# Patient Record
Sex: FEMALE | Race: BLACK OR AFRICAN AMERICAN | Employment: OTHER | URBAN - METROPOLITAN AREA
[De-identification: names, ages, dates, MRNs, and addresses within clinical notes are randomized per-mention and may not be internally consistent; named-entity substitution may affect disease eponyms.]

---

## 2017-01-28 ENCOUNTER — HOSPITAL ENCOUNTER (EMERGENCY)
Age: 67
Discharge: HOME | End: 2017-01-28
Attending: EMERGENCY MEDICINE
Payer: SELF-PAY

## 2017-01-28 VITALS
OXYGEN SATURATION: 100 % | BODY MASS INDEX: 27.09 KG/M2 | WEIGHT: 138 LBS | HEART RATE: 82 BPM | TEMPERATURE: 98.9 F | RESPIRATION RATE: 16 BRPM | DIASTOLIC BLOOD PRESSURE: 83 MMHG | HEIGHT: 60 IN | SYSTOLIC BLOOD PRESSURE: 127 MMHG

## 2017-01-28 DIAGNOSIS — M25.512 PAIN IN JOINT OF LEFT SHOULDER: Primary | ICD-10-CM

## 2017-01-28 DIAGNOSIS — M75.42 SHOULDER IMPINGEMENT, LEFT: ICD-10-CM

## 2017-01-28 PROCEDURE — 99283 EMERGENCY DEPT VISIT LOW MDM: CPT

## 2017-01-28 PROCEDURE — 74011250637 HC RX REV CODE- 250/637: Performed by: PHYSICIAN ASSISTANT

## 2017-01-28 RX ORDER — HYDROCODONE BITARTRATE AND ACETAMINOPHEN 5; 325 MG/1; MG/1
1 TABLET ORAL
Status: COMPLETED | OUTPATIENT
Start: 2017-01-28 | End: 2017-01-28

## 2017-01-28 RX ORDER — HYDROCODONE BITARTRATE AND ACETAMINOPHEN 5; 325 MG/1; MG/1
1 TABLET ORAL
Qty: 12 TAB | Refills: 0 | Status: SHIPPED | OUTPATIENT
Start: 2017-01-28 | End: 2017-02-12

## 2017-01-28 RX ADMIN — HYDROCODONE BITARTRATE AND ACETAMINOPHEN 1 TABLET: 5; 325 TABLET ORAL at 15:00

## 2017-01-28 NOTE — DISCHARGE INSTRUCTIONS
MD On-Line Activation    Thank you for requesting access to [facility]. Please follow the instructions below to securely access and download your online medical record. [facility] allows you to send messages to your doctor, view your test results, renew your prescriptions, schedule appointments, and more. How Do I Sign Up? 1. In your internet browser, go to [email or web address]  2. Click on the First Time User? Click Here link in the Sign In box. You will be redirect to the New Member Sign Up page. 3. Enter your [facility] Access Code exactly as it appears below. You will not need to use this code after youve completed the sign-up process. If you do not sign up before the expiration date, you must request a new code. [facility] Access Code: [email or web address]  Expires: 2017  2:22 PM (This is the date your [facility] access code will )    4. Enter the last four digits of your Social Security Number (xxxx) and Date of Birth (mm/dd/yyyy) as indicated and click Submit. You will be taken to the next sign-up page. 5. Create a [facility] ID. This will be your [facility] login ID and cannot be changed, so think of one that is secure and easy to remember. 6. Create a [facility] password. You can change your password at any time. 7. Enter your Password Reset Question and Answer. This can be used at a later time if you forget your password. 8. Enter your e-mail address. You will receive e-mail notification when new information is available in [address]. 9. Click Sign Up. You can now view and download portions of your medical record. 10. Click the Download Summary menu link to download a portable copy of your medical information. Additional Information    If you have questions, please visit the Frequently Asked Questions section of the [facility] On[facility]Line website at [email or web address] Remember, [facility] On[facility] is NOT to be used for urgent needs. For medical emergencies, dial 911.

## 2017-01-28 NOTE — ED PROVIDER NOTES
HPI Comments: 2:12 PM Yao Ty is a 73 y.o. female who presents to the ED c/o left shoulder pain that started a week ago, but worsened several days ago after carrying a 10lb bag of potatoes for 5 blocks. The pain is worsened with movement. Today, the pt's sister measured her temperature and said that she had a fever, so she brought her to the ED. She took Tylenol when the fever was measured. The pt denies any redness of the left shoulder, any abscesses, chest pain, SOB, HA, back pain, trauma, cough, runny nose, sore throat, abdominal pain, N/V/D, urinary symptoms, and any further complaints. The history is provided by the patient. Past Medical History:   Diagnosis Date    Anxiety     Dental caries        No past surgical history on file. No family history on file. Social History     Social History    Marital status: SINGLE     Spouse name: N/A    Number of children: N/A    Years of education: N/A     Occupational History    Not on file. Social History Main Topics    Smoking status: Former Smoker     Years: 15.00    Smokeless tobacco: Not on file    Alcohol use No    Drug use: No      Comment: none in 2 years    Sexual activity: Not on file     Other Topics Concern    Not on file     Social History Narrative         ALLERGIES: Aspirin    Review of Systems   Constitutional: Positive for fever. Negative for chills, diaphoresis and fatigue. HENT: Negative for congestion, ear pain, rhinorrhea and sore throat. Eyes: Negative for pain and redness. Respiratory: Negative for cough, shortness of breath, wheezing and stridor. Cardiovascular: Negative for chest pain, palpitations and leg swelling. Gastrointestinal: Negative for abdominal pain, constipation, diarrhea, nausea and vomiting. Genitourinary: Negative for dysuria, flank pain, frequency, hematuria and urgency. Musculoskeletal: Positive for arthralgias.  Negative for back pain, myalgias, neck pain and neck stiffness. Positive for left shoulder pain   Skin: Negative for color change, rash and wound. Neurological: Negative for dizziness, seizures, syncope, light-headedness, numbness and headaches. Psychiatric/Behavioral: Negative for confusion and hallucinations. All other systems reviewed and are negative. There were no vitals filed for this visit. Physical Exam   Constitutional: She is oriented to person, place, and time. She appears well-developed and well-nourished. She appears distressed. Mildly distressed   HENT:   Head: Normocephalic. Neck: Normal range of motion. Neck supple. Cardiovascular: Normal rate, regular rhythm and normal heart sounds. Exam reveals no gallop and no friction rub. No murmur heard. Pulmonary/Chest: Effort normal and breath sounds normal. No stridor. No respiratory distress. She has no wheezes. She has no rales. Musculoskeletal: She exhibits tenderness. She exhibits no edema or deformity. L shoulder: TTP over the deltoid region and anterior shoulder, no deformity noted, limited ROM upon flexion due to pain, no edema/warmth/ertythema noted, positive Hawkin's and Neer's   Neurological: She is alert and oriented to person, place, and time. Gait is steady. Able to ambulate without difficulty. Skin: Skin is warm and dry. No rash noted. She is not diaphoretic. No erythema. Psychiatric: She has a normal mood and affect. Her behavior is normal. Thought content normal.   Nursing note and vitals reviewed. MDM  Number of Diagnoses or Management Options  Pain in joint of left shoulder:   Diagnosis management comments: Impression:  Shoulder pain, shoulder impingement     norco given in the ED    Patient is stable for discharge at this time. Rx for short course of norco given. Rest and follow-up with PCP/ortho this week. Return to the ED immediately for any new or worsening sx.   Jazmin Beavers PA-C 2:20 PM     Risk of Complications, Morbidity, and/or Mortality  Presenting problems: low  Diagnostic procedures: low  Management options: low    Patient Progress  Patient progress: stable    ED Course       Procedures  Vitals:  Patient Vitals for the past 12 hrs:   Temp Pulse Resp BP SpO2   01/28/17 1412 98.9 °F (37.2 °C) 82 16 127/83 100 %     Pulse ox reviewed and WNL    Medications ordered:   Medications - No data to display      Progress notes, Consult notes or additional Procedure notes:   2:17 PM Pt reevaluated at this time and is resting comfortably in NAD. Discussed results and findings, as well as, diagnosis and plan for discharge. Pt verbalizes understanding and agreement with plan. All questions addressed at this time. Disposition:  Diagnosis: No diagnosis found. Disposition: Discharge    Follow-up Information     None           Patient's Medications   Start Taking    No medications on file   Continue Taking    HYDROXYZINE (VISTARIL) 25 MG CAPSULE    Take 1 Cap by mouth three (3) times daily as needed for Itching. Indications: ALLERGIC DERMATITIS    PERMETHRIN (ELIMITE) 5 % TOPICAL CREAM    Apply from neck to toes where there is itch and rash and leave on skin for 12 hours and repeat in 3 days    PREDNISONE (DELTASONE) 20 MG TABLET    Take 2 Tabs by mouth daily. Take two tabs each morning with breakfast and start tomorrow   These Medications have changed    No medications on file   Stop Taking    No medications on file         SCRIBE ATTESTATION STATEMENT  Documented by: Kiko Marino for, and in the presence of, Jazmin Rose PA-C 2:17 PM     Signed by: Nir Akbar, 01/28/17 2:17 PM    PROVIDER ATTESTATION STATEMENT  I personally performed the services described in the documentation, reviewed the documentation, as recorded by the scribe in my presence, and it accurately and completely records my words and actions.   Jazmin Rose PA-C

## 2017-02-12 ENCOUNTER — HOSPITAL ENCOUNTER (EMERGENCY)
Age: 67
Discharge: HOME | End: 2017-02-12
Attending: EMERGENCY MEDICINE
Payer: COMMERCIAL

## 2017-02-12 VITALS
TEMPERATURE: 98.4 F | BODY MASS INDEX: 27.82 KG/M2 | RESPIRATION RATE: 16 BRPM | WEIGHT: 138 LBS | SYSTOLIC BLOOD PRESSURE: 98 MMHG | DIASTOLIC BLOOD PRESSURE: 63 MMHG | HEART RATE: 92 BPM | HEIGHT: 59 IN | OXYGEN SATURATION: 97 %

## 2017-02-12 DIAGNOSIS — G89.29 CHRONIC LEFT SHOULDER PAIN: Primary | ICD-10-CM

## 2017-02-12 DIAGNOSIS — M25.512 CHRONIC LEFT SHOULDER PAIN: Primary | ICD-10-CM

## 2017-02-12 PROCEDURE — 74011250637 HC RX REV CODE- 250/637: Performed by: PHYSICIAN ASSISTANT

## 2017-02-12 PROCEDURE — 99283 EMERGENCY DEPT VISIT LOW MDM: CPT

## 2017-02-12 RX ORDER — TRAMADOL HYDROCHLORIDE 50 MG/1
50 TABLET ORAL
Qty: 12 TAB | Refills: 0 | Status: SHIPPED | OUTPATIENT
Start: 2017-02-12 | End: 2017-06-11

## 2017-02-12 RX ORDER — TRAMADOL HYDROCHLORIDE 50 MG/1
50 TABLET ORAL
Status: COMPLETED | OUTPATIENT
Start: 2017-02-12 | End: 2017-02-12

## 2017-02-12 RX ADMIN — TRAMADOL HYDROCHLORIDE 50 MG: 50 TABLET, FILM COATED ORAL at 11:56

## 2017-02-12 NOTE — ED PROVIDER NOTES
HPI Comments: 11:27 AM Joselin Patel is a 73 y.o. female who presents to the ED c/o left shoulder pain that started 2 weeks ago after carrying a 10 lb bag of potatoes for several blocks. She was seen in the ED two weeks ago and was prescribed Norco, that did not help her pain. She tried taking a friend's pain pill which helped relieve her pain, but she cannot remember the name of the medications. The pain is worsened with movement. She was referred to Dr. Kailash Viramontes, but she has been unable to see him as she does not have any insurance. The pt denies fever, new trauma, neck pain, back pain, numbness or tingling in the left arm, chest pain, SOB, and any further complaints. The history is provided by the patient. Past Medical History:   Diagnosis Date    Anxiety     Dental caries        No past surgical history on file. No family history on file. Social History     Social History    Marital status: SINGLE     Spouse name: N/A    Number of children: N/A    Years of education: N/A     Occupational History    Not on file. Social History Main Topics    Smoking status: Former Smoker     Years: 15.00    Smokeless tobacco: Not on file    Alcohol use No    Drug use: No      Comment: none in 2 years    Sexual activity: Not on file     Other Topics Concern    Not on file     Social History Narrative         ALLERGIES: Aspirin    Review of Systems   Constitutional: Negative for chills and fever. HENT: Negative for congestion and rhinorrhea. Respiratory: Negative for cough and shortness of breath. Cardiovascular: Negative for chest pain and leg swelling. Gastrointestinal: Negative for abdominal pain and nausea. Genitourinary: Negative for dysuria and hematuria. Musculoskeletal: Positive for arthralgias (left shoulder ). Negative for myalgias. Skin: Negative for rash and wound. Neurological: Negative for light-headedness and headaches.    Psychiatric/Behavioral: Negative for confusion and hallucinations. Vitals:    02/12/17 1123   BP: 98/63   Pulse: 92   Resp: 16   Temp: 98.4 °F (36.9 °C)   SpO2: 97%   Weight: 62.6 kg (138 lb)   Height: 4' 11\" (1.499 m)            Physical Exam   Constitutional: She is oriented to person, place, and time. She appears well-developed and well-nourished. No distress. HENT:   Head: Normocephalic and atraumatic. Eyes: Conjunctivae are normal. Right eye exhibits no discharge. Left eye exhibits no discharge. No scleral icterus. Neck: Normal range of motion. Neck supple.   + Spurling's to left   Cardiovascular: Normal rate, regular rhythm and normal heart sounds. Pulmonary/Chest: Effort normal and breath sounds normal. No respiratory distress. She has no wheezes. She has no rales. Musculoskeletal: Normal range of motion. TTP left anterior shoulder and overlying superior deltoid, + NEER sign. NVI distally with 5/5  strength. Neurological: She is alert and oriented to person, place, and time. She exhibits normal muscle tone. Skin: Skin is warm and dry. No rash noted. She is not diaphoretic. No erythema. No pallor. Psychiatric: She has a normal mood and affect. Her behavior is normal. Judgment and thought content normal.   Nursing note and vitals reviewed. MDM  Number of Diagnoses or Management Options  Chronic left shoulder pain:   Diagnosis management comments: DDX: nerve impingement, ligamentous tear, fracture, muscle strain, versus other etiologies. Pt appears to have impingement in her shoulder and possibly her neck as well. Will not write for norco, as cannot refill narcotics in the ED. Will give her a few ultram and instructions to f/u with ortho.  referral placed.      ED Course       Procedures      Vitals:  Patient Vitals for the past 12 hrs:   Temp Pulse Resp BP SpO2   02/12/17 1123 98.4 °F (36.9 °C) 92 16 98/63 97 %     Pulse ox reviewed and WNL    Medications ordered:   Medications   traMADol Shearon Se) tablet 50 mg (50 mg Oral Given 2/12/17 1156)           Progress notes, Consult notes or additional Procedure notes:     11:34 AM Pt reevaluated at this time and is resting comfortably in NAD. Discussed results and findings, as well as, diagnosis and plan for discharge. Pt verbalizes understanding and agreement with plan. All questions addressed at this time. Disposition:  Diagnosis:   1. Chronic left shoulder pain        Disposition: Discharge    Follow-up Information     Follow up With Details Comments Contact Info    Stacy Chapman MD In 3 days  [address] Replaced by [facility] [address] [facility] Call in 3 days  [address]    [facility]  If symptoms worsen [address] [facility]  [phone]           Patient's Medications   Start Taking    TRAMADOL (ULTRAM) 50 MG TABLET    Take 1 Tab by mouth every six (6) hours as needed for Pain. Max Daily Amount: 200 mg. Continue Taking    HYDROXYZINE (VISTARIL) 25 MG CAPSULE    Take 1 Cap by mouth three (3) times daily as needed for Itching. Indications: ALLERGIC DERMATITIS    PERMETHRIN (ELIMITE) 5 % TOPICAL CREAM    Apply from neck to toes where there is itch and rash and leave on skin for 12 hours and repeat in 3 days    PREDNISONE (DELTASONE) 20 MG TABLET    Take 2 Tabs by mouth daily. Take two tabs each morning with breakfast and start tomorrow   These Medications have changed    No medications on file   Stop Taking    HYDROCODONE-ACETAMINOPHEN (NORCO) 5-325 MG PER TABLET    Take 1 Tab by mouth every four (4) hours as needed for Pain. Max Daily Amount: 6 Tabs.          SCRIBE ATTESTATION STATEMENT  Documented by: Malia Hernandez scribing for, and in the presence of, Roxanne Cain PA-C 11:33 AM     Signed by: Nir Zurita, 02/12/17 11:33 AM      PROVIDER ATTESTATION STATEMENT  I personally performed the services described in the documentation, reviewed the documentation, as recorded by the scribe in my presence, and it accurately and completely records my words and actions.   Chauncey Quintero PA-C

## 2017-02-12 NOTE — ED NOTES
I have reviewed discharge instructions with the patient. The patient verbalized understanding. Patient armband removed and shredded  Pt seen and treated by provider.

## 2017-02-12 NOTE — DISCHARGE INSTRUCTIONS
Shoulder Pain: Care Instructions  Your Care Instructions    You can hurt your shoulder by using it too much during an activity, such as fishing or baseball. It can also happen as part of the everyday wear and tear of getting older. Shoulder injuries can be slow to heal, but your shoulder should get better with time. Your doctor may recommend a sling to rest your shoulder. If you have injured your shoulder, you may need testing and treatment. Follow-up care is a key part of your treatment and safety. Be sure to make and go to all appointments, and call your doctor if you are having problems. It's also a good idea to know your test results and keep a list of the medicines you take. How can you care for yourself at home? · Take pain medicines exactly as directed. ¨ If the doctor gave you a prescription medicine for pain, take it as prescribed. ¨ If you are not taking a prescription pain medicine, ask your doctor if you can take an over-the-counter medicine. ¨ Do not take two or more pain medicines at the same time unless the doctor told you to. Many pain medicines contain acetaminophen, which is Tylenol. Too much acetaminophen (Tylenol) can be harmful. · If your doctor recommends that you wear a sling, use it as directed. Do not take it off before your doctor tells you to. · Put ice or a cold pack on the sore area for 10 to 20 minutes at a time. Put a thin cloth between the ice and your skin. · If there is no swelling, you can put moist heat, a heating pad, or a warm cloth on your shoulder. Some doctors suggest alternating between hot and cold. · Rest your shoulder for a few days. If your doctor recommends it, you can then begin gentle exercise of the shoulder, but do not lift anything heavy. When should you call for help? Call 911 anytime you think you may need emergency care. For example, call if:  · You have chest pain or pressure. This may occur with:  ¨ Sweating. ¨ Shortness of breath.   ¨ Nausea or vomiting. ¨ Pain that spreads from the chest to the neck, jaw, or one or both shoulders or arms. ¨ Dizziness or lightheadedness. ¨ A fast or uneven pulse. After calling 911, chew 1 adult-strength aspirin. Wait for an ambulance. Do not try to drive yourself. · Your arm or hand is cool or pale or changes color. Call your doctor now or seek immediate medical care if:  · You have signs of infection, such as:  ¨ Increased pain, swelling, warmth, or redness in your shoulder. ¨ Red streaks leading from a place on your shoulder. ¨ Pus draining from an area of your shoulder. ¨ Swollen lymph nodes in your neck, armpits, or groin. ¨ A fever. Watch closely for changes in your health, and be sure to contact your doctor if:  · You cannot use your shoulder. · Your shoulder does not get better as expected. Where can you learn more? Go to http://hussein-nickolas.info/. Enter D549 in the search box to learn more about \"Shoulder Pain: Care Instructions. \"  Current as of: May 23, 2016  Content Version: 11.1  © 7532-7904 [employer]. Care instructions adapted under license by [employer] (which disclaims liability or warranty for this information). If you have questions about a medical condition or this instruction, always ask your healthcare professional. [place] [address] any warranty or liability for your use of this information.

## 2017-02-12 NOTE — ED TRIAGE NOTES
Pt, c/o pain left shoulder for 4 weeks,  Was seen  Here  For same problem  Given  Rx  States   Its  Did not help

## 2017-02-12 NOTE — LETTER
NOTIFICATION OF RETURN TO WORK / SCHOOL 
 
2/12/2017 Ms. Nayeli Mitchell [address] 
[address] [facility] To Whom It May Concern: Nayeli Mitchell was under the care of [facility] from 2/12/17 to 2/14/17. She will return to work on 2/15/17 with no restrictions. If there are questions or concerns please have the patient contact our office.  
 
 
 
Sincerely, 
 
 
Roseline Sotelo RN

## 2017-02-14 NOTE — ED NOTES
Patient will need to follow up at [facility] ED because she currently does not have any insurance. She will have to get qualified for the [employer] in order to pay for services at Orthopedic office in [place]. Patient will be given information to follow up with the [facility] or the [address] [place] [address] in order to get an referral to go to Orthopedic and [address] [place] [address]. They require to pay a $200 co-pay or make payment arrangement with their office in order to see the doctor. The patient will be mailed out information to contact [facility] in order to see if she qualifies for the [facility] in order to be seen at Ortho. Patient will be followed up in 1 week to ensure that she has contacted the foundation or been seen on the [place] [id number], and [facility].

## 2017-02-24 ENCOUNTER — HOSPITAL ENCOUNTER (EMERGENCY)
Age: 67
Discharge: HOME | End: 2017-02-24
Attending: EMERGENCY MEDICINE
Payer: SELF-PAY

## 2017-02-24 ENCOUNTER — APPOINTMENT (OUTPATIENT)
Dept: GENERAL RADIOLOGY | Age: 67
End: 2017-02-24
Attending: PHYSICIAN ASSISTANT
Payer: SELF-PAY

## 2017-02-24 VITALS
DIASTOLIC BLOOD PRESSURE: 83 MMHG | HEART RATE: 68 BPM | OXYGEN SATURATION: 99 % | WEIGHT: 138 LBS | RESPIRATION RATE: 16 BRPM | SYSTOLIC BLOOD PRESSURE: 134 MMHG | TEMPERATURE: 98.6 F | HEIGHT: 59 IN | BODY MASS INDEX: 27.82 KG/M2

## 2017-02-24 DIAGNOSIS — A59.03 TRICHOMONAL CYSTITIS: ICD-10-CM

## 2017-02-24 DIAGNOSIS — B86 SCABIES: ICD-10-CM

## 2017-02-24 DIAGNOSIS — R07.9 CHEST PAIN, UNSPECIFIED TYPE: Primary | ICD-10-CM

## 2017-02-24 LAB
ALBUMIN SERPL BCP-MCNC: 3.8 G/DL (ref 3.4–5)
ALBUMIN/GLOB SERPL: 1.1 {RATIO} (ref 0.8–1.7)
ALP SERPL-CCNC: 53 U/L (ref 45–117)
ALT SERPL-CCNC: 26 U/L (ref 13–56)
ANION GAP BLD CALC-SCNC: 4 MMOL/L (ref 3–18)
APPEARANCE UR: ABNORMAL
AST SERPL W P-5'-P-CCNC: 18 U/L (ref 15–37)
ATRIAL RATE: 76 BPM
BACTERIA URNS QL MICRO: ABNORMAL /HPF
BASOPHILS # BLD AUTO: 0 K/UL (ref 0–0.06)
BASOPHILS # BLD: 1 % (ref 0–2)
BILIRUB SERPL-MCNC: 0.5 MG/DL (ref 0.2–1)
BILIRUB UR QL: NEGATIVE
BUN SERPL-MCNC: 12 MG/DL (ref 7–18)
BUN/CREAT SERPL: 14 (ref 12–20)
CALCIUM SERPL-MCNC: 9.3 MG/DL (ref 8.5–10.1)
CALCULATED P AXIS, ECG09: 46 DEGREES
CALCULATED R AXIS, ECG10: 24 DEGREES
CALCULATED T AXIS, ECG11: 37 DEGREES
CHLORIDE SERPL-SCNC: 104 MMOL/L (ref 100–108)
CK MB CFR SERPL CALC: 1.6 % (ref 0–4)
CK MB SERPL-MCNC: 3.1 NG/ML (ref 5–25)
CK SERPL-CCNC: 195 U/L (ref 26–192)
CO2 SERPL-SCNC: 31 MMOL/L (ref 21–32)
COLOR UR: YELLOW
CREAT SERPL-MCNC: 0.84 MG/DL (ref 0.6–1.3)
DIAGNOSIS, 93000: NORMAL
DIFFERENTIAL METHOD BLD: NORMAL
EOSINOPHIL # BLD: 0.1 K/UL (ref 0–0.4)
EOSINOPHIL NFR BLD: 2 % (ref 0–5)
EPITH CASTS URNS QL MICRO: ABNORMAL /LPF (ref 0–5)
ERYTHROCYTE [DISTWIDTH] IN BLOOD BY AUTOMATED COUNT: 14 % (ref 11.6–14.5)
GLOBULIN SER CALC-MCNC: 3.6 G/DL (ref 2–4)
GLUCOSE SERPL-MCNC: 125 MG/DL (ref 74–99)
GLUCOSE UR STRIP.AUTO-MCNC: NEGATIVE MG/DL
HCT VFR BLD AUTO: 38.9 % (ref 35–45)
HGB BLD-MCNC: 13.8 G/DL (ref 12–16)
HGB UR QL STRIP: ABNORMAL
KETONES UR QL STRIP.AUTO: NEGATIVE MG/DL
LEUKOCYTE ESTERASE UR QL STRIP.AUTO: ABNORMAL
LYMPHOCYTES # BLD AUTO: 48 % (ref 21–52)
LYMPHOCYTES # BLD: 3.2 K/UL (ref 0.9–3.6)
MAGNESIUM SERPL-MCNC: 2.3 MG/DL (ref 1.8–2.4)
MCH RBC QN AUTO: 28.9 PG (ref 24–34)
MCHC RBC AUTO-ENTMCNC: 35.5 G/DL (ref 31–37)
MCV RBC AUTO: 81.6 FL (ref 74–97)
MONOCYTES # BLD: 0.5 K/UL (ref 0.05–1.2)
MONOCYTES NFR BLD AUTO: 8 % (ref 3–10)
NEUTS SEG # BLD: 2.7 K/UL (ref 1.8–8)
NEUTS SEG NFR BLD AUTO: 41 % (ref 40–73)
NITRITE UR QL STRIP.AUTO: NEGATIVE
P-R INTERVAL, ECG05: 154 MS
PH UR STRIP: 7 [PH] (ref 5–8)
PLATELET # BLD AUTO: 264 K/UL (ref 135–420)
PMV BLD AUTO: 9.7 FL (ref 9.2–11.8)
POTASSIUM SERPL-SCNC: 4.1 MMOL/L (ref 3.5–5.5)
PROT SERPL-MCNC: 7.4 G/DL (ref 6.4–8.2)
PROT UR STRIP-MCNC: NEGATIVE MG/DL
Q-T INTERVAL, ECG07: 408 MS
QRS DURATION, ECG06: 72 MS
QTC CALCULATION (BEZET), ECG08: 459 MS
RBC # BLD AUTO: 4.77 M/UL (ref 4.2–5.3)
RBC #/AREA URNS HPF: ABNORMAL /HPF (ref 0–5)
SODIUM SERPL-SCNC: 139 MMOL/L (ref 136–145)
SP GR UR REFRACTOMETRY: 1.02 (ref 1–1.03)
TRICHOMONAS UR QL MICRO: ABNORMAL
TROPONIN I SERPL-MCNC: <0.02 NG/ML (ref 0–0.04)
UROBILINOGEN UR QL STRIP.AUTO: 1 EU/DL (ref 0.2–1)
VENTRICULAR RATE, ECG03: 76 BPM
WBC # BLD AUTO: 6.6 K/UL (ref 4.6–13.2)
WBC URNS QL MICRO: ABNORMAL /HPF (ref 0–4)

## 2017-02-24 PROCEDURE — 93005 ELECTROCARDIOGRAM TRACING: CPT

## 2017-02-24 PROCEDURE — 85025 COMPLETE CBC W/AUTO DIFF WBC: CPT | Performed by: PHYSICIAN ASSISTANT

## 2017-02-24 PROCEDURE — 71020 XR CHEST PA LAT: CPT

## 2017-02-24 PROCEDURE — 82550 ASSAY OF CK (CPK): CPT | Performed by: PHYSICIAN ASSISTANT

## 2017-02-24 PROCEDURE — 80053 COMPREHEN METABOLIC PANEL: CPT | Performed by: PHYSICIAN ASSISTANT

## 2017-02-24 PROCEDURE — 83735 ASSAY OF MAGNESIUM: CPT | Performed by: PHYSICIAN ASSISTANT

## 2017-02-24 PROCEDURE — 74011250637 HC RX REV CODE- 250/637: Performed by: PHYSICIAN ASSISTANT

## 2017-02-24 PROCEDURE — 99284 EMERGENCY DEPT VISIT MOD MDM: CPT

## 2017-02-24 PROCEDURE — 81001 URINALYSIS AUTO W/SCOPE: CPT | Performed by: PHYSICIAN ASSISTANT

## 2017-02-24 RX ORDER — PERMETHRIN 50 MG/G
CREAM TOPICAL
Qty: 60 G | Refills: 0 | Status: SHIPPED | OUTPATIENT
Start: 2017-02-24 | End: 2017-03-22

## 2017-02-24 RX ORDER — HYDROXYZINE PAMOATE 25 MG/1
50 CAPSULE ORAL
Status: COMPLETED | OUTPATIENT
Start: 2017-02-24 | End: 2017-02-24

## 2017-02-24 RX ORDER — METRONIDAZOLE 500 MG/1
500 TABLET ORAL 2 TIMES DAILY
Qty: 14 TAB | Refills: 0 | Status: SHIPPED | OUTPATIENT
Start: 2017-02-24 | End: 2017-03-03

## 2017-02-24 RX ORDER — HYDROXYZINE PAMOATE 25 MG/1
25 CAPSULE ORAL
Qty: 30 CAP | Refills: 0 | Status: SHIPPED | OUTPATIENT
Start: 2017-02-24 | End: 2017-06-11

## 2017-02-24 RX ADMIN — HYDROXYZINE PAMOATE 50 MG: 25 CAPSULE ORAL at 14:50

## 2017-02-24 NOTE — ED TRIAGE NOTES
Pt,   Of itching  On arms & legs  For about a week , had been scratching it hard,  Also added  That she had chest pain  Started last night,

## 2017-02-24 NOTE — ED NOTES
PT ambulated to room F2 without difficulty, reports itching to bilateral arms and reports scabies, also reports incident of CP last night but was out of prescribed nitro, Ax4, safety intact, will continue to monitor

## 2017-02-24 NOTE — ED PROVIDER NOTES
HPI Comments: 70yoF with hx of anxiety, scabies, bradycardia to ED c/o bilateral arm itching x last night. Pt states when she was itching her arms this morning, she started to experience left sided chest pain (8/9am). The pain lasted for about 5-10 min and has not returned. Reports mild SOB at that time. Denies N/V/D, abd pain, dizziness, HA or any other symptoms. She usually takes nitro for the CP but has been out. Pt reports she believes the itching is from scabies as it is similar to the itching she had last year when she was dx with scabies. Patient is a 73 y.o. female presenting with skin problem and other event. Skin Problem   Associated symptoms include chest pain and shortness of breath. Other   Associated symptoms include chest pain and shortness of breath. Past Medical History:   Diagnosis Date    Anxiety     Dental caries        No past surgical history on file. No family history on file. Social History     Social History    Marital status: SINGLE     Spouse name: N/A    Number of children: N/A    Years of education: N/A     Occupational History    Not on file. Social History Main Topics    Smoking status: Former Smoker     Years: 15.00    Smokeless tobacco: Not on file    Alcohol use No    Drug use: No      Comment: none in 2 years    Sexual activity: Not on file     Other Topics Concern    Not on file     Social History Narrative         ALLERGIES: Aspirin    Review of Systems   Respiratory: Positive for shortness of breath. Cardiovascular: Positive for chest pain. Skin: Positive for rash. All other systems reviewed and are negative. Vitals:    02/24/17 1106   BP: 134/83   Pulse: 68   Resp: 16   Temp: 98.6 °F (37 °C)   SpO2: 99%   Weight: 62.6 kg (138 lb)   Height: 4' 11\" (1.499 m)            Physical Exam   Constitutional: She is oriented to person, place, and time. She appears well-developed and well-nourished. No distress.    Resting comfortably, sunglasses on   HENT:   Head: Normocephalic and atraumatic. Right Ear: External ear normal.   Left Ear: External ear normal.   Mouth/Throat: Oropharynx is clear and moist. No oropharyngeal exudate. Eyes: Conjunctivae and EOM are normal. Pupils are equal, round, and reactive to light. Neck: Normal range of motion. Neck supple. Cardiovascular: Normal rate and regular rhythm. Pulmonary/Chest: Effort normal. She has no wheezes. Abdominal: Soft. Bowel sounds are normal. There is no tenderness. Musculoskeletal: Normal range of motion. She exhibits no edema. Neurological: She is alert and oriented to person, place, and time. Skin: Skin is warm and dry. She is not diaphoretic. Psychiatric: She has a normal mood and affect. MDM  Number of Diagnoses or Management Options  Chest pain, unspecified type:   Scabies:   Trichomonal cystitis:   Diagnosis management comments: Pt presents c/o CP and SOB onset this morning while she was itching her arms due to scabies. Pt has been CP free since this morning. She is walking around the department w/o distress or complaints. CP likely anxiety related. Labs pending. SKYE White 2:36 PM  2:45 PM Labs are reassuring with exception of +trichomonas in urine, will place on flagyl. Pt states she is calling her ride because she is ready to leave.  SKYE White         Amount and/or Complexity of Data Reviewed  Clinical lab tests: ordered and reviewed  Tests in the radiology section of CPT®: ordered and reviewed    Risk of Complications, Morbidity, and/or Mortality  Presenting problems: moderate  Diagnostic procedures: moderate  Management options: low    Patient Progress  Patient progress: stable    ED Course       Procedures

## 2017-02-24 NOTE — DISCHARGE INSTRUCTIONS
Chest Pain: Care Instructions  Your Care Instructions  There are many things that can cause chest pain. Some are not serious and will get better on their own in a few days. But some kinds of chest pain need more testing and treatment. Your doctor may have recommended a follow-up visit in the next 8 to 12 hours. If you are not getting better, you may need more tests or treatment. Even though your doctor has released you, you still need to watch for any problems. The doctor carefully checked you, but sometimes problems can develop later. If you have new symptoms or if your symptoms do not get better, get medical care right away. If you have worse or different chest pain or pressure that lasts more than 5 minutes or you passed out (lost consciousness), call 911 or seek other emergency help right away. A medical visit is only one step in your treatment. Even if you feel better, you still need to do what your doctor recommends, such as going to all suggested follow-up appointments and taking medicines exactly as directed. This will help you recover and help prevent future problems. How can you care for yourself at home? · Rest until you feel better. · Take your medicine exactly as prescribed. Call your doctor if you think you are having a problem with your medicine. · Do not drive after taking a prescription pain medicine. When should you call for help? Call 911 if:  · You passed out (lost consciousness). · You have severe difficulty breathing. · You have symptoms of a heart attack. These may include:  ¨ Chest pain or pressure, or a strange feeling in your chest.  ¨ Sweating. ¨ Shortness of breath. ¨ Nausea or vomiting. ¨ Pain, pressure, or a strange feeling in your back, neck, jaw, or upper belly or in one or both shoulders or arms. ¨ Lightheadedness or sudden weakness. ¨ A fast or irregular heartbeat.   After you call 911, the  may tell you to chew 1 adult-strength or 2 to 4 low-dose aspirin. Wait for an ambulance. Do not try to drive yourself. Call your doctor today if:  · You have any trouble breathing. · Your chest pain gets worse. · You are dizzy or lightheaded, or you feel like you may faint. · You are not getting better as expected. · You are having new or different chest pain. Where can you learn more? Go to http://hussein-nickolas.info/. Enter A120 in the search box to learn more about \"Chest Pain: Care Instructions. \"  Current as of: May 27, 2016  Content Version: 11.1  © 2651-2163 [employer]. Care instructions adapted under license by [employer] (which disclaims liability or warranty for this information). If you have questions about a medical condition or this instruction, always ask your healthcare professional. [address] any warranty or liability for your use of this information. Scabies: Care Instructions  Your Care Instructions  Scabies is a skin problem that can cause intense itching. It is caused by very tiny bugs called mites that dig just under the skin and lay eggs. An allergic reaction to the mites causes the itching. Scabies is usually spread by person-to-person contact. It is also possible, but not common, for scabies to spread through towels, clothes, and bedding. Everyone in your household should be treated. Scabies is treated with medicine. Itching may last for several weeks after treatment. Follow-up care is a key part of your treatment and safety. Be sure to make and go to all appointments, and call your doctor if you are having problems. It's also a good idea to know your test results and keep a list of the medicines you take. How can you care for yourself at home? · Use the lotion or cream your doctor recommends or prescribes. One treatment usually cures scabies. Do not use the cream again unless your doctor tells you to.   · Wash all clothes, bedding, and towels that you used in the 3 days before you started treatment. Use hot water, and use the hot cycle in the dryer. Another option is to dry-clean these items. Or seal them in a plastic bag for 3 to 7 days. · Take an oral antihistamine, such as loratadine (Claritin) or diphenhydramine (Benadryl), to help stop itching. You also can use a nonprescription anti-itch cream. Read and follow all instructions on the label. · Do not have physical contact with other people or let anyone use your personal items until you have finished treatment. Do not use other people's personal items until your treatment is done. Tell people with whom you have close contact that they will need treatment if they have symptoms. · Take an oatmeal bath to help relieve itching. Add a handful of oatmeal (ground to a powder) to your bath. Or you can try an oatmeal bath product, such as Aveeno. When should you call for help? Call your doctor now or seek immediate medical care if:  · You have signs of infection, such as:  ¨ Increased pain, swelling, warmth, or redness. ¨ Red streaks leading from the mite bites. ¨ Pus draining from a bite area. ¨ A fever. Watch closely for changes in your health, and be sure to contact your doctor if:  · Anyone else in your family has itching. · You do not get better within 2 weeks. Where can you learn more? Go to http://hussein-nickolas.info/. Enter R344 in the search box to learn more about \"Scabies: Care Instructions. \"  Current as of: February 5, 2016  Content Version: 11.1  © 1109-1760 [employer]. Care instructions adapted under license by [employer] (which disclaims liability or warranty for this information). If you have questions about a medical condition or this instruction, always ask your healthcare professional. [address] any warranty or liability for your use of this information.          Trichomoniasis: Care Instructions  Your Care Instructions  Trichomoniasis is a sexually transmitted infection (STI) that is spread by having sex with an infected partner. Trichomoniasis is commonly called trich (say \"trick\"). In women, trich may cause vaginal itching and a smelly discharge. But in many cases, especially in men, there are no symptoms. Ajay Rising is treated so that you do not spread it to others. Both you and your sex partner or partners should be treated at the same time so you do not infect each other again. Trich may cause problems with pregnancy. Your doctor will talk with you about treatment for Trich if you are pregnant. Follow-up care is a key part of your treatment and safety. Be sure to make and go to all appointments, and call your doctor if you are having problems. Its also a good idea to know your test results and keep a list of the medicines you take. How can you care for yourself at home? · Take your antibiotics as directed. Do not stop taking them just because you feel better. You need to take the full course of antibiotics. · Do not have sex while you are being treated. If your doctor gave you a single dose of antibiotics, do not have sex for one week after being treated and until your partner also has been treated. · Tell your sex partner (or partners) that he or she will also need to be tested and treated. · Use a cold water compress or cool baths to relieve itching. To prevent trichomoniasis in the future  · Use latex condoms every time you have sex. Use them from the beginning to the end of sexual contact. · Talk to your partner before having sex. Find out if he or she has or is at risk for trich or any other STI. Keep in mind that a person may be able to spread an STI even if he or she does not have symptoms. · Do not have sex if you are being treated for trich or any other STI. · Do not have sex with anyone who has symptoms of an STI, such as sores on the genitals or mouth.   · Having one sex partner (who does not have STIs and does not have sex with anyone else) is a good way to avoid STIs. When should you call for help? Call your doctor now or seek immediate medical care if:  · You have unusual vaginal bleeding. · You have a fever. · You have new discharge from the vagina or penis. · You have pelvic pain. Watch closely for changes in your health, and be sure to contact your doctor if:  · You do not get better as expected. · You have any new symptoms or your symptoms get worse. Where can you learn more? Go to http://hussein-nickolas.info/. Enter L561 in the search box to learn more about \"Trichomoniasis: Care Instructions. \"  Current as of: May 27, 2016  Content Version: 11.1  © 2353-5905 [employer]. Care instructions adapted under license by [employer] (which disclaims liability or warranty for this information). If you have questions about a medical condition or this instruction, always ask your healthcare professional. [address] any warranty or liability for your use of this information.

## 2017-03-22 ENCOUNTER — HOSPITAL ENCOUNTER (EMERGENCY)
Age: 67
Discharge: STILL ADMITTED OR ADMINISTRATIVE | End: 2017-03-22
Attending: EMERGENCY MEDICINE
Payer: SELF-PAY

## 2017-03-22 PROCEDURE — 75810000275 HC EMERGENCY DEPT VISIT NO LEVEL OF CARE

## 2017-03-22 RX ORDER — PERMETHRIN 50 MG/G
CREAM TOPICAL
Qty: 60 G | Refills: 0 | Status: SHIPPED | OUTPATIENT
Start: 2017-03-22 | End: 2017-06-11

## 2017-05-17 ENCOUNTER — HOSPITAL ENCOUNTER (EMERGENCY)
Age: 67
Discharge: HOME | End: 2017-05-17
Attending: EMERGENCY MEDICINE
Payer: SELF-PAY

## 2017-05-17 VITALS
SYSTOLIC BLOOD PRESSURE: 106 MMHG | HEIGHT: 60 IN | DIASTOLIC BLOOD PRESSURE: 77 MMHG | HEART RATE: 89 BPM | TEMPERATURE: 98.4 F | RESPIRATION RATE: 16 BRPM | OXYGEN SATURATION: 97 % | BODY MASS INDEX: 27.48 KG/M2 | WEIGHT: 140 LBS

## 2017-05-17 DIAGNOSIS — M72.2 PLANTAR FASCIITIS: ICD-10-CM

## 2017-05-17 DIAGNOSIS — K05.10 GINGIVITIS: Primary | ICD-10-CM

## 2017-05-17 DIAGNOSIS — M79.672 LEFT FOOT PAIN: ICD-10-CM

## 2017-05-17 PROCEDURE — 99282 EMERGENCY DEPT VISIT SF MDM: CPT

## 2017-05-17 RX ORDER — ACETAMINOPHEN AND CODEINE PHOSPHATE 300; 30 MG/1; MG/1
1 TABLET ORAL
Qty: 12 TAB | Refills: 0 | Status: SHIPPED | OUTPATIENT
Start: 2017-05-17 | End: 2017-06-11

## 2017-05-17 NOTE — ED PROVIDER NOTES
HPI Comments: 3:34 PM Ilia Guido is a 73 y.o. female with a history of dental caries who presents to ED to be evaluated for gum pain onset 2 days ago. Pt also c/o L foot pain she describes as \"burning\" in the ball of her foot. States she has been experiencing the pain for 8- 9 months. No other concerns at this time. The history is provided by the patient. Past Medical History:   Diagnosis Date    Angina at rest [facility])     Anxiety     Dental caries        No past surgical history on file. No family history on file. Social History     Social History    Marital status: SINGLE     Spouse name: N/A    Number of children: N/A    Years of education: N/A     Occupational History    Not on file. Social History Main Topics    Smoking status: Former Smoker     Years: 15.00    Smokeless tobacco: Not on file    Alcohol use No    Drug use: No      Comment: none in 2 years    Sexual activity: Not on file     Other Topics Concern    Not on file     Social History Narrative         ALLERGIES: Aspirin    Review of Systems   Constitutional: Negative for fever. HENT: Positive for dental problem (Gum pain). Negative for congestion, ear pain and sore throat. Eyes: Negative for pain. Respiratory: Negative for cough and shortness of breath. Cardiovascular: Negative for chest pain. Gastrointestinal: Negative for abdominal pain and vomiting. Genitourinary: Negative for difficulty urinating, dysuria and hematuria. Musculoskeletal: Negative for back pain and neck pain. L foot pain   Skin: Negative for rash. Neurological: Negative for light-headedness. All other systems reviewed and are negative. Vitals:    05/17/17 1534   BP: 106/77   Pulse: 89   Resp: 16   Temp: 98.4 °F (36.9 °C)   SpO2: 97%   Weight: 63.5 kg (140 lb)   Height: 5' (1.524 m)            Physical Exam   Constitutional: She appears well-developed and well-nourished. Non-toxic appearance.  She does not have a sickly appearance. She does not appear ill. No distress. HENT:   Head: Normocephalic and atraumatic. Mouth/Throat: Uvula is midline, oropharynx is clear and moist and mucous membranes are normal. She does not have dentures. Abnormal dentition. No dental abscesses, uvula swelling or dental caries. No oropharyngeal exudate, posterior oropharyngeal edema, posterior oropharyngeal erythema or tonsillar abscesses. Edentulous   No gingivitis noted   Eyes: Conjunctivae and EOM are normal. Pupils are equal, round, and reactive to light. No scleral icterus. Neck: Trachea normal and normal range of motion. Neck supple. No hepatojugular reflux and no JVD present. No tracheal deviation present. No thyromegaly present. Cardiovascular: Normal rate, regular rhythm, S1 normal, S2 normal, normal heart sounds, intact distal pulses and normal pulses. Exam reveals no gallop, no S3 and no S4. No murmur heard. Pulses:       Radial pulses are 2+ on the right side, and 2+ on the left side. Dorsalis pedis pulses are 2+ on the right side, and 2+ on the left side. Pulmonary/Chest: Effort normal and breath sounds normal. No respiratory distress. She has no decreased breath sounds. She has no wheezes. She has no rhonchi. She has no rales. Abdominal: Soft. Normal appearance and bowel sounds are normal. She exhibits no distension and no mass. There is no hepatosplenomegaly. There is no tenderness. There is no rigidity, no rebound, no guarding, no CVA tenderness, no tenderness at McBurney's point and negative Eagle's sign. Musculoskeletal: Normal range of motion. Feet:    Lymphadenopathy:        Head (right side): No submental, no submandibular, no preauricular and no occipital adenopathy present. Head (left side): No submental, no submandibular, no preauricular and no occipital adenopathy present. She has no cervical adenopathy. Right: No supraclavicular adenopathy present.         Left: No supraclavicular adenopathy present. Neurological: She is alert. She has normal strength and normal reflexes. She is not disoriented. No cranial nerve deficit or sensory deficit. Coordination and gait normal. GCS eye subscore is 4. GCS verbal subscore is 5. GCS motor subscore is 6. Grossly intact    Skin: Skin is warm, dry and intact. No rash noted. She is not diaphoretic. Psychiatric: She has a normal mood and affect. Her speech is normal and behavior is normal. Judgment and thought content normal. Cognition and memory are normal.   Nursing note and vitals reviewed. MDM  Number of Diagnoses or Management Options  Gingivitis:   Left foot pain:   Plantar fasciitis:     ED Course       Procedures       I have assessed the patient. Patient is feeling better. Patient will be prescribed Tylenol-Codeine #3. Patient was discharged in stable condition. Patient is to return to emergency department if any new or worsening condition. 3:47 PM      Disposition: Discharged    Diagnosis:   1. Gingivitis    2. Left foot pain    3. Plantar fasciitis          Follow-up Information     Follow up With Details Comments Contact Info    Kiesha Gandhi DPM Schedule an appointment as soon as possible for a visit  [address] [place] [address]  Jairon Go to  [address] [place] [address] kalyn Vera scribing for, and in the presence of [facility] found 3:38 PM, 05/17/17. Physician Attestation  I personally performed the services described in the documentation, reviewed the documentation, as recorded by the scribe in my presence, and it accurately and completely records my words and actions.     [facility],   Signed by: Nir Levi, May 17, 2017 at 3:38 PM

## 2017-05-17 NOTE — ED NOTES
Patient armband removed and shredded  I have reviewed discharge instructions with the patient. The patient verbalized understanding. Patient discharged on one prescription.

## 2017-06-11 ENCOUNTER — HOSPITAL ENCOUNTER (EMERGENCY)
Age: 67
Discharge: HOME | End: 2017-06-11
Attending: EMERGENCY MEDICINE
Payer: SELF-PAY

## 2017-06-11 VITALS
BODY MASS INDEX: 27.48 KG/M2 | WEIGHT: 140 LBS | RESPIRATION RATE: 16 BRPM | DIASTOLIC BLOOD PRESSURE: 73 MMHG | TEMPERATURE: 98.3 F | HEART RATE: 88 BPM | SYSTOLIC BLOOD PRESSURE: 113 MMHG | HEIGHT: 60 IN | OXYGEN SATURATION: 98 %

## 2017-06-11 DIAGNOSIS — K05.10 GINGIVITIS: Primary | ICD-10-CM

## 2017-06-11 DIAGNOSIS — Z59.00 HOMELESSNESS: ICD-10-CM

## 2017-06-11 PROCEDURE — 99283 EMERGENCY DEPT VISIT LOW MDM: CPT

## 2017-06-11 PROCEDURE — 74011250637 HC RX REV CODE- 250/637: Performed by: NURSE PRACTITIONER

## 2017-06-11 RX ORDER — CHLORHEXIDINE GLUCONATE 1.2 MG/ML
15 RINSE ORAL
Status: COMPLETED | OUTPATIENT
Start: 2017-06-11 | End: 2017-06-11

## 2017-06-11 RX ORDER — CHLORHEXIDINE GLUCONATE 1.2 MG/ML
15 RINSE ORAL 2 TIMES DAILY
Qty: 420 ML | Refills: 0 | Status: SHIPPED | OUTPATIENT
Start: 2017-06-11 | End: 2017-06-25

## 2017-06-11 RX ADMIN — CHLORHEXIDINE GLUCONATE 15 ML: 1.2 RINSE ORAL at 19:32

## 2017-06-11 SDOH — ECONOMIC STABILITY - HOUSING INSECURITY: HOMELESSNESS UNSPECIFIED: Z59.00

## 2017-06-11 NOTE — ED PROVIDER NOTES
HPI Comments: Patient is a 73 y.o.  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 States she is homeless, has not seen a dentist as an adult, lost the majority of her teeth years ago and has not returned to the [facility]"in years\" for her general care. She denies swelling to the face, inability to control oral secretions, swallow, sensation of swelling to tongue or throat, discharge of blood or purulence, inability to open or close mouth, external trauma or abuse, fever/chills, n/v, CP, SOB or swallowing/inhaling the broken tooth/hot food/beverage or caustic substance. This did start after a lot of soda yesterday. Patient is a 73 y.o. female presenting with dental problem. The history is provided by the patient. Dental Pain    This is a chronic problem. The current episode started yesterday. The problem occurs daily. The problem has not changed since onset. The pain is located in the right lower and right upper mouth. The pain is at a severity of 6/10. The pain is moderate. Associated symptoms include swelling. There was no vomiting, no nausea, no fever, no chest pain, no shortness of breath, no headaches, no gum redness and no drainage. She has tried nothing for the symptoms. The treatment provided no relief. The patient has cardiac history. Past Medical History:   Diagnosis Date    Angina at rest [facility])     Anxiety     CVA, old, facial weakness 2012    left ptosis    Dental caries        History reviewed. No pertinent surgical history. History reviewed. No pertinent family history.     Social History     Social History    Marital status: SINGLE     Spouse name: N/A    Number of children: N/A    Years of education: N/A     Occupational History    Not on file. Social History Main Topics    Smoking status: Former Smoker     Years: 15.00    Smokeless tobacco: Not on file    Alcohol use No    Drug use: No      Comment: none in 2 years    Sexual activity: No     Other Topics Concern    Not on file     Social History Narrative         ALLERGIES: Aspirin    Review of Systems   Constitutional: Negative for activity change, appetite change, chills and fever. HENT: Positive for dental problem. Negative for congestion, drooling, ear discharge, trouble swallowing and voice change. Eyes: Negative for discharge, redness and visual disturbance. Respiratory: Negative for cough, chest tightness and shortness of breath. Cardiovascular: Negative for chest pain and leg swelling. Gastrointestinal: Negative for abdominal pain, constipation, diarrhea, nausea and vomiting. Genitourinary: Negative for difficulty urinating, dysuria, flank pain, frequency and urgency. Musculoskeletal: Negative for back pain, joint swelling, neck pain and neck stiffness. Skin: Negative for color change, rash and wound. Neurological: Negative for dizziness, speech difficulty and headaches. Psychiatric/Behavioral: Negative for agitation and behavioral problems. The patient is not nervous/anxious. Vitals:    06/11/17 1910   BP: 113/73   Pulse: 88   Resp: 16   Temp: 98.3 °F (36.8 °C)   SpO2: 98%   Weight: 63.5 kg (140 lb)   Height: 5' (1.524 m)            Physical Exam   Constitutional: She is oriented to person, place, and time. She appears well-developed and well-nourished. No distress. HENT:   Head: Normocephalic. Mouth/Throat: Uvula is midline and oropharynx is clear and moist. No oropharyngeal exudate. Eyes: Conjunctivae and EOM are normal. No scleral icterus.    Cardiovascular: Normal rate, regular rhythm and normal heart sounds. No murmur heard. Pulmonary/Chest: Effort normal and breath sounds normal. No stridor. No respiratory distress. Neurological: She is alert and oriented to person, place, and time. Skin: Skin is warm and dry. Psychiatric: She has a normal mood and affect. Her behavior is normal. Judgment and thought content normal.        MDM  Number of Diagnoses or Management Options  Gingivitis:   Homelessness:   Diagnosis management comments: Will work on providing oral antibiotics and encourage follow up with  since she qualifies for insurance at 73 y.o. Nursing will work on this today. The primary encounter diagnosis was Gingivitis. A diagnosis of Homelessness was also pertinent to this visit.       ED Course       Procedures

## 2017-06-11 NOTE — DISCHARGE INSTRUCTIONS
Periodontal Conditions: Care Instructions  Your Care Instructions    Periodontal conditions affect the gums, bone, and tissue that surround and support the teeth. The most common problems are caused by plaque. Plaque is a thin film of bacteria that sticks to teeth above and below the gum line. It can build up and harden into tartar. The bacteria in plaque and tartar can cause gum disease. Gingivitis is a disease that affects the gums (gingiva). The gums are the soft tissue that surrounds the teeth. Gingivitis causes red, swollen, tender gums that bleed easily when brushed, persistent bad breath, and sensitive teeth. Because it is not painful, many people do not get treatment when they should. Gingivitis can be reversed with good dental care. Periodontitis is a more advanced disease that affects more than the gums. The gums pull away from the teeth. This leaves deep pockets where bacteria can grow. The disease can damage the bones that support the teeth. The teeth may get loose and fall out. A periodontal condition should be treated as soon as it is found. Finding gum problems early, treating them right away, and having regular checkups bring the best results. You can treat mild periodontal conditions by brushing and flossing your teeth every day. Your dentist may prescribe a mouthwash to kill the bacteria that can damage teeth and gums. Your dentist may have you take antibiotics to treat infection from moderate periodontal disease. If your gums have pulled away from your teeth, you may need cleaning between the teeth and gums right down to the teeth roots. This is called root planing and scaling. If you have severe periodontal disease, you may need surgery to remove diseased gum tissue or repair bone damage. Follow-up care is a key part of your treatment and safety. Be sure to make and go to all appointments, and call your dentist if you are having problems.  It's also a good idea to know your test results and keep a list of the medicines you take. How can you care for yourself at home? · If your dentist prescribed antibiotics, take them as directed. Do not stop taking them just because you feel better. You need to take the full course of antibiotics. · Brush your teeth twice a day, in the morning and at night. ¨ Use a toothbrush with soft, rounded-end bristles and a head that is small enough to reach all parts of your teeth and mouth. Replace your toothbrush every 3 to 4 months. ¨ Use a fluoride toothpaste. ¨ Place the brush at a 45-degree angle where the teeth meet the gums. Press firmly, and gently rock the brush back and forth using small circular movements. ¨ Brush chewing surfaces vigorously with short back-and-forth strokes. ¨ Brush your tongue from back to front. · Floss at least once a day. Choose the type and flavor that you like best.  · Have your teeth cleaned by a professional at least twice a year. · Ask your dentist about using an antibacterial mouthwash to help reduce bacteria. · Rinse your mouth with water or chew sugar-free gum after meals if you can't brush your teeth. · Do not smoke or use smokeless tobacco. Tobacco use can cause periodontal disease. When should you call for help? Call your dentist now or seek immediate medical care if:  · You have symptoms of infection, such as:  ¨ Increased pain, swelling, warmth, or redness. ¨ Red streaks leading from the area. ¨ Pus draining from the area. ¨ A fever. Watch closely for changes in your health, and be sure to contact your dentist if:  · Your gums bleed easily when brushed. · You have other problems with your gums or mouth. · You do not get better as expected. Where can you learn more? Go to http://hussein-nickolas.info/. Enter V351 in the search box to learn more about \"Periodontal Conditions: Care Instructions. \"  Current as of: August 9, 2016  Content Version: 11.2  © 0010-4707 [employer]. Care instructions adapted under license by [employer] (which disclaims liability or warranty for this information). If you have questions about a medical condition or this instruction, always ask your healthcare professional. [address] any warranty or liability for your use of this information.

## 2017-06-11 NOTE — ROUTINE PROCESS
I have reviewed discharge instructions with the patient. The patient verbalized understanding. Patient armband removed and given to patient to take home. Patient was informed of the privacy risks if armband lost or stolen. Pt ambulated without any distress.

## 2017-06-11 NOTE — ED TRIAGE NOTES
Pt,  C/o gum  Pain, right upper & lower  Jaw for 2 days, also wants to get check  For scabies , was treated for , just want to make  She don't have it,  Denies rash or itching

## 2017-08-31 ENCOUNTER — HOSPITAL ENCOUNTER (EMERGENCY)
Age: 67
Discharge: HOME | End: 2017-08-31
Attending: EMERGENCY MEDICINE
Payer: SELF-PAY

## 2017-08-31 VITALS
SYSTOLIC BLOOD PRESSURE: 124 MMHG | HEART RATE: 91 BPM | OXYGEN SATURATION: 99 % | BODY MASS INDEX: 27.48 KG/M2 | HEIGHT: 60 IN | WEIGHT: 140 LBS | RESPIRATION RATE: 20 BRPM | DIASTOLIC BLOOD PRESSURE: 78 MMHG | TEMPERATURE: 98.6 F

## 2017-08-31 DIAGNOSIS — M54.6 ACUTE BILATERAL THORACIC BACK PAIN: ICD-10-CM

## 2017-08-31 DIAGNOSIS — H10.33 ACUTE CONJUNCTIVITIS OF BOTH EYES, UNSPECIFIED ACUTE CONJUNCTIVITIS TYPE: ICD-10-CM

## 2017-08-31 DIAGNOSIS — R21 RASH AND OTHER NONSPECIFIC SKIN ERUPTION: Primary | ICD-10-CM

## 2017-08-31 PROCEDURE — 74011250637 HC RX REV CODE- 250/637: Performed by: PHYSICIAN ASSISTANT

## 2017-08-31 PROCEDURE — 99283 EMERGENCY DEPT VISIT LOW MDM: CPT

## 2017-08-31 PROCEDURE — 74011000250 HC RX REV CODE- 250: Performed by: PHYSICIAN ASSISTANT

## 2017-08-31 RX ORDER — NAPROXEN 500 MG/1
500 TABLET ORAL 2 TIMES DAILY WITH MEALS
Qty: 12 TAB | Refills: 0 | Status: SHIPPED | OUTPATIENT
Start: 2017-08-31 | End: 2021-10-27

## 2017-08-31 RX ORDER — PERMETHRIN 50 MG/G
CREAM TOPICAL
Qty: 60 G | Refills: 1 | Status: SHIPPED | OUTPATIENT
Start: 2017-08-31 | End: 2017-09-01

## 2017-08-31 RX ORDER — PERMETHRIN 50 MG/G
CREAM TOPICAL
Status: COMPLETED | OUTPATIENT
Start: 2017-08-31 | End: 2017-08-31

## 2017-08-31 RX ORDER — PROPARACAINE HYDROCHLORIDE 5 MG/ML
2 SOLUTION/ DROPS OPHTHALMIC
Status: COMPLETED | OUTPATIENT
Start: 2017-08-31 | End: 2017-08-31

## 2017-08-31 RX ORDER — ERYTHROMYCIN 5 MG/G
OINTMENT OPHTHALMIC
Qty: 3.5 G | Refills: 0 | Status: SHIPPED | OUTPATIENT
Start: 2017-08-31 | End: 2019-02-07

## 2017-08-31 RX ORDER — NAPROXEN 250 MG/1
500 TABLET ORAL ONCE
Status: COMPLETED | OUTPATIENT
Start: 2017-08-31 | End: 2017-08-31

## 2017-08-31 RX ADMIN — PERMETHRIN: 50 CREAM TOPICAL at 18:08

## 2017-08-31 RX ADMIN — FLUORESCEIN SODIUM 1 STRIP: 1 STRIP OPHTHALMIC at 18:14

## 2017-08-31 RX ADMIN — NAPROXEN 500 MG: 250 TABLET ORAL at 19:30

## 2017-08-31 RX ADMIN — PROPARACAINE HYDROCHLORIDE 2 DROP: 5 SOLUTION/ DROPS OPHTHALMIC at 18:14

## 2017-08-31 NOTE — ED PROVIDER NOTES
HPI Comments: Pari Mccarty is a 78 y.o. female with a pertinent history of dental caries, anxiety, CVA who presents to the emergency department for evaluation of diffuse rash 2/2 to scabies, bilateral upper back pain, and bilateral watery eyes. She reports the left eye was initially watery and then it spread to the right eye. She relates thick eye discharge this morning, but states it feels better now. No visual changes, pain, redness, or photophobia. Back pain is bilateral, non-radiating. She denies any heavy lifting, trauma, injury, or falls. No focal numbness/tingling/weakness. Rash is worse on BUE. Pt relates she ran out of her prescription cream.  She was only able to use it once. She thinks she needs another dose because the rash has gotten worse in the past few days, as has the itching. Pt denies any recent fevers or chills, headache, dizziness or light headedness, ENT issues, CP or discomfort, SOB, cough, n/v/d/c, abd pain, dysuria, hematuria, frequency, bladder/bowel dysfunction. Patient has no other complaints at this time. PCP:  None        Patient is a 78 y.o. female presenting with rash, back pain, and watery eyes. Rash      Back Pain    Pertinent negatives include no chest pain, no fever, no headaches, no abdominal pain and no dysuria. Watery Eyes   Pertinent negatives include no chest pain, no abdominal pain, no headaches and no shortness of breath. Past Medical History:   Diagnosis Date    Angina at rest 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 No family history on file. Social History     Social History    Marital status: SINGLE     Spouse name: N/A    Number of children: N/A    Years of education: N/A     Occupational History    Not on file.      Social History Main Topics    Smoking status: Former Smoker     Years: 15.00    Smokeless tobacco: Not on file    Alcohol use No    Drug use: No      Comment: none in 2 years    Sexual activity: No     Other Topics Concern    Not on file     Social History Narrative         ALLERGIES: Aspirin    Review of Systems   Constitutional: Negative for chills and fever. HENT: Negative for congestion, rhinorrhea and sore throat. Eyes: Positive for discharge. Negative for photophobia, pain, redness, itching and visual disturbance. Respiratory: Negative for cough and shortness of breath. Cardiovascular: Negative for chest pain. Gastrointestinal: Negative for abdominal pain, constipation, diarrhea, nausea and vomiting. Genitourinary: Negative for dysuria, frequency and hematuria. Musculoskeletal: Positive for back pain. Negative for myalgias. Skin: Positive for rash. Negative for wound. Neurological: Negative for dizziness and headaches. Vitals:    08/31/17 1630   BP: 124/78   Pulse: 91   Resp: 20   Temp: 98.6 °F (37 °C)   SpO2: 99%   Weight: 63.5 kg (140 lb)   Height: 5' (1.524 m)            Physical Exam   Constitutional: She is oriented to person, place, and time. She appears well-developed and well-nourished. No distress. HENT:   Head: Normocephalic and atraumatic. Eyes: Conjunctivae and EOM are normal. Pupils are equal, round, and reactive to light. Right eye exhibits no discharge. Left eye exhibits no discharge. No scleral icterus. Unremarkable exam of bilateral eyes without conjunctival injection, tearing, drainage, swelling, chemosis, or other abnormality. EYE EXAM:    Bilateral eyes were anesthetized with 3 drops of proparacaine. No gross foreign body seen with eversion of eyelids. Fluorescein stain reveals no uptake. No hyphema, hypopyon, streaming, anterior chamber bulging, or periorbital swelling, redness, dendritic lesion, or rash. No pain with EOMs. Pt tolerated exam well. Neck: Normal range of motion. Neck supple. No thyromegaly present.    Cardiovascular: Normal rate, regular rhythm and normal heart sounds. Pulmonary/Chest: Effort normal and breath sounds normal. No respiratory distress. She exhibits no tenderness. Musculoskeletal: She exhibits no edema or deformity. Minimal TTP bilateral thoracic back. No obvious deformity, step-off deformity, midline spinal tenderness, edema, ecchymosis, erythema, or overlying skin changes noted on exam.  Pt is ambulatory with even, steady gait, moving BUE and BLE with 5/5 strength and FROM against resistance in flexion and extension. Pt is neurovascularly intact distally with cap refill < 3 seconds and intact sensation. Lymphadenopathy:     She has no cervical adenopathy. Neurological: She is alert and oriented to person, place, and time. She has normal reflexes. Skin: Skin is warm and dry. She is not diaphoretic. Psychiatric: She has a normal mood and affect. Nursing note and vitals reviewed. MDM  Number of Diagnoses or Management Options  Acute bilateral thoracic back pain: new and does not require workup  Acute conjunctivitis of both eyes, unspecified acute conjunctivitis type: new and requires workup  Rash and other nonspecific skin eruption: new and does not require workup  Diagnosis management comments: Differential Diagnosis (eye discharge): Viral/bacterial/allergic conjunctivitis, infectious keratitis, blepharitis, hordeolum, chalazion, periorbital cellulitis, orbital cellulitis, corneal ulcer/lesion, corneal abrasion, ultraviolet keratitis, eye FB, corneal FB, corneal laceration, globe injury, chemical eye burn    Differential Diagnosis (back pain): Musculoskeletal pain, myofascial strain/sprain, muscle spasm, electrolyte abnormality, dehydration    Differential Diagnosis (rash):  Scabies, tinea, eczema, psoriasis, dry skin, allergic reaction, varicella, acne, bug bites, impetigo, folliculitis, pityriasis, poison ivy/oak/sumac, lupus, lichen planus, dermatitis herpetiformis, chiggers    Plan:  Pt presents in NAD, vitals wnl. Rash c/w scabies. Unremarkable eye exam.  Back minimally tender on exam.  Treated here with naprosyn. Will DC home with ilotycin, permethrin, and naprosyn. At this time, patient is stable and appropriate for discharge home. Patient demonstrates understanding of current diagnoses and is in agreement with the treatment plan. They are advised that while the likelihood of serious underlying condition is low at this point given the evaluation performed today, we cannot fully rule it out. They are advised to immediately return with any new symptoms or worsening of current condition. All questions have been answered. Patient is given educational material regarding their diagnoses, including danger symptoms and when to return to the ED. Follow-up with PCP and . Amount and/or Complexity of Data Reviewed  Review and summarize past medical records: yes    Risk of Complications, Morbidity, and/or Mortality  Presenting problems: moderate  Diagnostic procedures: moderate  Management options: moderate    Patient Progress  Patient progress: improved    ED Course       Procedures      -------------------------------------------------------------------------------------------------------------------  Orders:  Orders Placed This Encounter    proparacaine (OPTHAINE) 0.5 % ophthalmic solution 2 Drop    fluorescein (FUL-IRIS) 1 mg ophthalmic strip 1 Strip    permethrin (ACTICIN) 5 % cream    naproxen (NAPROSYN) tablet 500 mg    naproxen (NAPROSYN) 500 mg tablet     Sig: Take 1 Tab by mouth two (2) times daily (with meals). Dispense:  12 Tab     Refill:  0    erythromycin (ILOTYCIN) ophthalmic ointment     Sig: Apply one 1/2 inch cream ribbon inside the lower lid of both eyes four to six times per day. Continue for seven days. Dispense:  3.5 g     Refill:  0    permethrin (ACTICIN) 5 % topical cream     Sig: Apply to entire body, avoiding eyes/ears/nose/mouth/genitals.   Leave on for 8-14 hours and then wash off. Repeat in 14 days if still experiencing symptoms. Dispense:  60 g     Refill:  1      Progress Notes:  6:15 PM:  Megan Rose PA-C was at the pt's bedside, assessed the pt and answered the pt's questions regarding treatment.    -------------------------------------------------------------------------------------------------------------------    Disposition:  Diagnosis:   1. Rash and other nonspecific skin eruption    2. Acute conjunctivitis of both eyes, unspecified acute conjunctivitis type    3. Acute bilateral thoracic back pain        Disposition: NH home    Follow-up Information     Follow up With Details Comments Dipesh Pitts Call in 2 days For assistance with affording medications [facility] Ch[address]    [facility] EMERGENCY DEPT Go to As needed, If symptoms worsen [address] [facility] [address]  [phone]          Patient's Medications   Start Taking    ERYTHROMYCIN (ILOTYCIN) OPHTHALMIC OINTMENT    Apply one 1/2 inch cream ribbon inside the lower lid of both eyes four to six times per day. Continue for seven days. NAPROXEN (NAPROSYN) 500 MG TABLET    Take 1 Tab by mouth two (2) times daily (with meals). PERMETHRIN (ACTICIN) 5 % TOPICAL CREAM    Apply to entire body, avoiding eyes/ears/nose/mouth/genitals. Leave on for 8-14 hours and then wash off. Repeat in 14 days if still experiencing symptoms.    Continue Taking    No medications on file   These Medications have changed    No medications on file   Stop Taking    No medications on file

## 2017-08-31 NOTE — DISCHARGE INSTRUCTIONS
Back Pain, Emergency or Urgent Symptoms: Care Instructions  Your Care Instructions  Many people have back pain at one time or another. In most cases, pain gets better with self-care that includes over-the-counter pain medicine, ice, heat, and exercises. Unless you have symptoms of a severe injury or heart attack, you may be able to give yourself a few days before you call a doctor. But some back problems are very serious. Do not ignore symptoms that need to be checked right away. Follow-up care is a key part of your treatment and safety. Be sure to make and go to all appointments, and call your doctor if you are having problems. It's also a good idea to know your test results and keep a list of the medicines you take. How can you care for yourself at home? · Sit or lie in positions that are most comfortable and that reduce your pain. Try one of these positions when you lie down:  ¨ Lie on your back with your knees bent and supported by large pillows. ¨ Lie on the floor with your legs on the seat of a sofa or chair. Karn Miser on your side with your knees and hips bent and a pillow between your legs. ¨ Lie on your stomach if it does not make pain worse. · Do not sit up in bed, and avoid soft couches and twisted positions. Bed rest can help relieve pain at first, but it delays healing. Avoid bed rest after the first day. · Change positions every 30 minutes. If you must sit for long periods of time, take breaks from sitting. Get up and walk around, or lie flat. · Try using a heating pad on a low or medium setting, for 15 to 20 minutes every 2 or 3 hours. Try a warm shower in place of one session with the heating pad. You can also buy single-use heat wraps that last up to 8 hours. You can also try ice or cold packs on your back for 10 to 20 minutes at a time, several times a day. (Put a thin cloth between the ice pack and your skin.) This reduces pain and makes it easier to be active and exercise.   · Take pain medicines exactly as directed. ¨ If the doctor gave you a prescription medicine for pain, take it as prescribed. ¨ If you are not taking a prescription pain medicine, ask your doctor if you can take an over-the-counter medicine. When should you call for help? Call 911 anytime you think you may need emergency care. For example, call if:  · You are unable to move a leg at all. · You have back pain with severe belly pain. · You have symptoms of a heart attack. These may include:  ¨ Chest pain or pressure, or a strange feeling in the chest.  ¨ Sweating. ¨ Shortness of breath. ¨ Nausea or vomiting. ¨ Pain, pressure, or a strange feeling in the back, neck, jaw, or upper belly or in one or both shoulders or arms. ¨ Lightheadedness or sudden weakness. ¨ A fast or irregular heartbeat. After you call 911, the  may tell you to chew 1 adult-strength or 2 to 4 low-dose aspirin. Wait for an ambulance. Do not try to drive yourself. Call your doctor now or seek immediate medical care if:  · You have new or worse symptoms in your arms, legs, chest, belly, or buttocks. Symptoms may include:  ¨ Numbness or tingling. ¨ Weakness. ¨ Pain. · You lose bladder or bowel control. · You have back pain and:  ¨ You have injured your back while lifting or doing some other activity. Call if the pain is severe, has not gone away after 1 or 2 days, and you cannot do your normal daily activities. ¨ You have had a back injury before that needed treatment. ¨ Your pain has lasted longer than 4 weeks. ¨ You have had weight loss you cannot explain. ¨ You are age 48 or older. ¨ You have cancer now or have had it before. Watch closely for changes in your health, and be sure to contact your doctor if you are not getting better as expected. Where can you learn more? Go to http://hussein-nickolas.info/.   Enter D451 in the search box to learn more about \"Back Pain, Emergency or Urgent Symptoms: Care Instructions. \"  Current as of: March 20, 2017  Content Version: 11.3  © 1108-8014 [employer]. Care instructions adapted under license by [employer] (which disclaims liability or warranty for this information). If you have questions about a medical condition or this instruction, always ask your healthcare professional. [address] any warranty or liability for your use of this information. Pinkeye: Care Instructions  Your Care Instructions    Pinkeye is redness and swelling of the eye surface and the conjunctiva (the lining of the eyelid and the covering of the white part of the eye). Pinkeye is also called conjunctivitis. Pinkeye is often caused by infection with bacteria or a virus. Dry air, allergies, smoke, and chemicals are other common causes. Pinkeye often clears on its own in 7 to 10 days. Antibiotics only help if the pinkeye is caused by bacteria. Pinkeye caused by infection spreads easily. If an allergy or chemical is causing pinkeye, it will not go away unless you can avoid whatever is causing it. Follow-up care is a key part of your treatment and safety. Be sure to make and go to all appointments, and call your doctor if you are having problems. It's also a good idea to know your test results and keep a list of the medicines you take. How can you care for yourself at home? · Wash your hands often. Always wash them before and after you treat pinkeye or touch your eyes or face. · Use moist cotton or a clean, wet cloth to remove crust. Wipe from the inside corner of the eye to the outside. Use a clean part of the cloth for each wipe. · Put cold or warm wet cloths on your eye a few times a day if the eye hurts. · Do not wear contact lenses or eye makeup until the pinkeye is gone. Throw away any eye makeup you were using when you got pinkeye. Clean your contacts and storage case.  If you wear disposable contacts, use a new pair when your eye has cleared and it is safe to wear contacts again. · If the doctor gave you antibiotic ointment or eyedrops, use them as directed. Use the medicine for as long as instructed, even if your eye starts looking better soon. Keep the bottle tip clean, and do not let it touch the eye area. · To put in eyedrops or ointment:  ¨ Tilt your head back, and pull your lower eyelid down with one finger. ¨ Drop or squirt the medicine inside the lower lid. ¨ Close your eye for 30 to 60 seconds to let the drops or ointment move around. ¨ Do not touch the ointment or dropper tip to your eyelashes or any other surface. · Do not share towels, pillows, or washcloths while you have pinkeye. When should you call for help? Call your doctor now or seek immediate medical care if:  · You have pain in your eye, not just irritation on the surface. · You have a change in vision or loss of vision. · You have an increase in discharge from the eye. · Your eye has not started to improve or begins to get worse within 48 hours after you start using antibiotics. · Pinkeye lasts longer than 7 days. Watch closely for changes in your health, and be sure to contact your doctor if you have any problems. Where can you learn more? Go to http://hussein-nickolas.info/. Enter Y392 in the search box to learn more about \"Pinkeye: Care Instructions. \"  Current as of: March 20, 2017  Content Version: 11.3  © 9210-5597 [employer]. Care instructions adapted under license by [employer] (which disclaims liability or warranty for this information). If you have questions about a medical condition or this instruction, always ask your healthcare professional. [place] [address] any warranty or liability for your use of this information.

## 2018-01-19 ENCOUNTER — APPOINTMENT (OUTPATIENT)
Dept: GENERAL RADIOLOGY | Age: 68
End: 2018-01-19
Attending: EMERGENCY MEDICINE
Payer: SELF-PAY

## 2018-01-19 ENCOUNTER — HOSPITAL ENCOUNTER (EMERGENCY)
Age: 68
Discharge: HOME | End: 2018-01-19
Attending: EMERGENCY MEDICINE
Payer: SELF-PAY

## 2018-01-19 VITALS
WEIGHT: 130 LBS | DIASTOLIC BLOOD PRESSURE: 87 MMHG | OXYGEN SATURATION: 97 % | HEIGHT: 60 IN | HEART RATE: 71 BPM | RESPIRATION RATE: 18 BRPM | SYSTOLIC BLOOD PRESSURE: 128 MMHG | BODY MASS INDEX: 25.52 KG/M2 | TEMPERATURE: 98.2 F

## 2018-01-19 DIAGNOSIS — R07.9 CHEST PAIN, UNSPECIFIED TYPE: Primary | ICD-10-CM
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
## 2018-01-19 LAB
ANION GAP SERPL CALC-SCNC: 4 MMOL/L (ref 3–18)
BASOPHILS # BLD: 0.1 K/UL (ref 0–0.1)
BASOPHILS NFR BLD: 1 % (ref 0–2)
BUN SERPL-MCNC: 16 MG/DL (ref 7–18)
BUN/CREAT SERPL: 19 (ref 12–20)
CALCIUM SERPL-MCNC: 9.3 MG/DL (ref 8.5–10.1)
CHLORIDE SERPL-SCNC: 107 MMOL/L (ref 100–108)
CK MB CFR SERPL CALC: 1.1 % (ref 0–4)
CK MB SERPL-MCNC: 3.6 NG/ML (ref 5–25)
CK SERPL-CCNC: 334 U/L (ref 26–192)
CO2 SERPL-SCNC: 31 MMOL/L (ref 21–32)
CREAT SERPL-MCNC: 0.86 MG/DL (ref 0.6–1.3)
DIFFERENTIAL METHOD BLD: ABNORMAL
EOSINOPHIL # BLD: 0.1 K/UL (ref 0–0.4)
EOSINOPHIL NFR BLD: 2 % (ref 0–5)
ERYTHROCYTE [DISTWIDTH] IN BLOOD BY AUTOMATED COUNT: 14 % (ref 11.6–14.5)
GLUCOSE SERPL-MCNC: 81 MG/DL (ref 74–99)
HCT VFR BLD AUTO: 39 % (ref 35–45)
HGB BLD-MCNC: 13.9 G/DL (ref 12–16)
LYMPHOCYTES # BLD: 1.4 K/UL (ref 0.9–3.6)
LYMPHOCYTES NFR BLD: 34 % (ref 21–52)
MAGNESIUM SERPL-MCNC: 2.2 MG/DL (ref 1.6–2.6)
MCH RBC QN AUTO: 28.8 PG (ref 24–34)
MCHC RBC AUTO-ENTMCNC: 35.6 G/DL (ref 31–37)
MCV RBC AUTO: 80.9 FL (ref 74–97)
MONOCYTES # BLD: 0.6 K/UL (ref 0.05–1.2)
MONOCYTES NFR BLD: 15 % (ref 3–10)
NEUTS SEG # BLD: 2 K/UL (ref 1.8–8)
NEUTS SEG NFR BLD: 48 % (ref 40–73)
PLATELET # BLD AUTO: 296 K/UL (ref 135–420)
PMV BLD AUTO: 10.3 FL (ref 9.2–11.8)
POTASSIUM SERPL-SCNC: 4 MMOL/L (ref 3.5–5.5)
RBC # BLD AUTO: 4.82 M/UL (ref 4.2–5.3)
SODIUM SERPL-SCNC: 142 MMOL/L (ref 136–145)
TROPONIN I SERPL-MCNC: <0.02 NG/ML (ref 0–0.04)
WBC # BLD AUTO: 4.2 K/UL (ref 4.6–13.2)

## 2018-01-19 PROCEDURE — 74011250637 HC RX REV CODE- 250/637: Performed by: EMERGENCY MEDICINE

## 2018-01-19 PROCEDURE — 93005 ELECTROCARDIOGRAM TRACING: CPT

## 2018-01-19 PROCEDURE — 85025 COMPLETE CBC W/AUTO DIFF WBC: CPT | Performed by: EMERGENCY MEDICINE

## 2018-01-19 PROCEDURE — 82553 CREATINE MB FRACTION: CPT | Performed by: EMERGENCY MEDICINE

## 2018-01-19 PROCEDURE — 83735 ASSAY OF MAGNESIUM: CPT | Performed by: EMERGENCY MEDICINE

## 2018-01-19 PROCEDURE — 80048 BASIC METABOLIC PNL TOTAL CA: CPT | Performed by: EMERGENCY MEDICINE

## 2018-01-19 PROCEDURE — 99283 EMERGENCY DEPT VISIT LOW MDM: CPT

## 2018-01-19 RX ORDER — NITROGLYCERIN 0.4 MG/1
0.4 TABLET SUBLINGUAL AS NEEDED
Status: DISCONTINUED | OUTPATIENT
Start: 2018-01-19 | End: 2018-01-20 | Stop reason: HOSPADM

## 2018-01-19 RX ORDER — NITROGLYCERIN 0.4 MG/1
0.4 TABLET SUBLINGUAL
Qty: 1 BOTTLE | Refills: 0 | Status: SHIPPED | OUTPATIENT
Start: 2018-01-19 | End: 2021-10-27

## 2018-01-19 RX ORDER — GUAIFENESIN 100 MG/5ML
324 LIQUID (ML) ORAL
Status: DISPENSED | OUTPATIENT
Start: 2018-01-19 | End: 2018-01-19

## 2018-01-19 RX ADMIN — NITROGLYCERIN 0.4 MG: 0.4 TABLET, ORALLY DISINTEGRATING SUBLINGUAL at 11:23

## 2018-01-19 NOTE — DISCHARGE INSTRUCTIONS
Chest Pain: Care Instructions  Your Care Instructions    There are many things that can cause chest pain. Some are not serious and will get better on their own in a few days. But some kinds of chest pain need more testing and treatment. Your doctor may have recommended a follow-up visit in the next 8 to 12 hours. If you are not getting better, you may need more tests or treatment. Even though your doctor has released you, you still need to watch for any problems. The doctor carefully checked you, but sometimes problems can develop later. If you have new symptoms or if your symptoms do not get better, get medical care right away. If you have worse or different chest pain or pressure that lasts more than 5 minutes or you passed out (lost consciousness), call 911 or seek other emergency help right away. A medical visit is only one step in your treatment. Even if you feel better, you still need to do what your doctor recommends, such as going to all suggested follow-up appointments and taking medicines exactly as directed. This will help you recover and help prevent future problems. How can you care for yourself at home? · Rest until you feel better. · Take your medicine exactly as prescribed. Call your doctor if you think you are having a problem with your medicine. · Do not drive after taking a prescription pain medicine. When should you call for help? Call 911 if:  ? · You passed out (lost consciousness). ? · You have severe difficulty breathing. ? · You have symptoms of a heart attack. These may include:  ¨ Chest pain or pressure, or a strange feeling in your chest.  ¨ Sweating. ¨ Shortness of breath. ¨ Nausea or vomiting. ¨ Pain, pressure, or a strange feeling in your back, neck, jaw, or upper belly or in one or both shoulders or arms. ¨ Lightheadedness or sudden weakness. ¨ A fast or irregular heartbeat.   After you call 911, the  may tell you to chew 1 adult-strength or 2 to 4 low-dose aspirin. Wait for an ambulance. Do not try to drive yourself. ?Call your doctor today if:  ? · You have any trouble breathing. ? · Your chest pain gets worse. ? · You are dizzy or lightheaded, or you feel like you may faint. ? · You are not getting better as expected. ? · You are having new or different chest pain. Where can you learn more? Go to http://hussein-nickolas.info/. Enter A120 in the search box to learn more about \"Chest Pain: Care Instructions. \"  Current as of: 2017  Content Version: 11.4  © 0900-5620 [employer]. Care instructions adapted under license by [employer] (which disclaims liability or warranty for this information). If you have questions about a medical condition or this instruction, always ask your healthcare professional. [address] any warranty or liability for your use of this information. [employer] Activation    Thank you for requesting access to [employer]. Please follow the instructions below to securely access and download your online medical record. [employer] allows you to send messages to your doctor, view your test results, renew your prescriptions, schedule appointments, and more. How Do I Sign Up? 1. In your internet browser, go to [email or web address]. 2. Click on the First Time User? Click Here link in the Sign In box. You will see the New Member Sign Up page. 3. Enter your [employer] Access Code exactly as it appears below. You will not need to use this code after youve completed the sign-up process. If you do not sign up before the expiration date, you must request a new code. [employer] Access Code: [email or web address]  Expires: 2018 12:38 PM (This is the date your [employer] access code will )    4. Enter the last four digits of your Social Security Number (xxxx) and Date of Birth (mm/dd/yyyy) as indicated and click Submit.  You will be taken to the next sign-up page. 5. Create a EachNett ID. This will be your [employer] login ID and cannot be changed, so think of one that is secure and easy to remember. 6. Create a [employer] password. You can change your password at any time. 7. Enter your Password Reset Question and Answer. This can be used at a later time if you forget your password. 8. Enter your e-mail address. You will receive e-mail notification when new information is available in [address]. 9. Click Sign Up. You can now view and download portions of your medical record. 10. Click the Download Summary menu link to download a portable copy of your medical information. Additional Information    If you have questions, please visit the Frequently Asked Questions section of the [employer] website at [email or web address] Remember, [employer] is NOT to be used for urgent needs. For medical emergencies, dial 911.
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
## 2018-01-19 NOTE — ED TRIAGE NOTES
Pt stated she started having CP yesterday evening. Pt stated she is out of her nitro pills she usually takes for CP. Pt stated pain is in the center of her chest and non radiating. Denies SOB.

## 2018-01-20 LAB
ATRIAL RATE: 72 BPM
CALCULATED P AXIS, ECG09: 37 DEGREES
CALCULATED R AXIS, ECG10: 5 DEGREES
CALCULATED T AXIS, ECG11: 14 DEGREES
DIAGNOSIS, 93000: NORMAL
P-R INTERVAL, ECG05: 160 MS
Q-T INTERVAL, ECG07: 422 MS
QRS DURATION, ECG06: 70 MS
QTC CALCULATION (BEZET), ECG08: 462 MS
VENTRICULAR RATE, ECG03: 72 BPM

## 2018-08-04 ENCOUNTER — HOSPITAL ENCOUNTER (EMERGENCY)
Age: 68
Discharge: HOME | End: 2018-08-04
Attending: EMERGENCY MEDICINE
Payer: SELF-PAY

## 2018-08-04 ENCOUNTER — APPOINTMENT (OUTPATIENT)
Dept: GENERAL RADIOLOGY | Age: 68
End: 2018-08-04
Attending: EMERGENCY MEDICINE
Payer: SELF-PAY

## 2018-08-04 VITALS
SYSTOLIC BLOOD PRESSURE: 130 MMHG | TEMPERATURE: 98.3 F | DIASTOLIC BLOOD PRESSURE: 81 MMHG | RESPIRATION RATE: 12 BRPM | HEART RATE: 67 BPM | OXYGEN SATURATION: 99 %

## 2018-08-04 DIAGNOSIS — R07.9 ACUTE CHEST PAIN: Primary | ICD-10-CM

## 2018-08-04 LAB
ALBUMIN SERPL-MCNC: 4 G/DL (ref 3.4–5)
ALBUMIN/GLOB SERPL: 1.1 {RATIO} (ref 0.8–1.7)
ALP SERPL-CCNC: 48 U/L (ref 45–117)
ALT SERPL-CCNC: 24 U/L (ref 13–56)
ANION GAP SERPL CALC-SCNC: 8 MMOL/L (ref 3–18)
APTT PPP: 27.4 SEC (ref 23–36.4)
AST SERPL-CCNC: 15 U/L (ref 15–37)
BASOPHILS # BLD: 0.1 K/UL (ref 0–0.1)
BASOPHILS NFR BLD: 1 % (ref 0–2)
BILIRUB SERPL-MCNC: 0.6 MG/DL (ref 0.2–1)
BUN SERPL-MCNC: 13 MG/DL (ref 7–18)
BUN/CREAT SERPL: 16 (ref 12–20)
CALCIUM SERPL-MCNC: 8.6 MG/DL (ref 8.5–10.1)
CHLORIDE SERPL-SCNC: 104 MMOL/L (ref 100–108)
CK MB CFR SERPL CALC: 1.7 % (ref 0–4)
CK MB SERPL-MCNC: 4.2 NG/ML (ref 5–25)
CK SERPL-CCNC: 243 U/L (ref 26–192)
CO2 SERPL-SCNC: 26 MMOL/L (ref 21–32)
CREAT SERPL-MCNC: 0.81 MG/DL (ref 0.6–1.3)
DIFFERENTIAL METHOD BLD: ABNORMAL
EOSINOPHIL # BLD: 0.1 K/UL (ref 0–0.4)
EOSINOPHIL NFR BLD: 2 % (ref 0–5)
ERYTHROCYTE [DISTWIDTH] IN BLOOD BY AUTOMATED COUNT: 13.7 % (ref 11.6–14.5)
GLOBULIN SER CALC-MCNC: 3.8 G/DL (ref 2–4)
GLUCOSE SERPL-MCNC: 95 MG/DL (ref 74–99)
HCT VFR BLD AUTO: 37.7 % (ref 35–45)
HGB BLD-MCNC: 13.7 G/DL (ref 12–16)
INR PPP: 0.9 (ref 0.8–1.2)
LYMPHOCYTES # BLD: 1.7 K/UL (ref 0.9–3.6)
LYMPHOCYTES NFR BLD: 30 % (ref 21–52)
MCH RBC QN AUTO: 29.1 PG (ref 24–34)
MCHC RBC AUTO-ENTMCNC: 36.3 G/DL (ref 31–37)
MCV RBC AUTO: 80.2 FL (ref 74–97)
MONOCYTES # BLD: 0.6 K/UL (ref 0.05–1.2)
MONOCYTES NFR BLD: 11 % (ref 3–10)
NEUTS SEG # BLD: 3.1 K/UL (ref 1.8–8)
NEUTS SEG NFR BLD: 56 % (ref 40–73)
PLATELET # BLD AUTO: 267 K/UL (ref 135–420)
PMV BLD AUTO: 9.6 FL (ref 9.2–11.8)
POTASSIUM SERPL-SCNC: 3.7 MMOL/L (ref 3.5–5.5)
PROT SERPL-MCNC: 7.8 G/DL (ref 6.4–8.2)
PROTHROMBIN TIME: 11.8 SEC (ref 11.5–15.2)
RBC # BLD AUTO: 4.7 M/UL (ref 4.2–5.3)
SODIUM SERPL-SCNC: 138 MMOL/L (ref 136–145)
TROPONIN I SERPL-MCNC: <0.02 NG/ML (ref 0–0.04)
WBC # BLD AUTO: 5.6 K/UL (ref 4.6–13.2)

## 2018-08-04 PROCEDURE — 93005 ELECTROCARDIOGRAM TRACING: CPT

## 2018-08-04 PROCEDURE — 71046 X-RAY EXAM CHEST 2 VIEWS: CPT

## 2018-08-04 PROCEDURE — 85730 THROMBOPLASTIN TIME PARTIAL: CPT | Performed by: EMERGENCY MEDICINE

## 2018-08-04 PROCEDURE — 85025 COMPLETE CBC W/AUTO DIFF WBC: CPT | Performed by: EMERGENCY MEDICINE

## 2018-08-04 PROCEDURE — 99285 EMERGENCY DEPT VISIT HI MDM: CPT

## 2018-08-04 PROCEDURE — 80053 COMPREHEN METABOLIC PANEL: CPT | Performed by: EMERGENCY MEDICINE

## 2018-08-04 PROCEDURE — 85610 PROTHROMBIN TIME: CPT | Performed by: EMERGENCY MEDICINE

## 2018-08-04 PROCEDURE — 82550 ASSAY OF CK (CPK): CPT | Performed by: EMERGENCY MEDICINE

## 2018-08-04 PROCEDURE — 71045 X-RAY EXAM CHEST 1 VIEW: CPT

## 2018-08-04 NOTE — DISCHARGE INSTRUCTIONS
Chest Pain: Care Instructions  Your Care Instructions    There are many things that can cause chest pain. Some are not serious and will get better on their own in a few days. But some kinds of chest pain need more testing and treatment. Your doctor may have recommended a follow-up visit in the next 8 to 12 hours. If you are not getting better, you may need more tests or treatment. Even though your doctor has released you, you still need to watch for any problems. The doctor carefully checked you, but sometimes problems can develop later. If you have new symptoms or if your symptoms do not get better, get medical care right away. If you have worse or different chest pain or pressure that lasts more than 5 minutes or you passed out (lost consciousness), call 911 or seek other emergency help right away. A medical visit is only one step in your treatment. Even if you feel better, you still need to do what your doctor recommends, such as going to all suggested follow-up appointments and taking medicines exactly as directed. This will help you recover and help prevent future problems. How can you care for yourself at home? · Rest until you feel better. · Take your medicine exactly as prescribed. Call your doctor if you think you are having a problem with your medicine. · Do not drive after taking a prescription pain medicine. When should you call for help? Call 911 if:    · You passed out (lost consciousness).     · You have severe difficulty breathing.     · You have symptoms of a heart attack. These may include:  ¨ Chest pain or pressure, or a strange feeling in your chest.  ¨ Sweating. ¨ Shortness of breath. ¨ Nausea or vomiting. ¨ Pain, pressure, or a strange feeling in your back, neck, jaw, or upper belly or in one or both shoulders or arms. ¨ Lightheadedness or sudden weakness. ¨ A fast or irregular heartbeat.   After you call 911, the  may tell you to chew 1 adult-strength or 2 to 4 low-dose aspirin. Wait for an ambulance. Do not try to drive yourself.    Call your doctor today if:    · You have any trouble breathing.     · Your chest pain gets worse.     · You are dizzy or lightheaded, or you feel like you may faint.     · You are not getting better as expected.     · You are having new or different chest pain. Where can you learn more? Go to http://hussein-nickolas.info/. Enter A120 in the search box to learn more about \"Chest Pain: Care Instructions. \"  Current as of: November 20, 2017  Content Version: 11.7  © 5100-9920 [employer]. Care instructions adapted under license by [employer] (which disclaims liability or warranty for this information). If you have questions about a medical condition or this instruction, always ask your healthcare professional. [address] any warranty or liability for your use of this information.

## 2018-08-04 NOTE — ED PROVIDER NOTES
EMERGENCY DEPARTMENT HISTORY AND PHYSICAL EXAM 
 
5:09 AM 
 
 
Date: 8/4/2018 Patient Name: Severiano Rist History of Presenting Illness No chief complaint on file. History Provided By: Patient Chief Complaint: chest pain Duration: 1 Hours Timing:  Acute Location: center chest 
Quality: Aching Severity: Moderate Modifying Factors: none Associated Symptoms: denies any other associated signs or symptoms Additional History (Context): Severiano Rist is a 80 y.o. female with stroke and anxiety, angina who presents with 1 hour of acute onset moderate aching center chest pain. Pt reports that she woke up with chest pain. Pt denies shortness of breath, nausea, vomiting, diarrhea. No other concerns or symptoms at this time. PCP: None Current Outpatient Prescriptions Medication Sig Dispense Refill  nitroglycerin (NITROSTAT) 0.4 mg SL tablet Take 1 Tab by mouth every five (5) minutes as needed for Chest Pain. Sit/Lay down then put one tab under the tongue every 5 minutes as needed for chest pain for 3 doses 1 Bottle 0  
 naproxen (NAPROSYN) 500 mg tablet Take 1 Tab by mouth two (2) times daily (with meals). 12 Tab 0  
 erythromycin (ILOTYCIN) ophthalmic ointment Apply one 1/2 inch cream ribbon inside the lower lid of both eyes four to six times per day. Continue for seven days. 3.5 g 0 Past History Past Medical History: 
Past Medical History:  
Diagnosis Date  Angina at rest [facility])  Anxiety  CVA, old, facial weakness 2012  
 left ptosis  Dental caries Past Surgical History: No past surgical history on file. Family History: No family history on file. Social History: 
Social History Substance Use Topics  Smoking status: Former Smoker Years: 15.00  Smokeless tobacco: Not on file  Alcohol use No  
 
 
Allergies: Allergies Allergen Reactions  Aspirin Nausea Only Review of Systems Review of Systems Respiratory: Negative for shortness of breath. Cardiovascular: Positive for chest pain. Gastrointestinal: Negative for diarrhea, nausea and vomiting. All other systems reviewed and are negative. Physical Exam  
 
Visit Vitals  /82 (BP 1 Location: Right arm, BP Patient Position: At rest)  Pulse 67  Temp 98.3 °F (36.8 °C)  Resp 18  SpO2 98% Physical Exam  
Constitutional:  
Physical exam: 
General:  Well-developed, well-nourished, sleepy but arousable. Head:  Normocephalic atraumatic. Eyes:  Pupils pinpoint  extraocular movements intact. No pallor or conjunctival injection. Nose:  No rhinorrhea, inspection grossly normal.   
Ears:  Grossly normal to inspection, no discharge. Mouth:  Mucous membranes moist, no appreciable intraoral lesion. Neck/Back:  Trachea midline, no asymmetry. Chest:  Grossly normal inspection, symmetric chest rise. Pulmonary:  Clear to auscultation bilaterally no wheezes rhonchi or rales. Cardiovascular:  S1-S2 no murmurs rubs or gallops. Abdomen: Soft, nontender, nondistended no guarding rebound or peritoneal signs. Extremities:  Grossly normal to inspection, peripheral pulses intact Neurologic:  Alert and oriented no appreciable focal neurologic deficit Skin:  Warm and dry Psychiatric:  Grossly normal mood and affect Nursing note reviewed, vital signs reviewed. Diagnostic Study Results Labs - Recent Results (from the past 12 hour(s)) METABOLIC PANEL, COMPREHENSIVE Collection Time: 08/04/18  3:30 AM  
Result Value Ref Range Sodium 138 136 - 145 mmol/L Potassium 3.7 3.5 - 5.5 mmol/L Chloride 104 100 - 108 mmol/L  
 CO2 26 21 - 32 mmol/L Anion gap 8 3.0 - 18 mmol/L Glucose 95 74 - 99 mg/dL BUN 13 7.0 - 18 MG/DL Creatinine 0.81 0.6 - 1.3 MG/DL  
 BUN/Creatinine ratio 16 12 - 20 GFR est AA >60 >60 ml/min/1.73m2 GFR est non-AA >60 >60 ml/min/1.73m2  Calcium 8.6 8.5 - 10.1 MG/DL  
 Bilirubin, total 0.6 0.2 - 1.0 MG/DL  
 ALT (SGPT) 24 13 - 56 U/L  
 AST (SGOT) 15 15 - 37 U/L Alk. phosphatase 48 45 - 117 U/L Protein, total 7.8 6.4 - 8.2 g/dL Albumin 4.0 3.4 - 5.0 g/dL Globulin 3.8 2.0 - 4.0 g/dL A-G Ratio 1.1 0.8 - 1.7 CARDIAC PANEL,(CK, CKMB & TROPONIN) Collection Time: 08/04/18  3:30 AM  
Result Value Ref Range  (H) 26 - 192 U/L  
 CK - MB 4.2 (H) <3.6 ng/ml CK-MB Index 1.7 0.0 - 4.0 % Troponin-I, Qt. <0.02 0.0 - 0.045 NG/ML  
PROTHROMBIN TIME + INR Collection Time: 08/04/18  3:30 AM  
Result Value Ref Range Prothrombin time 11.8 11.5 - 15.2 sec INR 0.9 0.8 - 1.2 PTT Collection Time: 08/04/18  3:30 AM  
Result Value Ref Range aPTT 27.4 23.0 - 36.4 SEC  
CBC WITH AUTOMATED DIFF Collection Time: 08/04/18  5:19 AM  
Result Value Ref Range WBC 5.6 4.6 - 13.2 K/uL  
 RBC 4.70 4.20 - 5.30 M/uL  
 HGB 13.7 12.0 - 16.0 g/dL HCT 37.7 35.0 - 45.0 % MCV 80.2 74.0 - 97.0 FL  
 MCH 29.1 24.0 - 34.0 PG  
 MCHC 36.3 31.0 - 37.0 g/dL  
 RDW 13.7 11.6 - 14.5 % PLATELET 185 265 - 259 K/uL MPV 9.6 9.2 - 11.8 FL  
 NEUTROPHILS 56 40 - 73 % LYMPHOCYTES 30 21 - 52 % MONOCYTES 11 (H) 3 - 10 % EOSINOPHILS 2 0 - 5 % BASOPHILS 1 0 - 2 %  
 ABS. NEUTROPHILS 3.1 1.8 - 8.0 K/UL  
 ABS. LYMPHOCYTES 1.7 0.9 - 3.6 K/UL  
 ABS. MONOCYTES 0.6 0.05 - 1.2 K/UL  
 ABS. EOSINOPHILS 0.1 0.0 - 0.4 K/UL  
 ABS. BASOPHILS 0.1 0.0 - 0.1 K/UL  
 DF AUTOMATED Radiologic Studies -  
XR CHEST PORT    (Results Pending) Medical Decision Making I am the first provider for this patient. I reviewed the vital signs, available nursing notes, past medical history, past surgical history, family history and social history. Vital Signs-Reviewed the patient's vital signs. Records Reviewed: Nursing Notes (Time of Review: 5:09 AM) ED Course: Progress Notes, Reevaluation, and Consults: 
ED course: 
Patient presents with chest pain, atypical, frequent visits for chest pain, she is afebrile and not tachycardic saturation normal on room air will rule out ACS, no concern for PE or pneumothorax, less likely infectious etiology she does not have any fever or cough abdomen is completely benign Monitor no sinus rhythm EKG done at 0339 hrs. normal sinus rhythm heart rate is 70 intervals within normal limits no ST changes or ectopy Labs unremarkable troponin is negative, she does not require a repeat troponin because of the onset of her symptoms Patient's history, physical exam and laboratory evaluations were reviewed. Had discussion with the patient about the possible etiologies of chest pain. Heart score: 3 Patient was felt to be low risk for major adverse cardiac event, was  informed about the risks and benefits and alternatives of inpatient versus outpatient workup. At this time patient is stable for outpatient management including follow-up with primary care physician/cardiology for reevaluation within 72 hours. Patient is aware that no evaluation in the emergency department can ensure 0% risk, patient will return to the emergency department with any concerns. Disposition: 
 
Discharged home Portions of this chart were created with Dragon medical speech to text program.   Unrecognized errors may be present. Diagnosis Clinical Impression: 1. Acute chest pain Follow-up Information Follow up With Details Comments Contact Info Sriram Patel MD Go in 2 days For follow up [place] [address] [address] [facility] [address] [facility] [facility] [address] 
[phone] [facility] EMERGENCY DEPT Go to As needed, If symptoms worsen [address] [place] [address] [address] Patient's Medications Start Taking No medications on file Continue Taking  ERYTHROMYCIN (ILOTYCIN) OPHTHALMIC OINTMENT    Apply one 1/2 inch cream ribbon inside the lower lid of both eyes four to six times per day. Continue for seven days. NAPROXEN (NAPROSYN) 500 MG TABLET    Take 1 Tab by mouth two (2) times daily (with meals). NITROGLYCERIN (NITROSTAT) 0.4 MG SL TABLET    Take 1 Tab by mouth every five (5) minutes as needed for Chest Pain. Sit/Lay down then put one tab under the tongue every 5 minutes as needed for chest pain for 3 doses These Medications have changed No medications on file Stop Taking No medications on file  
 
_______________________________ Attestations: 
Scribe Attestation Pricillamorenita Don acting as a scribe for and in the presence of Sherryle Bachelor, MD     
August 04, 2018 at 5:09 AM 
    
Provider Attestation:     
I personally performed the services described in the documentation, reviewed the documentation, as recorded by the scribe in my presence, and it accurately and completely records my words and actions. August 04, 2018 at 5:09 AM - Sherryle Bachelor, MD   
_______________________________

## 2018-08-04 NOTE — ED NOTES
Patient resting quietly on stretcher. Patient currently denies needs. Although patient appears in no distress and is sleeping at intervals, patient states that her pain is 9/10.

## 2018-08-05 LAB
ATRIAL RATE: 70 BPM
CALCULATED P AXIS, ECG09: 40 DEGREES
CALCULATED R AXIS, ECG10: 10 DEGREES
CALCULATED T AXIS, ECG11: 26 DEGREES
DIAGNOSIS, 93000: NORMAL
P-R INTERVAL, ECG05: 170 MS
Q-T INTERVAL, ECG07: 426 MS
QRS DURATION, ECG06: 76 MS
QTC CALCULATION (BEZET), ECG08: 460 MS
VENTRICULAR RATE, ECG03: 70 BPM

## 2019-02-04 ENCOUNTER — HOSPITAL ENCOUNTER (EMERGENCY)
Age: 69
Discharge: HOME | End: 2019-02-04
Attending: EMERGENCY MEDICINE
Payer: MEDICAID

## 2019-02-04 ENCOUNTER — APPOINTMENT (OUTPATIENT)
Dept: GENERAL RADIOLOGY | Age: 69
End: 2019-02-04
Attending: EMERGENCY MEDICINE
Payer: MEDICAID

## 2019-02-04 VITALS
SYSTOLIC BLOOD PRESSURE: 130 MMHG | HEART RATE: 73 BPM | BODY MASS INDEX: 27.48 KG/M2 | HEIGHT: 60 IN | RESPIRATION RATE: 18 BRPM | OXYGEN SATURATION: 99 % | WEIGHT: 140 LBS | TEMPERATURE: 97.8 F | DIASTOLIC BLOOD PRESSURE: 86 MMHG

## 2019-02-04 DIAGNOSIS — J06.9 ACUTE UPPER RESPIRATORY INFECTION: Primary | ICD-10-CM

## 2019-02-04 PROCEDURE — 71046 X-RAY EXAM CHEST 2 VIEWS: CPT

## 2019-02-04 PROCEDURE — 99281 EMR DPT VST MAYX REQ PHY/QHP: CPT

## 2019-02-04 RX ORDER — FLUTICASONE PROPIONATE 50 MCG
2 SPRAY, SUSPENSION (ML) NASAL DAILY
Qty: 1 BOTTLE | Refills: 0 | Status: SHIPPED | OUTPATIENT
Start: 2019-02-04 | End: 2021-10-27

## 2019-02-04 NOTE — DISCHARGE INSTRUCTIONS
Patient Education        Upper Respiratory Infection (Cold): Care Instructions  Your Care Instructions    An upper respiratory infection, or URI, is an infection of the nose, sinuses, or throat. URIs are spread by coughs, sneezes, and direct contact. The common cold is the most frequent kind of URI. The flu and sinus infections are other kinds of URIs. Almost all URIs are caused by viruses. Antibiotics won't cure them. But you can treat most infections with home care. This may include drinking lots of fluids and taking over-the-counter pain medicine. You will probably feel better in 4 to 10 days. The doctor has checked you carefully, but problems can develop later. If you notice any problems or new symptoms, get medical treatment right away. Follow-up care is a key part of your treatment and safety. Be sure to make and go to all appointments, and call your doctor if you are having problems. It's also a good idea to know your test results and keep a list of the medicines you take. How can you care for yourself at home? · To prevent dehydration, drink plenty of fluids, enough so that your urine is light yellow or clear like water. Choose water and other caffeine-free clear liquids until you feel better. If you have kidney, heart, or liver disease and have to limit fluids, talk with your doctor before you increase the amount of fluids you drink. · Take an over-the-counter pain medicine, such as acetaminophen (Tylenol), ibuprofen (Advil, Motrin), or naproxen (Aleve). Read and follow all instructions on the label. · Before you use cough and cold medicines, check the label. These medicines may not be safe for young children or for people with certain health problems. · Be careful when taking over-the-counter cold or flu medicines and Tylenol at the same time. Many of these medicines have acetaminophen, which is Tylenol. Read the labels to make sure that you are not taking more than the recommended dose.  Too much acetaminophen (Tylenol) can be harmful. · Get plenty of rest.  · Do not smoke or allow others to smoke around you. If you need help quitting, talk to your doctor about stop-smoking programs and medicines. These can increase your chances of quitting for good. When should you call for help? Call 911 anytime you think you may need emergency care. For example, call if:    · You have severe trouble breathing.    Call your doctor now or seek immediate medical care if:    · You seem to be getting much sicker.     · You have new or worse trouble breathing.     · You have a new or higher fever.     · You have a new rash.    Watch closely for changes in your health, and be sure to contact your doctor if:    · You have a new symptom, such as a sore throat, an earache, or sinus pain.     · You cough more deeply or more often, especially if you notice more mucus or a change in the color of your mucus.     · You do not get better as expected. Where can you learn more? Go to http://hussein-nickolas.info/. Enter D333 in the search box to learn more about \"Upper Respiratory Infection (Cold): Care Instructions. \"  Current as of: September 5, 2018  Content Version: 11.9  © 4651-5789 [employer]. Care instructions adapted under license by [employer] (which disclaims liability or warranty for this information). If you have questions about a medical condition or this instruction, always ask your healthcare professional. [place] [address] any warranty or liability for your use of this information.

## 2019-02-04 NOTE — ED PROVIDER NOTES
EMERGENCY DEPARTMENT HISTORY AND PHYSICAL EXAM    2:40 PM      Date: 2/4/2019  Patient Name: Jeferson Serrano    History of Presenting Illness     Chief Complaint   Patient presents with    Flu Like Symptoms         History Provided By: Patient    Chief Complaint: Congestion   Duration: last night   Timing:  Constant  Location: chest   Quality: Aching   Severity: not reported   Modifying Factors: No modifying or aggravating factors were reported. Associated Symptoms: myalgias, cough, fever, vomiting, congestion, and back pain with deep inspiration       Additional History (Context): Jeferson Serrano is a 80 y.o. female with PMHx of anxiety, angina (at rest), and CVA who presents with c/o constant aching chest congestion that started last night with associate Sx of myalgias, cough, fever, vomiting, congestion, and back pain with deep inspiration that started 5 days ago. Pt states she didn't try any meds for her Sx today. Pt denies sore throat and diarrhea. Denies any further complaints or symptoms at the moment. PCP: None    Current Outpatient Medications   Medication Sig Dispense Refill    Camphor-Eucalyptus Oil-Menthol (VICKS VAPORUB) 4.8-1.2-2.6 % oint 1 Actuation(s) by Apply Externally route three (3) times daily as needed. 50 g 0    dextromethorphan-guaiFENesin (ROBITUSSIN-DM)  mg/5 mL syrup Take 10 mL by mouth every six (6) hours as needed for Cough. 240 mL 0    fluticasone (FLONASE) 50 mcg/actuation nasal spray 2 Sprays by Both Nostrils route daily. 1 Bottle 0    dextromethorphan-guaiFENesin (CORICIDIN HBP)  mg cap Take 1 Cap by mouth two (2) times daily as needed. 20 Cap 0    nitroglycerin (NITROSTAT) 0.4 mg SL tablet Take 1 Tab by mouth every five (5) minutes as needed for Chest Pain. Sit/Lay down then put one tab under the tongue every 5 minutes as needed for chest pain for 3 doses 1 Bottle 0    naproxen (NAPROSYN) 500 mg tablet Take 1 Tab by mouth two (2) times daily (with meals). 12 Tab 0    erythromycin (ILOTYCIN) ophthalmic ointment Apply one 1/2 inch cream ribbon inside the lower lid of both eyes four to six times per day. Continue for seven days. 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 Family History:  No family history on file. Social History:  Social History     Tobacco Use    Smoking status: Former Smoker     Years: 15.00   Substance Use Topics    Alcohol use: No    Drug use: No     Comment: none in 2 years       Allergies: Allergies   Allergen Reactions    Aspirin Nausea Only         Review of Systems       Review of Systems   Constitutional: Positive for fever. Negative for activity change and fatigue. HENT: Positive for congestion (chest). Negative for rhinorrhea and sore throat. Eyes: Negative for visual disturbance. Respiratory: Positive for cough. Negative for shortness of breath. Cardiovascular: Negative for chest pain and palpitations. Gastrointestinal: Positive for vomiting. Negative for abdominal pain, diarrhea and nausea. Genitourinary: Negative for dysuria and hematuria. Musculoskeletal: Positive for back pain (with deep inspiration) and myalgias. Skin: Negative for rash. Neurological: Negative for dizziness, weakness and light-headedness. All other systems reviewed and are negative. Physical Exam     Visit Vitals  /86 (BP 1 Location: Left arm, BP Patient Position: Sitting)   Pulse 73   Temp 97.8 °F (36.6 °C)   Resp 18   Ht 5' (1.524 m)   Wt 63.5 kg (140 lb)   SpO2 99%   BMI 27.34 kg/m²     Physical Exam   Constitutional: She is oriented to person, place, and time. She appears well-developed. HENT:   Head: Normocephalic and atraumatic. Eyes: Pupils are equal, round, and reactive to light.    Neck: No JVD present. No tracheal deviation present. No thyromegaly present. Cardiovascular: Normal rate, regular rhythm and normal heart sounds. Exam reveals no gallop and no friction rub. No murmur heard. Pulmonary/Chest: Effort normal and breath sounds normal. No stridor. No respiratory distress. She has no wheezes. She has no rales. She exhibits no tenderness. Abdominal: Soft. She exhibits no distension and no mass. There is no tenderness. There is no rebound and no guarding. Musculoskeletal: She exhibits no edema or tenderness. Lymphadenopathy:     She has no cervical adenopathy. Neurological: She is alert and oriented to person, place, and time. Skin: Skin is warm and dry. No rash noted. No erythema. No pallor. Psychiatric: She has a normal mood and affect. Her behavior is normal. Thought content normal.   Nursing note and vitals reviewed. Diagnostic Study Results     Labs -  No results found for this or any previous visit (from the past 12 hour(s)). Radiologic Studies -   XR CHEST PA LAT    (Results Pending)         Medical Decision Making     It should be noted that Lord Yuriy MARTINEZ PA-C will be the provider of record for this patient. I reviewed the vital signs, available nursing notes, past medical history, past surgical history, family history and social history. Vital Signs-Reviewed the patient's vital signs. Pulse Oximetry Analysis -  99% on room air (Interpretation) normal    Records Reviewed: Nursing Notes and Old Medical Records (Time of Review: 2:40 PM)    ED Course: Progress Notes, Reevaluation, and Consults:      Provider Notes (Medical Decision Making): nothing acute on CXR; treat URI. Diagnosis     Clinical Impression:   1.  Acute upper respiratory infection        Disposition: home    Follow-up Information     Follow up With Specialties Details Why Adiel Romeo Callahan  Schedule an appointment as soon as possible for a visit in 1 day  [phone] [facility] [address] [phone]    [facility] EMERGENCY DEPT Emergency Medicine  If symptoms worsen return immediately [address] [facility] [address]  [phone]              Medication List      START taking these medications    Camphor-Eucalyptus Oil-Menthol 4.8-1.2-2.6 % Oint  Commonly known as:  VICKS VAPORUB  1 Actuation(s) by Apply Externally route three (3) times daily as needed. * dextromethorphan-guaiFENesin  mg/5 mL syrup  Commonly known as:  ROBITUSSIN-DM  Take 10 mL by mouth every six (6) hours as needed for Cough. * dextromethorphan-guaiFENesin  mg Cap  Commonly known as:  CORICIDIN HBP  Take 1 Cap by mouth two (2) times daily as needed. fluticasone 50 mcg/actuation nasal spray  Commonly known as:  FLONASE  2 Sprays by Both Nostrils route daily. * This list has 2 medication(s) that are the same as other medications prescribed for you. Read the directions carefully, and ask your doctor or other care provider to review them with you. ASK your doctor about these medications    erythromycin ophthalmic ointment  Commonly known as:  ILOTYCIN  Apply one 1/2 inch cream ribbon inside the lower lid of both eyes four to six times per day. Continue for seven days. naproxen 500 mg tablet  Commonly known as:  NAPROSYN  Take 1 Tab by mouth two (2) times daily (with meals). nitroglycerin 0.4 mg SL tablet  Commonly known as:  NITROSTAT  Take 1 Tab by mouth every five (5) minutes as needed for Chest Pain.  Sit/Lay down then put one tab under the tongue every 5 minutes as needed for chest pain for 3 doses           Where to Get Your Medications      Information about where to get these medications is not yet available    Ask your nurse or doctor about these medications  · Camphor-Eucalyptus Oil-Menthol 4.8-1.2-2.6 % Oint  · dextromethorphan-guaiFENesin  mg Cap  · dextromethorphan-guaiFENesin  mg/5 mL syrup  · fluticasone 50 mcg/actuation nasal spray       _______________________________       Scribe Attestation     Hyun Goel acting as a scribe for and in the presence of Camron Lara      February 04, 2019 at 2:40 PM       Provider Attestation:      I personally performed the services described in the documentation, reviewed the documentation, as recorded by the scribe in my presence, and it accurately and completely records my words and actions.  February 04, 2019 at 2:40 PM - Miki Henderson PA-C        _______________________________

## 2019-02-04 NOTE — ED TRIAGE NOTES
Pt. States \"I was having chest pain last night, but I've body aches and cold in my throat and I've been throwing up\".

## 2019-02-07 ENCOUNTER — APPOINTMENT (OUTPATIENT)
Dept: GENERAL RADIOLOGY | Age: 69
End: 2019-02-07
Attending: EMERGENCY MEDICINE
Payer: MEDICAID

## 2019-02-07 ENCOUNTER — HOSPITAL ENCOUNTER (EMERGENCY)
Age: 69
Discharge: HOME | End: 2019-02-07
Attending: EMERGENCY MEDICINE
Payer: MEDICAID

## 2019-02-07 VITALS
OXYGEN SATURATION: 100 % | DIASTOLIC BLOOD PRESSURE: 84 MMHG | HEART RATE: 87 BPM | TEMPERATURE: 98.3 F | WEIGHT: 140 LBS | RESPIRATION RATE: 24 BRPM | BODY MASS INDEX: 27.34 KG/M2 | SYSTOLIC BLOOD PRESSURE: 131 MMHG

## 2019-02-07 DIAGNOSIS — F17.210 CIGARETTE NICOTINE DEPENDENCE WITHOUT COMPLICATION: ICD-10-CM

## 2019-02-07 DIAGNOSIS — I10 ESSENTIAL HYPERTENSION: Primary | ICD-10-CM

## 2019-02-07 LAB
ALBUMIN SERPL-MCNC: 4 G/DL (ref 3.4–5)
ALBUMIN/GLOB SERPL: 1.1 {RATIO} (ref 0.8–1.7)
ALP SERPL-CCNC: 52 U/L (ref 45–117)
ALT SERPL-CCNC: 27 U/L (ref 13–56)
ANION GAP SERPL CALC-SCNC: 6 MMOL/L (ref 3–18)
AST SERPL-CCNC: 25 U/L (ref 15–37)
ATRIAL RATE: 69 BPM
BASOPHILS # BLD: 0 K/UL (ref 0–0.1)
BASOPHILS NFR BLD: 0 % (ref 0–2)
BILIRUB SERPL-MCNC: 0.7 MG/DL (ref 0.2–1)
BUN SERPL-MCNC: 9 MG/DL (ref 7–18)
BUN/CREAT SERPL: 13 (ref 12–20)
CALCIUM SERPL-MCNC: 8.9 MG/DL (ref 8.5–10.1)
CALCULATED P AXIS, ECG09: 41 DEGREES
CALCULATED R AXIS, ECG10: 14 DEGREES
CALCULATED T AXIS, ECG11: 28 DEGREES
CHLORIDE SERPL-SCNC: 105 MMOL/L (ref 100–108)
CK MB CFR SERPL CALC: 2 % (ref 0–4)
CK MB SERPL-MCNC: 6.4 NG/ML (ref 5–25)
CK SERPL-CCNC: 313 U/L (ref 26–192)
CO2 SERPL-SCNC: 26 MMOL/L (ref 21–32)
CREAT SERPL-MCNC: 0.7 MG/DL (ref 0.6–1.3)
DIAGNOSIS, 93000: NORMAL
DIFFERENTIAL METHOD BLD: ABNORMAL
EOSINOPHIL # BLD: 0.1 K/UL (ref 0–0.4)
EOSINOPHIL NFR BLD: 2 % (ref 0–5)
ERYTHROCYTE [DISTWIDTH] IN BLOOD BY AUTOMATED COUNT: 14 % (ref 11.6–14.5)
FLUAV AG NPH QL IA: NEGATIVE
FLUBV AG NOSE QL IA: NEGATIVE
GLOBULIN SER CALC-MCNC: 3.6 G/DL (ref 2–4)
GLUCOSE SERPL-MCNC: 98 MG/DL (ref 74–99)
HCT VFR BLD AUTO: 38.4 % (ref 35–45)
HGB BLD-MCNC: 14 G/DL (ref 12–16)
LYMPHOCYTES # BLD: 2.3 K/UL (ref 0.9–3.6)
LYMPHOCYTES NFR BLD: 34 % (ref 21–52)
MAGNESIUM SERPL-MCNC: 2.3 MG/DL (ref 1.6–2.6)
MCH RBC QN AUTO: 28.8 PG (ref 24–34)
MCHC RBC AUTO-ENTMCNC: 36.5 G/DL (ref 31–37)
MCV RBC AUTO: 79 FL (ref 74–97)
MONOCYTES # BLD: 0.7 K/UL (ref 0.05–1.2)
MONOCYTES NFR BLD: 11 % (ref 3–10)
NEUTS SEG # BLD: 3.6 K/UL (ref 1.8–8)
NEUTS SEG NFR BLD: 53 % (ref 40–73)
P-R INTERVAL, ECG05: 172 MS
PLATELET # BLD AUTO: 264 K/UL (ref 135–420)
PMV BLD AUTO: 9.7 FL (ref 9.2–11.8)
POTASSIUM SERPL-SCNC: 3.9 MMOL/L (ref 3.5–5.5)
PROT SERPL-MCNC: 7.6 G/DL (ref 6.4–8.2)
Q-T INTERVAL, ECG07: 424 MS
QRS DURATION, ECG06: 76 MS
QTC CALCULATION (BEZET), ECG08: 454 MS
RBC # BLD AUTO: 4.86 M/UL (ref 4.2–5.3)
SODIUM SERPL-SCNC: 137 MMOL/L (ref 136–145)
TROPONIN I SERPL-MCNC: <0.02 NG/ML (ref 0–0.04)
VENTRICULAR RATE, ECG03: 69 BPM
WBC # BLD AUTO: 6.8 K/UL (ref 4.6–13.2)

## 2019-02-07 PROCEDURE — 82550 ASSAY OF CK (CPK): CPT

## 2019-02-07 PROCEDURE — 71046 X-RAY EXAM CHEST 2 VIEWS: CPT

## 2019-02-07 PROCEDURE — 80053 COMPREHEN METABOLIC PANEL: CPT

## 2019-02-07 PROCEDURE — 83735 ASSAY OF MAGNESIUM: CPT

## 2019-02-07 PROCEDURE — 74011250637 HC RX REV CODE- 250/637: Performed by: EMERGENCY MEDICINE

## 2019-02-07 PROCEDURE — 93005 ELECTROCARDIOGRAM TRACING: CPT

## 2019-02-07 PROCEDURE — 99284 EMERGENCY DEPT VISIT MOD MDM: CPT

## 2019-02-07 PROCEDURE — 87804 INFLUENZA ASSAY W/OPTIC: CPT

## 2019-02-07 PROCEDURE — 85025 COMPLETE CBC W/AUTO DIFF WBC: CPT

## 2019-02-07 RX ORDER — ONDANSETRON 2 MG/ML
4 INJECTION INTRAMUSCULAR; INTRAVENOUS
Status: DISCONTINUED | OUTPATIENT
Start: 2019-02-07 | End: 2019-02-07 | Stop reason: HOSPADM

## 2019-02-07 RX ORDER — CLONIDINE HYDROCHLORIDE 0.1 MG/1
0.2 TABLET ORAL
Status: DISCONTINUED | OUTPATIENT
Start: 2019-02-07 | End: 2019-02-07

## 2019-02-07 RX ORDER — CLONIDINE HYDROCHLORIDE 0.1 MG/1
0.1 TABLET ORAL
Status: DISCONTINUED | OUTPATIENT
Start: 2019-02-07 | End: 2019-02-07 | Stop reason: HOSPADM

## 2019-02-07 RX ORDER — ACETAMINOPHEN 325 MG/1
650 TABLET ORAL
Status: COMPLETED | OUTPATIENT
Start: 2019-02-07 | End: 2019-02-07

## 2019-02-07 RX ORDER — HYDROCHLOROTHIAZIDE 25 MG/1
25 TABLET ORAL DAILY
Qty: 10 TAB | Refills: 0 | Status: SHIPPED | OUTPATIENT
Start: 2019-02-07 | End: 2019-02-17

## 2019-02-07 RX ADMIN — ACETAMINOPHEN 650 MG: 325 TABLET ORAL at 04:41

## 2019-02-07 NOTE — ED PROVIDER NOTES
EMERGENCY DEPARTMENT HISTORY AND PHYSICAL EXAM 
 
3:20 AM 
 
 
Date: 2/7/2019 Patient Name: Cedric Alejo History of Presenting Illness Chief Complaint Patient presents with  Generalized Body Aches  Headache History Provided By: Patient Chief Complaint: Headaches Duration:  2 days Timing:  Worsening Location: neuro Quality: Aching Severity: Moderate Modifying Factors: None reported. Associated Symptoms: fever (subjective), cough, SOB (intermittent), diarrhea, nausea and diaphoresis Additional History (Context): Cedric Alejo is a 80 y.o. female with a PMHx of a CVA, anxiety and angina (at rest) who presents with c/o moderate, worsening headaches onset 2 days. Pt reports she was seen in the ED 2 days ago for similar sx and discharged home. States that since her visit she has had worsening headaches and developed associated diaphoresis excessively. She says that she has been having a cough that is productive of brownish sputum. Pt has a drug allergy to Aspirin. Former smoker, no etoh or drug use. Associated sx include fever (subjective), cough, SOB (intermittent), diarrhea, nausea and diaphoresis. Denies any chills, CP, abdominal pain, vomiting and any urinary sx. No further concerns or complaints at this time. PCP: None Current Outpatient Medications Medication Sig Dispense Refill  Camphor-Eucalyptus Oil-Menthol (VICKS VAPORUB) 4.8-1.2-2.6 % oint 1 Actuation(s) by Apply Externally route three (3) times daily as needed. 50 g 0  
 fluticasone (FLONASE) 50 mcg/actuation nasal spray 2 Sprays by Both Nostrils route daily. 1 Bottle 0  
 nitroglycerin (NITROSTAT) 0.4 mg SL tablet Take 1 Tab by mouth every five (5) minutes as needed for Chest Pain. Sit/Lay down then put one tab under the tongue every 5 minutes as needed for chest pain for 3 doses 1 Bottle 0  
 naproxen (NAPROSYN) 500 mg tablet Take 1 Tab by mouth two (2) times daily (with meals).  12 Tab 0  
 
 
 Past History Past Medical History: 
Past Medical History:  
Diagnosis Date  Angina at rest [facility])  Anxiety  CVA, old, facial weakness 2012  
 left ptosis  Dental caries Past Surgical History: 
History reviewed. No pertinent surgical history. Family History: 
History reviewed. No pertinent family history. Social History: 
Social History Tobacco Use  Smoking status: Former Smoker Years: 15.00 Substance Use Topics  Alcohol use: No  
 Drug use: No  
  Comment: none in 2 years Allergies: Allergies Allergen Reactions  Aspirin Nausea Only Review of Systems Review of Systems Constitutional: Positive for diaphoresis and fever (subjective). Negative for activity change and fatigue. HENT: Negative for congestion and rhinorrhea. Eyes: Negative for visual disturbance. Respiratory: Positive for cough and shortness of breath. Cardiovascular: Negative for chest pain and palpitations. Gastrointestinal: Positive for diarrhea and nausea. Negative for abdominal pain and vomiting. Genitourinary: Negative for dysuria and hematuria. Musculoskeletal: Negative for back pain. Skin: Negative for rash. Neurological: Positive for headaches. Negative for dizziness, weakness and light-headedness. All other systems reviewed and are negative. Physical Exam  
 
Visit Vitals BP (!) 184/102 (BP 1 Location: Left arm) Pulse 89 Temp 98.3 °F (36.8 °C) Resp 16 Wt 63.5 kg (140 lb) SpO2 97% BMI 27.34 kg/m² Physical Exam 
GENERAL: Elderly  female, appears older than age, AOx4, awake EYES: PERRLA, EOMI, no conjunctival pallor, no hyphema HEAD: NC/AT, no lacerations NECK: No midline tenderness, no step offs,  trachea midline ENT: MMM, no pharyngeal edema, hearing intact CV: RRR, +S1/S2, no JVD RESP: Lungs CTA B/L,  no w/r/r, breaths sounds equal and nonlabored, no accessory muscle use CHEST: No crepitus or obvious deformity, no ecchymosis or abrasions ABD:  normoactive BS x4 quadrants, non-TTP, soft and non distended EXTREMITIES: no LE edema B/L, no obvious deformity INTEGUMENTARY: warm, dry, no pallor MSK: normal ROM, normal gross strength NEURO: Alert and appropriate, normal speech,  CNs II-XII intact, moving all 4 extremities freely and symmetrically Diagnostic Study Results Labs - Recent Results (from the past 12 hour(s)) EKG, 12 LEAD, INITIAL Collection Time: 02/07/19  3:37 AM  
Result Value Ref Range Ventricular Rate 69 BPM  
 Atrial Rate 69 BPM  
 P-R Interval 172 ms QRS Duration 76 ms  
 Q-T Interval 424 ms QTC Calculation (Bezet) 454 ms Calculated P Axis 41 degrees Calculated R Axis 14 degrees Calculated T Axis 28 degrees Diagnosis Normal sinus rhythm Possible Left atrial enlargement Borderline ECG When compared with ECG of 04-AUG-2018 03:39, No significant change was found CBC WITH AUTOMATED DIFF Collection Time: 02/07/19  3:48 AM  
Result Value Ref Range WBC 6.8 4.6 - 13.2 K/uL  
 RBC 4.86 4.20 - 5.30 M/uL  
 HGB 14.0 12.0 - 16.0 g/dL HCT 38.4 35.0 - 45.0 % MCV 79.0 74.0 - 97.0 FL  
 MCH 28.8 24.0 - 34.0 PG  
 MCHC 36.5 31.0 - 37.0 g/dL  
 RDW 14.0 11.6 - 14.5 % PLATELET 636 085 - 751 K/uL MPV 9.7 9.2 - 11.8 FL  
 NEUTROPHILS 53 40 - 73 % LYMPHOCYTES 34 21 - 52 % MONOCYTES 11 (H) 3 - 10 % EOSINOPHILS 2 0 - 5 % BASOPHILS 0 0 - 2 %  
 ABS. NEUTROPHILS 3.6 1.8 - 8.0 K/UL  
 ABS. LYMPHOCYTES 2.3 0.9 - 3.6 K/UL  
 ABS. MONOCYTES 0.7 0.05 - 1.2 K/UL  
 ABS. EOSINOPHILS 0.1 0.0 - 0.4 K/UL  
 ABS. BASOPHILS 0.0 0.0 - 0.1 K/UL  
 DF AUTOMATED METABOLIC PANEL, COMPREHENSIVE Collection Time: 02/07/19  3:48 AM  
Result Value Ref Range Sodium 137 136 - 145 mmol/L Potassium 3.9 3.5 - 5.5 mmol/L Chloride 105 100 - 108 mmol/L  
 CO2 26 21 - 32 mmol/L  Anion gap 6 3.0 - 18 mmol/L  
 Glucose 98 74 - 99 mg/dL BUN 9 7.0 - 18 MG/DL Creatinine 0.70 0.6 - 1.3 MG/DL  
 BUN/Creatinine ratio 13 12 - 20 GFR est AA >60 >60 ml/min/1.73m2 GFR est non-AA >60 >60 ml/min/1.73m2 Calcium 8.9 8.5 - 10.1 MG/DL Bilirubin, total 0.7 0.2 - 1.0 MG/DL  
 ALT (SGPT) 27 13 - 56 U/L  
 AST (SGOT) 25 15 - 37 U/L Alk. phosphatase 52 45 - 117 U/L Protein, total 7.6 6.4 - 8.2 g/dL Albumin 4.0 3.4 - 5.0 g/dL Globulin 3.6 2.0 - 4.0 g/dL A-G Ratio 1.1 0.8 - 1.7 CARDIAC PANEL,(CK, CKMB & TROPONIN) Collection Time: 02/07/19  3:48 AM  
Result Value Ref Range  (H) 26 - 192 U/L  
 CK - MB 6.4 (H) <3.6 ng/ml CK-MB Index 2.0 0.0 - 4.0 % Troponin-I, QT <0.02 0.0 - 0.045 NG/ML  
MAGNESIUM Collection Time: 02/07/19  3:48 AM  
Result Value Ref Range Magnesium 2.3 1.6 - 2.6 mg/dL INFLUENZA A & B AG (RAPID TEST) Collection Time: 02/07/19  3:48 AM  
Result Value Ref Range Influenza A Antigen NEGATIVE  NEG Influenza B Antigen NEGATIVE  NEG Radiologic Studies -  
XR CHEST PA LAT    (Results Pending) NAP read by Dr. Geovani Weber, pending official radiologist read Medical Decision Making It should be noted that INathan Cather* will be the provider of record for this patient. I reviewed the vital signs, available nursing notes, past medical history, past surgical history, family history and social history. Vital Signs-Reviewed the patient's vital signs. Pulse Oximetry Analysis -  97% on room air (Normal) Cardiac Monitor: 
Rate: 89 Rhythm:  Normal Sinus Rhythm EKG: Interpreted by the EP. Time Interpreted: 3:40 AM 
 Rate: 69 bpm. 
 Rhythm: Normal Sinus Rhythm Interpretation: QTc 454 ms, No STEMI. Records Reviewed: Nursing Notes (Time of Review: 3:20 AM) ED Course: Progress Notes, Reevaluation, and Consults: 
 
Provider Notes (Medical Decision Making): 79 YO F presenting w/ generalized body aches, cough, SOB. Was seen 2 days prior w/ similar compaints. On exam hypertensive but neurologically intact. Afebrile, non toxic. Will obtain screening labwork, cardiac enzymes, EKG, CXR and swab for flu. No indication for advanced imaging for stella hypertension as no focal neurological deficits. Will give dose of Clonidine but if labs WNL will DC w [facility] FU for PCP, start on HCTZ as had documented hx of prior HBP. Labwork w/ no bandemia or leukocytosis. CMP reassuring. Cardiac enzymes negative. Will DC with rx for HCTZ and PCP FU. Diagnosis DISCHARGE NOTE: 
Rasheed Mcguire  results have been reviewed with her. She has been counseled regarding her diagnosis, treatment, and plan. She verbally conveys understanding and agreement of the signs, symptoms, diagnosis, treatment and prognosis and additionally agrees to follow up as discussed. She also agrees with the care-plan and conveys that all of her questions have been answered. I have also provided discharge instructions for her that include: educational information regarding their diagnosis and treatment, and list of reasons why they would want to return to the ED prior to their follow-up appointment, should her condition change. She has been provided with education for proper emergency department utilization. CLINICAL IMPRESSION: 
 
1. Essential hypertension 2. Cigarette nicotine dependence without complication PLAN: 
1. D/C Home 2. Current Discharge Medication List  
  
START taking these medications Details  
hydroCHLOROthiazide (HYDRODIURIL) 25 mg tablet Take 1 Tab by mouth daily for 10 days. Qty: 10 Tab, Refills: 0  
  
  
 
3. Follow-up Information Follow up With Specialties Details Why Contact Info [address]  Schedule an appointment as soon as possible for a visit in 2 days  [address] [facility] [address] 
[phone] Scribe Attestation Angelika Ha acting as a scribe for and in the presence of Prema Evans* February 07, 2019 at 3:20 AM 
    
Provider Attestation:     
I personally performed the services described in the documentation, reviewed the documentation, as recorded by the scribe in my presence, and it accurately and completely records my words and actions. February 07, 2019 at 3:20 AM - Prema Evans*   
 
 
_______________________________

## 2019-02-07 NOTE — DISCHARGE INSTRUCTIONS
Patient Education        Home Blood Pressure Test: About This Test  What is it? A home blood pressure test allows you to keep track of your blood pressure at home. Blood pressure is a measure of the force of blood against the walls of your arteries. Blood pressure readings include two numbers, such as 130/80 (say \"130 over 80\"). The first number is the systolic pressure. The second number is the diastolic pressure. Why is this test done? You may do this test at home to:  · Find out if you have high blood pressure. · Track your blood pressure if you have high blood pressure. · Track how well medicine is working to reduce high blood pressure. · Check how lifestyle changes, such as weight loss and exercise, are affecting blood pressure. How can you prepare for the test?  · Do not use caffeine, tobacco, or medicines known to raise blood pressure (such as nasal decongestant sprays) for at least 30 minutes before taking your blood pressure. · Do not exercise for at least 30 minutes before taking your blood pressure. What happens before the test?  Take your blood pressure while you feel comfortable and relaxed. Sit quietly with both feet on the floor for at least 5 minutes before the test.  What happens during the test?  · Sit with your arm slightly bent and resting on a table so that your upper arm is at the same level as your heart. · Roll up your sleeve or take off your shirt to expose your upper arm. · Wrap the blood pressure cuff around your upper arm so that the lower edge of the cuff is about 1 inch above the bend of your elbow. Proceed with the following steps depending on if you are using an automatic or manual pressure monitor. Automatic blood pressure monitors  · Press the on/off button on the automatic monitor and wait until the ready-to-measure \"heart\" symbol appears next to zero in the display window. · Press the start button. The cuff will inflate and deflate by itself.   · Your blood pressure numbers will appear on the screen. · Write your numbers in your log book, along with the date and time. Manual blood pressure monitors  · Place the earpieces of a stethoscope in your ears, and place the bell of the stethoscope over the artery, just below the cuff. · Close the valve on the rubber inflating bulb. · Squeeze the bulb rapidly with your opposite hand to inflate the cuff until the dial or column of mercury reads about 30 mm Hg higher than your usual systolic pressure. If you do not know your usual pressure, inflate the cuff to 210 mm Hg or until the pulse at your wrist disappears. · Open the pressure valve just slightly by twisting or pressing the valve on the bulb. · As you watch the pressure slowly fall, note the level on the dial at which you first start to hear a pulsing or tapping sound through the stethoscope. This is your systolic blood pressure. · Continue letting the air out slowly. The sounds will become muffled and will finally disappear. Note the pressure when the sounds completely disappear. This is your diastolic blood pressure. Let out all the remaining air. · Write your numbers in your log book, along with the date and time. What else should you know about the test?  It is more accurate to take the average of several readings made throughout the day than to rely on a single reading. It's normal for blood pressure to go up and down throughout the day. Follow-up care is a key part of your treatment and safety. Be sure to make and go to all appointments, and call your doctor if you are having problems. It's also a good idea to keep a list of the medicines you take. Where can you learn more? Go to http://hussein-nickolas.info/. Enter C427 in the search box to learn more about \"Home Blood Pressure Test: About This Test.\"  Current as of: July 22, 2018  Content Version: 11.9  © 2929-1796 [employer].  Care instructions adapted under license by [facility] (which disclaims liability or warranty for this information). If you have questions about a medical condition or this instruction, always ask your healthcare professional. [address] any warranty or liability for your use of this information. Patient Education        Learning About High Blood Pressure  What is high blood pressure? Blood pressure is a measure of how hard the blood pushes against the walls of your arteries. It's normal for blood pressure to go up and down throughout the day, but if it stays up, you have high blood pressure. Another name for high blood pressure is hypertension. Two numbers tell you your blood pressure. The first number is the systolic pressure. It shows how hard the blood pushes when your heart is pumping. The second number is the diastolic pressure. It shows how hard the blood pushes between heartbeats, when your heart is relaxed and filling with blood. Your doctor will give you a goal for your blood pressure. Your goal will be based on your health and your age. High blood pressure (hypertension) means that the top number stays high, or the bottom number stays high, or both. High blood pressure increases the risk of stroke, heart attack, and other problems. You and your doctor will talk about your risks of these problems based on your blood pressure. What happens when you have high blood pressure? · Blood flows through your arteries with too much force. Over time, this damages the walls of your arteries. But you can't feel it. High blood pressure usually doesn't cause symptoms. · Fat and calcium start to build up in your arteries. This buildup is called plaque. Plaque makes your arteries narrower and stiffer. Blood can't flow through them as easily. · This lack of good blood flow starts to damage some of the organs in your body.  This can lead to problems such as coronary artery disease and heart attack, heart failure, stroke, kidney failure, and eye damage. How can you prevent high blood pressure? · Stay at a healthy weight. · Try to limit how much sodium you eat to less than 2,300 milligrams (mg) a day. If you limit your sodium to 1,500 mg a day, you can lower your blood pressure even more. ? Buy foods that are labeled \"unsalted,\" \"sodium-free,\" or \"low-sodium. \" Foods labeled \"reduced-sodium\" and \"light sodium\" may still have too much sodium. ? Flavor your food with garlic, lemon juice, onion, vinegar, herbs, and spices instead of salt. Do not use soy sauce, steak sauce, onion salt, garlic salt, mustard, or ketchup on your food. ? Use less salt (or none) when recipes call for it. You can often use half the salt a recipe calls for without losing flavor. · Be physically active. Get at least 30 minutes of exercise on most days of the week. Walking is a good choice. You also may want to do other activities, such as running, swimming, cycling, or playing tennis or team sports. · Limit alcohol to 2 drinks a day for men and 1 drink a day for women. · Eat plenty of fruits, vegetables, and low-fat dairy products. Eat less saturated and total fats. How is high blood pressure treated? · Your doctor will suggest making lifestyle changes. For example, your doctor may ask you to eat healthy foods, quit smoking, lose extra weight, and be more active. · If lifestyle changes don't help enough, your doctor may recommend that you take medicine. · When blood pressure is very high, medicines are needed to lower it. Follow-up care is a key part of your treatment and safety. Be sure to make and go to all appointments, and call your doctor if you are having problems. It's also a good idea to know your test results and keep a list of the medicines you take. Where can you learn more? Go to http://hussein-nickolas.info/. Enter P501 in the search box to learn more about \"Learning About High Blood Pressure. \"  Current as of: July 22, 2018  Content Version: 11.9  © 6772-6599 [employer]. Care instructions adapted under license by [employer] (which disclaims liability or warranty for this information). If you have questions about a medical condition or this instruction, always ask your healthcare professional. [address] any warranty or liability for your use of this information.

## 2019-02-25 ENCOUNTER — APPOINTMENT (OUTPATIENT)
Dept: GENERAL RADIOLOGY | Age: 69
End: 2019-02-25
Attending: PHYSICIAN ASSISTANT
Payer: MEDICAID

## 2019-02-25 ENCOUNTER — HOSPITAL ENCOUNTER (EMERGENCY)
Age: 69
Discharge: HOME | End: 2019-02-26
Attending: EMERGENCY MEDICINE
Payer: MEDICAID

## 2019-02-25 DIAGNOSIS — R07.9 CHEST PAIN, UNSPECIFIED TYPE: Primary | ICD-10-CM

## 2019-02-25 LAB
ALBUMIN SERPL-MCNC: 4 G/DL (ref 3.4–5)
ALBUMIN/GLOB SERPL: 1 {RATIO} (ref 0.8–1.7)
ALP SERPL-CCNC: 60 U/L (ref 45–117)
ALT SERPL-CCNC: 28 U/L (ref 13–56)
ANION GAP SERPL CALC-SCNC: 4 MMOL/L (ref 3–18)
AST SERPL-CCNC: 22 U/L (ref 15–37)
BASOPHILS # BLD: 0.1 K/UL (ref 0–0.1)
BASOPHILS NFR BLD: 1 % (ref 0–2)
BILIRUB SERPL-MCNC: 0.6 MG/DL (ref 0.2–1)
BUN SERPL-MCNC: 23 MG/DL (ref 7–18)
BUN/CREAT SERPL: 18 (ref 12–20)
CALCIUM SERPL-MCNC: 8.9 MG/DL (ref 8.5–10.1)
CHLORIDE SERPL-SCNC: 104 MMOL/L (ref 100–108)
CK MB CFR SERPL CALC: 1 % (ref 0–4)
CK MB SERPL-MCNC: 4.2 NG/ML (ref 5–25)
CK SERPL-CCNC: 403 U/L (ref 26–192)
CO2 SERPL-SCNC: 29 MMOL/L (ref 21–32)
CREAT SERPL-MCNC: 1.29 MG/DL (ref 0.6–1.3)
DIFFERENTIAL METHOD BLD: NORMAL
EOSINOPHIL # BLD: 0.2 K/UL (ref 0–0.4)
EOSINOPHIL NFR BLD: 3 % (ref 0–5)
ERYTHROCYTE [DISTWIDTH] IN BLOOD BY AUTOMATED COUNT: 14 % (ref 11.6–14.5)
GLOBULIN SER CALC-MCNC: 4 G/DL (ref 2–4)
GLUCOSE SERPL-MCNC: 159 MG/DL (ref 74–99)
HCT VFR BLD AUTO: 40.3 % (ref 35–45)
HGB BLD-MCNC: 14.5 G/DL (ref 12–16)
LYMPHOCYTES # BLD: 3.5 K/UL (ref 0.9–3.6)
LYMPHOCYTES NFR BLD: 45 % (ref 21–52)
MCH RBC QN AUTO: 29.1 PG (ref 24–34)
MCHC RBC AUTO-ENTMCNC: 36 G/DL (ref 31–37)
MCV RBC AUTO: 80.8 FL (ref 74–97)
MONOCYTES # BLD: 0.7 K/UL (ref 0.05–1.2)
MONOCYTES NFR BLD: 9 % (ref 3–10)
NEUTS SEG # BLD: 3.1 K/UL (ref 1.8–8)
NEUTS SEG NFR BLD: 42 % (ref 40–73)
PLATELET # BLD AUTO: 301 K/UL (ref 135–420)
PMV BLD AUTO: 9.7 FL (ref 9.2–11.8)
POTASSIUM SERPL-SCNC: 3.9 MMOL/L (ref 3.5–5.5)
PROT SERPL-MCNC: 8 G/DL (ref 6.4–8.2)
RBC # BLD AUTO: 4.99 M/UL (ref 4.2–5.3)
SODIUM SERPL-SCNC: 137 MMOL/L (ref 136–145)
TROPONIN I SERPL-MCNC: <0.02 NG/ML (ref 0–0.04)
WBC # BLD AUTO: 7.5 K/UL (ref 4.6–13.2)

## 2019-02-25 PROCEDURE — 93005 ELECTROCARDIOGRAM TRACING: CPT

## 2019-02-25 PROCEDURE — 71046 X-RAY EXAM CHEST 2 VIEWS: CPT

## 2019-02-25 PROCEDURE — 82550 ASSAY OF CK (CPK): CPT

## 2019-02-25 PROCEDURE — 85025 COMPLETE CBC W/AUTO DIFF WBC: CPT

## 2019-02-25 PROCEDURE — 80053 COMPREHEN METABOLIC PANEL: CPT

## 2019-02-25 PROCEDURE — 99284 EMERGENCY DEPT VISIT MOD MDM: CPT

## 2019-02-26 VITALS
RESPIRATION RATE: 16 BRPM | TEMPERATURE: 97.2 F | OXYGEN SATURATION: 98 % | SYSTOLIC BLOOD PRESSURE: 118 MMHG | DIASTOLIC BLOOD PRESSURE: 82 MMHG | HEART RATE: 97 BPM

## 2019-02-26 LAB
ATRIAL RATE: 88 BPM
CALCULATED P AXIS, ECG09: 32 DEGREES
CALCULATED R AXIS, ECG10: 12 DEGREES
CALCULATED T AXIS, ECG11: 26 DEGREES
DIAGNOSIS, 93000: NORMAL
P-R INTERVAL, ECG05: 158 MS
Q-T INTERVAL, ECG07: 370 MS
QRS DURATION, ECG06: 64 MS
QTC CALCULATION (BEZET), ECG08: 447 MS
TROPONIN I SERPL-MCNC: <0.02 NG/ML (ref 0–0.04)
VENTRICULAR RATE, ECG03: 88 BPM

## 2019-02-26 PROCEDURE — 74011250637 HC RX REV CODE- 250/637: Performed by: EMERGENCY MEDICINE

## 2019-02-26 PROCEDURE — 84484 ASSAY OF TROPONIN QUANT: CPT

## 2019-02-26 RX ORDER — ACETAMINOPHEN 325 MG/1
650 TABLET ORAL
Status: COMPLETED | OUTPATIENT
Start: 2019-02-26 | End: 2019-02-26

## 2019-02-26 RX ORDER — ACETAMINOPHEN 325 MG/1
TABLET ORAL
Status: DISCONTINUED
Start: 2019-02-26 | End: 2019-02-26 | Stop reason: HOSPADM

## 2019-02-26 RX ADMIN — ACETAMINOPHEN 650 MG: 325 TABLET ORAL at 01:30

## 2019-05-01 ENCOUNTER — HOSPITAL ENCOUNTER (EMERGENCY)
Age: 69
Discharge: HOME | End: 2019-05-01
Attending: EMERGENCY MEDICINE
Payer: MEDICAID

## 2019-05-01 VITALS
TEMPERATURE: 97.2 F | RESPIRATION RATE: 18 BRPM | OXYGEN SATURATION: 98 % | DIASTOLIC BLOOD PRESSURE: 95 MMHG | SYSTOLIC BLOOD PRESSURE: 151 MMHG | HEART RATE: 97 BPM

## 2019-05-01 DIAGNOSIS — B86 SCABIES: Primary | ICD-10-CM

## 2019-05-01 PROCEDURE — 99282 EMERGENCY DEPT VISIT SF MDM: CPT

## 2019-05-01 RX ORDER — PERMETHRIN 50 MG/G
CREAM TOPICAL
Qty: 60 G | Refills: 0 | Status: SHIPPED | OUTPATIENT
Start: 2019-05-01 | End: 2019-05-31

## 2019-05-01 NOTE — ED PROVIDER NOTES
EMERGENCY DEPARTMENT HISTORY AND PHYSICAL EXAM 
 
Date: (Not on file) Patient Name: Wendy Arciniega History of Presenting Illness Chief Complaint Patient presents with  Insect Bite HPI: Wendy Arciniega, 80 y.o. female presents to the ED with pruritic rash to arms, legs, fingers, and back. She states she has had same rash in the past and it was scabies. She states she has been treated with the cream for scabies but it has been over one year. Patient reports she recently went back to the house where she got scabies last year and rash began. Rash is nonpainful, no drainage. She denies fevers, chills, arthralgias, joint swelling, shortness of breath, wheezing, throat swelling There are no other complaints, changes, or physical findings at this time. Past History Past Medical History: 
Past Medical History:  
Diagnosis Date  Angina at rest [facility])  Anxiety  CVA, old, facial weakness 2012  
 left ptosis  Dental caries Past Surgical History: No past surgical history on file. Family History: No family history on file. Social History: 
Social History Tobacco Use  Smoking status: Former Smoker Years: 15.00  Smokeless tobacco: Never Used Substance Use Topics  Alcohol use: No  
 Drug use: No  
  Comment: none in 2 years Allergies: Allergies Allergen Reactions  Aspirin Nausea Only Review of Systems Constitutional: Negative for chills and fever. HENT: Negative. Musculoskeletal: Negative for arthralgias, joint swelling and myalgias. Skin: Positive for rash. itching Allergic/Immunologic: Negative for environmental allergies and food allergies. Physical Exam  
Constitutional: She is oriented to person, place, and time. She appears well-developed and well-nourished. She is cooperative. Non-toxic appearance. No distress. HENT:  
Head: Normocephalic and atraumatic.   
Right Ear: External ear normal.  
 Left Ear: External ear normal.  
Nose: Nose normal.  
Mouth/Throat: Mucous membranes are normal.  
Eyes: Conjunctivae, EOM and lids are normal. No scleral icterus. Neck: Normal range of motion. Neck supple. Pulmonary/Chest: Effort normal. No respiratory distress. Abdominal: There is no tenderness. There is no rebound. Musculoskeletal: Normal range of motion. She exhibits no edema. Neurological: She is alert and oriented to person, place, and time. She exhibits normal muscle tone. Skin: Skin is warm and intact. Rash noted. Small erythematous papules to dorsum of bilat hands, arms, lower legs, and upper back. No vesicles or pustules. No drainage Psychiatric: She has a normal mood and affect. Her speech is normal and behavior is normal. Judgment and thought content normal.  
Nursing note and vitals reviewed. Diagnostic Study Results Labs - No results found for this or any previous visit (from the past 12 hour(s)). Radiologic Studies - No orders to display CT Results  (Last 48 hours) None CXR Results  (Last 48 hours) None Vital Signs-Reviewed the patient's vital signs. Patient Vitals for the past 12 hrs: 
 Temp Pulse Resp BP SpO2  
05/01/19 1307 97.2 °F (36.2 °C) 97 18 (!) 151/95 98 % I reviewed the vital signs, available nursing notes, past medical history, past surgical history, family history and social history. Provider Notes (Medical Decision Making):  
Pruritic bites diffusely, states she had same in past when she was staying at same house and it was scabies. Scabies v. Bed bugs, will give Permethrin Diagnosis Clinical Impression: 1. Scabies Disposition: 
Home PLAN: 
1. Current Discharge Medication List  
  
START taking these medications Details  
permethrin (ACTICIN) 5 % topical cream Apply head to toe avoiding face, wash off after 12 hours, use entire tube Qty: 60 g, Refills: 0  
  
  
 
2. Follow-up Information Follow up With Specialties Details Why Contact Info [facility] EMERGENCY DEPT Emergency Medicine  If symptoms worsen, As needed [address] [address] 
[phone] [address]  Call in 2 days for re-evaluation, If symptoms worsen, As needed [address] [address] 
[phone] 3. Return to ED if worse Petra Flowers PA-C

## 2019-05-01 NOTE — DISCHARGE INSTRUCTIONS
Patient Education   Wash all bedding and clothes in hot water. Wash hands frequently. Return as needed or if symptoms worsen. If you continue to get new bites after using the cream then it is possible you have bedbugs, which requires an      Scabies: Care Instructions  Your Care Instructions  Scabies is a skin problem that can cause intense itching. It is caused by very tiny bugs called mites that dig just under the skin and lay eggs. An allergic reaction to the mites causes the itching. Scabies is usually spread by person-to-person contact. It is also possible, but not common, for scabies to spread through towels, clothes, and bedding. Everyone in your household should be treated. Scabies is treated with medicine. Itching may last for several weeks after treatment. Follow-up care is a key part of your treatment and safety. Be sure to make and go to all appointments, and call your doctor if you are having problems. It's also a good idea to know your test results and keep a list of the medicines you take. How can you care for yourself at home? · Use the lotion or cream your doctor recommends or prescribes. One treatment usually cures scabies. Do not use the cream again unless your doctor tells you to. · Wash all clothes, bedding, and towels that you used in the 3 days before you started treatment. Use hot water, and use the hot cycle in the dryer. Another option is to dry-clean these items. Or seal them in a plastic bag for 3 to 7 days. · Take an oral antihistamine, such as loratadine (Claritin) or diphenhydramine (Benadryl), to help stop itching. You also can use a nonprescription anti-itch cream. Read and follow all instructions on the label. · Do not have physical contact with other people or let anyone use your personal items until you have finished treatment. Do not use other people's personal items until your treatment is done.  Tell people with whom you have close contact that they will need treatment if they have symptoms. · Take an oatmeal bath to help relieve itching. Add a handful of oatmeal (ground to a powder) to your bath. Or you can try an oatmeal bath product, such as Aveeno. When should you call for help? Call your doctor now or seek immediate medical care if:    · You have signs of infection, such as:  ? Increased pain, swelling, warmth, or redness. ? Red streaks leading from the mite bites. ? Pus draining from a bite area. ? A fever.    Watch closely for changes in your health, and be sure to contact your doctor if:    · Anyone else in your family has itching.     · You do not get better within 2 weeks. Where can you learn more? Go to http://hussein-nickolas.info/. Enter F559 in the search box to learn more about \"Scabies: Care Instructions. \"  Current as of: April 17, 2018  Content Version: 11.9  © 4093-5753 [employer]. Care instructions adapted under license by [employer] (which disclaims liability or warranty for this information). If you have questions about a medical condition or this instruction, always ask your healthcare professional. [place] [address] any warranty or liability for your use of this information.

## 2019-05-01 NOTE — ED NOTES
Pt. walked into [facility] office after discharge requesting medication assistance. Pt. assessed while in [facility] office. Pt. currently uninsured and unemployed. Pt. and I discussed the importance of establishing care with a primary care doctor for medical needs. Pt. states she has applied for Medicaid and application is pending. Pt. states she completed a atif care application over 6 months ago. Pt. given another application to complete and return with W-6 form, ID, SNAP benefit letter and proof of [place] residency. Pt. handed [facility] application to complete and return. Pt. handed information for [facility] [employer] [facility] on 05- and 05/06/2019. Pt. received INDIGENT VOUCHER for one medication. Pt. Ilana [facility] use [facility]. Voucher faxed over. Pt. given [facility] contact information for any further assistance that's needed.

## 2019-06-17 ENCOUNTER — HOSPITAL ENCOUNTER (EMERGENCY)
Age: 69
Discharge: HOME | End: 2019-06-17
Attending: EMERGENCY MEDICINE
Payer: MEDICAID

## 2019-06-17 ENCOUNTER — APPOINTMENT (OUTPATIENT)
Dept: GENERAL RADIOLOGY | Age: 69
End: 2019-06-17
Attending: EMERGENCY MEDICINE
Payer: MEDICAID

## 2019-06-17 VITALS
OXYGEN SATURATION: 100 % | TEMPERATURE: 98.1 F | HEART RATE: 78 BPM | SYSTOLIC BLOOD PRESSURE: 142 MMHG | RESPIRATION RATE: 16 BRPM | DIASTOLIC BLOOD PRESSURE: 88 MMHG

## 2019-06-17 DIAGNOSIS — B86 SCABIES: Primary | ICD-10-CM

## 2019-06-17 LAB
APPEARANCE UR: CLEAR
BACTERIA URNS QL MICRO: ABNORMAL /HPF
BILIRUB UR QL: NEGATIVE
COLOR UR: YELLOW
EPITH CASTS URNS QL MICRO: ABNORMAL /LPF (ref 0–5)
GLUCOSE UR STRIP.AUTO-MCNC: NEGATIVE MG/DL
HGB UR QL STRIP: ABNORMAL
KETONES UR QL STRIP.AUTO: NEGATIVE MG/DL
LEUKOCYTE ESTERASE UR QL STRIP.AUTO: ABNORMAL
NITRITE UR QL STRIP.AUTO: NEGATIVE
PH UR STRIP: 5 [PH] (ref 5–8)
PROT UR STRIP-MCNC: NEGATIVE MG/DL
RBC #/AREA URNS HPF: ABNORMAL /HPF (ref 0–5)
SP GR UR REFRACTOMETRY: 1.02 (ref 1–1.03)
UROBILINOGEN UR QL STRIP.AUTO: 0.2 EU/DL (ref 0.2–1)
WBC URNS QL MICRO: ABNORMAL /HPF (ref 0–4)

## 2019-06-17 PROCEDURE — 74018 RADEX ABDOMEN 1 VIEW: CPT

## 2019-06-17 PROCEDURE — 99283 EMERGENCY DEPT VISIT LOW MDM: CPT

## 2019-06-17 PROCEDURE — 81001 URINALYSIS AUTO W/SCOPE: CPT

## 2019-06-17 RX ORDER — PREDNISONE 5 MG/1
TABLET ORAL
Qty: 21 TAB | Refills: 0 | Status: SHIPPED | OUTPATIENT
Start: 2019-06-17 | End: 2021-10-27

## 2019-06-17 RX ORDER — HYDROXYZINE 25 MG/1
TABLET, FILM COATED ORAL
Qty: 30 TAB | Refills: 0 | Status: SHIPPED | OUTPATIENT
Start: 2019-06-17 | End: 2019-07-14

## 2019-06-17 NOTE — ED TRIAGE NOTES
\"I was here for scabies. It's not any better. I'm still itching. I'm also having sharp pains to the upper part of my stomach. I also have an abscess to the inside of my mouth and it busted in my sleep last night. \"  She is also complaining of nausea, vomiting, and diarrhea.

## 2019-06-18 NOTE — ED PROVIDER NOTES
EMERGENCY DEPARTMENT HISTORY AND PHYSICAL EXAM    8:26 PM      Date: 6/17/2019  Patient Name: Ne Patel    History of Presenting Illness     Chief Complaint   Patient presents with    Itching         History Provided By: Patient    Additional History (Context): Ne Patel is a 80 y.o. female with stroke who presents with status post scabies treatment. Patient states he continues to have some itchiness. He admits to not really following directions for the medication. She additionally complains of some mild stomach pain which she says it might be gas. She denies any urinary symptoms. She denies any chest pain, nausea, vomiting or diarrhea. She denies any smoking alcohol or recreational drug use. Minesh Santizo PCP: None      Current Outpatient Medications   Medication Sig Dispense Refill    permethrin (NIX) 1 % lotion Apply  to affected area two (2) times a day. 1 Bottle 0    hydrOXYzine HCl (ATARAX) 25 mg tablet Take 1-2 tablets by mouth every 6 hours as needed. 30 Tab 0    predniSONE (STERAPRED) 5 mg dose pack See administration instruction per 5mg dose pack 21 Tab 0    Camphor-Eucalyptus Oil-Menthol (VICKS VAPORUB) 4.8-1.2-2.6 % oint 1 Actuation(s) by Apply Externally route three (3) times daily as needed. 50 g 0    fluticasone (FLONASE) 50 mcg/actuation nasal spray 2 Sprays by Both Nostrils route daily. 1 Bottle 0    nitroglycerin (NITROSTAT) 0.4 mg SL tablet Take 1 Tab by mouth every five (5) minutes as needed for Chest Pain. Sit/Lay down then put one tab under the tongue every 5 minutes as needed for chest pain for 3 doses 1 Bottle 0    naproxen (NAPROSYN) 500 mg tablet Take 1 Tab by mouth two (2) times daily (with meals). 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  No pertinent surgical history. Family History:  History reviewed. No pertinent family history. Social History:  Social History     Tobacco Use    Smoking status: Former Smoker     Years: 15.00    Smokeless tobacco: Never Used   Substance Use Topics    Alcohol use: No    Drug use: No     Comment: none in 2 years       Allergies: Allergies   Allergen Reactions    Aspirin Nausea Only         Review of Systems       Review of Systems   Constitutional: Negative. Negative for chills, diaphoresis and fever. HENT: Negative. Negative for congestion, rhinorrhea and sore throat. Eyes: Negative. Negative for pain, discharge and redness. Respiratory: Negative. Negative for cough, chest tightness, shortness of breath and wheezing. Cardiovascular: Negative. Negative for chest pain. Gastrointestinal: Negative. Negative for abdominal pain, constipation, diarrhea, nausea and vomiting. Genitourinary: Negative. Negative for dysuria, flank pain, frequency, hematuria and urgency. Musculoskeletal: Negative. Negative for back pain and neck pain. Skin: Negative. Negative for rash. Neurological: Negative. Negative for syncope, weakness, numbness and headaches. Psychiatric/Behavioral: Negative. All other systems reviewed and are negative. Physical Exam   There were no vitals taken for this visit. Physical Exam   Constitutional: She appears well-developed and well-nourished. Non-toxic appearance. She does not have a sickly appearance. She does not appear ill. No distress. HENT:   Head: Normocephalic and atraumatic. Mouth/Throat: Oropharynx is clear and moist. No oropharyngeal exudate. Eyes: Pupils are equal, round, and reactive to light. Conjunctivae and EOM are normal. No scleral icterus. Neck: Normal range of motion. Neck supple. No hepatojugular reflux and no JVD present. No tracheal deviation present. No thyromegaly present.    Cardiovascular: Normal rate, regular rhythm, S1 normal, S2 normal, normal heart sounds, intact distal pulses and normal pulses. Exam reveals no gallop, no S3 and no S4. No murmur heard. Pulses:       Radial pulses are 2+ on the right side, and 2+ on the left side. Dorsalis pedis pulses are 2+ on the right side, and 2+ on the left side. Pulmonary/Chest: Effort normal and breath sounds normal. No respiratory distress. She has no decreased breath sounds. She has no wheezes. She has no rhonchi. She has no rales. Abdominal: Soft. Normal appearance and bowel sounds are normal. She exhibits no distension and no mass. There is no hepatosplenomegaly. There is no tenderness. There is no rigidity, no rebound, no guarding, no CVA tenderness, no tenderness at McBurney's point and negative Eagle's sign. Musculoskeletal: Normal range of motion. Lymphadenopathy:        Head (right side): No submental, no submandibular, no preauricular and no occipital adenopathy present. Head (left side): No submental, no submandibular, no preauricular and no occipital adenopathy present. She has no cervical adenopathy. Right: No supraclavicular adenopathy present. Left: No supraclavicular adenopathy present. Neurological: She is alert. She has normal strength and normal reflexes. She is not disoriented. No cranial nerve deficit or sensory deficit. Coordination and gait normal. GCS eye subscore is 4. GCS verbal subscore is 5. GCS motor subscore is 6. Skin: Skin is warm, dry and intact. No rash noted. She is not diaphoretic. Psychiatric: She has a normal mood and affect. Her speech is normal and behavior is normal. Judgment and thought content normal. Cognition and memory are normal.   Nursing note and vitals reviewed.         Diagnostic Study Results     Labs -  Recent Results (from the past 12 hour(s))   URINALYSIS W/ RFLX MICROSCOPIC    Collection Time: 06/17/19  7:10 PM   Result Value Ref Range    Color YELLOW      Appearance CLEAR      Specific gravity 1.021 1.005 - 1.030      pH (UA) 5.0 5.0 - 8.0      Protein NEGATIVE  NEG mg/dL    Glucose NEGATIVE  NEG mg/dL    Ketone NEGATIVE  NEG mg/dL    Bilirubin NEGATIVE  NEG      Blood SMALL (A) NEG      Urobilinogen 0.2 0.2 - 1.0 EU/dL    Nitrites NEGATIVE  NEG      Leukocyte Esterase MODERATE (A) NEG     URINE MICROSCOPIC ONLY    Collection Time: 06/17/19  7:10 PM   Result Value Ref Range    WBC 3 to 6 0 - 4 /hpf    RBC 0 to 3 0 - 5 /hpf    Epithelial cells 4+ 0 - 5 /lpf    Bacteria FEW (A) NEG /hpf       Radiologic Studies -   XR ABD (KUB)    (Results Pending)         Medical Decision Making   Provider Notes (Medical Decision Making):  MDM  Number of Diagnoses or Management Options  Diagnosis management comments: Scabies  Abdominal pain        I am the first provider for this patient. I reviewed the vital signs, available nursing notes, past medical history, past surgical history, family history and social history. Vital Signs-Reviewed the patient's vital signs. Records Reviewed: Nursing Notes (Time of Review: 8:26 PM)    ED Course: Progress Notes, Reevaluation, and Consults:    UA showed moderate amount of leukocytes. I will not treat since the patient does not have any symptoms. KUB showed diffuse gas patterns. No acute process. 8:56 PM 6/17/2019        Diagnosis       I have reassessed the patient. Patient is feeling well. Patient will be prescribed a second prescription of Eliminate, Atarax and Sterapred. Patient was discharged in stable condition. Patient is to return to emergency department if any new or worsening condition. Clinical Impression:   1.  Scabies        Disposition: Discharged home    Follow-up Information     Follow up With Specialties Details Why [address]  In 2 days  [address] [place] [address]             Attestation        Provider Attestation:     I personally performed the services described in the documentation, reviewed the documentation and it accurately and completely records my words and actions utilizing the [address] [place] June 17, 2019 at 8:57 PM - Heena, Memory Darting, DO    Disclaimer. It is dictated using utilizing voice recognition software. Unfortunately this leads to occasional typographical errors. I apologize in advance if the situation occurs. If questions arise please do not hesitate to contact me or call our department.

## 2019-07-14 ENCOUNTER — HOSPITAL ENCOUNTER (EMERGENCY)
Age: 69
Discharge: HOME | End: 2019-07-14
Attending: EMERGENCY MEDICINE
Payer: MEDICAID

## 2019-07-14 VITALS
DIASTOLIC BLOOD PRESSURE: 70 MMHG | RESPIRATION RATE: 16 BRPM | HEART RATE: 75 BPM | TEMPERATURE: 98.2 F | SYSTOLIC BLOOD PRESSURE: 103 MMHG | OXYGEN SATURATION: 98 %

## 2019-07-14 DIAGNOSIS — Z59.00 HOMELESSNESS: ICD-10-CM

## 2019-07-14 DIAGNOSIS — K05.10 GINGIVITIS: ICD-10-CM

## 2019-07-14 DIAGNOSIS — B86 SCABIES: Primary | ICD-10-CM

## 2019-07-14 PROCEDURE — 99282 EMERGENCY DEPT VISIT SF MDM: CPT

## 2019-07-14 RX ORDER — AMOXICILLIN AND CLAVULANATE POTASSIUM 875; 125 MG/1; MG/1
1 TABLET, FILM COATED ORAL 2 TIMES DAILY
Qty: 20 TAB | Refills: 0 | Status: SHIPPED | OUTPATIENT
Start: 2019-07-14 | End: 2019-07-24

## 2019-07-14 RX ORDER — HYDROXYZINE 25 MG/1
TABLET, FILM COATED ORAL
Qty: 30 TAB | Refills: 0 | Status: SHIPPED | OUTPATIENT
Start: 2019-07-14 | End: 2019-09-11

## 2019-07-14 SDOH — ECONOMIC STABILITY - HOUSING INSECURITY: HOMELESSNESS UNSPECIFIED: Z59.00

## 2019-07-14 NOTE — DISCHARGE INSTRUCTIONS
Patient Education        Scabies: Care Instructions  Your Care Instructions  Scabies is a skin problem that can cause intense itching. It is caused by very tiny bugs called mites that dig just under the skin and lay eggs. An allergic reaction to the mites causes the itching. Scabies is usually spread by person-to-person contact. It is also possible, but not common, for scabies to spread through towels, clothes, and bedding. Everyone in your household should be treated. Scabies is treated with medicine. Itching may last for several weeks after treatment. Follow-up care is a key part of your treatment and safety. Be sure to make and go to all appointments, and call your doctor if you are having problems. It's also a good idea to know your test results and keep a list of the medicines you take. How can you care for yourself at home? · Use the lotion or cream your doctor recommends or prescribes. One treatment usually cures scabies. Do not use the cream again unless your doctor tells you to. · Wash all clothes, bedding, and towels that you used in the 3 days before you started treatment. Use hot water, and use the hot cycle in the dryer. Another option is to dry-clean these items. Or seal them in a plastic bag for 3 to 7 days. · Take an oral antihistamine, such as loratadine (Claritin) or diphenhydramine (Benadryl), to help stop itching. You also can use a nonprescription anti-itch cream. Read and follow all instructions on the label. · Do not have physical contact with other people or let anyone use your personal items until you have finished treatment. Do not use other people's personal items until your treatment is done. Tell people with whom you have close contact that they will need treatment if they have symptoms. · Take an oatmeal bath to help relieve itching. Add a handful of oatmeal (ground to a powder) to your bath. Or you can try an oatmeal bath product, such as Aveeno.   When should you call for help?  Call your doctor now or seek immediate medical care if:    · You have signs of infection, such as:  ? Increased pain, swelling, warmth, or redness. ? Red streaks leading from the mite bites. ? Pus draining from a bite area. ? A fever.    Watch closely for changes in your health, and be sure to contact your doctor if:    · Anyone else in your family has itching.     · You do not get better within 2 weeks. Where can you learn more? Go to http://hussein-nickolas.info/. Enter Z920 in the search box to learn more about \"Scabies: Care Instructions. \"  Current as of: April 17, 2018  Content Version: 11.9  © 2006-2018 [employer]. Care instructions adapted under license by [employer] (which disclaims liability or warranty for this information). If you have questions about a medical condition or this instruction, always ask your healthcare professional. [place] [address] any warranty or liability for your use of this information. Patient Education        Periodontal Conditions: Care Instructions  Your Care Instructions    Periodontal conditions affect the gums, bone, and tissue that surround and support the teeth. The most common problems are caused by plaque. Plaque is a thin film of bacteria that sticks to teeth above and below the gum line. It can build up and harden into tartar. The bacteria in plaque and tartar can cause gum disease. Gingivitis is a disease that affects the gums (gingiva). The gums are the soft tissue that surrounds the teeth. Gingivitis causes red, swollen, tender gums that bleed easily when brushed, persistent bad breath, and sensitive teeth. Because it is not painful, many people do not get treatment when they should. Gingivitis can be reversed with good dental care. Periodontitis is a more advanced disease that affects more than the gums. The gums pull away from the teeth.  This leaves deep pockets where bacteria can grow. The disease can damage the bones that support the teeth. The teeth may get loose and fall out. A periodontal condition should be treated as soon as it is found. Finding gum problems early, treating them right away, and having regular checkups bring the best results. You can treat mild periodontal conditions by brushing and flossing your teeth every day. Your dentist may prescribe a mouthwash to kill the bacteria that can damage teeth and gums. Your dentist may have you take antibiotics to treat infection from moderate periodontal disease. If your gums have pulled away from your teeth, you may need cleaning between the teeth and gums right down to the teeth roots. This is called root planing and scaling. If you have severe periodontal disease, you may need surgery to remove diseased gum tissue or repair bone damage. Follow-up care is a key part of your treatment and safety. Be sure to make and go to all appointments, and call your dentist if you are having problems. It's also a good idea to know your test results and keep a list of the medicines you take. How can you care for yourself at home? · If your dentist prescribed antibiotics, take them as directed. Do not stop taking them just because you feel better. You need to take the full course of antibiotics. · Brush your teeth twice a day, in the morning and at night. ? Use a toothbrush with soft, rounded-end bristles and a head that is small enough to reach all parts of your teeth and mouth. Replace your toothbrush every 3 to 4 months. ? Use a fluoride toothpaste. ? Place the brush at a 45-degree angle where the teeth meet the gums. Press firmly, and gently rock the brush back and forth using small circular movements. ? Brush chewing surfaces vigorously with short back-and-forth strokes. ? Brush your tongue from back to front. · Floss at least once a day.  Choose the type and flavor that you like best.  · Have your teeth cleaned by a professional at least twice a year. · Ask your dentist about using an antibacterial mouthwash to help reduce bacteria. · Rinse your mouth with water or chew sugar-free gum after meals if you can't brush your teeth. · Do not smoke or use smokeless tobacco. Tobacco use can cause periodontal disease. When should you call for help? Call your dentist now or seek immediate medical care if:    · You have symptoms of infection, such as:  ? Increased pain, swelling, warmth, or redness. ? Red streaks leading from the area. ? Pus draining from the area. ? A fever.    Watch closely for changes in your health, and be sure to contact your dentist if:    · You have new or worse tooth pain.     · You do not get better as expected. Where can you learn more? Go to http://hussein-nickolas.info/. Enter B903 in the search box to learn more about \"Periodontal Conditions: Care Instructions. \"  Current as of: March 27, 2018  Content Version: 11.9  © 9936-6220 [employer]. Care instructions adapted under license by [employer] (which disclaims liability or warranty for this information). If you have questions about a medical condition or this instruction, always ask your healthcare professional. [place] [address] any warranty or liability for your use of this information.

## 2019-07-14 NOTE — ED TRIAGE NOTES
Pt reports she wants her hydralazine refilled for itching to her arms and abscess to her upper right gums.

## 2019-07-14 NOTE — ED PROVIDER NOTES
EMERGENCY DEPARTMENT HISTORY AND PHYSICAL EXAM    Date: 7/14/2019  Patient Name: John Barbosa    History of Presenting Illness     Chief Complaint   Patient presents with    Skin Problem    Medication Refill    Dental Pain         History Provided By: Patient        Additional History (Context): John Barbosa is a 77 y.o. female with Gingivitis, homelessness and scabies who presents with request for additional scabies medications and treatment of her upper right gingivitis. Going to [facility] dental.  Denies fever drooling trismus. PCP: None    Current Outpatient Medications   Medication Sig Dispense Refill    permethrin (NIX) 1 % lotion Apply  to affected area once for 1 dose. Leave on for 8 hours before rinsing. 1 Bottle 2    amoxicillin-clavulanate (AUGMENTIN) 875-125 mg per tablet Take 1 Tab by mouth two (2) times a day for 10 days. 20 Tab 0    hydrOXYzine HCl (ATARAX) 25 mg tablet Take 1-2 tablets by mouth every 6 hours as needed. 30 Tab 0    predniSONE (STERAPRED) 5 mg dose pack See administration instruction per 5mg dose pack 21 Tab 0    Camphor-Eucalyptus Oil-Menthol (VICKS VAPORUB) 4.8-1.2-2.6 % oint 1 Actuation(s) by Apply Externally route three (3) times daily as needed. 50 g 0    fluticasone (FLONASE) 50 mcg/actuation nasal spray 2 Sprays by Both Nostrils route daily. 1 Bottle 0    nitroglycerin (NITROSTAT) 0.4 mg SL tablet Take 1 Tab by mouth every five (5) minutes as needed for Chest Pain. Sit/Lay down then put one tab under the tongue every 5 minutes as needed for chest pain for 3 doses 1 Bottle 0    naproxen (NAPROSYN) 500 mg tablet Take 1 Tab by mouth two (2) times daily (with meals). 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     Family History:  No family history on file. Social History:  Social History     Tobacco Use    Smoking status: Former Smoker     Years: 15.00    Smokeless tobacco: Never Used   Substance Use Topics    Alcohol use: No    Drug use: No     Comment: none in 2 years       Allergies: Allergies   Allergen Reactions    Aspirin Nausea Only         Review of Systems   Review of Systems   Constitutional: Negative for fever. HENT: Positive for dental problem. Negative for drooling. Skin: Positive for rash. All Other Systems Negative  Physical Exam     Vitals:    07/14/19 1212   BP: 103/70   Pulse: 75   Resp: 16   Temp: 98.2 °F (36.8 °C)   SpO2: 98%     Physical Exam   Constitutional: She is oriented to person, place, and time. She appears well-developed and well-nourished. HENT:   Head: Normocephalic and atraumatic. Right Ear: External ear normal.   Left Ear: External ear normal.   Nose: Nose normal.   Mouth/Throat: Oropharynx is clear and moist.   Edentulous. Upper right gingiva has mild inflammation and tenderness to it no focal abscess seen or palpated. No drooling or trismus. Eyes: Pupils are equal, round, and reactive to light. Conjunctivae and EOM are normal.   Neck: Normal range of motion. Neck supple. No JVD present. No tracheal deviation present. Cardiovascular: Normal rate, regular rhythm, normal heart sounds and intact distal pulses. Exam reveals no gallop and no friction rub. No murmur heard. Pulmonary/Chest: Effort normal and breath sounds normal. No respiratory distress. She has no wheezes. She has no rales. Abdominal: Soft. Bowel sounds are normal. She exhibits no distension and no mass. There is no tenderness. There is no rebound and no guarding. Musculoskeletal: Normal range of motion. She exhibits no edema or tenderness. Neurological: She is alert and oriented to person, place, and time. She has normal reflexes. No cranial nerve deficit. Skin: Skin is warm and dry. Rash noted.    Numerous body wide scabies consistent lesions of slits burrowing marks on her neck back bilateral upper arms and webspaces. Psychiatric: She has a normal mood and affect. Her behavior is normal.   Nursing note and vitals reviewed. Diagnostic Study Results     Labs -   No results found for this or any previous visit (from the past 12 hour(s)). Radiologic Studies -   No orders to display     CT Results  (Last 48 hours)    None        CXR Results  (Last 48 hours)    None            Medical Decision Making   I am the first provider for this patient. I reviewed the vital signs, available nursing notes, past medical history, past surgical history, family history and social history. Vital Signs-Reviewed the patient's vital signs. Records Reviewed: Nursing Notes    Procedures:  Procedures    Provider Notes (Medical Decision Making): Treat gingivitis as scabies. Follow-up with [facility] and [facility].    MED RECONCILIATION:  No current facility-administered medications for this encounter. Current Outpatient Medications   Medication Sig    permethrin (NIX) 1 % lotion Apply  to affected area once for 1 dose. Leave on for 8 hours before rinsing.  amoxicillin-clavulanate (AUGMENTIN) 875-125 mg per tablet Take 1 Tab by mouth two (2) times a day for 10 days.  hydrOXYzine HCl (ATARAX) 25 mg tablet Take 1-2 tablets by mouth every 6 hours as needed.  predniSONE (STERAPRED) 5 mg dose pack See administration instruction per 5mg dose pack    Camphor-Eucalyptus Oil-Menthol (VICKS VAPORUB) 4.8-1.2-2.6 % oint 1 Actuation(s) by Apply Externally route three (3) times daily as needed.  fluticasone (FLONASE) 50 mcg/actuation nasal spray 2 Sprays by Both Nostrils route daily.  nitroglycerin (NITROSTAT) 0.4 mg SL tablet Take 1 Tab by mouth every five (5) minutes as needed for Chest Pain.  Sit/Lay down then put one tab under the tongue every 5 minutes as needed for chest pain for 3 doses    naproxen (NAPROSYN) 500 mg tablet Take 1 Tab by mouth two (2) times daily (with meals). Disposition:  home    DISCHARGE NOTE:   2:05 PM    Pt has been reexamined. Patient has no new complaints, changes, or physical findings. Care plan outlined and precautions discussed. Results of exam were reviewed with the patient. All medications were reviewed with the patient; will d/c home with see below. All of pt's questions and concerns were addressed. Patient was instructed and agrees to follow up with PCP, dental, as well as to return to the ED upon further deterioration. Patient is ready to go home. Follow-up Information     Follow up With Specialties Details Why [address]  Call in 1 day  [place] [facility]    [address]  Schedule an appointment as soon as possible for a visit in 1 day  [address] [address]  [facility] [facility]  Schedule an appointment as soon as possible for a visit in 1 day  [address] [address]  [phone]    [facility]  Schedule an appointment as soon as possible for a visit in 1 day  [address]  [address]  [address]    [facility] EMERGENCY DEPT Emergency Medicine  If symptoms worsen return immediately [address] [facility] [address]  [phone]          Current Discharge Medication List      START taking these medications    Details   amoxicillin-clavulanate (AUGMENTIN) 875-125 mg per tablet Take 1 Tab by mouth two (2) times a day for 10 days. Qty: 20 Tab, Refills: 0         CONTINUE these medications which have CHANGED    Details   permethrin (NIX) 1 % lotion Apply  to affected area once for 1 dose. Leave on for 8 hours before rinsing. Qty: 1 Bottle, Refills: 2      hydrOXYzine HCl (ATARAX) 25 mg tablet Take 1-2 tablets by mouth every 6 hours as needed.   Qty: 30 Tab, Refills: 0           Diagnosis Clinical Impression:   1. Scabies    2. Gingivitis    3. Homelessness        I was personally available for consultation in the emergency department.  Donna Ruelas MD

## 2019-09-11 ENCOUNTER — HOSPITAL ENCOUNTER (EMERGENCY)
Age: 69
Discharge: HOME | End: 2019-09-11
Attending: EMERGENCY MEDICINE
Payer: MEDICAID

## 2019-09-11 VITALS
SYSTOLIC BLOOD PRESSURE: 138 MMHG | OXYGEN SATURATION: 98 % | TEMPERATURE: 99.2 F | HEART RATE: 84 BPM | RESPIRATION RATE: 16 BRPM | DIASTOLIC BLOOD PRESSURE: 89 MMHG

## 2019-09-11 DIAGNOSIS — B86 SCABIES: Primary | ICD-10-CM

## 2019-09-11 DIAGNOSIS — K04.7 DENTAL ABSCESS: ICD-10-CM

## 2019-09-11 PROCEDURE — 99281 EMR DPT VST MAYX REQ PHY/QHP: CPT

## 2019-09-11 RX ORDER — PENICILLIN V POTASSIUM 250 MG/1
250 TABLET, FILM COATED ORAL 4 TIMES DAILY
Qty: 28 TAB | Refills: 0 | Status: SHIPPED | OUTPATIENT
Start: 2019-09-11 | End: 2019-09-18

## 2019-09-11 RX ORDER — HYDROXYZINE 50 MG/1
50 TABLET, FILM COATED ORAL
Qty: 20 TAB | Refills: 0 | Status: SHIPPED | OUTPATIENT
Start: 2019-09-11 | End: 2019-09-21

## 2019-09-11 RX ORDER — PERMETHRIN 50 MG/G
CREAM TOPICAL
Qty: 60 G | Refills: 0 | Status: SHIPPED | OUTPATIENT
Start: 2019-09-11 | End: 2019-10-11

## 2019-09-11 NOTE — ED TRIAGE NOTES
\"My scabies got worse mainly on my back and right here. \" Refers to left upper arm. Dry rash observed. Pt. Also c/o dental pain to left, upper side.

## 2019-09-11 NOTE — DISCHARGE INSTRUCTIONS
Patient Education        Scabies: Care Instructions  Your Care Instructions  Scabies is a skin problem that can cause intense itching. It is caused by very tiny bugs called mites that dig just under the skin and lay eggs. An allergic reaction to the mites causes the itching. Scabies is usually spread by person-to-person contact. It is also possible, but not common, for scabies to spread through towels, clothes, and bedding. Everyone in your household should be treated. Scabies is treated with medicine. Itching may last for several weeks after treatment. Follow-up care is a key part of your treatment and safety. Be sure to make and go to all appointments, and call your doctor if you are having problems. It's also a good idea to know your test results and keep a list of the medicines you take. How can you care for yourself at home? · Use the lotion or cream your doctor recommends or prescribes. One treatment usually cures scabies. Do not use the cream again unless your doctor tells you to. · Wash all clothes, bedding, and towels that you used in the 3 days before you started treatment. Use hot water, and use the hot cycle in the dryer. Another option is to dry-clean these items. Or seal them in a plastic bag for 3 to 7 days. · Take an oral antihistamine, such as loratadine (Claritin) or diphenhydramine (Benadryl), to help stop itching. You also can use a nonprescription anti-itch cream. Read and follow all instructions on the label. · Do not have physical contact with other people or let anyone use your personal items until you have finished treatment. Do not use other people's personal items until your treatment is done. Tell people with whom you have close contact that they will need treatment if they have symptoms. · Take an oatmeal bath to help relieve itching. Add a handful of oatmeal (ground to a powder) to your bath. Or you can try an oatmeal bath product, such as Aveeno.   When should you call for help?  Call your doctor now or seek immediate medical care if:    · You have signs of infection, such as:  ? Increased pain, swelling, warmth, or redness. ? Red streaks leading from the mite bites. ? Pus draining from a bite area. ? A fever.    Watch closely for changes in your health, and be sure to contact your doctor if:    · Anyone else in your family has itching.     · You do not get better within 2 weeks. Where can you learn more? Go to http://hussein-nickolas.info/. Enter P203 in the search box to learn more about \"Scabies: Care Instructions. \"  Current as of: April 1, 2019  Content Version: 12.1  © 0826-0175 [employer] Care instructions adapted under license by [employer] (which disclaims liability or warranty for this information). If you have questions about a medical condition or this instruction, always ask your healthcare professional. [place] [address] any warranty or liability for your use of this information. Patient Education        Abscessed Tooth: Care Instructions  Your Care Instructions    An abscessed tooth is a tooth that has a pocket of pus in the tissues around it. Pus forms when the body tries to fight an infection caused by bacteria. If the pus cannot drain, it forms an abscess. An abscessed tooth can cause red, swollen gums and throbbing pain, especially when you chew. You may have a bad taste in your mouth and a fever, and your jaw may swell. Damage to the tooth, untreated tooth decay, or gum disease can cause an abscessed tooth. An abscessed tooth needs to be treated by a dental professional right away. If it is not treated, the infection could spread to other parts of your body. Your dentist will give you antibiotics to stop the infection. He or she may make a hole in the tooth or cut open (miladys) the abscess inside your mouth so that the infection can drain, which should relieve your pain.  You may need to have a root canal treatment, which tries to save your tooth by taking out the infected pulp and replacing it with a healing medicine and/or a filling. If these treatments do not work, your tooth may have to be removed. Follow-up care is a key part of your treatment and safety. Be sure to make and go to all appointments, and call your doctor if you are having problems. It's also a good idea to know your test results and keep a list of the medicines you take. How can you care for yourself at home? · Reduce pain and swelling in your face and jaw by putting ice or a cold pack on the outside of your cheek for 10 to 20 minutes at a time. Put a thin cloth between the ice and your skin. · Take pain medicines exactly as directed. ? If the doctor gave you a prescription medicine for pain, take it as prescribed. ? If you are not taking a prescription pain medicine, ask your doctor if you can take an over-the-counter medicine. · Take your antibiotics as directed. Do not stop taking them just because you feel better. You need to take the full course of antibiotics. To prevent tooth abscess  · Brush and floss every day, and have regular dental checkups. · Eat a healthy diet, and avoid sugary foods and drinks. · Do not smoke, use e-cigarettes with nicotine, or use spit tobacco. Tobacco and nicotine slow your ability to heal. Tobacco also increases your risk for gum disease and cancer of the mouth and throat. If you need help quitting, talk to your doctor about stop-smoking programs and medicines. These can increase your chances of quitting for good. When should you call for help? Call 911 anytime you think you may need emergency care. For example, call if:    · You have trouble breathing.    Call your doctor now or seek immediate medical care if:    · You have new or worse symptoms of infection, such as:  ? Increased pain, swelling, warmth, or redness. ? Red streaks leading from the area.   ? Pus draining from the area.  ? A fever.    Watch closely for changes in your health, and be sure to contact your doctor if:    · You do not get better as expected. Where can you learn more? Go to http://hussein-nickolas.info/. Enter V616 in the search box to learn more about \"Abscessed Tooth: Care Instructions. \"  Current as of: October 3, 2018  Content Version: 12.1  © 1629-7199 [employer]. Care instructions adapted under license by [employer] (which disclaims liability or warranty for this information). If you have questions about a medical condition or this instruction, always ask your healthcare professional. [address] any warranty or liability for your use of this information.

## 2019-09-11 NOTE — ED PROVIDER NOTES
EMERGENCY DEPARTMENT HISTORY AND PHYSICAL EXAM    Date: 9/11/2019  Patient Name: Beth Morales    History of Presenting Illness     Chief Complaint   Patient presents with    Rash    Dental Pain         History Provided By: Patient    2:52 PM  Beth Morales is a 77 y.o. female with PMHX of scabies and dental infections who presents to the emergency department C/O relies itching and left upper dental pain. Patient reports past diagnosis of scabies for which she did not correctly apply cream or take medication. She feels like it is gotten worse prompting her to come in. Also reports about 2 days of left upper dental pain and swelling consistent with abscess that she has had before. Does not have a dentist appointment for another month. . Pt denies fever, new soaps lotions detergents, lip or tongue swelling, and any other sxs or complaints. PCP: None    Current Outpatient Medications   Medication Sig Dispense Refill    permethrin (ACTICIN) 5 % topical cream Apply head to toe, leave on 8hrs then rinse off. May reapply in 1 week if needed. 60 g 0    hydrOXYzine HCl (ATARAX) 50 mg tablet Take 1 Tab by mouth every six (6) hours as needed for Itching for up to 10 days. 20 Tab 0    penicillin v potassium (VEETID) 250 mg tablet Take 1 Tab by mouth four (4) times daily for 7 days. 28 Tab 0    predniSONE (STERAPRED) 5 mg dose pack See administration instruction per 5mg dose pack 21 Tab 0    Camphor-Eucalyptus Oil-Menthol (VICKS VAPORUB) 4.8-1.2-2.6 % oint 1 Actuation(s) by Apply Externally route three (3) times daily as needed. 50 g 0    fluticasone (FLONASE) 50 mcg/actuation nasal spray 2 Sprays by Both Nostrils route daily. 1 Bottle 0    nitroglycerin (NITROSTAT) 0.4 mg SL tablet Take 1 Tab by mouth every five (5) minutes as needed for Chest Pain.  Sit/Lay down then put one tab under the tongue every 5 minutes as needed for chest pain for 3 doses 1 Bottle 0    naproxen (NAPROSYN) 500 mg tablet Take 1 Tab by mouth two (2) times daily (with meals). 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 Family History:  No family history on file. Social History:  Social History     Tobacco Use    Smoking status: Former Smoker     Years: 15.00    Smokeless tobacco: Never Used   Substance Use Topics    Alcohol use: No    Drug use: No     Comment: none in 2 years       Allergies: Allergies   Allergen Reactions    Aspirin Nausea Only         Review of Systems   Review of Systems   Constitutional: Negative for fever. HENT: Positive for dental problem. Skin: Positive for rash. All other systems reviewed and are negative. Physical Exam     Vitals:    09/11/19 1438   BP: 138/89   Pulse: 84   Resp: 16   Temp: 99.2 °F (37.3 °C)   SpO2: 98%     Physical Exam  Vital signs and nursing notes reviewed. CONSTITUTIONAL: Alert. Well-appearing; well-nourished; in no apparent distress. HEAD: Normocephalic; atraumatic. EYES: PERRL; Conjunctiva clear. ENT: TM's normal. External ear normal. Normal nose; no rhinorrhea. Normal pharynx. Overall very poor dentition with many missing or decayed teeth. Left upper tooth is decayed on the gumline with associated gum swelling, no fluctuance or drainage. No overlying facial swelling moist mucus membranes. NECK: Supple; FROM without difficulty, non-tender; no cervical lymphadenopathy. CV: Normal S1, S2; no murmurs, rubs, or gallops. No chest wall tenderness. RESPIRATORY: Normal chest excursion with respiration; breath sounds clear and equal bilaterally; no wheezes, rhonchi, or rales. EXT: Normal ROM in all four extremities; non-tender to palpation.   SKIN: Normal for age and race; warm; dry; good turgor; diffuse dry fine flesh-colored papules on bilateral arms upper back and hands. No other vesicular lesions, ulcerations, erythema or discharge. NEURO: A & O x3. PSYCH:  Mood and affect appropriate. Diagnostic Study Results     Labs -   No results found for this or any previous visit (from the past 12 hour(s)). Radiologic Studies - none  No orders to display     CT Results  (Last 48 hours)    None        CXR Results  (Last 48 hours)    None          Medications given in the ED-  Medications - No data to display      Medical Decision Making   I am the first provider for this patient. I reviewed the vital signs, available nursing notes, past medical history, past surgical history, family history and social history. Vital Signs-Reviewed the patient's vital signs. Records Reviewed: Nursing Notes      Procedures:  Procedures    ED Course:  2:52 PM   Initial assessment performed. The patients presenting problems have been discussed, and they are in agreement with the care plan formulated and outlined with them. I have encouraged them to ask questions as they arise throughout their visit. Provider Notes (Medical Decision Making): Destiny Hylton is a 77 y.o. female with recurrent scabies and early dental abscess. Will start on permethrin as well as hydroxyzine as needed for itching, penicillin for dental infection and referral to PCP/dental clinic. Diagnosis and Disposition       DISCHARGE NOTE:    Vignesh Hansenisaura  results have been reviewed with her. She has been counseled regarding her diagnosis, treatment, and plan. She verbally conveys understanding and agreement of the signs, symptoms, diagnosis, treatment and prognosis and additionally agrees to follow up as discussed. She also agrees with the care-plan and conveys that all of her questions have been answered.   I have also provided discharge instructions for her that include: educational information regarding their diagnosis and treatment, and list of reasons why they would want to return to the ED prior to their follow-up appointment, should her condition change. She has been provided with education for proper emergency department utilization. CLINICAL IMPRESSION:    1. Scabies    2. Dental abscess        PLAN:  1. D/C Home  2. Discharge Medication List as of 9/11/2019  2:51 PM      START taking these medications    Details   permethrin (ACTICIN) 5 % topical cream Apply head to toe, leave on 8hrs then rinse off. May reapply in 1 week if needed., Normal, Disp-60 g, R-0      penicillin v potassium (VEETID) 250 mg tablet Take 1 Tab by mouth four (4) times daily for 7 days. , Normal, Disp-28 Tab, R-0         CONTINUE these medications which have CHANGED    Details   hydrOXYzine HCl (ATARAX) 50 mg tablet Take 1 Tab by mouth every six (6) hours as needed for Itching for up to 10 days. , Normal, Disp-20 Tab, R-0         CONTINUE these medications which have NOT CHANGED    Details   predniSONE (STERAPRED) 5 mg dose pack See administration instruction per 5mg dose pack, Print, Disp-21 Tab, R-0      Camphor-Eucalyptus Oil-Menthol (VICKS VAPORUB) 4.8-1.2-2.6 % oint 1 Actuation(s) by Apply Externally route three (3) times daily as needed. , Print, Disp-50 g, R-0      fluticasone (FLONASE) 50 mcg/actuation nasal spray 2 Sprays by Both Nostrils route daily. , Print, Disp-1 Bottle, R-0      nitroglycerin (NITROSTAT) 0.4 mg SL tablet Take 1 Tab by mouth every five (5) minutes as needed for Chest Pain. Sit/Lay down then put one tab under the tongue every 5 minutes as needed for chest pain for 3 doses, Print, Disp-1 Bottle, R-0      naproxen (NAPROSYN) 500 mg tablet Take 1 Tab by mouth two (2) times daily (with meals). , Print, Disp-12 Tab, R-0           3.    Follow-up Information     Follow up With Specialties Details Why Contact Dora Gonzalez  Schedule an appointment as soon as possible for a visit  [address] [place] [address] [facility]) [phone]    [facility] EMERGENCY DEPT Emergency Medicine  As needed, If symptoms worsen [address]  [phone]        _______________________________      Please note that this dictation was completed with [employer], the computer voice recognition software. Quite often unanticipated grammatical, syntax, homophones, and other interpretive errors are inadvertently transcribed by the computer software. Please disregard these errors. Please excuse any errors that have escaped final proofreading.

## 2019-11-12 PROCEDURE — 99283 EMERGENCY DEPT VISIT LOW MDM: CPT

## 2019-11-13 ENCOUNTER — HOSPITAL ENCOUNTER (EMERGENCY)
Age: 69
Discharge: HOME | End: 2019-11-13
Attending: EMERGENCY MEDICINE
Payer: MEDICAID

## 2019-11-13 VITALS
SYSTOLIC BLOOD PRESSURE: 133 MMHG | WEIGHT: 148.7 LBS | OXYGEN SATURATION: 96 % | DIASTOLIC BLOOD PRESSURE: 86 MMHG | RESPIRATION RATE: 18 BRPM | HEART RATE: 81 BPM | TEMPERATURE: 98.5 F | BODY MASS INDEX: 29.04 KG/M2

## 2019-11-13 DIAGNOSIS — M54.50 ACUTE BILATERAL LOW BACK PAIN WITHOUT SCIATICA: ICD-10-CM

## 2019-11-13 DIAGNOSIS — B86 SCABIES: Primary | ICD-10-CM

## 2019-11-13 LAB
AMPHET UR QL SCN: NEGATIVE
APPEARANCE UR: CLEAR
BARBITURATES UR QL SCN: NEGATIVE
BENZODIAZ UR QL: NEGATIVE
BILIRUB UR QL: NEGATIVE
CANNABINOIDS UR QL SCN: NEGATIVE
COCAINE UR QL SCN: POSITIVE
COLOR UR: YELLOW
EPITH CASTS URNS QL MICRO: NORMAL /LPF (ref 0–5)
GLUCOSE UR STRIP.AUTO-MCNC: NEGATIVE MG/DL
HDSCOM,HDSCOM: ABNORMAL
HGB UR QL STRIP: ABNORMAL
KETONES UR QL STRIP.AUTO: NEGATIVE MG/DL
LEUKOCYTE ESTERASE UR QL STRIP.AUTO: ABNORMAL
METHADONE UR QL: NEGATIVE
NITRITE UR QL STRIP.AUTO: NEGATIVE
OPIATES UR QL: NEGATIVE
PCP UR QL: NEGATIVE
PH UR STRIP: 5.5 [PH] (ref 5–8)
PROT UR STRIP-MCNC: NEGATIVE MG/DL
RBC #/AREA URNS HPF: NORMAL /HPF (ref 0–5)
SP GR UR REFRACTOMETRY: 1.02 (ref 1–1.03)
UROBILINOGEN UR QL STRIP.AUTO: 1 EU/DL (ref 0.2–1)
WBC URNS QL MICRO: NORMAL /HPF (ref 0–4)

## 2019-11-13 PROCEDURE — 81001 URINALYSIS AUTO W/SCOPE: CPT

## 2019-11-13 PROCEDURE — 74011250637 HC RX REV CODE- 250/637: Performed by: NURSE PRACTITIONER

## 2019-11-13 PROCEDURE — 80307 DRUG TEST PRSMV CHEM ANLYZR: CPT

## 2019-11-13 RX ORDER — METHOCARBAMOL 500 MG/1
500 TABLET, FILM COATED ORAL 4 TIMES DAILY
Qty: 20 TAB | Refills: 0 | Status: SHIPPED | OUTPATIENT
Start: 2019-11-13 | End: 2019-12-12

## 2019-11-13 RX ORDER — METHOCARBAMOL 500 MG/1
500 TABLET, FILM COATED ORAL
Status: COMPLETED | OUTPATIENT
Start: 2019-11-13 | End: 2019-11-13

## 2019-11-13 RX ORDER — IBUPROFEN 800 MG/1
800 TABLET ORAL
Qty: 20 TAB | Refills: 0 | Status: SHIPPED | OUTPATIENT
Start: 2019-11-13 | End: 2019-11-20

## 2019-11-13 RX ORDER — IBUPROFEN 400 MG/1
800 TABLET ORAL
Status: COMPLETED | OUTPATIENT
Start: 2019-11-13 | End: 2019-11-13

## 2019-11-13 RX ADMIN — IBUPROFEN 800 MG: 400 TABLET ORAL at 03:33

## 2019-11-13 RX ADMIN — METHOCARBAMOL 500 MG: 500 TABLET, FILM COATED ORAL at 03:33

## 2019-11-13 NOTE — ED PROVIDER NOTES
[facility]  Emergency Department Treatment Report        3:25 AM Alondra Nation is a 77 y.o. female who presents to ED complaining of low back pain and scabies patient states she has had scabies in the past and its back again. And she is has some low back pain is just an ache in her lower lumbar buttock region no numbness or tingling is reported no saddle anesthesia no urinary incontinence or retention no dysuria. Patient denies any injury just states she has and this happens sometimes. No distinct injury has been reported and patient denies any IV drug use. Patient states she has had back pain in the past this is just a recurrence of an old back injury states it flares up every once in a while    No other complaints, associated symptoms or modifying factors at this time. PCP: None      The history is provided by the patient. No  was used. Back Pain    This is a recurrent problem. The problem has not changed since onset. The problem occurs constantly. The pain is associated with no known injury. The pain is present in the lower back. The quality of the pain is described as dull. The pain does not radiate. The pain is mild. Pertinent negatives include no chest pain, no fever, no numbness, no abdominal pain, no abdominal swelling, no bowel incontinence, no perianal numbness, no bladder incontinence, no dysuria, no pelvic pain, no leg pain, no paresthesias, no paresis, no tingling and no weakness. She has tried nothing for the symptoms. 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 No pertinent surgical history. History reviewed. No pertinent family history.     Social History     Socioeconomic History    Marital status: SINGLE     Spouse name: Not on file    Number of children: Not on file    Years of education: Not on file    Highest education level: Not on file   Occupational History    Not on file   Social Needs    Financial resource strain: Not on file    Food insecurity:     Worry: Not on file     Inability: Not on file    Transportation needs:     Medical: Not on file     Non-medical: Not on file   Tobacco Use    Smoking status: Former Smoker     Years: 15.00    Smokeless tobacco: Never Used   Substance and Sexual Activity    Alcohol use: No    Drug use: No     Comment: none in 2 years    Sexual activity: Never   Lifestyle    Physical activity:     Days per week: Not on file     Minutes per session: Not on file    Stress: Not on file   Relationships    Social connections:     Talks on phone: Not on file     Gets together: Not on file     Attends [employer] service: Not on file     Active member of club or organization: Not on file     Attends meetings of clubs or organizations: Not on file     Relationship status: Not on file    Intimate partner violence:     Fear of current or ex partner: Not on file     Emotionally abused: Not on file     Physically abused: Not on file     Forced sexual activity: Not on file   Other Topics Concern    Not on file   Social History Narrative    Not on file         ALLERGIES: Aspirin    Review of Systems   Constitutional: Negative for fever. Cardiovascular: Negative for chest pain. Gastrointestinal: Negative for abdominal pain and bowel incontinence. Genitourinary: Negative for bladder incontinence, dysuria, pelvic pain and urgency. Musculoskeletal: Positive for back pain. Negative for gait problem, joint swelling, neck pain and neck stiffness. Skin: Negative for color change. Neurological: Negative for dizziness, tingling, weakness, numbness and paresthesias. All other systems reviewed and are negative.       Vitals:    11/13/19 0009   BP: 133/86   Pulse: 81   Resp: 18   Temp: 98.5 °F (36.9 °C)   SpO2: 96%   Weight: 67.4 kg (148 lb 11.2 oz)            Physical Exam Constitutional: She is oriented to person, place, and time. She appears well-developed and well-nourished. HENT:   Head: Normocephalic and atraumatic. Eyes: Pupils are equal, round, and reactive to light. Conjunctivae and EOM are normal.   Neck: Normal range of motion. Neck supple. Cardiovascular: Normal rate and regular rhythm. Pulmonary/Chest: Effort normal and breath sounds normal.   Abdominal: Soft. Bowel sounds are normal.   Musculoskeletal: Normal range of motion. She exhibits tenderness. She exhibits no edema or deformity. Back:    Tenderness mainly to the soft tissue paraspinal area   Neurological: She is alert and oriented to person, place, and time. She has normal reflexes. Skin: Skin is warm and dry. Capillary refill takes less than 2 seconds. Rash noted. Created rash to upper arms bilaterally rash to dorsal area of hands and between fingers. No sign of cellulitis or infection   Psychiatric: She has a normal mood and affect. Her behavior is normal. Judgment and thought content normal.   Nursing note and vitals reviewed. MDM  Number of Diagnoses or Management Options  Acute bilateral low back pain without sciatica:   Scabies:   Diagnosis management comments: Suspect low back pain I do not suspect any spinal abscess or other acute illness. No trauma has been reported no fever. Patient also states that she has had scabies in the past that she thinks she is been exposed to it again she is got excoriated areas on her upper arms and patient states she was just putting the cream on her arms and was not using it on her entire body and then washing it off and may had not have been used correctly. I will prescribe patient Motrin and Robaxin for her pain and Elimite cream for her repeated infection with scabies I will instruct patient on how to use it appropriately. Patient to follow-up with her primary care doctor and return to the emergency room if worse.        Amount and/or Complexity of Data Reviewed  Clinical lab tests: ordered and reviewed  Review and summarize past medical records: yes  Independent visualization of images, tracings, or specimens: yes    Risk of Complications, Morbidity, and/or Mortality  Presenting problems: low  Diagnostic procedures: low  Management options: low    Patient Progress  Patient progress: stable         Procedures            Vitals:  Patient Vitals for the past 12 hrs:   Temp Pulse Resp BP SpO2   11/13/19 0009 98.5 °F (36.9 °C) 81 18 133/86 96 %       Medications ordered:   Medications   ibuprofen (MOTRIN) tablet 800 mg (has no administration in time range)   methocarbamol (ROBAXIN) tablet 500 mg (has no administration in time range)         Lab findings:  Recent Results (from the past 12 hour(s))   DRUG SCREEN, URINE    Collection Time: 11/13/19 12:12 AM   Result Value Ref Range    BENZODIAZEPINES NEGATIVE  NEG      BARBITURATES NEGATIVE  NEG      THC (TH-CANNABINOL) NEGATIVE  NEG      OPIATES NEGATIVE  NEG      PCP(PHENCYCLIDINE) NEGATIVE  NEG      COCAINE POSITIVE (A) NEG      AMPHETAMINES NEGATIVE  NEG      METHADONE NEGATIVE  NEG      HDSCOM (NOTE)    URINALYSIS W/ RFLX MICROSCOPIC    Collection Time: 11/13/19 12:12 AM   Result Value Ref Range    Color YELLOW      Appearance CLEAR      Specific gravity 1.019 1.005 - 1.030      pH (UA) 5.5 5.0 - 8.0      Protein NEGATIVE  NEG mg/dL    Glucose NEGATIVE  NEG mg/dL    Ketone NEGATIVE  NEG mg/dL    Bilirubin NEGATIVE  NEG      Blood SMALL (A) NEG      Urobilinogen 1.0 0.2 - 1.0 EU/dL    Nitrites NEGATIVE  NEG      Leukocyte Esterase SMALL (A) NEG     URINE MICROSCOPIC ONLY    Collection Time: 11/13/19 12:12 AM   Result Value Ref Range    WBC 0 to 5 0 - 4 /hpf    RBC 0 to 2 0 - 5 /hpf    Epithelial cells 1+ 0 - 5 /lpf            Disposition:    Diagnosis:   1. Scabies    2.  Acute bilateral low back pain without sciatica        Disposition: to Home      Follow-up Information     Follow up With Specialties Details Why Contact Info    [address]  In 2 days  [address] [facility]  [phone]           Patient's Medications   Start Taking    IBUPROFEN (MOTRIN) 800 MG TABLET    Take 1 Tab by mouth every six (6) hours as needed for Pain for up to 7 days. METHOCARBAMOL (ROBAXIN) 500 MG TABLET    Take 1 Tab by mouth four (4) times daily. PERMETHRIN (NIX) 1 % LOTION    Apply to entire body below the chin leave on for 10 hours and then wash off   Continue Taking    CAMPHOR-EUCALYPTUS OIL-MENTHOL (VICKS VAPORUB) 4.8-1.2-2.6 % OINT    1 Actuation(s) by Apply Externally route three (3) times daily as needed. FLUTICASONE (FLONASE) 50 MCG/ACTUATION NASAL SPRAY    2 Sprays by Both Nostrils route daily. NAPROXEN (NAPROSYN) 500 MG TABLET    Take 1 Tab by mouth two (2) times daily (with meals). NITROGLYCERIN (NITROSTAT) 0.4 MG SL TABLET    Take 1 Tab by mouth every five (5) minutes as needed for Chest Pain. Sit/Lay down then put one tab under the tongue every 5 minutes as needed for chest pain for 3 doses    PREDNISONE (STERAPRED) 5 MG DOSE PACK    See administration instruction per 5mg dose pack   These Medications have changed    No medications on file   Stop Taking    No medications on file       Return to the ER if you are unable to obtain referral as directed. [facility]  results have been reviewed with her. She has been counseled regarding her diagnosis, treatment, and plan. She verbally conveys understanding and agreement of the signs, symptoms, diagnosis, treatment and prognosis and additionally agrees to follow up as discussed. She also agrees with the care-plan and conveys that all of her questions have been answered.   I have also provided discharge instructions for her that include: educational information regarding their diagnosis and treatment, and list of reasons why they would want to return to the ED prior to their follow-up appointment, should her condition change. Ellen Maya ENP-C,FNP-C      Dragon voice recognition was used to generate this report, which may have resulted in some phonetic based errors in grammar and contents.  Even though attempts were made to correct all the mistakes, some may have been missed, and remained in the body of the document

## 2019-11-13 NOTE — DISCHARGE INSTRUCTIONS
Patient Education        Back Pain: Care Instructions  Your Care Instructions    Back pain has many possible causes. It is often related to problems with muscles and ligaments of the back. It may also be related to problems with the nerves, discs, or bones of the back. Moving, lifting, standing, sitting, or sleeping in an awkward way can strain the back. Sometimes you don't notice the injury until later. Arthritis is another common cause of back pain. Although it may hurt a lot, back pain usually improves on its own within several weeks. Most people recover in 12 weeks or less. Using good home treatment and being careful not to stress your back can help you feel better sooner. Follow-up care is a key part of your treatment and safety. Be sure to make and go to all appointments, and call your doctor if you are having problems. It's also a good idea to know your test results and keep a list of the medicines you take. How can you care for yourself at home? · Sit or lie in positions that are most comfortable and reduce your pain. Try one of these positions when you lie down:  ? Lie on your back with your knees bent and supported by large pillows. ? Lie on the floor with your legs on the seat of a sofa or chair. ? Lie on your side with your knees and hips bent and a pillow between your legs. ? Lie on your stomach if it does not make pain worse. · Do not sit up in bed, and avoid soft couches and twisted positions. Bed rest can help relieve pain at first, but it delays healing. Avoid bed rest after the first day of back pain. · Change positions every 30 minutes. If you must sit for long periods of time, take breaks from sitting. Get up and walk around, or lie in a comfortable position. · Try using a heating pad on a low or medium setting for 15 to 20 minutes every 2 or 3 hours. Try a warm shower in place of one session with the heating pad. · You can also try an ice pack for 10 to 15 minutes every 2 to 3 hours. Put a thin cloth between the ice pack and your skin. · Take pain medicines exactly as directed. ? If the doctor gave you a prescription medicine for pain, take it as prescribed. ? If you are not taking a prescription pain medicine, ask your doctor if you can take an over-the-counter medicine. · Take short walks several times a day. You can start with 5 to 10 minutes, 3 or 4 times a day, and work up to longer walks. Walk on level surfaces and avoid hills and stairs until your back is better. · Return to work and other activities as soon as you can. Continued rest without activity is usually not good for your back. · To prevent future back pain, do exercises to stretch and strengthen your back and stomach. Learn how to use good posture, safe lifting techniques, and proper body mechanics. When should you call for help? Call your doctor now or seek immediate medical care if:    · You have new or worsening numbness in your legs.     · You have new or worsening weakness in your legs. (This could make it hard to stand up.)     · You lose control of your bladder or bowels.    Watch closely for changes in your health, and be sure to contact your doctor if:    · You have a fever, lose weight, or don't feel well.     · You do not get better as expected. Where can you learn more? Go to http://hussein-nickolas.info/. Enter W635 in the search box to learn more about \"Back Pain: Care Instructions. \"  Current as of: June 26, 2019  Content Version: 12.2  © 5897-3931 [employer]. Care instructions adapted under license by [employer] (which disclaims liability or warranty for this information). If you have questions about a medical condition or this instruction, always ask your healthcare professional. [place] [address] any warranty or liability for your use of this information.          Patient Education        Scabies: Care Instructions  Your Care Instructions  Scabies is a skin problem that can cause intense itching. It is caused by very tiny bugs called mites that dig just under the skin and lay eggs. An allergic reaction to the mites causes the itching. Scabies is usually spread by person-to-person contact. It is also possible, but not common, for scabies to spread through towels, clothes, and bedding. Everyone in your household should be treated. Scabies is treated with medicine. Itching may last for several weeks after treatment. Follow-up care is a key part of your treatment and safety. Be sure to make and go to all appointments, and call your doctor if you are having problems. It's also a good idea to know your test results and keep a list of the medicines you take. How can you care for yourself at home? · Use the lotion or cream your doctor recommends or prescribes. One treatment usually cures scabies. Do not use the cream again unless your doctor tells you to. · Wash all clothes, bedding, and towels that you used in the 3 days before you started treatment. Use hot water, and use the hot cycle in the dryer. Another option is to dry-clean these items. Or seal them in a plastic bag for 3 to 7 days. · Take an oral antihistamine, such as loratadine (Claritin) or diphenhydramine (Benadryl), to help stop itching. You also can use a nonprescription anti-itch cream. Read and follow all instructions on the label. · Do not have physical contact with other people or let anyone use your personal items until you have finished treatment. Do not use other people's personal items until your treatment is done. Tell people with whom you have close contact that they will need treatment if they have symptoms. · Take an oatmeal bath to help relieve itching. Add a handful of oatmeal (ground to a powder) to your bath. Or you can try an oatmeal bath product, such as Aveeno. When should you call for help?   Call your doctor now or seek immediate medical care if:    · You have signs of infection, such as:  ? Increased pain, swelling, warmth, or redness. ? Red streaks leading from the mite bites. ? Pus draining from a bite area. ? A fever.    Watch closely for changes in your health, and be sure to contact your doctor if:    · Anyone else in your family has itching.     · You do not get better within 2 weeks. Where can you learn more? Go to http://hussein-nickolas.info/. Enter C249 in the search box to learn more about \"Scabies: Care Instructions. \"  Current as of: April 1, 2019  Content Version: 12.2  © 9672-3509 [employer]. Care instructions adapted under license by [employer] (which disclaims liability or warranty for this information). If you have questions about a medical condition or this instruction, always ask your healthcare professional. [address] any warranty or liability for your use of this information.

## 2019-12-12 ENCOUNTER — HOSPITAL ENCOUNTER (EMERGENCY)
Age: 69
Discharge: HOME | End: 2019-12-12
Attending: EMERGENCY MEDICINE
Payer: MEDICAID

## 2019-12-12 VITALS
BODY MASS INDEX: 32.25 KG/M2 | TEMPERATURE: 97.7 F | OXYGEN SATURATION: 99 % | WEIGHT: 160 LBS | RESPIRATION RATE: 12 BRPM | HEART RATE: 71 BPM | SYSTOLIC BLOOD PRESSURE: 133 MMHG | DIASTOLIC BLOOD PRESSURE: 90 MMHG | HEIGHT: 59 IN

## 2019-12-12 DIAGNOSIS — B86 SCABIES: ICD-10-CM

## 2019-12-12 DIAGNOSIS — K05.219 GUM ABSCESS: Primary | ICD-10-CM

## 2019-12-12 DIAGNOSIS — M54.50 BILATERAL LOW BACK PAIN WITHOUT SCIATICA, UNSPECIFIED CHRONICITY: ICD-10-CM

## 2019-12-12 PROCEDURE — 99282 EMERGENCY DEPT VISIT SF MDM: CPT

## 2019-12-12 RX ORDER — METHOCARBAMOL 500 MG/1
500 TABLET, FILM COATED ORAL 4 TIMES DAILY
Qty: 20 TAB | Refills: 0 | OUTPATIENT
Start: 2019-12-12 | End: 2020-08-06

## 2019-12-12 RX ORDER — PENICILLIN V POTASSIUM 500 MG/1
500 TABLET, FILM COATED ORAL 3 TIMES DAILY
Qty: 30 TAB | Refills: 0 | Status: SHIPPED | OUTPATIENT
Start: 2019-12-12 | End: 2019-12-22

## 2019-12-12 NOTE — DISCHARGE INSTRUCTIONS
Patient Education        Periodontal Abscess: Care Instructions  Your Care Instructions    A periodontal abscess is a pocket of pus in the tissues of the gum. It looks like a small red ball pushing out of the swollen gum. An abscess can occur with serious gum disease (periodontitis), which causes the gums to pull away from the teeth. This leaves deep pockets where bacteria can grow. If tartar builds up too much, or if food gets stuck in the pockets, pus forms. If the pus can't drain, it forms an abscess. An abscess can cause a fever and a throbbing pain in nearby teeth. It can also cause long-term damage to your teeth and gums. The teeth may get loose and fall out. The infection can spread to another part of your body. In most cases, your dentist will give you antibiotics to stop the infection. He or she may need to cut open (miladys) the abscess so that the infection can drain. This should relieve your pain. You may also need more dental treatment, such as tooth removal or oral surgery to fix bone damage caused by the abscess. Follow-up care is a key part of your treatment and safety. Be sure to make and go to all appointments, and call your doctor if you are having problems. It's also a good idea to know your test results and keep a list of the medicines you take. How can you care for yourself at home? · Reduce pain and swelling in your face and jaw by putting ice or a cold pack on the outside of your cheek for 10 to 20 minutes at a time. Put a thin cloth between the ice and your skin. · Be safe with medicines. Read and follow all instructions on the label. ? If the doctor gave you a prescription medicine for pain, take it as prescribed. ? If you are not taking a prescription pain medicine, ask your doctor if you can take an over-the-counter medicine. · Take your antibiotics as directed. Do not stop taking them just because you feel better. You need to take the full course of antibiotics.   To prevent periodontal abscess  · Brush and floss every day, and have regular dental checkups. · Eat a healthy diet, and avoid sugary foods and drinks. · Do not smoke or use spit tobacco. Tobacco use slows your ability to heal. It also increases your risk for gum disease and cancer of the mouth and throat. If you need help quitting, talk to your doctor about stop-smoking programs and medicines. These can increase your chances of quitting for good. When should you call for help? Call 911 anytime you think you may need emergency care. For example, call if:    · You have trouble breathing.    Call your doctor now or seek immediate medical care if:    · You have new or worse symptoms of infection, such as:  ? Increased pain, swelling, warmth, or redness. ? Red streaks leading from the area. ? Pus draining from the area. ? A fever.    Watch closely for changes in your health, and be sure to contact your doctor if:    · You do not get better as expected. Where can you learn more? Go to http://hussein-nickolas.info/. Enter O179 in the search box to learn more about \"Periodontal Abscess: Care Instructions. \"  Current as of: October 3, 2018  Content Version: 12.2  © 2916-3199 [employer]. Care instructions adapted under license by [employer] (which disclaims liability or warranty for this information). If you have questions about a medical condition or this instruction, always ask your healthcare professional. [place] [address] any warranty or liability for your use of this information. Patient Education        Scabies: Care Instructions  Your Care Instructions  Scabies is a skin problem that can cause intense itching. It is caused by very tiny bugs called mites that dig just under the skin and lay eggs. An allergic reaction to the mites causes the itching. Scabies is usually spread by person-to-person contact.  It is also possible, but not common, for scabies to spread through towels, clothes, and bedding. Everyone in your household should be treated. Scabies is treated with medicine. Itching may last for several weeks after treatment. Follow-up care is a key part of your treatment and safety. Be sure to make and go to all appointments, and call your doctor if you are having problems. It's also a good idea to know your test results and keep a list of the medicines you take. How can you care for yourself at home? · Use the lotion or cream your doctor recommends or prescribes. One treatment usually cures scabies. Do not use the cream again unless your doctor tells you to. · Wash all clothes, bedding, and towels that you used in the 3 days before you started treatment. Use hot water, and use the hot cycle in the dryer. Another option is to dry-clean these items. Or seal them in a plastic bag for 3 to 7 days. · Take an oral antihistamine, such as loratadine (Claritin) or diphenhydramine (Benadryl), to help stop itching. You also can use a nonprescription anti-itch cream. Read and follow all instructions on the label. · Do not have physical contact with other people or let anyone use your personal items until you have finished treatment. Do not use other people's personal items until your treatment is done. Tell people with whom you have close contact that they will need treatment if they have symptoms. · Take an oatmeal bath to help relieve itching. Add a handful of oatmeal (ground to a powder) to your bath. Or you can try an oatmeal bath product, such as Aveeno. When should you call for help? Call your doctor now or seek immediate medical care if:    · You have signs of infection, such as:  ? Increased pain, swelling, warmth, or redness. ? Red streaks leading from the mite bites. ? Pus draining from a bite area.   ? A fever.    Watch closely for changes in your health, and be sure to contact your doctor if:    · Anyone else in your family has itching.     · You do not get better within 2 weeks. Where can you learn more? Go to http://hussein-nickolas.info/. Enter X852 in the search box to learn more about \"Scabies: Care Instructions. \"  Current as of: April 1, 2019  Content Version: 12.2  © 1591-1606 [employer]. Care instructions adapted under license by [employer] (which disclaims liability or warranty for this information). If you have questions about a medical condition or this instruction, always ask your healthcare professional. [address] any warranty or liability for your use of this information.

## 2019-12-12 NOTE — ED PROVIDER NOTES
EMERGENCY DEPARTMENT HISTORY AND PHYSICAL EXAM    Date: 12/12/2019  Patient Name: Thyra Phoenix    History of Presenting Illness     Chief Complaint   Patient presents with    Back Pain    Dental Pain    Itching         History Provided By: Patient    Chief Complaint: itching, back pain  Duration: months   Timing:  Constant  Location: generalized itching, low back pain  Quality: Aching  Severity: Mild  Modifying Factors: none - left prescriptions from last visit in cab   Associated Symptoms: dental abscess      Additional History (Context): Thyra Phoenix is a 77 y.o. female with anxiety, homelessness who presents with itching from scabies, bilat low back pain, and gum pain. Pt states she left the prescriptions from the last visit in the cab and is still having bilat low back pain and itching from scabies. No back injuries, f/c, IVDU, N/V/D or any other symptoms. States she is no longer homeless. Also reports there is an abscess on her front upper gum, needs antibiotics. PCP: None    Current Outpatient Medications   Medication Sig Dispense Refill    methocarbamol (ROBAXIN) 500 mg tablet Take 1 Tab by mouth four (4) times daily. 20 Tab 0    permethrin (NIX) 1 % lotion Apply to entire body below the chin leave on for 10 hours and then wash off 2 Bottle 0    penicillin v potassium (VEETID) 500 mg tablet Take 1 Tab by mouth three (3) times daily for 10 days. 30 Tab 0    predniSONE (STERAPRED) 5 mg dose pack See administration instruction per 5mg dose pack 21 Tab 0    Camphor-Eucalyptus Oil-Menthol (VICKS VAPORUB) 4.8-1.2-2.6 % oint 1 Actuation(s) by Apply Externally route three (3) times daily as needed. 50 g 0    fluticasone (FLONASE) 50 mcg/actuation nasal spray 2 Sprays by Both Nostrils route daily. 1 Bottle 0    nitroglycerin (NITROSTAT) 0.4 mg SL tablet Take 1 Tab by mouth every five (5) minutes as needed for Chest Pain.  Sit/Lay down then put one tab under the tongue every 5 minutes as needed for chest pain for 3 doses 1 Bottle 0    naproxen (NAPROSYN) 500 mg tablet Take 1 Tab by mouth two (2) times daily (with meals). 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 No pertinent surgical history. Family History:  History reviewed. No pertinent family history. Social History:  Social History     Tobacco Use    Smoking status: Former Smoker     Years: 15.00    Smokeless tobacco: Never Used   Substance Use Topics    Alcohol use: No    Drug use: No     Comment: none in 2 years       Allergies: Allergies   Allergen Reactions    Aspirin Nausea Only         Review of Systems   Review of Systems   Constitutional: Negative for chills and fever. HENT: Positive for dental problem. Musculoskeletal: Positive for back pain. Skin: Positive for rash. All other systems reviewed and are negative. All Other Systems Negative  Physical Exam     Vitals:    12/12/19 1111   BP: 133/90   Pulse: 71   Resp: 12   Temp: 97.7 °F (36.5 °C)   SpO2: 99%   Weight: 72.6 kg (160 lb)   Height: 4' 11\" (1.499 m)     Physical Exam  Constitutional:       Appearance: Normal appearance. She is normal weight. HENT:      Head: Normocephalic and atraumatic. Right Ear: Tympanic membrane, ear canal and external ear normal.      Left Ear: Tympanic membrane, ear canal and external ear normal.      Nose: Nose normal.      Mouth/Throat:      Mouth: Mucous membranes are moist.      Comments: Edentulous. Upper front gum with small non drainable abscess. Eyes:      Extraocular Movements: Extraocular movements intact. Pupils: Pupils are equal, round, and reactive to light. Musculoskeletal: Normal range of motion. Cervical back: Normal.      Thoracic back: Normal.      Lumbar back: She exhibits tenderness and pain.  She exhibits normal range of motion, no bony tenderness, no swelling, no edema, no deformity and no laceration. Back:    Skin:     General: Skin is warm. Findings: No rash. Neurological:      General: No focal deficit present. Mental Status: She is alert. Psychiatric:         Mood and Affect: Mood normal.            Diagnostic Study Results     Labs -   No results found for this or any previous visit (from the past 12 hour(s)). Radiologic Studies -   No orders to display     CT Results  (Last 48 hours)    None        CXR Results  (Last 48 hours)    None            Medical Decision Making   I am the first provider for this patient. I reviewed the vital signs, available nursing notes, past medical history, past surgical history, family history and social history. Vital Signs-Reviewed the patient's vital signs. Pulse Oximetry Analysis - 99% on RA      Records Reviewed: Nursing Notes and Old Medical Records    Procedures:  Procedures    Provider Notes (Medical Decision Making): pt here for same complaint as 2 weeks ago as she lost her prescriptions. Has bilat low back pain and scabies, tho no obvious rash. Today she has upper gum abscess, will start on pcn and rx for robaxin and premethrin. SKYE Tolentino 12:23 PM      MED RECONCILIATION:  No current facility-administered medications for this encounter. Current Outpatient Medications   Medication Sig    methocarbamol (ROBAXIN) 500 mg tablet Take 1 Tab by mouth four (4) times daily.  permethrin (NIX) 1 % lotion Apply to entire body below the chin leave on for 10 hours and then wash off    penicillin v potassium (VEETID) 500 mg tablet Take 1 Tab by mouth three (3) times daily for 10 days.  predniSONE (STERAPRED) 5 mg dose pack See administration instruction per 5mg dose pack    Camphor-Eucalyptus Oil-Menthol (VICKS VAPORUB) 4.8-1.2-2.6 % oint 1 Actuation(s) by Apply Externally route three (3) times daily as needed.     fluticasone (FLONASE) 50 mcg/actuation nasal spray 2 Sprays by Both Nostrils route daily.  nitroglycerin (NITROSTAT) 0.4 mg SL tablet Take 1 Tab by mouth every five (5) minutes as needed for Chest Pain. Sit/Lay down then put one tab under the tongue every 5 minutes as needed for chest pain for 3 doses    naproxen (NAPROSYN) 500 mg tablet Take 1 Tab by mouth two (2) times daily (with meals). Disposition:  home    DISCHARGE NOTE:     Pt has been reexamined. Patient has no new complaints, changes, or physical findings. Care plan outlined and precautions discussed. RAll medications were reviewed with the patient; will d/c home with robaxin, permethrin, and pcn. All of pt's questions and concerns were addressed. Patient was instructed and agrees to follow up with pcp and dental clinic, as well as to return to the ED upon further deterioration. Patient is ready to go home. Follow-up Information     Follow up With Specialties Details Why [address] [address]  [phone]          Current Discharge Medication List      START taking these medications    Details   penicillin v potassium (VEETID) 500 mg tablet Take 1 Tab by mouth three (3) times daily for 10 days. Qty: 30 Tab, Refills: 0         CONTINUE these medications which have CHANGED    Details   methocarbamol (ROBAXIN) 500 mg tablet Take 1 Tab by mouth four (4) times daily. Qty: 20 Tab, Refills: 0      permethrin (NIX) 1 % lotion Apply to entire body below the chin leave on for 10 hours and then wash off  Qty: 2 Bottle, Refills: 0               Diagnosis     Clinical Impression:   1. Gum abscess    2. Bilateral low back pain without sciatica, unspecified chronicity    3.  Scabies

## 2019-12-12 NOTE — ED TRIAGE NOTES
The patient presents for evaluation of bilateral back pain that began this morning, possible scabies, and an abscess in her mouth. She was seen here before for scabies, but left her prescription here.

## 2020-01-19 ENCOUNTER — HOSPITAL ENCOUNTER (EMERGENCY)
Age: 70
Discharge: HOME | End: 2020-01-19
Attending: EMERGENCY MEDICINE
Payer: MEDICAID

## 2020-01-19 VITALS
DIASTOLIC BLOOD PRESSURE: 100 MMHG | HEART RATE: 75 BPM | RESPIRATION RATE: 12 BRPM | TEMPERATURE: 98.7 F | SYSTOLIC BLOOD PRESSURE: 151 MMHG | OXYGEN SATURATION: 100 %

## 2020-01-19 DIAGNOSIS — B86 SCABIES: Primary | ICD-10-CM

## 2020-01-19 DIAGNOSIS — K05.10 GINGIVITIS: ICD-10-CM

## 2020-01-19 PROCEDURE — 99281 EMR DPT VST MAYX REQ PHY/QHP: CPT

## 2020-01-19 RX ORDER — PENICILLIN V POTASSIUM 500 MG/1
500 TABLET, FILM COATED ORAL 2 TIMES DAILY
Qty: 20 TAB | Refills: 0 | Status: SHIPPED | OUTPATIENT
Start: 2020-01-19 | End: 2020-01-29

## 2020-01-19 NOTE — ED PROVIDER NOTES
EMERGENCY DEPARTMENT HISTORY AND PHYSICAL EXAM    Date: 1/19/2020  Patient Name: Severiano Rist    History of Presenting Illness     Chief Complaint   Patient presents with    Dental Pain    Itching         History Provided By: patient        Additional History (Context): Severiano Rist is a 41-year-old female presenting to the emergency department with request for medication refill and right upper dental pain. States dental pain is been off and on for a long time, states is acting up for the past 2 days and wants this evaluated. No fever or chills. Patient also states she was recently diagnosed with scabies and left medication and cab car. Needs a refill. No other complaints at this time. Denies fever, chills, chest pain or shortness of breath. PCP: None    Current Outpatient Medications   Medication Sig Dispense Refill    permethrin (NIX) 1 % lotion Apply to entire body below the chin leave on for 10 hours and then wash off 2 Bottle 0    penicillin v potassium (VEETID) 500 mg tablet Take 1 Tab by mouth two (2) times a day for 10 days. 20 Tab 0    methocarbamol (ROBAXIN) 500 mg tablet Take 1 Tab by mouth four (4) times daily. 20 Tab 0    predniSONE (STERAPRED) 5 mg dose pack See administration instruction per 5mg dose pack 21 Tab 0    Camphor-Eucalyptus Oil-Menthol (VICKS VAPORUB) 4.8-1.2-2.6 % oint 1 Actuation(s) by Apply Externally route three (3) times daily as needed. 50 g 0    fluticasone (FLONASE) 50 mcg/actuation nasal spray 2 Sprays by Both Nostrils route daily. 1 Bottle 0    nitroglycerin (NITROSTAT) 0.4 mg SL tablet Take 1 Tab by mouth every five (5) minutes as needed for Chest Pain. Sit/Lay down then put one tab under the tongue every 5 minutes as needed for chest pain for 3 doses 1 Bottle 0    naproxen (NAPROSYN) 500 mg tablet Take 1 Tab by mouth two (2) times daily (with meals).  12 Tab 0       Past History     Past Medical History:  Past Medical History:   Diagnosis Date    Ang[facility] rest 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 No pertinent surgical history. Family History:  History reviewed. No pertinent family history. Social History:  Social History     Tobacco Use    Smoking status: Former Smoker     Years: 15.00    Smokeless tobacco: Never Used   Substance Use Topics    Alcohol use: No    Drug use: No     Comment: none in 2 years       Allergies: Allergies   Allergen Reactions    Aspirin Nausea Only         Review of Systems       Review of Systems   Constitutional: Negative for chills and fever. HENT: Negative for nasal congestion, sore throat, rhinorrhea, positive for dental pain  Eyes: Negative. Respiratory: Negative for cough and negative for shortness of breath. Cardiovascular: Negative for chest pain and palpitations. Gastrointestinal: Negative for abdominal pain, constipation, diarrhea, nausea and vomiting. Genitourinary: Negative. Negative for difficulty urinating and flank pain. Musculoskeletal: Negative for back pain. Negative for gait problem and neck pain. Skin: Positive for rash. Allergic/Immunologic: Negative. Neurological: Negative for dizziness, weakness, numbness and headaches. Psychiatric/Behavioral: Negative. All other systems reviewed and are negative. All Other Systems Negative  Physical Exam     Vitals:    01/19/20 1522   BP: (!) 151/100   Pulse: 75   Resp: 12   Temp: 98.7 °F (37.1 °C)   SpO2: 100%     Physical Exam  Vitals signs and nursing note reviewed. Constitutional:       General: She is not in acute distress. Appearance: She is well-developed. She is not diaphoretic. HENT:      Head: Normocephalic and atraumatic. Right Ear: Tympanic membrane normal.      Left Ear: Tympanic membrane normal.      Nose: Nose normal.      Mouth/Throat:      Lips: Pink.       Mouth: Mucous membranes are moist. Mucous membranes are pale. No injury, lacerations, oral lesions or angioedema. Dentition: Does not have dentures. No gingival swelling or dental abscesses. Tongue: Tongue does not protrude in midline. Palate: No mass and lesions. Palate does not elevate in midline. Pharynx: Oropharynx is clear. Uvula midline. No pharyngeal swelling, oropharyngeal exudate, posterior oropharyngeal erythema or uvula swelling. Comments: Upper teeth have been extracted, gums on right upper side slightly erythematous, no appreciated abscess  Eyes:      Conjunctiva/sclera: Conjunctivae normal.      Pupils: Pupils are equal, round, and reactive to light. Neck:      Musculoskeletal: Normal range of motion and neck supple. Cardiovascular:      Rate and Rhythm: Normal rate and regular rhythm. Pulmonary:      Effort: Pulmonary effort is normal. No respiratory distress. Breath sounds: Normal breath sounds. Abdominal:      Palpations: Abdomen is soft. Musculoskeletal: Normal range of motion. Skin:     General: Skin is warm. Findings: No rash. Neurological:      Mental Status: She is alert and oriented to person, place, and time. Cranial Nerves: No cranial nerve deficit. Coordination: Coordination normal.   Psychiatric:         Behavior: Behavior normal.           Diagnostic Study Results     Labs -   No results found for this or any previous visit (from the past 12 hour(s)). Radiologic Studies -   No orders to display     CT Results  (Last 48 hours)    None        CXR Results  (Last 48 hours)    None            Medical Decision Making   I am the first provider for this patient. I reviewed the vital signs, available nursing notes, past medical history, past surgical history, family history and social history. Vital Signs-Reviewed the patient's vital signs.         Records Reviewed: Nursing notes, old medical records and any previous labs, imaging, visits, consultations pertinent to patient care    Procedures:  Procedures      ED Course: Progress Notes, Reevaluation, and Consults:      Provider Notes (Medical Decision Making): We will give medication for dental infection as well as refill scabies medication. MED RECONCILIATION:  No current facility-administered medications for this encounter. Current Outpatient Medications   Medication Sig    permethrin (NIX) 1 % lotion Apply to entire body below the chin leave on for 10 hours and then wash off    penicillin v potassium (VEETID) 500 mg tablet Take 1 Tab by mouth two (2) times a day for 10 days.  methocarbamol (ROBAXIN) 500 mg tablet Take 1 Tab by mouth four (4) times daily.  predniSONE (STERAPRED) 5 mg dose pack See administration instruction per 5mg dose pack    Camphor-Eucalyptus Oil-Menthol (VICKS VAPORUB) 4.8-1.2-2.6 % oint 1 Actuation(s) by Apply Externally route three (3) times daily as needed.  fluticasone (FLONASE) 50 mcg/actuation nasal spray 2 Sprays by Both Nostrils route daily.  nitroglycerin (NITROSTAT) 0.4 mg SL tablet Take 1 Tab by mouth every five (5) minutes as needed for Chest Pain. Sit/Lay down then put one tab under the tongue every 5 minutes as needed for chest pain for 3 doses    naproxen (NAPROSYN) 500 mg tablet Take 1 Tab by mouth two (2) times daily (with meals). Disposition:  home    DISCHARGE NOTE:     Pt has been reexamined. Patient has no new complaints, changes, or physical findings. Care plan outlined and precautions discussed. Discussed proper way to take medications. Discussed treatment plan, return precautions, symptomatic relief, and expected time to improvement. All questions answered. Patient is stable for discharge and outpatient management. Patient is ready to go home.     Follow-up Information     Follow up With Specialties Details Why [address] [facility] EMERGENCY DEPT Emergency Medicine   [address] [place] [id number] [phone]          Current Discharge Medication List      START taking these medications    Details   penicillin v potassium (VEETID) 500 mg tablet Take 1 Tab by mouth two (2) times a day for 10 days. Qty: 20 Tab, Refills: 0         CONTINUE these medications which have CHANGED    Details   permethrin (NIX) 1 % lotion Apply to entire body below the chin leave on for 10 hours and then wash off  Qty: 2 Bottle, Refills: 0                   Diagnosis     Clinical Impression:   1. Scabies    2. Gingivitis            Dictation disclaimer:  Please note that this dictation was completed with [employer], the [employer] voice recognition software. Quite often unanticipated grammatical, syntax, homophones, and other interpretive errors are inadvertently transcribed by the computer software. Please disregard these errors. Please excuse any errors that have escaped final proofreading.

## 2020-01-19 NOTE — DISCHARGE INSTRUCTIONS
Patient Education        Scabies: Care Instructions  Your Care Instructions  Scabies is a skin problem that can cause intense itching. It is caused by very tiny bugs called mites that dig just under the skin and lay eggs. An allergic reaction to the mites causes the itching. Scabies is usually spread by person-to-person contact. It is also possible, but not common, for scabies to spread through towels, clothes, and bedding. Everyone in your household should be treated. Scabies is treated with medicine. Itching may last for several weeks after treatment. Follow-up care is a key part of your treatment and safety. Be sure to make and go to all appointments, and call your doctor if you are having problems. It's also a good idea to know your test results and keep a list of the medicines you take. How can you care for yourself at home? · Use the lotion or cream your doctor recommends or prescribes. One treatment usually cures scabies. Do not use the cream again unless your doctor tells you to. · Wash all clothes, bedding, and towels that you used in the 3 days before you started treatment. Use hot water, and use the hot cycle in the dryer. Another option is to dry-clean these items. Or seal them in a plastic bag for 3 to 7 days. · Take an oral antihistamine, such as loratadine (Claritin) or diphenhydramine (Benadryl), to help stop itching. You also can use a nonprescription anti-itch cream. Read and follow all instructions on the label. · Do not have physical contact with other people or let anyone use your personal items until you have finished treatment. Do not use other people's personal items until your treatment is done. Tell people with whom you have close contact that they will need treatment if they have symptoms. · Take an oatmeal bath to help relieve itching. Add a handful of oatmeal (ground to a powder) to your bath. Or you can try an oatmeal bath product, such as Aveeno.   When should you call for help?  Call your doctor now or seek immediate medical care if:    · You have signs of infection, such as:  ? Increased pain, swelling, warmth, or redness. ? Red streaks leading from the mite bites. ? Pus draining from a bite area. ? A fever.    Watch closely for changes in your health, and be sure to contact your doctor if:    · Anyone else in your family has itching.     · You do not get better within 2 weeks. Where can you learn more? Go to http://hussein-nickolas.info/. Enter R127 in the search box to learn more about \"Scabies: Care Instructions. \"  Current as of: April 1, 2019  Content Version: 12.2  © 1960-5142 [employer]. Care instructions adapted under license by [employer] (which disclaims liability or warranty for this information). If you have questions about a medical condition or this instruction, always ask your healthcare professional. [address] any warranty or liability for your use of this information.

## 2020-01-19 NOTE — ED TRIAGE NOTES
\"I left the cream I was using for scabies in a cab. I need some more. I also have an infected tooth. \"

## 2020-08-04 NOTE — ED PROVIDER NOTES
EMERGENCY DEPARTMENT HISTORY AND PHYSICAL EXAM 
 
1:31 AM 
 
 
Date: 2/25/2019 Patient Name: Wilmer Lewis History of Presenting Illness Chief Complaint Patient presents with  Anxiety  Chest Pain History Provided By: Patient Additional History (Context): Wilmer Lewis is a 80 y.o. female with PMHx of anxiety, angina, and CVA who presents with complaints of acute substernal chest pain that began shortly PTA with associated anxiety. The pt is asymptomatic here in the ED after NTG. She is having no chest pain but continues to feel anxious. The patient presents no further medical complaints or concerns to the ED at this time. HPI is limited due to pt being a poor historian. PCP: None Chief Complaint: Chest Pain Duration:  Hours Timing:  Acute and Improving Location: Substernal chest 
Modifying Factors: Improved after NTG Associated Symptoms: Anxiety Current Facility-Administered Medications Medication Dose Route Frequency Provider Last Rate Last Dose  acetaminophen (TYLENOL) 325 mg tablet Current Outpatient Medications Medication Sig Dispense Refill  Camphor-Eucalyptus Oil-Menthol (VICKS VAPORUB) 4.8-1.2-2.6 % oint 1 Actuation(s) by Apply Externally route three (3) times daily as needed. 50 g 0  
 fluticasone (FLONASE) 50 mcg/actuation nasal spray 2 Sprays by Both Nostrils route daily. 1 Bottle 0  
 nitroglycerin (NITROSTAT) 0.4 mg SL tablet Take 1 Tab by mouth every five (5) minutes as needed for Chest Pain. Sit/Lay down then put one tab under the tongue every 5 minutes as needed for chest pain for 3 doses 1 Bottle 0  
 naproxen (NAPROSYN) 500 mg tablet Take 1 Tab by mouth two (2) times daily (with meals). 12 Tab 0 Past History Past Medical History: 
Past Medical History:  
Diagnosis Date  Angina at rest [facility])  Anxiety  CVA, old, facial weakness 2012  
 left ptosis  Dental caries Past Surgical History: Ortho Discharge Summary    Patient ID:  Valentina Gil  [id number]  male  36 y.o.  1985    Admit date: 8/3/2020    Discharge date: 8/4/2020    Admitting Physician: Tenna Carrel, DO     Consulting Physician(s):   Treatment Team: Attending Provider: Jasmyne Sauceda DO; Primary Nurse: Barbara Velazquez RN; Utilization Review: Drew Marcos RN    Date of Surgery:   8/3/2020     Preoperative Diagnosis:  AVASCULAR NECROSIS BILATERAL FEMORAL HEAD    Postoperative Diagnosis:   AVASCULAR NECROSIS BILATERAL FEMORAL HEAD    Procedure(s):   LEFT TOTAL HIP ARTHROPLASTY, RIGHT TOTAL HIP ARTHROPLASTY     Anesthesia Type:   General     Surgeon: Jasmyne Sauceda DO                            HPI:  Pt is a 36 y.o. male who has a history of AVASCULAR NECROSIS BILATERAL FEMORAL HEAD  with pain and limitations of activities of daily living who presents at this time for a LEFT TOTAL HIP ARTHROPLASTY, RIGHT TOTAL HIP ARTHROPLASTY following the failure of conservative management. PMH:   Past Medical History:   Diagnosis Date    Asthma     as child/resolved    Avascular necrosis ([address]     Hypertension        Body mass index is 37.71 kg/m². : A BMI > 30 is classified as obesity and > 40 is classified as morbid obesity. Medications upon admission :   Prior to Admission Medications   Prescriptions Last Dose Informant Patient Reported? Taking? amLODIPine (Norvasc) 5 mg tablet 8/2/2020 at 1430   Yes Yes   Sig: Take 5 mg by mouth daily. ascorbic acid (VITAMIN C PO) 3/3/2020  Yes No   Sig: Take 250 mg by mouth daily. gummies   biotin 10,000 mcg cap 3/3/2020  Yes No   Sig: Take 10,000 mcg by mouth as needed. cholecalciferol, vitamin D3, (VITAMIN D3 PO) 3/3/2020  Yes No   Sig: Take  by mouth daily. gummies   cyanocobalamin, vitamin B-12, (VITAMIN B12 PO) 3/3/2020  Yes No   Sig: Take 5,000 mcg by mouth daily. gummies   lisinopril (PRINIVIL, ZESTRIL) 2.5 mg tablet 8/2/2020 at 1430  Yes No   Sig: Take 5 mg by mouth nightly. History reviewed. No pertinent surgical history. Family History: 
History reviewed. No pertinent family history. Social History: 
Social History Tobacco Use  Smoking status: Former Smoker Years: 15.00  Smokeless tobacco: Never Used Substance Use Topics  Alcohol use: No  
 Drug use: No  
  Comment: none in 2 years Allergies: Allergies Allergen Reactions  Aspirin Nausea Only Review of Systems Review of Systems Constitutional: Negative. HENT: Negative. Eyes: Negative. Respiratory: Negative for shortness of breath. Cardiovascular: Positive for chest pain. Musculoskeletal: Negative. Skin: Negative. Neurological: Positive for headaches. Psychiatric/Behavioral:  
     Positive for anxiety. All other systems reviewed and are negative. Physical Exam  
 
Physical Exam: 
. Patient Vitals for the past 12 hrs: 
 Pulse Resp BP SpO2  
02/25/19 2330 66 13 129/81 98 % 02/25/19 2300 65 12 111/76 98 % 02/25/19 2230 78 17 115/68 97 % Gen: Well developed, well nourished 80 y.o. female HEENT: Normocephalic, atraumatic, no scleral icterus Respiratory: No accessory muscle use No wheeze, No rales, No rhonchi. Normal chest wall excursion. No subcutaneous air, no rib crepitus Cardiovascular: Regular rhythm and rate, Normal pulses, Normal perfusion. No edema Gastrointestinal: Non distended, Non tender, No masses. No ascites. No organomegaly. No evidence of trauma Musculoskeletal: Full range of motion at all other tested joints. No joint effusions. No spinal tenderness. Neuro: Normal strength, Normal sensation. Normal speech. No ataxia. Cranial Nerves II-XII normal as tested. Skin: No rash, No lesions, petechia or purpura. Warm and dry Psyche: No suicidal ideation, No homicidal ideation. No hallucinations. Organized thoughts. Heme: Normal 
: Deferred Diagnostic Study Results Labs - Recent Results (from the past 12 hour(s)) multivitamin, tx-iron-ca-min (THERA-M W/ IRON) 9 mg iron-400 mcg tab tablet 3/3/2020  Yes No   Sig: Take 1 Tab by mouth daily. Facility-Administered Medications: None        Allergies: Allergies   Allergen Reactions    Prednisone Myalgia     Hip pain          Hospital Course: The patient underwent surgery. Complications:  None; patient tolerated the procedure well. Was taken to the PACU in stable condition and then transferred to the ortho floor. Perioperative Antibiotics:  Ancef     Postoperative Pain Management:  Oxycodone & Tylenol     DVT Prophylaxis: Aspirin 325 BID    Postoperative transfusions:    Number of units banked PRBCs =   none     Post Op complications: none    Hemoglobin at discharge:    Lab Results   Component Value Date/Time    HGB 11.3 (L) 08/04/2020 03:30 AM    INR 1.0 07/21/2020 03:36 PM       Steri-strips and tegaderm dressing remained clean, dry and intact. No significant erythema or swelling. Wound appears to be healing without any evidence of infection. Neurovascular exam found to be within normal limits. Physical Therapy started following surgery and participated in bed mobility, transfers and ambulation. Gait:  Gait  Speed/Barbara: Pace decreased (<100 feet/min)  Step Length: Left shortened, Right shortened  Gait Abnormalities: Antalgic, Decreased step clearance, Step to gait(progressed to step through gait with time)  Ambulation - Level of Assistance: Contact guard assistance  Distance (ft): 180 Feet (ft)  Assistive Device: Gait belt, Walker, rolling                   Discharged to: Home with . Condition on Discharge:   stable    Discharge instructions:  - Anticoagulate with Aspirin 325 BID  - Take pain medications as prescribed  - Resume pre hospital diet      - Discharge activity: activity as tolerated  - Ambulate with assistive device as needed. - Weight bearing status - WBAT  - Wound Care Keep wound clean and dry.   See discharge instruction EKG, 12 LEAD, INITIAL Collection Time: 02/25/19  7:15 PM  
Result Value Ref Range Ventricular Rate 88 BPM  
 Atrial Rate 88 BPM  
 P-R Interval 158 ms QRS Duration 64 ms Q-T Interval 370 ms QTC Calculation (Bezet) 447 ms Calculated P Axis 32 degrees Calculated R Axis 12 degrees Calculated T Axis 26 degrees Diagnosis Normal sinus rhythm Possible Left atrial enlargement Borderline ECG When compared with ECG of 07-FEB-2019 03:37, No significant change was found CARDIAC PANEL,(CK, CKMB & TROPONIN) Collection Time: 02/25/19 10:19 PM  
Result Value Ref Range  (H) 26 - 192 U/L  
 CK - MB 4.2 (H) <3.6 ng/ml CK-MB Index 1.0 0.0 - 4.0 % Troponin-I, QT <0.02 0.0 - 0.045 NG/ML  
CBC WITH AUTOMATED DIFF Collection Time: 02/25/19 10:19 PM  
Result Value Ref Range WBC 7.5 4.6 - 13.2 K/uL  
 RBC 4.99 4.20 - 5.30 M/uL  
 HGB 14.5 12.0 - 16.0 g/dL HCT 40.3 35.0 - 45.0 % MCV 80.8 74.0 - 97.0 FL  
 MCH 29.1 24.0 - 34.0 PG  
 MCHC 36.0 31.0 - 37.0 g/dL  
 RDW 14.0 11.6 - 14.5 % PLATELET 128 033 - 885 K/uL MPV 9.7 9.2 - 11.8 FL  
 NEUTROPHILS 42 40 - 73 % LYMPHOCYTES 45 21 - 52 % MONOCYTES 9 3 - 10 % EOSINOPHILS 3 0 - 5 % BASOPHILS 1 0 - 2 %  
 ABS. NEUTROPHILS 3.1 1.8 - 8.0 K/UL  
 ABS. LYMPHOCYTES 3.5 0.9 - 3.6 K/UL  
 ABS. MONOCYTES 0.7 0.05 - 1.2 K/UL  
 ABS. EOSINOPHILS 0.2 0.0 - 0.4 K/UL  
 ABS. BASOPHILS 0.1 0.0 - 0.1 K/UL  
 DF AUTOMATED METABOLIC PANEL, COMPREHENSIVE Collection Time: 02/25/19 10:19 PM  
Result Value Ref Range Sodium 137 136 - 145 mmol/L Potassium 3.9 3.5 - 5.5 mmol/L Chloride 104 100 - 108 mmol/L  
 CO2 29 21 - 32 mmol/L Anion gap 4 3.0 - 18 mmol/L Glucose 159 (H) 74 - 99 mg/dL BUN 23 (H) 7.0 - 18 MG/DL Creatinine 1.29 0.6 - 1.3 MG/DL  
 BUN/Creatinine ratio 18 12 - 20 GFR est AA 50 (L) >60 ml/min/1.73m2 GFR est non-AA 41 (L) >60 ml/min/1.73m2  Calcium 8.9 8.5 - 10.1 MG/DL  
 sheet.            -DISCHARGE MEDICATION LIST     Current Discharge Medication List      START taking these medications    Details   aspirin delayed-release 325 mg tablet Take 1 Tab by mouth two (2) times a day for 30 days. Qty: 60 Tab, Refills: 0      famotidine (PEPCID) 20 mg tablet Take 1 Tab by mouth two (2) times a day for 30 days. Qty: 60 Tab, Refills: 0      oxyCODONE IR (ROXICODONE) 5 mg immediate release tablet Take 1-2 Tabs by mouth every four (4) hours as needed for Pain for up to 14 days. Max Daily Amount: 60 mg. One tab for mild to moderate pain level 1-6, or 2 tabs for severe pain level 7-10  Qty: 40 Tab, Refills: 0    Associated Diagnoses: S/P bilateral hip replacements      polyethylene glycol (MIRALAX) 17 gram packet Take 1 Packet by mouth daily for 14 days. Qty: 14 Packet, Refills: 0      senna-docusate (PERICOLACE) 8.6-50 mg per tablet Take 1 Tab by mouth two (2) times a day for 14 days. Qty: 28 Tab, Refills: 0         CONTINUE these medications which have NOT CHANGED    Details   amLODIPine (Norvasc) 5 mg tablet Take 5 mg by mouth daily. ascorbic acid (VITAMIN C PO) Take 250 mg by mouth daily. gummies      cyanocobalamin, vitamin B-12, (VITAMIN B12 PO) Take 5,000 mcg by mouth daily. gummies      cholecalciferol, vitamin D3, (VITAMIN D3 PO) Take  by mouth daily. gummies      lisinopril (PRINIVIL, ZESTRIL) 2.5 mg tablet Take 5 mg by mouth nightly. biotin 10,000 mcg cap Take 10,000 mcg by mouth as needed. multivitamin, tx-iron-ca-min (THERA-M W/ IRON) 9 mg iron-400 mcg tab tablet Take 1 Tab by mouth daily.           per medical continuation form      -Follow up in office in 2 weeks      Signed:  Marianne Clark NP  Orthopaedic Nurse Practitioner    8/4/2020  10:24 AM Bilirubin, total 0.6 0.2 - 1.0 MG/DL  
 ALT (SGPT) 28 13 - 56 U/L  
 AST (SGOT) 22 15 - 37 U/L Alk. phosphatase 60 45 - 117 U/L Protein, total 8.0 6.4 - 8.2 g/dL Albumin 4.0 3.4 - 5.0 g/dL Globulin 4.0 2.0 - 4.0 g/dL A-G Ratio 1.0 0.8 - 1.7    
TROPONIN I Collection Time: 19  1:25 AM  
Result Value Ref Range Troponin-I, QT <0.02 0.0 - 0.045 NG/ML Radiologic Studies -  
XR CHEST PA LAT Final Result IMPRESSION:    
1. Unremarkable chest  
  
   
  
  
 
 
 
Medical Decision Making It should be noted that I, Juan Antonio, Flory Stockton MD will be the provider of record for this patient. I reviewed the vital signs, available nursing notes, past medical history, past surgical history, family history and social history. Vital Signs-Reviewed the patient's vital signs. Pulse Oximetry Analysis -  98% on room air (Interpretation), wnl 
 
Cardiac Monitor: 
Rate: 66 bpm 
Rhythm:   
 
EK:22AM 
EKG was done at 7:15 PM 
Normal sinus rhythm, rate of 88, QRS duration of 64 ms, QTC of 447 ms, no STEMI. Records Reviewed: Nursing Notes (Time of Review: 1:31 AM) ED Course: Progress Notes, Reevaluation, and Consults: MEDICAL DECISION MAKING The patient remained pain free after a period of observation in the emergency department. No clinically significant ST or T wave changes on ECG monitoring. Initial and follow-up cardiac enzymes were noted. The patient is low risk for outpatient management. All of their questions were answered, and I encouraged close follow-up as an outpatient. I did discuss the risks and benefits of further treatment and observation both in the emergency department, as well as in the hospital under observation status. The patient ultimately decided to follow up as an outpatient. I counseled the patient that they may return at any time if they change their mind about wanting to stay for further testing. Diagnosis Clinical Impression: 1. Chest pain, unspecified type Disposition: Discharge home Follow-up Information Follow up With Specialties Details Why Contact Info None  Call  None [phone] Patient stated that they have no PCP Medication List  
  
ASK your doctor about these medications Camphor-Eucalyptus Oil-Menthol 4.8-1.2-2.6 % Oint Commonly known as:  VICKS VAPORUB 
1 Actuation(s) by Apply Externally route three (3) times daily as needed. fluticasone 50 mcg/actuation nasal spray Commonly known as:  Genie Santiago 2 Sprays by Both Nostrils route daily. naproxen 500 mg tablet Commonly known as:  NAPROSYN Take 1 Tab by mouth two (2) times daily (with meals). nitroglycerin 0.4 mg SL tablet Commonly known as:  NITROSTAT Take 1 Tab by mouth every five (5) minutes as needed for Chest Pain. Sit/Lay down then put one tab under the tongue every 5 minutes as needed for chest pain for 3 doses 
  
  
 
_______________________________ Scribe Attestation Cuate Rojo acting as a scribe for and in the presence of Della Romano MD     
February 26, 2019 at 1:31 AM 
    
Provider Attestation:     
I personally performed the services described in the documentation, reviewed the documentation, as recorded by the scribe in my presence, and it accurately and completely records my words and actions. February 26, 2019 at 1:31 AM - Della Romano MD   
 
 
_______________________________

## 2020-08-06 ENCOUNTER — HOSPITAL ENCOUNTER (EMERGENCY)
Age: 70
Discharge: HOME | End: 2020-08-06
Attending: EMERGENCY MEDICINE
Payer: MEDICAID

## 2020-08-06 VITALS
HEIGHT: 60 IN | BODY MASS INDEX: 28.47 KG/M2 | SYSTOLIC BLOOD PRESSURE: 132 MMHG | RESPIRATION RATE: 16 BRPM | HEART RATE: 76 BPM | WEIGHT: 145 LBS | OXYGEN SATURATION: 100 % | DIASTOLIC BLOOD PRESSURE: 87 MMHG | TEMPERATURE: 98.4 F

## 2020-08-06 DIAGNOSIS — G89.29 CHRONIC DENTAL PAIN: ICD-10-CM

## 2020-08-06 DIAGNOSIS — M79.10 MUSCLE PAIN: ICD-10-CM

## 2020-08-06 DIAGNOSIS — K08.9 CHRONIC DENTAL PAIN: ICD-10-CM

## 2020-08-06 DIAGNOSIS — R07.81 RIB PAIN: Primary | ICD-10-CM

## 2020-08-06 PROCEDURE — 99282 EMERGENCY DEPT VISIT SF MDM: CPT

## 2020-08-06 RX ORDER — CYCLOBENZAPRINE HCL 10 MG
10 TABLET ORAL
Qty: 7 TAB | Refills: 0 | Status: SHIPPED | OUTPATIENT
Start: 2020-08-06 | End: 2020-08-13

## 2020-08-06 RX ORDER — PENICILLIN V POTASSIUM 500 MG/1
500 TABLET, FILM COATED ORAL 4 TIMES DAILY
Qty: 28 TAB | Refills: 0 | Status: SHIPPED | OUTPATIENT
Start: 2020-08-06 | End: 2020-08-13

## 2020-08-06 RX ORDER — LIDOCAINE 4 G/100G
PATCH TOPICAL
Qty: 10 PATCH | Refills: 0 | Status: SHIPPED | OUTPATIENT
Start: 2020-08-06 | End: 2021-10-27

## 2020-08-06 RX ORDER — ACETAMINOPHEN 500 MG
500 TABLET ORAL
Qty: 20 TAB | Refills: 0 | Status: ON HOLD | OUTPATIENT
Start: 2020-08-06 | End: 2021-11-08

## 2020-08-06 NOTE — ED PROVIDER NOTES
EMERGENCY DEPARTMENT HISTORY AND PHYSICAL EXAM    4:37 PM      Date: 8/6/2020  Patient Name: Sixto Greene    History of Presenting Illness     Chief Complaint   Patient presents with    Back Pain         History Provided By: Patient    Additional History (Context): Sixto Greene is a 72 y.o. female with History of chronic dental pain, CVA who presents with complaint of d gum pain and tenderness for the last week as well as pain on her right ribs. Patient reports she was moving a sofa 4 days ago and believes she pulled a muscle on her ribs. Patient reports the pain is worse with movement from side side to side. Patient has not taken anything for her symptoms. Patient reports she has a dentist appointment next month. She denies fevers, nausea, vomiting, chest pain, abdominal pain, headaches, chills, extremity weakness. Patient denies incontinence of bowel or bladder. Patient denies drug use. PCP: None    Current Outpatient Medications   Medication Sig Dispense Refill    penicillin v potassium (VEETID) 500 mg tablet Take 1 Tab by mouth four (4) times daily for 7 days. 28 Tab 0    aluminum-magnesium hydroxide 200-200 mg/5 mL susp 5 mL, diphenhydrAMINE 12.5 mg/5 mL liqd 12.5 mg, lidocaine 2 % soln 5 mL 5 mL by Swish and Spit route two (2) times a day. Magic mouth wash   Maalox  Lidocaine 2% viscous   Diphenhydramine oral solution     Pharmacy to mix equal portions of ingredients to a total volume as indicated in the dispense amount. 200 mL 0    cyclobenzaprine (FLEXERIL) 10 mg tablet Take 1 Tab by mouth nightly for 7 days. 7 Tab 0    lidocaine 4 % patch Apply to affected area for 12 hours daily. 10 Patch 0    acetaminophen (Tylenol Extra Strength) 500 mg tablet Take 1 Tab by mouth every six (6) hours as needed for Pain.  20 Tab 0    permethrin (NIX) 1 % lotion Apply to entire body below the chin leave on for 10 hours and then wash off 2 Bottle 0    predniSONE (STERAPRED) 5 mg dose pack See administration instruction per 5mg dose pack 21 Tab 0    Camphor-Eucalyptus Oil-Menthol (VICKS VAPORUB) 4.8-1.2-2.6 % oint 1 Actuation(s) by Apply Externally route three (3) times daily as needed. 50 g 0    fluticasone (FLONASE) 50 mcg/actuation nasal spray 2 Sprays by Both Nostrils route daily. 1 Bottle 0    nitroglycerin (NITROSTAT) 0.4 mg SL tablet Take 1 Tab by mouth every five (5) minutes as needed for Chest Pain. Sit/Lay down then put one tab under the tongue every 5 minutes as needed for chest pain for 3 doses 1 Bottle 0    naproxen (NAPROSYN) 500 mg tablet Take 1 Tab by mouth two (2) times daily (with meals). 12 Tab 0       Past History     Past Medical History:  Past Medical History:   Diagnosis Date    Angina at rest [facility])     Anxiety     CVA, old, facial weakness 2012    left ptosis    Dental caries        Past Surgical History:  No past surgical history on file. Family History:  No family history on file. Social History:  Social History     Tobacco Use    Smoking status: Former Smoker     Years: 15.00    Smokeless tobacco: Never Used   Substance Use Topics    Alcohol use: No    Drug use: No     Comment: none in 2 years       Allergies: Allergies   Allergen Reactions    Aspirin Nausea Only         Review of Systems       Review of Systems   Constitutional: Negative for chills and fever. HENT: Positive for dental problem. Respiratory: Negative for shortness of breath. Cardiovascular: Negative for chest pain. Gastrointestinal: Negative for abdominal pain, nausea and vomiting. Musculoskeletal:        Right rib pain   Skin: Negative for rash. Neurological: Negative for weakness. All other systems reviewed and are negative.         Physical Exam     Visit Vitals  /87 (BP 1 Location: Left arm, BP Patient Position: At rest)   Pulse 76   Temp 98.4 °F (36.9 °C)   Resp 16   Ht 5' (1.524 m)   Wt 65.8 kg (145 lb)   SpO2 100%   BMI 28.32 kg/m²         Physical Exam  Vitals signs reviewed. Constitutional:       General: She is not in acute distress. Appearance: Normal appearance. She is well-developed. She is not ill-appearing or toxic-appearing. HENT:      Head: Normocephalic and atraumatic. Mouth/Throat:      Dentition: Abnormal dentition. Dental tenderness and gingival swelling present. Comments: Patient has no teeth. Generalized gum swelling. Patient reports tenderness on upper gums. No obvious abscess. Eyes:      Conjunctiva/sclera: Conjunctivae normal.      Pupils: Pupils are equal, round, and reactive to light. Neck:      Musculoskeletal: Normal range of motion. Trachea: Trachea normal.   Cardiovascular:      Rate and Rhythm: Normal rate and regular rhythm. Pulmonary:      Effort: Pulmonary effort is normal.      Breath sounds: Normal breath sounds. Chest:      Chest wall: Tenderness present. Abdominal:      General: Bowel sounds are normal. There is no distension or abdominal bruit. Palpations: Abdomen is soft. Abdomen is not rigid. There is no shifting dullness, fluid wave, mass or pulsatile mass. Tenderness: There is no abdominal tenderness. There is no guarding. Negative signs include Eagle's sign and McBurney's sign. Comments: No abdominal tenderness on exam.  No pulsatile masses. Musculoskeletal:      Cervical back: Normal.      Thoracic back: Normal.      Lumbar back: Normal.   Neurological:      General: No focal deficit present. Mental Status: She is alert and oriented to person, place, and time. Mental status is at baseline. Diagnostic Study Results     Labs -  No results found for this or any previous visit (from the past 12 hour(s)). Radiologic Studies -   No orders to display         Medical Decision Making   I am the first provider for this patient. I reviewed the vital signs, available nursing notes, past medical history, past surgical history, family history and social history.     Vital Signs-Reviewed the patient's vital signs. Records Reviewed: Nursing Notes and Old Medical Records (Time of Review: 4:37 PM)    ED Course: Progress Notes, Reevaluation, and Consults:       Provider Notes (Medical Decision Making):   72 y.o. female with History of chronic dental pain, CVA who presents with complaint of d gum pain and tenderness for the last week as well as pain on her right ribs. Patient reports she was moving a sofa 4 days ago and believes she pulled a muscle on her ribs. Patient reports the pain is worse with movement from side side to side. Patient has not taken anything for her symptoms. Patient reports she has a dentist appointment next month. She denies fevers, nausea, vomiting, chest pain, abdominal pain, headaches, chills, extremity weakness. Patient in no acute distress during visit. On physical exam patient had tenderness on palpation of right anterior thoracic wall, on the rib cage. No bruising. Pain reproduced with palpation and movement. Patient has no teeth. Generalized, swelling. Patient reports tenderness on upper gums that is worse on bottom. Patient given antibiotics due to the gingival swelling and tenderness as well as a Magic mouthwash. Patient to follow-up with her dentist.  Suspect patient chest wall pain is due to her moving the couch. Patient given muscle relaxants and lidocaine patch. Patient to follow-up with her PCP. Patient gastric return precautions if symptoms worsen. I do not suspect MI at this time. Patient's pain is reproducible with palpation and movement. Patient denies any chest pain or shortness of breath. Denies difficulty breathing. No back midline tenderness. Diagnosis     Clinical Impression:   1. Rib pain    2. Muscle pain    3.  Chronic dental pain        Disposition: home     Follow-up Information     Follow up With Specialties Details Why [address] [facility] EMERGENCY DEPT Emergency Medicine  If symptoms worsen [address] [place] [address]  [address]  Schedule an appointment as soon as possible for a visit in 1 day  [address]  [address]           Discharge Medication List as of 8/6/2020  4:05 PM      START taking these medications    Details   penicillin v potassium (VEETID) 500 mg tablet Take 1 Tab by mouth four (4) times daily for 7 days. , Normal, Disp-28 Tab,R-0      aluminum-magnesium hydroxide 200-200 mg/5 mL susp 5 mL, diphenhydrAMINE 12.5 mg/5 mL liqd 12.5 mg, lidocaine 2 % soln 5 mL 5 mL by Swish and Spit route two (2) times a day. Magic mouth wash   Maalox  Lidocaine 2% viscous   Diphenhydramine oral solution     Pharmacy to mix equal portions of ingredients to a total volume as indicated in the dispense amount. , Print, Disp-200 mL ,R-0      cyclobenzaprine (FLEXERIL) 10 mg tablet Take 1 Tab by mouth nightly for 7 days. , Normal, Disp-7 Tab,R-0      lidocaine 4 % patch Apply to affected area for 12 hours daily. , Normal, Disp-10 Patch,R-0      acetaminophen (Tylenol Extra Strength) 500 mg tablet Take 1 Tab by mouth every six (6) hours as needed for Pain., Normal, Disp-20 Tab,R-0         CONTINUE these medications which have NOT CHANGED    Details   permethrin (NIX) 1 % lotion Apply to entire body below the chin leave on for 10 hours and then wash off, Print, Disp-2 Bottle, R-0      predniSONE (STERAPRED) 5 mg dose pack See administration instruction per 5mg dose pack, Print, Disp-21 Tab, R-0      Camphor-Eucalyptus Oil-Menthol (VICKS VAPORUB) 4.8-1.2-2.6 % oint 1 Actuation(s) by Apply Externally route three (3) times daily as needed. , Print, Disp-50 g, R-0      fluticasone (FLONASE) 50 mcg/actuation nasal spray 2 Sprays by Both Nostrils route daily. , Print, Disp-1 Bottle, R-0      nitroglycerin (NITROSTAT) 0.4 mg SL tablet Take 1 Tab by mouth every five (5) minutes as needed for Chest Pain.  Sit/Lay down then put one tab under the tongue every 5 minutes as needed for chest pain for 3 doses, Print, Disp-1 Bottle, R-0      naproxen (NAPROSYN) 500 mg tablet Take 1 Tab by mouth two (2) times daily (with meals). , Print, Disp-12 Tab, R-0         STOP taking these medications       methocarbamol (ROBAXIN) 500 mg tablet Comments:   Reason for Stopping:               Dictation disclaimer:  Please note that this dictation was completed with [employer], the computer voice recognition software. Quite often unanticipated grammatical, syntax, homophones, and other interpretive errors are inadvertently transcribed by the computer software. Please disregard these errors. Please excuse any errors that have escaped final proofreading.

## 2020-08-06 NOTE — DISCHARGE INSTRUCTIONS
Patient Education        Musculoskeletal Chest Pain: Care Instructions  Your Care Instructions     Chest pain is not always a sign that something is wrong with your heart or that you have another serious problem. The doctor thinks your chest pain is caused by strained muscles or ligaments, inflamed chest cartilage, or another problem in your chest, rather than by your heart. You may need more tests to find the cause of your chest pain. Follow-up care is a key part of your treatment and safety. Be sure to make and go to all appointments, and call your doctor if you are having problems. It's also a good idea to know your test results and keep a list of the medicines you take. How can you care for yourself at home? · Take pain medicines exactly as directed. ? If the doctor gave you a prescription medicine for pain, take it as prescribed. ? If you are not taking a prescription pain medicine, ask your doctor if you can take an over-the-counter medicine. · Rest and protect the sore area. · Stop, change, or take a break from any activity that may be causing your pain or soreness. · Put ice or a cold pack on the sore area for 10 to 20 minutes at a time. Try to do this every 1 to 2 hours for the next 3 days (when you are awake) or until the swelling goes down. Put a thin cloth between the ice and your skin. · After 2 or 3 days, apply a heating pad set on low or a warm cloth to the area that hurts. Some doctors suggest that you go back and forth between hot and cold. · Do not wrap or tape your ribs for support. This may cause you to take smaller breaths, which could increase your risk of lung problems. · Mentholated creams such as Bengay or Icy Hot may soothe sore muscles. Follow the instructions on the package. · Follow your doctor's instructions for exercising. · Gentle stretching and massage may help you get better faster.  Stretch slowly to the point just before pain begins, and hold the stretch for at least 15 to 30 seconds. Do this 3 or 4 times a day. Stretch just after you have applied heat. · As your pain gets better, slowly return to your normal activities. Any increased pain may be a sign that you need to rest a while longer. When should you call for help? [id number] anytime you think you may need emergency care. For example, call if:  · You have chest pain or pressure. This may occur with:  ? Sweating. ? Shortness of breath. ? Nausea or vomiting. ? Pain that spreads from the chest to the neck, jaw, or one or both shoulders or arms. ? Dizziness or lightheadedness. ? A fast or uneven pulse. After calling 911, chew 1 adult-strength aspirin. Wait for an ambulance. Do not try to drive yourself. · You have sudden chest pain and shortness of breath, or you cough up blood. Call your doctor now or seek immediate medical care if:  · You have any trouble breathing. · Your chest pain gets worse. · Your chest pain occurs consistently with exercise and is relieved by rest.  Watch closely for changes in your health, and be sure to contact your doctor if:  · Your chest pain does not get better after 1 week. Where can you learn more? Go to http://www.alva.com/  Enter V293 in the search box to learn more about \"Musculoskeletal Chest Pain: Care Instructions. \"  Current as of: June 26, 2019               Content Version: 12.5  © 9637-3747 [employer]. Care instructions adapted under license by [employer] (which disclaims liability or warranty for this information). If you have questions about a medical condition or this instruction, always ask your healthcare professional. [place] [address] any warranty or liability for your use of this information. Patient Education     Dental Pain: After Your Visit  Your Care Instructions  The most common cause of dental pain is tooth decay.  It can also be caused by an infection of the tooth (abscess) or gum, a tooth that has not broken all the way through the gum (impacted tooth), or a problem with the nerve-filled center of the tooth. Follow-up care is a key part of your treatment and safety. Be sure to make and go to all appointments, and call your doctor if you are having problems. Its also a good idea to know your test results and keep a list of the medicines you take. How can you care for yourself at home? · Contact a dentist for follow-up care. · Put ice or a cold pack on the outside of your mouth for 10 to 20 minutes at a time to reduce pain and swelling. Put a thin cloth between the ice and your skin. · Take an over-the-counter pain medicine, such as acetaminophen (Tylenol), ibuprofen (Advil, Motrin), or naproxen (Aleve). Read and follow all instructions on the label. · Do not take two or more pain medicines at the same time unless the doctor told you to. Many pain medicines have acetaminophen, which is Tylenol. Too much acetaminophen (Tylenol) can be harmful. · Rinse your mouth with warm salt water every 2 hours to help relieve pain and swelling from an infected tooth. Mix 1 teaspoon of salt in 8 ounces of water. · If your doctor prescribed antibiotics, take them as directed. Do not stop taking them just because you feel better. You need to take the full course of antibiotics. When should you call for help? Call your doctor now or seek immediate medical care if:  · You have signs of infection, such as:  ¨ Increased pain, swelling, warmth, or redness. ¨ Pus draining from the gum, tooth, or face. ¨ A fever. Watch closely for changes in your health, and be sure to contact your doctor if:  · You do not get better as expected. Where can you learn more? Go to Frequency.be  Enter V264 in the search box to learn more about \"Dental Pain: After Your Visit. \"   © 3883-6418 [employer].  Care instructions adapted under license by [employer] (which disclaims liability or warranty for this information). This care instruction is for use with your licensed healthcare professional. If you have questions about a medical condition or this instruction, always ask your healthcare professional. [address] any warranty or liability for your use of this information. Content Version: 51.6.207857; Last Revised: May 17, 2013                 Patient Education        Tooth and Gum Pain: Care Instructions  Your Care Instructions    The most common causes of dental pain are tooth decay and gum disease. Pain can also be caused by an infection of the tooth (abscess) or the gums. Or you may have pain from a broken or cracked tooth. Other causes of pain include infection and damage to a tooth from nervous grinding of your teeth. A wisdom tooth can be painful when it is coming in but cannot break through the gum. It can also be painful when the tooth is only partway in and extra gum tissue has formed around it. The tissue can get inflamed (pericoronitis), and sometimes it gets infected. Prompt dental care can help find the cause of your toothache and keep the tooth from dying or gum disease from getting worse. Self-care at home may reduce your pain and discomfort. Follow-up care is a key part of your treatment and safety. Be sure to make and go to all appointments, and call your dentist or doctor if you are having problems. It's also a good idea to know your test results and keep a list of the medicines you take. How can you care for yourself at home? · To reduce pain and facial swelling, put an ice or cold pack on the outside of your cheek for 10 to 20 minutes at a time. Put a thin cloth between the ice and your skin. Do not use heat. · If your doctor prescribed antibiotics, take them as directed. Do not stop taking them just because you feel better. You need to take the full course of antibiotics.   · Ask your doctor if you can take an over-the-counter pain medicine, such as acetaminophen (Tylenol), ibuprofen (Advil, Motrin), or naproxen (Aleve). Be safe with medicines. Read and follow all instructions on the label. · Avoid very hot, cold, or sweet foods and drinks if they increase your pain. · Rinse your mouth with warm salt water every 2 hours to help relieve pain and swelling. Mix 1 teaspoon of salt in 8 ounces of water. · Talk to your dentist about using special toothpaste for sensitive teeth. To reduce pain on contact with heat or cold or when brushing, brush with this toothpaste regularly or rub a small amount of the paste on the sensitive area with a clean finger 2 or 3 times a day. Floss gently between your teeth. · Do not smoke or use spit tobacco. Tobacco use can make gum problems worse, decreases your ability to fight infection in your gums, and delays healing. If you need help quitting, talk to your doctor about stop-smoking programs and medicines. These can increase your chances of quitting for good. When should you call for help? [id number] anytime you think you may need emergency care. For example, call if:  · You have trouble breathing. Call your dentist or doctor now or seek immediate medical care if:  · You have signs of infection, such as:  ? Increased pain, swelling, warmth, or redness. ? Red streaks leading from the area. ? Pus draining from the area. ? A fever. Watch closely for changes in your health, and be sure to contact your doctor if:  · You do not get better as expected. Where can you learn more? Go to http://hussein-nickolas.info/  Enter H417 in the search box to learn more about \"Tooth and Gum Pain: Care Instructions. \"  Current as of: March 25, 2020               Content Version: 12.5  © 6733-3953 [employer]. Care instructions adapted under license by [employer] (which disclaims liability or warranty for this information).  If you have questions about a medical condition or this instruction, always ask your healthcare professional. [place] [address] any warranty or liability for your use of this information. Take antibiotic as prescribed for gum pain and use mouthwash. Apply lidocaine patch to ribs for comfort. Take muscle relaxant. Follow-up with one of the provided dental clinics below:    [facility] [address] [phone]  [place] [address] [phone]    Call to schedule an appointment.

## 2020-10-26 ENCOUNTER — HOSPITAL ENCOUNTER (EMERGENCY)
Age: 70
Discharge: HOME | End: 2020-10-26
Attending: EMERGENCY MEDICINE
Payer: MEDICAID

## 2020-10-26 VITALS
HEIGHT: 59 IN | WEIGHT: 160 LBS | RESPIRATION RATE: 16 BRPM | OXYGEN SATURATION: 99 % | TEMPERATURE: 98 F | HEART RATE: 79 BPM | DIASTOLIC BLOOD PRESSURE: 89 MMHG | BODY MASS INDEX: 32.25 KG/M2 | SYSTOLIC BLOOD PRESSURE: 140 MMHG

## 2020-10-26 DIAGNOSIS — K05.10 GINGIVITIS: Primary | ICD-10-CM

## 2020-10-26 PROCEDURE — 99281 EMR DPT VST MAYX REQ PHY/QHP: CPT

## 2020-10-26 RX ORDER — AMOXICILLIN 500 MG/1
500 TABLET, FILM COATED ORAL 3 TIMES DAILY
Qty: 30 TAB | Refills: 0 | Status: SHIPPED | OUTPATIENT
Start: 2020-10-26 | End: 2020-11-05

## 2020-10-26 RX ORDER — CHLORHEXIDINE GLUCONATE 1.2 MG/ML
15 RINSE ORAL EVERY 12 HOURS
Qty: 300 ML | Refills: 0 | Status: SHIPPED | OUTPATIENT
Start: 2020-10-26 | End: 2020-11-05

## 2020-10-26 NOTE — ED TRIAGE NOTES
Pt says she has an abcess in her mouth. She said she noticed it yesterday but it started hurting today.

## 2020-10-26 NOTE — DISCHARGE INSTRUCTIONS
Patient Education        Periodontal Conditions: Care Instructions  Your Care Instructions    Periodontal conditions affect the gums, bone, and tissue that surround and support the teeth. The most common problems are caused by plaque. Plaque is a thin film of bacteria that sticks to teeth above and below the gum line. It can build up and harden into tartar. The bacteria in plaque and tartar can cause gum disease. Gingivitis is a disease that affects the gums (gingiva). The gums are the soft tissue that surrounds the teeth. Gingivitis causes red, swollen, tender gums that bleed easily when brushed, persistent bad breath, and sensitive teeth. Because it is not painful, many people do not get treatment when they should. Gingivitis can be reversed with good dental care. Periodontitis is a more advanced disease that affects more than the gums. The gums pull away from the teeth. This leaves deep pockets where bacteria can grow. The disease can damage the bones that support the teeth. The teeth may get loose and fall out. A periodontal condition should be treated as soon as it is found. Finding gum problems early, treating them right away, and having regular checkups bring the best results. You can treat mild periodontal conditions by brushing and flossing your teeth every day. Your dentist may prescribe a mouthwash to kill the bacteria that can damage teeth and gums. Your dentist may have you take antibiotics to treat infection from moderate periodontal disease. If your gums have pulled away from your teeth, you may need cleaning between the teeth and gums right down to the teeth roots. This is called root planing and scaling. If you have severe periodontal disease, you may need surgery to remove diseased gum tissue or repair bone damage. Follow-up care is a key part of your treatment and safety. Be sure to make and go to all appointments, and call your dentist if you are having problems.  It's also a good idea to know your test results and keep a list of the medicines you take. How can you care for yourself at home? · If your dentist prescribed antibiotics, take them as directed. Do not stop taking them just because you feel better. You need to take the full course of antibiotics. · Brush your teeth twice a day, in the morning and at night. ? Use a toothbrush with soft, rounded-end bristles and a head that is small enough to reach all parts of your teeth and mouth. Replace your toothbrush every 3 to 4 months. ? Use a fluoride toothpaste. ? Place the brush at a 45-degree angle where the teeth meet the gums. Press firmly, and gently rock the brush back and forth using small circular movements. ? Brush chewing surfaces vigorously with short back-and-forth strokes. ? Brush your tongue from back to front. · Floss at least once a day. Choose the type and flavor that you like best.  · Have your teeth cleaned by a professional at least twice a year. · Ask your dentist about using an antibacterial mouthwash to help reduce bacteria. · Rinse your mouth with water or chew sugar-free gum after meals if you can't brush your teeth. · Do not smoke or use smokeless tobacco. Tobacco use can cause periodontal disease. When should you call for help? Call your dentist now or seek immediate medical care if:    · You have symptoms of infection, such as:  ? Increased pain, swelling, warmth, or redness. ? Red streaks leading from the area. ? Pus draining from the area. ? A fever. Watch closely for changes in your health, and be sure to contact your dentist if:    · You have new or worse tooth pain.     · You do not get better as expected. Where can you learn more? Go to http://www.gray.com/  Enter Z920 in the search box to learn more about \"Periodontal Conditions: Care Instructions. \"  Current as of: March 25, 2020               Content Version: 12.6  © 4682-7299 [employer].    Care instructions adapted under license by [employer] (which disclaims liability or warranty for this information). If you have questions about a medical condition or this instruction, always ask your healthcare professional. [address] any warranty or liability for your use of this information.

## 2020-10-26 NOTE — ED PROVIDER NOTES
EMERGENCY DEPARTMENT HISTORY AND PHYSICAL EXAM    3:06 PM      Date: (Not on file)  Patient Name: Kendall Murray    History of Presenting Illness     No chief complaint on file. History Provided By: Patient    Additional History (Context): Kendall Murray is a 72 y.o. female with past medical history significant for anxiety, and old CVA who presents with left upper gum pain and swelling since today. Patient states she was chewing on a pork chop when she started having pain. She admits to having no teeth and finds it difficult to chew meats. She denies any oral drainage, trouble swallowing, sore throat, fever, chills, or facial swelling. She is concerned she may have developed an abscess. Pain is moderate and throbbing in nature and constant. PCP: None    Current Outpatient Medications   Medication Sig Dispense Refill    chlorhexidine (Peridex) 0.12 % solution 15 mL by Swish and Spit route every twelve (12) hours for 10 days. 300 mL 0    amoxicillin 500 mg tab Take 500 mg by mouth three (3) times daily for 10 days. 30 Tab 0    aluminum-magnesium hydroxide 200-200 mg/5 mL susp 5 mL, diphenhydrAMINE 12.5 mg/5 mL liqd 12.5 mg, lidocaine 2 % soln 5 mL 5 mL by Swish and Spit route two (2) times a day. Magic mouth wash   Maalox  Lidocaine 2% viscous   Diphenhydramine oral solution     Pharmacy to mix equal portions of ingredients to a total volume as indicated in the dispense amount. 200 mL 0    lidocaine 4 % patch Apply to affected area for 12 hours daily. 10 Patch 0    acetaminophen (Tylenol Extra Strength) 500 mg tablet Take 1 Tab by mouth every six (6) hours as needed for Pain.  20 Tab 0    permethrin (NIX) 1 % lotion Apply to entire body below the chin leave on for 10 hours and then wash off 2 Bottle 0    predniSONE (STERAPRED) 5 mg dose pack See administration instruction per 5mg dose pack 21 Tab 0    Camphor-Eucalyptus Oil-Menthol (VICKS VAPORUB) 4.8-1.2-2.6 % oint 1 Actuation(s) by Apply Externally route three (3) times daily as needed. 50 g 0    fluticasone (FLONASE) 50 mcg/actuation nasal spray 2 Sprays by Both Nostrils route daily. 1 Bottle 0    nitroglycerin (NITROSTAT) 0.4 mg SL tablet Take 1 Tab by mouth every five (5) minutes as needed for Chest Pain. Sit/Lay down then put one tab under the tongue every 5 minutes as needed for chest pain for 3 doses 1 Bottle 0    naproxen (NAPROSYN) 500 mg tablet Take 1 Tab by mouth two (2) times daily (with meals). 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 Family History:  No family history on file. Social History:  Social History     Tobacco Use    Smoking status: Former Smoker     Years: 15.00    Smokeless tobacco: Never Used   Substance Use Topics    Alcohol use: No    Drug use: No     Comment: none in 2 years       Allergies: Allergies   Allergen Reactions    Aspirin Nausea Only         Review of Systems       Review of Systems   Constitutional: Negative. Negative for chills and fever. HENT: Positive for dental problem. Negative for congestion, drooling, ear discharge, ear pain, facial swelling, hearing loss, mouth sores, nosebleeds, postnasal drip, rhinorrhea, sinus pressure, sinus pain, sneezing, sore throat, tinnitus, trouble swallowing and voice change. Eyes: Negative. Negative for pain and redness. Respiratory: Negative. Negative for cough and shortness of breath. Cardiovascular: Negative. Negative for chest pain, palpitations and leg swelling. Gastrointestinal: Negative. Negative for abdominal pain, constipation, diarrhea, nausea and vomiting. Genitourinary: Negative. Negative for dysuria, frequency, hematuria and urgency. Musculoskeletal: Negative.   Negative for back pain, gait problem, joint swelling and neck pain. Skin: Negative. Negative for rash and wound. Neurological: Negative. Negative for dizziness, seizures, speech difficulty, weakness, light-headedness and headaches. Hematological: Negative for adenopathy. Does not bruise/bleed easily. All other systems reviewed and are negative. Physical Exam   There were no vitals taken for this visit. Physical Exam  Vitals signs and nursing note reviewed. Constitutional:       General: She is not in acute distress. Appearance: Normal appearance. She is not toxic-appearing. HENT:      Head: Normocephalic and atraumatic. Jaw: There is normal jaw occlusion. No trismus, tenderness, swelling or pain on movement. Salivary Glands: Right salivary gland is not diffusely enlarged or tender. Left salivary gland is not diffusely enlarged or tender. Right Ear: Hearing, tympanic membrane, ear canal and external ear normal. There is no impacted cerumen. Left Ear: Hearing, tympanic membrane, ear canal and external ear normal. There is no impacted cerumen. Nose: Nose normal. No congestion or rhinorrhea. Right Sinus: No maxillary sinus tenderness or frontal sinus tenderness. Left Sinus: No maxillary sinus tenderness or frontal sinus tenderness. Mouth/Throat:      Mouth: Mucous membranes are moist.      Dentition: Abnormal dentition. Does not have dentures. Dental tenderness and gingival swelling (And erythema) present. No dental caries, dental abscesses or gum lesions. Tongue: No lesions. Tongue does not deviate from midline. Palate: No mass and lesions. Pharynx: Oropharynx is clear. Uvula midline. No pharyngeal swelling, oropharyngeal exudate, posterior oropharyngeal erythema or uvula swelling. Tonsils: No tonsillar exudate or tonsillar abscesses. 0 on the right. 0 on the left. Comments: Tenderness along the alveolar ridge of the right mandibular region.   Erythema noted over the gums with no abscess. All of the teeth are absent throughout the mouth. Eyes:      General: No scleral icterus. Right eye: No discharge. Left eye: No discharge. Conjunctiva/sclera: Conjunctivae normal.   Neck:      Musculoskeletal: Normal range of motion and neck supple. No edema, erythema or muscular tenderness. Cardiovascular:      Rate and Rhythm: Normal rate and regular rhythm. Pulses: Normal pulses. Heart sounds: Normal heart sounds. No murmur. No friction rub. No gallop. Pulmonary:      Effort: Pulmonary effort is normal. No respiratory distress. Breath sounds: Normal breath sounds. No wheezing or rales. Lymphadenopathy:      Cervical: No cervical adenopathy. Skin:     General: Skin is warm and dry. Capillary Refill: Capillary refill takes less than 2 seconds. Neurological:      General: No focal deficit present. Mental Status: She is alert. Psychiatric:         Mood and Affect: Mood normal.         Behavior: Behavior normal.           Diagnostic Study Results     Labs -  No results found for this or any previous visit (from the past 12 hour(s)). Radiologic Studies -   No orders to display         Medical Decision Making   I am the first provider for this patient. I reviewed available nursing notes, past medical history, past surgical history, family history and social history. Vital Signs-Reviewed the patient's vital signs. Records Reviewed: Nursing Notes and Old Medical Records (Time of Review: 3:06 PM)      ED Course: Progress Notes, Reevaluation, and Consults:  3:06 PM  Initial assessment performed. The patients presenting problems have been discussed, and they/their family are in agreement with the care plan formulated and outlined with them. I have encouraged them to ask questions as they arise throughout their visit.     Provider Notes (Medical Decision Making):     Differential diagnosis includes: Periapical abscess, pulpitis, odontogenic infection, sinusitis, trauma, alveolar osteitis, necrotizing gingivitis, retropharyngeal abscess, peritonsillar abscess. Patient presents ambulatory, no acute distress. Patient is well-hydrated and nontoxic in appearance. Exam reveals erythema and swelling to the gums in the right maxillary region. No teeth are present. There is appropriate tenderness on palpation. No localized induration or fluctuance to suggest drainable abscess. No sublingual/submandibular induration, trismus, or stridor. Normal speech. Handling oral secretions without difficulty. Patient has full range of motion of the neck. Low suspicion for Neo's angina or deep space infection. Will discharge home with antibiotics and medication for pain. Patient is advised to follow-up with a dentist/oral surgeon to further manage this condition. Diagnosis     Clinical Impression:   1. Gingivitis        Disposition: Discharged home in stable condition    DISCHARGE NOTE:     Patient has been reexamined. Patient has no new complaints, changes, or physical findings. Care plan outlined and precautions discussed. Results of physical exam findings were reviewed with the patienty. All medications were reviewed with the patient; will discharge home with Peridex oral solution and amoxicillin. All of patient's questions and concerns were addressed. Patient was instructed and agrees to follow up with dentist/oral surgeon, as well as to return to the ED upon further deterioration. Patient is ready to go home.     Follow-up Information     Follow up With Specialties Details Why Contact Info    [address]  Schedule an appointment as soon as possible for a visit Follow-up from the Emergency Department Penny Austin [phone] [facility] EMERGENCY DEPT Emergency Medicine  As needed, If symptoms worsen [address] [facility] [id number]  [phone]           Current Discharge Medication List START taking these medications    Details   chlorhexidine (Peridex) 0.12 % solution 15 mL by Swish and Spit route every twelve (12) hours for 10 days. Qty: 300 mL, Refills: 0      amoxicillin 500 mg tab Take 500 mg by mouth three (3) times daily for 10 days. Qty: 30 Tab, Refills: 0         CONTINUE these medications which have NOT CHANGED    Details   aluminum-magnesium hydroxide 200-200 mg/5 mL susp 5 mL, diphenhydrAMINE 12.5 mg/5 mL liqd 12.5 mg, lidocaine 2 % soln 5 mL 5 mL by Swish and Spit route two (2) times a day. Magic mouth wash   Maalox  Lidocaine 2% viscous   Diphenhydramine oral solution     Pharmacy to mix equal portions of ingredients to a total volume as indicated in the dispense amount. Qty: 200 mL, Refills: 0      lidocaine 4 % patch Apply to affected area for 12 hours daily. Qty: 10 Patch, Refills: 0      acetaminophen (Tylenol Extra Strength) 500 mg tablet Take 1 Tab by mouth every six (6) hours as needed for Pain. Qty: 20 Tab, Refills: 0      permethrin (NIX) 1 % lotion Apply to entire body below the chin leave on for 10 hours and then wash off  Qty: 2 Bottle, Refills: 0      predniSONE (STERAPRED) 5 mg dose pack See administration instruction per 5mg dose pack  Qty: 21 Tab, Refills: 0      Camphor-Eucalyptus Oil-Menthol (VICKS VAPORUB) 4.8-1.2-2.6 % oint 1 Actuation(s) by Apply Externally route three (3) times daily as needed. Qty: 50 g, Refills: 0      fluticasone (FLONASE) 50 mcg/actuation nasal spray 2 Sprays by Both Nostrils route daily. Qty: 1 Bottle, Refills: 0      nitroglycerin (NITROSTAT) 0.4 mg SL tablet Take 1 Tab by mouth every five (5) minutes as needed for Chest Pain. Sit/Lay down then put one tab under the tongue every 5 minutes as needed for chest pain for 3 doses  Qty: 1 Bottle, Refills: 0      naproxen (NAPROSYN) 500 mg tablet Take 1 Tab by mouth two (2) times daily (with meals).   Qty: 12 Tab, Refills: 0               Dictation disclaimer:  Please note that this dictation was completed with [employer], the [employer] voice recognition software. Quite often unanticipated grammatical, syntax, homophones, and other interpretive errors are inadvertently transcribed by the computer software. Please disregard these errors. Please excuse any errors that have escaped final proofreading.

## 2020-12-03 ENCOUNTER — HOSPITAL ENCOUNTER (EMERGENCY)
Age: 70
Discharge: HOME | End: 2020-12-03
Attending: EMERGENCY MEDICINE
Payer: MEDICAID

## 2020-12-03 VITALS
RESPIRATION RATE: 18 BRPM | DIASTOLIC BLOOD PRESSURE: 88 MMHG | HEART RATE: 77 BPM | OXYGEN SATURATION: 97 % | TEMPERATURE: 98.6 F | SYSTOLIC BLOOD PRESSURE: 135 MMHG

## 2020-12-03 DIAGNOSIS — K08.89 DENTALGIA: ICD-10-CM

## 2020-12-03 DIAGNOSIS — K03.2 DENTAL EROSION EXTENDING INTO PULP: Primary | ICD-10-CM

## 2020-12-03 DIAGNOSIS — B86 SCABIES: ICD-10-CM

## 2020-12-03 PROCEDURE — 99281 EMR DPT VST MAYX REQ PHY/QHP: CPT

## 2020-12-03 RX ORDER — CLINDAMYCIN HYDROCHLORIDE 300 MG/1
300 CAPSULE ORAL 4 TIMES DAILY
Qty: 28 CAP | Refills: 0 | Status: SHIPPED | OUTPATIENT
Start: 2020-12-03 | End: 2020-12-10

## 2020-12-03 RX ORDER — HYDROCODONE BITARTRATE AND ACETAMINOPHEN 5; 325 MG/1; MG/1
1 TABLET ORAL
Qty: 10 TAB | Refills: 0 | Status: SHIPPED | OUTPATIENT
Start: 2020-12-03 | End: 2020-12-06

## 2020-12-03 RX ORDER — PERMETHRIN 50 MG/G
CREAM TOPICAL
Qty: 60 G | Refills: 0 | Status: SHIPPED | OUTPATIENT
Start: 2020-12-03 | End: 2021-01-02

## 2020-12-03 NOTE — ED PROVIDER NOTES
EMERGENCY DEPARTMENT HISTORY AND PHYSICAL EXAM    Date: 12/3/2020  Patient Name: Magda Avalos    History of Presenting Illness     Chief Complaint   Patient presents with    Gum Problem    Rash    Toe Pain         History Provided By:  Patient  Chief Complaint: Dental pain, gum swelling, rash and toe pain  Duration: Months, days   Timing: Worsening  Location: Left forearm and right great toe, right lower jaw  Quality: Aching and swelling  Severity: Moderate  Modifying Factors: None  Associated Symptoms: none       Additional History (Context): Magda Avalos is a 72 y.o. female with a history of angina, CVA, poor dentition who presents today for issues listed above. Patient's first complaint is dental pain. Patient states is been going on for multiple months. States she is scheduled to have the tooth pulled in February however was biting down on a pork chop recently which made the pain worse. Patient has not been trying anything for this at home. Denies any facial swelling or troubles opening her mouth. Denies any fevers or chills. Patient also reports a rash to the left forearm and upper arm which she states \"I know it scabies\". States she has had this rash before and it felt exactly the same. Patient also reports she has a \"corn\" to the right great toe that \"will not heal\". Does not have a podiatrist.  Has not tried thing for this at home. Denies history of diabetes. PCP: None    Current Outpatient Medications   Medication Sig Dispense Refill    clindamycin (CLEOCIN) 300 mg capsule Take 1 Cap by mouth four (4) times daily for 7 days. 28 Cap 0    permethrin (ACTICIN) 5 % topical cream Apply to entire body, let sit for 8 to 10 hours and repeat in 1 week 60 g 0    HYDROcodone-acetaminophen (Norco) 5-325 mg per tablet Take 1 Tab by mouth every six (6) hours as needed for Pain for up to 3 days. Max Daily Amount: 4 Tabs.  10 Tab 0    aluminum-magnesium hydroxide 200-200 mg/5 mL susp 5 mL, diphenhydrAMINE 12.5 mg/5 mL liqd 12.5 mg, lidocaine 2 % soln 5 mL 5 mL by Swish and Spit route two (2) times a day. Magic mouth wash   Maalox  Lidocaine 2% viscous   Diphenhydramine oral solution     Pharmacy to mix equal portions of ingredients to a total volume as indicated in the dispense amount. 200 mL 0    lidocaine 4 % patch Apply to affected area for 12 hours daily. 10 Patch 0    acetaminophen (Tylenol Extra Strength) 500 mg tablet Take 1 Tab by mouth every six (6) hours as needed for Pain. 20 Tab 0    permethrin (NIX) 1 % lotion Apply to entire body below the chin leave on for 10 hours and then wash off 2 Bottle 0    predniSONE (STERAPRED) 5 mg dose pack See administration instruction per 5mg dose pack 21 Tab 0    Camphor-Eucalyptus Oil-Menthol (VICKS VAPORUB) 4.8-1.2-2.6 % oint 1 Actuation(s) by Apply Externally route three (3) times daily as needed. 50 g 0    fluticasone (FLONASE) 50 mcg/actuation nasal spray 2 Sprays by Both Nostrils route daily. 1 Bottle 0    nitroglycerin (NITROSTAT) 0.4 mg SL tablet Take 1 Tab by mouth every five (5) minutes as needed for Chest Pain. Sit/Lay down then put one tab under the tongue every 5 minutes as needed for chest pain for 3 doses 1 Bottle 0    naproxen (NAPROSYN) 500 mg tablet Take 1 Tab by mouth two (2) times daily (with meals). 12 Tab 0       Past History     Past Medical History:  Past Medical History:   Diagnosis Date    [facility] rest [facility])     Anxiety     CVA, old, facial weakness 2012    left ptosis    Dental caries        Past Surgical History:  No past surgical history on file. Family History:  No family history on file. Social History:  Social History     Tobacco Use    Smoking status: Former Smoker     Years: 15.00    Smokeless tobacco: Never Used   Substance Use Topics    Alcohol use: No    Drug use: No     Comment: none in 2 years       Allergies:   Allergies   Allergen Reactions    Aspirin Nausea Only         Review of Systems Review of Systems   Constitutional: Negative for chills and fever. HENT: Positive for dental problem. Negative for congestion, rhinorrhea and sore throat. Respiratory: Negative for cough and shortness of breath. Cardiovascular: Negative for chest pain. Gastrointestinal: Negative for abdominal pain, blood in stool, constipation, diarrhea, nausea and vomiting. Genitourinary: Negative for dysuria, frequency and hematuria. Musculoskeletal: Negative for back pain and myalgias. Skin: Positive for color change and rash. Negative for wound. Neurological: Negative for dizziness and headaches. All other systems reviewed and are negative. All Other Systems Negative  Physical Exam     Vitals:    12/03/20 1138   BP: 135/88   Pulse: 77   Resp: 18   Temp: 98.6 °F (37 °C)   SpO2: 97%     Physical Exam  Vitals signs and nursing note reviewed. Constitutional:       General: She is not in acute distress. Appearance: She is well-developed. She is not diaphoretic. HENT:      Head: Normocephalic and atraumatic. Mouth/Throat:      Dentition: Dental tenderness and gingival swelling present. No dental caries, dental abscesses or gum lesions. Comments: No trismus, drooling or muffled voice sound. No facial swelling. Eyes:      Conjunctiva/sclera: Conjunctivae normal.   Neck:      Musculoskeletal: Normal range of motion and neck supple. Cardiovascular:      Rate and Rhythm: Normal rate and regular rhythm. Heart sounds: Normal heart sounds. Pulmonary:      Effort: Pulmonary effort is normal. No respiratory distress. Breath sounds: Normal breath sounds. Chest:      Chest wall: No tenderness. Abdominal:      General: Bowel sounds are normal. There is no distension. Palpations: Abdomen is soft. Tenderness: There is no abdominal tenderness. There is no guarding or rebound. Musculoskeletal:         General: No deformity. Skin:     General: Skin is warm and dry. Findings: Rash present. Comments: Multiple burrows consistent with scabies noted to the left forearm, excoriation noted, no secondary signs of infection   Neurological:      Mental Status: She is alert and oriented to person, place, and time. Deep Tendon Reflexes: Reflexes are normal and symmetric. Diagnostic Study Results     Labs -   No results found for this or any previous visit (from the past 12 hour(s)). Radiologic Studies -   No orders to display     CT Results  (Last 48 hours)    None        CXR Results  (Last 48 hours)    None            Medical Decision Making   I am the first provider for this patient. I reviewed the vital signs, available nursing notes, past medical history, past surgical history, family history and social history. Vital Signs-Reviewed the patient's vital signs. Records Reviewed: Nursing Notes and Old Medical Records     Procedures: None   Procedures    Provider Notes (Medical Decision Making):     DDx: Odontalgia, Dental caries,  Periodontal disease, dental fracture, gingivitis Periapical abscess, scabies, contact dermatitis, cellulitis, abscess, bunion        Plan: No emergent intervention needed at this time. Have stressed the importance of follow up with dentist, have advised patient to use tylenol and motrin as needed for pain and additional pain medication for breakthrough pain, will discharge home with abx and pain meds. Will discharge home with permethrin for concerns of scabies. Have advised patient to repeat this treatment in 1 week. We will also give patient follow-up with podiatry for right great toe concerns. No emergent imaging or work-up needed at this time. Patient agrees with the plan and management and states all questions have been thoroughly answered and there are no more remaining questions. MED RECONCILIATION:  No current facility-administered medications for this encounter.       Current Outpatient Medications Medication Sig    clindamycin (CLEOCIN) 300 mg capsule Take 1 Cap by mouth four (4) times daily for 7 days.  permethrin (ACTICIN) 5 % topical cream Apply to entire body, let sit for 8 to 10 hours and repeat in 1 week    HYDROcodone-acetaminophen (Norco) 5-325 mg per tablet Take 1 Tab by mouth every six (6) hours as needed for Pain for up to 3 days. Max Daily Amount: 4 Tabs.  aluminum-magnesium hydroxide 200-200 mg/5 mL susp 5 mL, diphenhydrAMINE 12.5 mg/5 mL liqd 12.5 mg, lidocaine 2 % soln 5 mL 5 mL by Swish and Spit route two (2) times a day. Magic mouth wash   Maalox  Lidocaine 2% viscous   Diphenhydramine oral solution     Pharmacy to mix equal portions of ingredients to a total volume as indicated in the dispense amount.  lidocaine 4 % patch Apply to affected area for 12 hours daily.  acetaminophen (Tylenol Extra Strength) 500 mg tablet Take 1 Tab by mouth every six (6) hours as needed for Pain.  permethrin (NIX) 1 % lotion Apply to entire body below the chin leave on for 10 hours and then wash off    predniSONE (STERAPRED) 5 mg dose pack See administration instruction per 5mg dose pack    Camphor-Eucalyptus Oil-Menthol (VICKS VAPORUB) 4.8-1.2-2.6 % oint 1 Actuation(s) by Apply Externally route three (3) times daily as needed.  fluticasone (FLONASE) 50 mcg/actuation nasal spray 2 Sprays by Both Nostrils route daily.  nitroglycerin (NITROSTAT) 0.4 mg SL tablet Take 1 Tab by mouth every five (5) minutes as needed for Chest Pain. Sit/Lay down then put one tab under the tongue every 5 minutes as needed for chest pain for 3 doses    naproxen (NAPROSYN) 500 mg tablet Take 1 Tab by mouth two (2) times daily (with meals). Disposition:  Home     DISCHARGE NOTE:   Pt has been reexamined. Patient has no new complaints, changes, or physical findings. Care plan outlined and precautions discussed. Results of workup were reviewed with the patient.  All medications were reviewed with the patient. All of pt's questions and concerns were addressed. Patient was instructed and agrees to follow up with PCP/dentist/podiatry as well as to return to the ED upon further deterioration. Patient is ready to go home. Follow-up Information     Follow up With Specialties Details Why Contact Info    [facility] EMERGENCY DEPT Emergency Medicine  As needed [place] [address]       Follow up with your dentist     [facility] Schedule an appointment as soon as possible for a visit  [address] [address] [phone]          Current Discharge Medication List      START taking these medications    Details   clindamycin (CLEOCIN) 300 mg capsule Take 1 Cap by mouth four (4) times daily for 7 days. Qty: 28 Cap, Refills: 0    Associated Diagnoses: Dental erosion extending into pulp      permethrin (ACTICIN) 5 % topical cream Apply to entire body, let sit for 8 to 10 hours and repeat in 1 week  Qty: 60 g, Refills: 0    Associated Diagnoses: Dental erosion extending into pulp      HYDROcodone-acetaminophen (Norco) 5-325 mg per tablet Take 1 Tab by mouth every six (6) hours as needed for Pain for up to 3 days. Max Daily Amount: 4 Tabs. Qty: 10 Tab, Refills: 0    Associated Diagnoses: Dental erosion extending into pulp         CONTINUE these medications which have NOT CHANGED    Details   permethrin (NIX) 1 % lotion Apply to entire body below the chin leave on for 10 hours and then wash off  Qty: 2 Bottle, Refills: 0                 Diagnosis     Clinical Impression:   1. Dental erosion extending into pulp    2. Dentalgia    3. Scabies          \"Please note that this dictation was completed with Xylo, the [employer] voice recognition software. Quite often unanticipated grammatical, syntax, homophones, and other interpretive errors are inadvertently transcribed by the computer software. Please disregard these errors.  Please excuse any errors that have escaped final proofreading. \"

## 2020-12-03 NOTE — DISCHARGE INSTRUCTIONS
Patient Education        Scabies: Care Instructions  Your Care Instructions  Scabies is a skin problem that can cause intense itching. It is caused by very tiny bugs called mites that dig just under the skin and lay eggs. An allergic reaction to the mites causes the itching. Scabies is usually spread by person-to-person contact. It is also possible, but not common, for scabies to spread through towels, clothes, and bedding. Everyone in your household should be treated. Scabies is treated with medicine. Itching may last for several weeks after treatment. Follow-up care is a key part of your treatment and safety. Be sure to make and go to all appointments, and call your doctor if you are having problems. It's also a good idea to know your test results and keep a list of the medicines you take. How can you care for yourself at home? · Use the lotion or cream your doctor recommends or prescribes. One treatment usually cures scabies. Do not use the cream again unless your doctor tells you to. · Wash all clothes, bedding, and towels that you used in the 3 days before you started treatment. Use hot water, and use the hot cycle in the dryer. Another option is to dry-clean these items. Or seal them in a plastic bag for 3 to 7 days. · Take an oral antihistamine, such as loratadine (Claritin) or diphenhydramine (Benadryl), to help stop itching. You also can use a nonprescription anti-itch cream. Read and follow all instructions on the label. · Do not have physical contact with other people or let anyone use your personal items until you have finished treatment. Do not use other people's personal items until your treatment is done. Tell people with whom you have close contact that they will need treatment if they have symptoms. · Take an oatmeal bath to help relieve itching. Add a handful of oatmeal (ground to a powder) to your bath. Or you can try an oatmeal bath product, such as Aveeno.   When should you call for help?   Call your doctor now or seek immediate medical care if:    · You have signs of infection, such as:  ? Increased pain, swelling, warmth, or redness. ? Red streaks leading from the mite bites. ? Pus draining from a bite area. ? A fever. Watch closely for changes in your health, and be sure to contact your doctor if:    · Anyone else in your family has itching.     · You do not get better within 2 weeks. Where can you learn more? Go to http://www.gray.com/  Enter S480 in the search box to learn more about \"Scabies: Care Instructions. \"  Current as of: July 2, 2020               Content Version: 12.6  © 2006-2020 [employer]. Care instructions adapted under license by [employer] (which disclaims liability or warranty for this information). If you have questions about a medical condition or this instruction, always ask your healthcare professional. [place] [address] any warranty or liability for your use of this information. Patient Education        Tooth and Gum Pain: Care Instructions  Your Care Instructions    The most common causes of dental pain are tooth decay and gum disease. Pain can also be caused by an infection of the tooth (abscess) or the gums. Or you may have pain from a broken or cracked tooth. Other causes of pain include infection and damage to a tooth from nervous grinding of your teeth. A wisdom tooth can be painful when it is coming in but cannot break through the gum. It can also be painful when the tooth is only partway in and extra gum tissue has formed around it. The tissue can get inflamed (pericoronitis), and sometimes it gets infected. Prompt dental care can help find the cause of your toothache and keep the tooth from dying or gum disease from getting worse. Self-care at home may reduce your pain and discomfort. Follow-up care is a key part of your treatment and safety.  Be sure to make and go to all appointments, and call your dentist or doctor if you are having problems. It's also a good idea to know your test results and keep a list of the medicines you take. How can you care for yourself at home? · To reduce pain and facial swelling, put an ice or cold pack on the outside of your cheek for 10 to 20 minutes at a time. Put a thin cloth between the ice and your skin. Do not use heat. · If your doctor prescribed antibiotics, take them as directed. Do not stop taking them just because you feel better. You need to take the full course of antibiotics. · Ask your doctor if you can take an over-the-counter pain medicine, such as acetaminophen (Tylenol), ibuprofen (Advil, Motrin), or naproxen (Aleve). Be safe with medicines. Read and follow all instructions on the label. · Avoid very hot, cold, or sweet foods and drinks if they increase your pain. · Rinse your mouth with warm salt water every 2 hours to help relieve pain and swelling. Mix 1 teaspoon of salt in 8 ounces of water. · Talk to your dentist about using special toothpaste for sensitive teeth. To reduce pain on contact with heat or cold or when brushing, brush with this toothpaste regularly or rub a small amount of the paste on the sensitive area with a clean finger 2 or 3 times a day. Floss gently between your teeth. · Do not smoke or use spit tobacco. Tobacco use can make gum problems worse, decreases your ability to fight infection in your gums, and delays healing. If you need help quitting, talk to your doctor about stop-smoking programs and medicines. These can increase your chances of quitting for good. When should you call for help? Call 911 anytime you think you may need emergency care. For example, call if:    · You have trouble breathing. Call your dentist or doctor now or seek immediate medical care if:    · You have signs of infection, such as:  ? Increased pain, swelling, warmth, or redness.   ? Red streaks leading from the area.  ? Pus draining from the area. ? A fever. Watch closely for changes in your health, and be sure to contact your doctor if:    · You do not get better as expected. Where can you learn more? Go to http://hussein-nickolas.info/  Enter H417 in the search box to learn more about \"Tooth and Gum Pain: Care Instructions. \"  Current as of: March 25, 2020               Content Version: 12.6  © 2006-2020 [employer]. Care instructions adapted under license by [employer] (which disclaims liability or warranty for this information). If you have questions about a medical condition or this instruction, always ask your healthcare professional. [place] [address] any warranty or liability for your use of this information.

## 2020-12-03 NOTE — ED TRIAGE NOTES
Patient presents to the ED with complaints of tooth ache, gum swelling, right toe corn that won't heal, and a rash on the left arm.

## 2021-09-12 ENCOUNTER — HOSPITAL ENCOUNTER (EMERGENCY)
Age: 71
Discharge: HOME | End: 2021-09-12
Attending: STUDENT IN AN ORGANIZED HEALTH CARE EDUCATION/TRAINING PROGRAM
Payer: MEDICAID

## 2021-09-12 VITALS
TEMPERATURE: 98 F | SYSTOLIC BLOOD PRESSURE: 115 MMHG | RESPIRATION RATE: 18 BRPM | HEART RATE: 77 BPM | OXYGEN SATURATION: 98 % | DIASTOLIC BLOOD PRESSURE: 80 MMHG

## 2021-09-12 DIAGNOSIS — K13.79 MOUTH PAIN: Primary | ICD-10-CM

## 2021-09-12 PROCEDURE — 99281 EMR DPT VST MAYX REQ PHY/QHP: CPT

## 2021-09-12 RX ORDER — AMOXICILLIN 500 MG/1
500 TABLET, FILM COATED ORAL 3 TIMES DAILY
Qty: 21 TABLET | Refills: 0 | Status: SHIPPED | OUTPATIENT
Start: 2021-09-12 | End: 2021-09-19

## 2021-09-12 NOTE — ED TRIAGE NOTES
Pt reports dental pain on the right and left side. Pt states she thinks there is an infection because she has a bad taste in her mouth. Pt states she thinks she has a dental abscess.

## 2021-09-12 NOTE — DISCHARGE INSTRUCTIONS
Follow-up with one of the provided dental clinics below:    [facility] ([address] [id number] [phone]  [place] [address] [phone]    Call to schedule an appointment.

## 2021-09-12 NOTE — ED PROVIDER NOTES
EMERGENCY DEPARTMENT HISTORY AND PHYSICAL EXAM    6:28 PM      Date: 9/12/2021  Patient Name: Moisés Rosales    History of Presenting Illness     Chief Complaint   Patient presents with    Dental Pain       History Provided By: Patient    Additional History (Context): Moisés Rosales is a 80 y.o. female with past medical history significant for anxiety, dental caries who presents with complaints of pain in the mouth on both sides of the lower jaw. She states she had a sour taste and drainage coming from these areas. She has no teeth present and had many of them pulled but states some of them fell out on their own. Denies any facial swelling, fever, chills, neck pain or stiffness, sore throat, trouble swallowing. PCP: None    Current Outpatient Medications   Medication Sig Dispense Refill    amoxicillin 500 mg tab Take 500 mg by mouth three (3) times daily for 7 days. 21 Tablet 0    aluminum-magnesium hydroxide 200-200 mg/5 mL susp 5 mL, diphenhydrAMINE 12.5 mg/5 mL liqd 12.5 mg, lidocaine 2 % soln 5 mL 5 mL by Swish and Spit route two (2) times a day. Magic mouth wash   Maalox  Lidocaine 2% viscous   Diphenhydramine oral solution     Pharmacy to mix equal portions of ingredients to a total volume as indicated in the dispense amount. 200 mL 0    lidocaine 4 % patch Apply to affected area for 12 hours daily. 10 Patch 0    acetaminophen (Tylenol Extra Strength) 500 mg tablet Take 1 Tab by mouth every six (6) hours as needed for Pain. 20 Tab 0    permethrin (NIX) 1 % lotion Apply to entire body below the chin leave on for 10 hours and then wash off 2 Bottle 0    predniSONE (STERAPRED) 5 mg dose pack See administration instruction per 5mg dose pack 21 Tab 0    Camphor-Eucalyptus Oil-Menthol (VICKS VAPORUB) 4.8-1.2-2.6 % oint 1 Actuation(s) by Apply Externally route three (3) times daily as needed. 50 g 0    fluticasone (FLONASE) 50 mcg/actuation nasal spray 2 Sprays by Both Nostrils route daily.  1 Bottle 0    nitroglycerin (NITROSTAT) 0.4 mg SL tablet Take 1 Tab by mouth every five (5) minutes as needed for Chest Pain. Sit/Lay down then put one tab under the tongue every 5 minutes as needed for chest pain for 3 doses 1 Bottle 0    naproxen (NAPROSYN) 500 mg tablet Take 1 Tab by mouth two (2) times daily (with meals). 12 Tab 0       Past History     Past Medical History:  Past Medical History:   Diagnosis Date    Angina at rest 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 Family History:  No family history on file. Social History:  Social History     Tobacco Use    Smoking status: Former Smoker     Years: 15.00    Smokeless tobacco: Never Used   Substance Use Topics    Alcohol use: No    Drug use: No     Comment: none in 2 years       Allergies: Allergies   Allergen Reactions    Aspirin Nausea Only         Review of Systems       Review of Systems   Constitutional: Negative. Negative for chills and fever. HENT: Positive for dental problem and mouth sores. Negative for congestion, ear pain and rhinorrhea. Eyes: Negative. Negative for pain and redness. Respiratory: Negative. Negative for cough and shortness of breath. Cardiovascular: Negative. Negative for chest pain, palpitations and leg swelling. Gastrointestinal: Negative. Negative for abdominal pain, constipation, diarrhea, nausea and vomiting. Genitourinary: Negative. Negative for dysuria, frequency, hematuria and urgency. Musculoskeletal: Negative. Negative for back pain, gait problem, joint swelling and neck pain. Skin: Negative. Negative for rash and wound. Neurological: Negative. Negative for dizziness, seizures, speech difficulty, weakness, light-headedness and headaches. Hematological: Negative for adenopathy. Does not bruise/bleed easily. All other systems reviewed and are negative.         Physical Exam Visit Vitals  /80   Pulse 77   Temp 98 °F (36.7 °C)   Resp 18   SpO2 98%         Physical Exam  Vitals and nursing note reviewed. Constitutional:       General: She is not in acute distress. Appearance: Normal appearance. She is not toxic-appearing. HENT:      Head: Normocephalic and atraumatic. Jaw: There is normal jaw occlusion. No trismus, tenderness, swelling or pain on movement. Salivary Glands: Right salivary gland is not diffusely enlarged or tender. Left salivary gland is not diffusely enlarged or tender. Right Ear: Tympanic membrane, ear canal and external ear normal. There is no impacted cerumen. Left Ear: Tympanic membrane, ear canal and external ear normal. There is no impacted cerumen. Nose: Nose normal. No congestion or rhinorrhea. Right Sinus: No maxillary sinus tenderness or frontal sinus tenderness. Left Sinus: No maxillary sinus tenderness or frontal sinus tenderness. Mouth/Throat:      Mouth: Mucous membranes are moist.      Dentition: Gum lesions present. No gingival swelling or dental abscesses. Pharynx: Oropharynx is clear. Uvula midline. No pharyngeal swelling, oropharyngeal exudate, posterior oropharyngeal erythema or uvula swelling. Tonsils: No tonsillar exudate or tonsillar abscesses. 0 on the right. 0 on the left. Comments: Patient has no teeth present in her mouth but there are abrasive, lesions to the bilateral lower gums. Eyes:      General:         Right eye: No discharge. Left eye: No discharge. Conjunctiva/sclera: Conjunctivae normal.      Pupils: Pupils are equal, round, and reactive to light. Neck:      Trachea: Phonation normal.   Cardiovascular:      Rate and Rhythm: Normal rate and regular rhythm. Pulmonary:      Effort: Pulmonary effort is normal.      Breath sounds: Normal breath sounds. Musculoskeletal:      Cervical back: Normal range of motion and neck supple. No edema or erythema. No muscular tenderness. Lymphadenopathy:      Cervical: No cervical adenopathy. Skin:     General: Skin is warm and dry. Capillary Refill: Capillary refill takes less than 2 seconds. Neurological:      General: No focal deficit present. Mental Status: She is alert. Psychiatric:         Mood and Affect: Mood normal.         Behavior: Behavior normal.           Diagnostic Study Results     Labs -  No results found for this or any previous visit (from the past 12 hour(s)). Radiologic Studies -   No orders to display         Medical Decision Making   I am the first provider for this patient. I reviewed available nursing notes, past medical history, past surgical history, family history and social history. Vital Signs-Reviewed the patient's vital signs. Records Reviewed: Nursing Notes and Old Medical Records (Time of Review: 6:28 PM)    Pulse Oximetry Analysis - 98% on RA- normal     ED Course: Progress Notes, Reevaluation, and Consults:  6:28 PM  Initial assessment performed. The patients presenting problems have been discussed, and they/their family are in agreement with the care plan formulated and outlined with them. I have encouraged them to ask questions as they arise throughout their visit. Provider Notes (Medical Decision Making):     Differential diagnosis includes: Periapical abscess, pulpitis, odontogenic infection, sinusitis, trauma, alveolar osteitis, necrotizing gingivitis, retropharyngeal abscess, peritonsillar abscess. Patient presents ambulatory, no acute distress. Patient is well-hydrated and nontoxic in appearance. Exam reveals no teeth present but several gum lesions to the lower bilaterally. She has no teeth however she has not had them all formally removed and cement fallen out on their own so I am wondering if there is some pulp left and possible dental root decay present if she is having drainage and foul taste in the mouth.   There is appropriate tenderness on palpation. No localized induration or fluctuance to suggest drainable abscess. No sublingual/submandibular induration, trismus, or stridor. Floor of mouth is soft. Nasolabial folds are soft with no facial swelling. Normal speech. Handling oral secretions without difficulty. Patient has full range of motion of the neck. Low suspicion for Neo's angina or deep space infection. Will discharge home with antibiotics. Patient is advised to follow-up with a dentist/oral surgeon to further manage this condition. Diagnosis     Clinical Impression:   1. Mouth pain        Disposition: Discharged home in stable condition    DISCHARGE NOTE:     Patient has been reexamined. Patient has no new complaints, changes, or physical findings. Care plan outlined and precautions discussed. Results of physical exam findings were reviewed with the patient. All medications were reviewed with the patient; will discharge home with amoxicillin. All of patient's questions and concerns were addressed. Patient was instructed and agrees to follow up with dentist, as well as to return to the ED upon further deterioration. Patient is ready to go home. Follow-up Information     Follow up With Specialties Details Why Tiffanie Calvillo  Schedule an appointment as soon as possible for a visit  Follow-up from the Emergency Department [address] [place] [id number]  [phone]    [facility] EMERGENCY DEPT Emergency Medicine  As needed, If symptoms worsen [address] [place] [id number]  [phone]           Discharge Medication List as of 9/12/2021  6:39 PM      START taking these medications    Details   amoxicillin 500 mg tab Take 500 mg by mouth three (3) times daily for 7 days. , Normal, Disp-21 Tablet, R-0         CONTINUE these medications which have NOT CHANGED    Details   aluminum-magnesium hydroxide 200-200 mg/5 mL susp 5 mL, diphenhydrAMINE 12.5 mg/5 mL liqd 12.5 mg, lidocaine 2 % soln 5 mL 5 mL by Swish and Spit route two (2) times a day. Magic mouth wash   Maalox  Lidocaine 2% viscous   Diphenhydramine oral solution     Pharmacy to mix equal portions of ingredients to a total volume as indicated in the dispense amount. , Print, Disp-200 mL ,R-0      lidocaine 4 % patch Apply to affected area for 12 hours daily. , Normal, Disp-10 Patch,R-0      acetaminophen (Tylenol Extra Strength) 500 mg tablet Take 1 Tab by mouth every six (6) hours as needed for Pain., Normal, Disp-20 Tab,R-0      permethrin (NIX) 1 % lotion Apply to entire body below the chin leave on for 10 hours and then wash off, Print, Disp-2 Bottle, R-0      predniSONE (STERAPRED) 5 mg dose pack See administration instruction per 5mg dose pack, Print, Disp-21 Tab, R-0      Camphor-Eucalyptus Oil-Menthol (VICKS VAPORUB) 4.8-1.2-2.6 % oint 1 Actuation(s) by Apply Externally route three (3) times daily as needed. , Print, Disp-50 g, R-0      fluticasone (FLONASE) 50 mcg/actuation nasal spray 2 Sprays by Both Nostrils route daily. , Print, Disp-1 Bottle, R-0      nitroglycerin (NITROSTAT) 0.4 mg SL tablet Take 1 Tab by mouth every five (5) minutes as needed for Chest Pain. Sit/Lay down then put one tab under the tongue every 5 minutes as needed for chest pain for 3 doses, Print, Disp-1 Bottle, R-0      naproxen (NAPROSYN) 500 mg tablet Take 1 Tab by mouth two (2) times daily (with meals). , Print, Disp-12 Tab, R-0               Dictation disclaimer:  Please note that this dictation was completed with Zoomorama, the Cloudcam voice recognition software. Quite often unanticipated grammatical, syntax, homophones, and other interpretive errors are inadvertently transcribed by the computer software. Please disregard these errors. Please excuse any errors that have escaped final proofreading.

## 2021-09-21 ENCOUNTER — APPOINTMENT (OUTPATIENT)
Dept: CT IMAGING | Age: 71
End: 2021-09-21
Attending: EMERGENCY MEDICINE
Payer: MEDICAID

## 2021-09-21 ENCOUNTER — APPOINTMENT (OUTPATIENT)
Dept: GENERAL RADIOLOGY | Age: 71
End: 2021-09-21
Attending: EMERGENCY MEDICINE
Payer: MEDICAID

## 2021-09-21 ENCOUNTER — HOSPITAL ENCOUNTER (EMERGENCY)
Age: 71
Discharge: HOME | End: 2021-09-22
Attending: EMERGENCY MEDICINE
Payer: MEDICAID

## 2021-09-21 DIAGNOSIS — R91.8 LUNG MASS: Primary | ICD-10-CM

## 2021-09-21 DIAGNOSIS — C34.91 MALIGNANT NEOPLASM OF RIGHT LUNG, UNSPECIFIED PART OF LUNG (HCC): ICD-10-CM

## 2021-09-21 DIAGNOSIS — R00.2 PALPITATIONS: ICD-10-CM

## 2021-09-21 LAB
ALBUMIN SERPL-MCNC: 3.8 G/DL (ref 3.4–5)
ALBUMIN/GLOB SERPL: 1.1 {RATIO} (ref 0.8–1.7)
ALP SERPL-CCNC: 54 U/L (ref 45–117)
ALT SERPL-CCNC: 27 U/L (ref 13–56)
ANION GAP SERPL CALC-SCNC: 7 MMOL/L (ref 3–18)
AST SERPL-CCNC: 20 U/L (ref 10–38)
BASOPHILS # BLD: 0.1 K/UL (ref 0–0.1)
BASOPHILS NFR BLD: 1 % (ref 0–2)
BILIRUB SERPL-MCNC: 0.3 MG/DL (ref 0.2–1)
BUN SERPL-MCNC: 18 MG/DL (ref 7–18)
BUN/CREAT SERPL: 22 (ref 12–20)
CALCIUM SERPL-MCNC: 9.4 MG/DL (ref 8.5–10.1)
CHLORIDE SERPL-SCNC: 107 MMOL/L (ref 100–111)
CK MB CFR SERPL CALC: 1.6 % (ref 0–4)
CK MB SERPL-MCNC: 4.1 NG/ML (ref 5–25)
CK SERPL-CCNC: 264 U/L (ref 26–192)
CO2 SERPL-SCNC: 27 MMOL/L (ref 21–32)
CREAT SERPL-MCNC: 0.82 MG/DL (ref 0.6–1.3)
DIFFERENTIAL METHOD BLD: NORMAL
EOSINOPHIL # BLD: 0.2 K/UL (ref 0–0.4)
EOSINOPHIL NFR BLD: 3 % (ref 0–5)
ERYTHROCYTE [DISTWIDTH] IN BLOOD BY AUTOMATED COUNT: 13.3 % (ref 11.6–14.5)
GLOBULIN SER CALC-MCNC: 3.5 G/DL (ref 2–4)
GLUCOSE SERPL-MCNC: 96 MG/DL (ref 74–99)
HCT VFR BLD AUTO: 41.2 % (ref 35–45)
HGB BLD-MCNC: 14.8 G/DL (ref 12–16)
LYMPHOCYTES # BLD: 3 K/UL (ref 0.9–3.6)
LYMPHOCYTES NFR BLD: 45 % (ref 21–52)
MCH RBC QN AUTO: 28.8 PG (ref 24–34)
MCHC RBC AUTO-ENTMCNC: 35.9 G/DL (ref 31–37)
MCV RBC AUTO: 80.3 FL (ref 78–100)
MONOCYTES # BLD: 0.6 K/UL (ref 0.05–1.2)
MONOCYTES NFR BLD: 9 % (ref 3–10)
NEUTS SEG # BLD: 2.9 K/UL (ref 1.8–8)
NEUTS SEG NFR BLD: 43 % (ref 40–73)
PLATELET # BLD AUTO: 313 K/UL (ref 135–420)
PMV BLD AUTO: 9.6 FL (ref 9.2–11.8)
POTASSIUM SERPL-SCNC: 4.5 MMOL/L (ref 3.5–5.5)
PROT SERPL-MCNC: 7.3 G/DL (ref 6.4–8.2)
RBC # BLD AUTO: 5.13 M/UL (ref 4.2–5.3)
SODIUM SERPL-SCNC: 141 MMOL/L (ref 136–145)
TROPONIN I SERPL-MCNC: <0.02 NG/ML (ref 0–0.04)
WBC # BLD AUTO: 6.7 K/UL (ref 4.6–13.2)

## 2021-09-21 PROCEDURE — 80053 COMPREHEN METABOLIC PANEL: CPT

## 2021-09-21 PROCEDURE — 71260 CT THORAX DX C+: CPT

## 2021-09-21 PROCEDURE — 71046 X-RAY EXAM CHEST 2 VIEWS: CPT

## 2021-09-21 PROCEDURE — 82553 CREATINE MB FRACTION: CPT

## 2021-09-21 PROCEDURE — 85025 COMPLETE CBC W/AUTO DIFF WBC: CPT

## 2021-09-21 PROCEDURE — 93005 ELECTROCARDIOGRAM TRACING: CPT

## 2021-09-21 PROCEDURE — 99283 EMERGENCY DEPT VISIT LOW MDM: CPT

## 2021-09-22 PROCEDURE — 74011000636 HC RX REV CODE- 636: Performed by: EMERGENCY MEDICINE

## 2021-09-22 RX ADMIN — IOPAMIDOL 100 ML: 612 INJECTION, SOLUTION INTRAVENOUS at 01:24

## 2021-09-22 NOTE — ED PROVIDER NOTES
EMERGENCY DEPARTMENT HISTORY AND PHYSICAL EXAM  This was created with voice recognition software and transcription errors may be present. 9:04 PM  Date: 9/21/2021  Patient Name: Humberto Donato    History of Presenting Illness     Chief Complaint:    History Provided By:     HPI: Humberto Donato is a 80 y.o. female past medical history of angina anxiety CVA dental caries presents with palpitations patient states she walked to the store and then she tossed some friends and that she said they told her some bad news and this was the third time she heard the news on the way to the store and she felt some palpitations and some fluttering and got diaphoretic she denies any tightness or pain states this lasted about 2 to 3 minutes and then it resolved. 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 Family History:  No family history on file. Social History:  Social History     Tobacco Use    Smoking status: Former Smoker     Years: 15.00    Smokeless tobacco: Never Used   Substance Use Topics    Alcohol use: No    Drug use: No     Comment: none in 2 years       Allergies: Allergies   Allergen Reactions    Aspirin Nausea Only       Review of Systems     Review of Systems   All other systems reviewed and are negative. 10 point review of systems otherwise negative unless noted in HPI. Physical Exam       Physical Exam  Constitutional:       Appearance: She is well-developed. HENT:      Head: Normocephalic and atraumatic. Eyes:      Pupils: Pupils are equal, round, and reactive to light. Cardiovascular:      Rate and Rhythm: Normal rate and regular rhythm. Heart sounds: Normal heart sounds. No murmur heard. No friction rub. Pulmonary:      Effort: Pulmonary effort is normal. No respiratory distress. Breath sounds: Normal breath sounds. No wheezing. Abdominal:      General: There is no distension. Palpations: Abdomen is soft. Tenderness: There is no abdominal tenderness. There is no guarding or rebound. Musculoskeletal:         General: Normal range of motion. Cervical back: Normal range of motion and neck supple. Skin:     General: Skin is warm and dry. Neurological:      Mental Status: She is alert and oriented to person, place, and time. Psychiatric:         Behavior: Behavior normal.         Thought Content: Thought content normal.         Diagnostic Study Results     Vital Signs  EKG: Sinus at 62 normal axis normal intervals there is no ST elevation or depression no hypertrophy  Labs:   Imaging:     Medical Decision Making     ED Course: Progress Notes, Reevaluation, and Consults:    I will be the provider of record for this patient. Provider Notes (Medical Decision Making): 61-year-old female presents with head fluttering and some diaphoresis. No pain at any time. Check troponin x2 as well as EKG    Labs unremarkable CT noted for spiculated mass certainly concerning for cancer. Patient does not have any primary care doctor refer her to her PCP as well as pulmonology for further testing technically not yet malignant neoplasm of the lung as she still needs further testing but in order to reinforce the significance of the follow-up will diagnose at this time         Diagnosis     Clinical Impression: No diagnosis found. Disposition:        Patient's Medications   Start Taking    No medications on file   Continue Taking    ACETAMINOPHEN (TYLENOL EXTRA STRENGTH) 500 MG TABLET    Take 1 Tab by mouth every six (6) hours as needed for Pain.     ALUMINUM-MAGNESIUM HYDROXIDE 200-200 MG/5 ML SUSP 5 ML, DIPHENHYDRAMINE 12.5 MG/5 ML LIQD 12.5 MG, LIDOCAINE 2 % SOLN 5 ML    5 mL by Swish and Spit route two (2) times a day. Magic mouth wash   Maalox  Lidocaine 2% viscous   Diphenhydramine oral solution     Pharmacy to mix equal portions of ingredients to a total volume as indicated in the dispense amount. CAMPHOR-EUCALYPTUS OIL-MENTHOL (VICKS VAPORUB) 4.8-1.2-2.6 % OINT    1 Actuation(s) by Apply Externally route three (3) times daily as needed. FLUTICASONE (FLONASE) 50 MCG/ACTUATION NASAL SPRAY    2 Sprays by Both Nostrils route daily. LIDOCAINE 4 % PATCH    Apply to affected area for 12 hours daily. NAPROXEN (NAPROSYN) 500 MG TABLET    Take 1 Tab by mouth two (2) times daily (with meals). NITROGLYCERIN (NITROSTAT) 0.4 MG SL TABLET    Take 1 Tab by mouth every five (5) minutes as needed for Chest Pain.  Sit/Lay down then put one tab under the tongue every 5 minutes as needed for chest pain for 3 doses    PERMETHRIN (NIX) 1 % LOTION    Apply to entire body below the chin leave on for 10 hours and then wash off    PREDNISONE (STERAPRED) 5 MG DOSE PACK    See administration instruction per 5mg dose pack   These Medications have changed    No medications on file   Stop Taking    No medications on file

## 2021-09-23 LAB
ATRIAL RATE: 63 BPM
CALCULATED P AXIS, ECG09: 37 DEGREES
CALCULATED R AXIS, ECG10: 16 DEGREES
CALCULATED T AXIS, ECG11: 36 DEGREES
DIAGNOSIS, 93000: NORMAL
P-R INTERVAL, ECG05: 168 MS
Q-T INTERVAL, ECG07: 452 MS
QRS DURATION, ECG06: 70 MS
QTC CALCULATION (BEZET), ECG08: 462 MS
VENTRICULAR RATE, ECG03: 63 BPM

## 2021-10-27 ENCOUNTER — HOSPITAL ENCOUNTER (EMERGENCY)
Age: 71
Discharge: HOME | End: 2021-10-28
Attending: EMERGENCY MEDICINE
Payer: MEDICAID

## 2021-10-27 VITALS
OXYGEN SATURATION: 99 % | TEMPERATURE: 99.3 F | HEART RATE: 79 BPM | BODY MASS INDEX: 28.48 KG/M2 | RESPIRATION RATE: 16 BRPM | SYSTOLIC BLOOD PRESSURE: 119 MMHG | WEIGHT: 141 LBS | DIASTOLIC BLOOD PRESSURE: 79 MMHG

## 2021-10-27 PROCEDURE — 75810000275 HC EMERGENCY DEPT VISIT NO LEVEL OF CARE

## 2021-11-06 ENCOUNTER — HOSPITAL ENCOUNTER (OUTPATIENT)
Age: 71
Setting detail: OBSERVATION
Discharge: HOME HEALTH | End: 2021-11-09
Attending: STUDENT IN AN ORGANIZED HEALTH CARE EDUCATION/TRAINING PROGRAM | Admitting: STUDENT IN AN ORGANIZED HEALTH CARE EDUCATION/TRAINING PROGRAM
Payer: MEDICAID

## 2021-11-06 ENCOUNTER — APPOINTMENT (OUTPATIENT)
Dept: CT IMAGING | Age: 71
End: 2021-11-06
Attending: STUDENT IN AN ORGANIZED HEALTH CARE EDUCATION/TRAINING PROGRAM
Payer: MEDICAID

## 2021-11-06 ENCOUNTER — APPOINTMENT (OUTPATIENT)
Dept: GENERAL RADIOLOGY | Age: 71
End: 2021-11-06
Attending: STUDENT IN AN ORGANIZED HEALTH CARE EDUCATION/TRAINING PROGRAM
Payer: MEDICAID

## 2021-11-06 DIAGNOSIS — Z20.822 SUSPECTED COVID-19 VIRUS INFECTION: ICD-10-CM

## 2021-11-06 DIAGNOSIS — N17.9 AKI (ACUTE KIDNEY INJURY) (HCC): ICD-10-CM

## 2021-11-06 DIAGNOSIS — N30.00 ACUTE CYSTITIS WITHOUT HEMATURIA: ICD-10-CM

## 2021-11-06 DIAGNOSIS — A41.9 SEPSIS, DUE TO UNSPECIFIED ORGANISM, UNSPECIFIED WHETHER ACUTE ORGAN DYSFUNCTION PRESENT (HCC): Primary | ICD-10-CM

## 2021-11-06 LAB
ALBUMIN SERPL-MCNC: 3 G/DL (ref 3.4–5)
ALBUMIN/GLOB SERPL: 0.5 {RATIO} (ref 0.8–1.7)
ALP SERPL-CCNC: 42 U/L (ref 45–117)
ALT SERPL-CCNC: 65 U/L (ref 13–56)
ANION GAP SERPL CALC-SCNC: 8 MMOL/L (ref 3–18)
APPEARANCE UR: ABNORMAL
AST SERPL-CCNC: 98 U/L (ref 10–38)
ATRIAL RATE: 124 BPM
B PERT DNA SPEC QL NAA+PROBE: NOT DETECTED
BACTERIA URNS QL MICRO: ABNORMAL /HPF
BASOPHILS # BLD: 0 K/UL (ref 0–0.1)
BASOPHILS NFR BLD: 0 % (ref 0–2)
BILIRUB SERPL-MCNC: 0.9 MG/DL (ref 0.2–1)
BILIRUB UR QL: NEGATIVE
BORDETELLA PARAPERTUSSIS PCR, BORPAR: NOT DETECTED
BUN SERPL-MCNC: 40 MG/DL (ref 7–18)
BUN/CREAT SERPL: 28 (ref 12–20)
C PNEUM DNA SPEC QL NAA+PROBE: NOT DETECTED
CALCIUM SERPL-MCNC: 9.2 MG/DL (ref 8.5–10.1)
CALCULATED P AXIS, ECG09: 73 DEGREES
CALCULATED R AXIS, ECG10: 20 DEGREES
CALCULATED T AXIS, ECG11: 46 DEGREES
CHLORIDE SERPL-SCNC: 103 MMOL/L (ref 100–111)
CK MB CFR SERPL CALC: 0.4 % (ref 0–4)
CK MB SERPL-MCNC: 1.3 NG/ML (ref 5–25)
CK SERPL-CCNC: 299 U/L (ref 26–192)
CO2 SERPL-SCNC: 26 MMOL/L (ref 21–32)
COLOR UR: ABNORMAL
CREAT SERPL-MCNC: 1.42 MG/DL (ref 0.6–1.3)
CRP SERPL-MCNC: 7.7 MG/DL (ref 0–0.3)
D DIMER PPP FEU-MCNC: 1.27 UG/ML(FEU)
DIAGNOSIS, 93000: NORMAL
DIFFERENTIAL METHOD BLD: ABNORMAL
EOSINOPHIL # BLD: 0.3 K/UL (ref 0–0.4)
EOSINOPHIL NFR BLD: 3 % (ref 0–5)
EPITH CASTS URNS QL MICRO: ABNORMAL /LPF (ref 0–5)
ERYTHROCYTE [DISTWIDTH] IN BLOOD BY AUTOMATED COUNT: 13 % (ref 11.6–14.5)
EST. AVERAGE GLUCOSE BLD GHB EST-MCNC: 126 MG/DL
FERRITIN SERPL-MCNC: 2034 NG/ML (ref 8–388)
FLUAV H1 2009 PAND RNA SPEC QL NAA+PROBE: NOT DETECTED
FLUAV H1 RNA SPEC QL NAA+PROBE: NOT DETECTED
FLUAV H3 RNA SPEC QL NAA+PROBE: NOT DETECTED
FLUAV RNA SPEC QL NAA+PROBE: NOT DETECTED
FLUAV SUBTYP SPEC NAA+PROBE: NOT DETECTED
FLUBV RNA SPEC QL NAA+PROBE: NOT DETECTED
FLUBV RNA SPEC QL NAA+PROBE: NOT DETECTED
GLOBULIN SER CALC-MCNC: 5.7 G/DL (ref 2–4)
GLUCOSE SERPL-MCNC: 175 MG/DL (ref 74–99)
GLUCOSE UR STRIP.AUTO-MCNC: NEGATIVE MG/DL
GRAN CASTS URNS QL MICRO: ABNORMAL /LPF
HADV DNA SPEC QL NAA+PROBE: NOT DETECTED
HBA1C MFR BLD: 6 % (ref 4.2–5.6)
HCOV 229E RNA SPEC QL NAA+PROBE: NOT DETECTED
HCOV HKU1 RNA SPEC QL NAA+PROBE: NOT DETECTED
HCOV NL63 RNA SPEC QL NAA+PROBE: NOT DETECTED
HCOV OC43 RNA SPEC QL NAA+PROBE: NOT DETECTED
HCT VFR BLD AUTO: 48.3 % (ref 35–45)
HGB BLD-MCNC: 17.3 G/DL (ref 12–16)
HGB UR QL STRIP: ABNORMAL
HMPV RNA SPEC QL NAA+PROBE: NOT DETECTED
HPIV1 RNA SPEC QL NAA+PROBE: NOT DETECTED
HPIV2 RNA SPEC QL NAA+PROBE: NOT DETECTED
HPIV3 RNA SPEC QL NAA+PROBE: NOT DETECTED
HPIV4 RNA SPEC QL NAA+PROBE: NOT DETECTED
KETONES UR QL STRIP.AUTO: ABNORMAL MG/DL
LACTATE BLD-SCNC: 0.93 MMOL/L (ref 0.4–2)
LACTATE BLD-SCNC: 3.58 MMOL/L (ref 0.4–2)
LDH SERPL L TO P-CCNC: 509 U/L (ref 81–234)
LEUKOCYTE ESTERASE UR QL STRIP.AUTO: ABNORMAL
LYMPHOCYTES # BLD: 0.7 K/UL (ref 0.9–3.6)
LYMPHOCYTES NFR BLD: 8 % (ref 21–52)
M PNEUMO DNA SPEC QL NAA+PROBE: NOT DETECTED
MCH RBC QN AUTO: 28.5 PG (ref 24–34)
MCHC RBC AUTO-ENTMCNC: 35.8 G/DL (ref 31–37)
MCV RBC AUTO: 79.6 FL (ref 78–100)
MONOCYTES # BLD: 0.3 K/UL (ref 0.05–1.2)
MONOCYTES NFR BLD: 4 % (ref 3–10)
NEUTS SEG # BLD: 7.1 K/UL (ref 1.8–8)
NEUTS SEG NFR BLD: 84 % (ref 40–73)
NITRITE UR QL STRIP.AUTO: NEGATIVE
P-R INTERVAL, ECG05: 118 MS
PH UR STRIP: 5.5 [PH] (ref 5–8)
PLATELET # BLD AUTO: 366 K/UL (ref 135–420)
PMV BLD AUTO: 10.2 FL (ref 9.2–11.8)
POTASSIUM SERPL-SCNC: 3.7 MMOL/L (ref 3.5–5.5)
PROCALCITONIN SERPL-MCNC: 0.1 NG/ML
PROT SERPL-MCNC: 8.7 G/DL (ref 6.4–8.2)
PROT UR STRIP-MCNC: 100 MG/DL
Q-T INTERVAL, ECG07: 308 MS
QRS DURATION, ECG06: 68 MS
QTC CALCULATION (BEZET), ECG08: 442 MS
RBC # BLD AUTO: 6.07 M/UL (ref 4.2–5.3)
RBC #/AREA URNS HPF: NEGATIVE /HPF (ref 0–5)
RSV RNA SPEC QL NAA+PROBE: NOT DETECTED
RV+EV RNA SPEC QL NAA+PROBE: NOT DETECTED
SARS-COV-2 PCR, COVPCR: DETECTED
SARS-COV-2, COV2: DETECTED
SODIUM SERPL-SCNC: 137 MMOL/L (ref 136–145)
SP GR UR REFRACTOMETRY: >1.03 (ref 1–1.03)
TROPONIN I SERPL-MCNC: <0.02 NG/ML (ref 0–0.04)
UROBILINOGEN UR QL STRIP.AUTO: 1 EU/DL (ref 0.2–1)
VENTRICULAR RATE, ECG03: 124 BPM
WBC # BLD AUTO: 8.5 K/UL (ref 4.6–13.2)
WBC URNS QL MICRO: ABNORMAL /HPF (ref 0–4)

## 2021-11-06 PROCEDURE — 70450 CT HEAD/BRAIN W/O DYE: CPT

## 2021-11-06 PROCEDURE — 74174 CTA ABD&PLVS W/CONTRAST: CPT

## 2021-11-06 PROCEDURE — 96375 TX/PRO/DX INJ NEW DRUG ADDON: CPT

## 2021-11-06 PROCEDURE — 87040 BLOOD CULTURE FOR BACTERIA: CPT

## 2021-11-06 PROCEDURE — 74011250637 HC RX REV CODE- 250/637: Performed by: STUDENT IN AN ORGANIZED HEALTH CARE EDUCATION/TRAINING PROGRAM

## 2021-11-06 PROCEDURE — 71045 X-RAY EXAM CHEST 1 VIEW: CPT

## 2021-11-06 PROCEDURE — 96368 THER/DIAG CONCURRENT INF: CPT

## 2021-11-06 PROCEDURE — 86140 C-REACTIVE PROTEIN: CPT

## 2021-11-06 PROCEDURE — 74011250636 HC RX REV CODE- 250/636: Performed by: STUDENT IN AN ORGANIZED HEALTH CARE EDUCATION/TRAINING PROGRAM

## 2021-11-06 PROCEDURE — G0378 HOSPITAL OBSERVATION PER HR: HCPCS

## 2021-11-06 PROCEDURE — 99233 SBSQ HOSP IP/OBS HIGH 50: CPT | Performed by: FAMILY MEDICINE

## 2021-11-06 PROCEDURE — 83036 HEMOGLOBIN GLYCOSYLATED A1C: CPT

## 2021-11-06 PROCEDURE — 81001 URINALYSIS AUTO W/SCOPE: CPT

## 2021-11-06 PROCEDURE — 83615 LACTATE (LD) (LDH) ENZYME: CPT

## 2021-11-06 PROCEDURE — 65660000000 HC RM CCU STEPDOWN

## 2021-11-06 PROCEDURE — 99285 EMERGENCY DEPT VISIT HI MDM: CPT

## 2021-11-06 PROCEDURE — 99223 1ST HOSP IP/OBS HIGH 75: CPT | Performed by: STUDENT IN AN ORGANIZED HEALTH CARE EDUCATION/TRAINING PROGRAM

## 2021-11-06 PROCEDURE — 80053 COMPREHEN METABOLIC PANEL: CPT

## 2021-11-06 PROCEDURE — 0202U NFCT DS 22 TRGT SARS-COV-2: CPT

## 2021-11-06 PROCEDURE — 96365 THER/PROPH/DIAG IV INF INIT: CPT

## 2021-11-06 PROCEDURE — 74011000258 HC RX REV CODE- 258: Performed by: STUDENT IN AN ORGANIZED HEALTH CARE EDUCATION/TRAINING PROGRAM

## 2021-11-06 PROCEDURE — 96372 THER/PROPH/DIAG INJ SC/IM: CPT

## 2021-11-06 PROCEDURE — 96366 THER/PROPH/DIAG IV INF ADDON: CPT

## 2021-11-06 PROCEDURE — 82728 ASSAY OF FERRITIN: CPT

## 2021-11-06 PROCEDURE — 74011250636 HC RX REV CODE- 250/636: Performed by: FAMILY MEDICINE

## 2021-11-06 PROCEDURE — 84145 PROCALCITONIN (PCT): CPT

## 2021-11-06 PROCEDURE — 87086 URINE CULTURE/COLONY COUNT: CPT

## 2021-11-06 PROCEDURE — 83605 ASSAY OF LACTIC ACID: CPT

## 2021-11-06 PROCEDURE — 87636 SARSCOV2 & INF A&B AMP PRB: CPT

## 2021-11-06 PROCEDURE — 96376 TX/PRO/DX INJ SAME DRUG ADON: CPT

## 2021-11-06 PROCEDURE — 85025 COMPLETE CBC W/AUTO DIFF WBC: CPT

## 2021-11-06 PROCEDURE — 82553 CREATINE MB FRACTION: CPT

## 2021-11-06 PROCEDURE — 93005 ELECTROCARDIOGRAM TRACING: CPT

## 2021-11-06 PROCEDURE — 96367 TX/PROPH/DG ADDL SEQ IV INF: CPT

## 2021-11-06 PROCEDURE — 85379 FIBRIN DEGRADATION QUANT: CPT

## 2021-11-06 PROCEDURE — 97165 OT EVAL LOW COMPLEX 30 MIN: CPT

## 2021-11-06 PROCEDURE — 74011000636 HC RX REV CODE- 636: Performed by: STUDENT IN AN ORGANIZED HEALTH CARE EDUCATION/TRAINING PROGRAM

## 2021-11-06 RX ORDER — ENOXAPARIN SODIUM 100 MG/ML
30 INJECTION SUBCUTANEOUS EVERY 12 HOURS
Status: DISCONTINUED | OUTPATIENT
Start: 2021-11-06 | End: 2021-11-09 | Stop reason: HOSPADM

## 2021-11-06 RX ORDER — ACETAMINOPHEN 325 MG/1
650 TABLET ORAL
Status: DISCONTINUED | OUTPATIENT
Start: 2021-11-06 | End: 2021-11-09 | Stop reason: HOSPADM

## 2021-11-06 RX ORDER — VANCOMYCIN/0.9 % SOD CHLORIDE 1.5G/250ML
1500 PLASTIC BAG, INJECTION (ML) INTRAVENOUS ONCE
Status: COMPLETED | OUTPATIENT
Start: 2021-11-06 | End: 2021-11-06

## 2021-11-06 RX ORDER — ACETAMINOPHEN 650 MG/1
650 SUPPOSITORY RECTAL
Status: DISCONTINUED | OUTPATIENT
Start: 2021-11-06 | End: 2021-11-09 | Stop reason: HOSPADM

## 2021-11-06 RX ORDER — SODIUM CHLORIDE 9 MG/ML
75 INJECTION, SOLUTION INTRAVENOUS CONTINUOUS
Status: DISPENSED | OUTPATIENT
Start: 2021-11-06 | End: 2021-11-06

## 2021-11-06 RX ORDER — ACETAMINOPHEN 500 MG
1000 TABLET ORAL ONCE
Status: COMPLETED | OUTPATIENT
Start: 2021-11-06 | End: 2021-11-06

## 2021-11-06 RX ORDER — SODIUM CHLORIDE 0.9 % (FLUSH) 0.9 %
5-10 SYRINGE (ML) INJECTION AS NEEDED
Status: DISCONTINUED | OUTPATIENT
Start: 2021-11-06 | End: 2021-11-09 | Stop reason: HOSPADM

## 2021-11-06 RX ORDER — LEVOFLOXACIN 5 MG/ML
750 INJECTION, SOLUTION INTRAVENOUS EVERY 24 HOURS
Status: DISCONTINUED | OUTPATIENT
Start: 2021-11-06 | End: 2021-11-06

## 2021-11-06 RX ORDER — DEXAMETHASONE SODIUM PHOSPHATE 4 MG/ML
6 INJECTION, SOLUTION INTRA-ARTICULAR; INTRALESIONAL; INTRAMUSCULAR; INTRAVENOUS; SOFT TISSUE EVERY 24 HOURS
Status: DISCONTINUED | OUTPATIENT
Start: 2021-11-06 | End: 2021-11-07

## 2021-11-06 RX ORDER — DIPHENHYDRAMINE HYDROCHLORIDE 50 MG/ML
50 INJECTION, SOLUTION INTRAMUSCULAR; INTRAVENOUS
Status: DISCONTINUED | OUTPATIENT
Start: 2021-11-06 | End: 2021-11-06 | Stop reason: CLARIF

## 2021-11-06 RX ADMIN — ENOXAPARIN SODIUM 30 MG: 100 INJECTION SUBCUTANEOUS at 21:32

## 2021-11-06 RX ADMIN — ENOXAPARIN SODIUM 30 MG: 100 INJECTION SUBCUTANEOUS at 08:30

## 2021-11-06 RX ADMIN — SODIUM CHLORIDE 920 ML: 900 INJECTION, SOLUTION INTRAVENOUS at 03:10

## 2021-11-06 RX ADMIN — SODIUM CHLORIDE 1000 ML: 900 INJECTION, SOLUTION INTRAVENOUS at 02:32

## 2021-11-06 RX ADMIN — ACETAMINOPHEN 1000 MG: 500 TABLET ORAL at 02:44

## 2021-11-06 RX ADMIN — PIPERACILLIN AND TAZOBACTAM 3.38 G: 3; .375 INJECTION, POWDER, LYOPHILIZED, FOR SOLUTION INTRAVENOUS at 08:30

## 2021-11-06 RX ADMIN — SODIUM CHLORIDE 75 ML/HR: 900 INJECTION, SOLUTION INTRAVENOUS at 08:19

## 2021-11-06 RX ADMIN — VANCOMYCIN HYDROCHLORIDE 1500 MG: 10 INJECTION, POWDER, LYOPHILIZED, FOR SOLUTION INTRAVENOUS at 04:30

## 2021-11-06 RX ADMIN — LEVOFLOXACIN 750 MG: 750 INJECTION, SOLUTION INTRAVENOUS at 04:11

## 2021-11-06 RX ADMIN — IOPAMIDOL 75 ML: 755 INJECTION, SOLUTION INTRAVENOUS at 04:19

## 2021-11-06 RX ADMIN — PIPERACILLIN AND TAZOBACTAM 3.38 G: 3; .375 INJECTION, POWDER, LYOPHILIZED, FOR SOLUTION INTRAVENOUS at 14:36

## 2021-11-06 RX ADMIN — PIPERACILLIN AND TAZOBACTAM 4.5 G: 4; .5 INJECTION, POWDER, FOR SOLUTION INTRAVENOUS at 02:49

## 2021-11-06 RX ADMIN — PIPERACILLIN AND TAZOBACTAM 3.38 G: 3; .375 INJECTION, POWDER, LYOPHILIZED, FOR SOLUTION INTRAVENOUS at 21:32

## 2021-11-06 RX ADMIN — DEXAMETHASONE SODIUM PHOSPHATE 6 MG: 4 INJECTION, SOLUTION INTRAMUSCULAR; INTRAVENOUS at 21:50

## 2021-11-06 NOTE — ED NOTES
Brie Shoemaker allowed nurse to speak to family about medical issues. Patient said \"yes, you can talk to my family about medical issues\". When asked if there was anything nurse should not talk about patient said \"No\".

## 2021-11-06 NOTE — ED NOTES
Purposeful rounding completed:    Side rails up x 2:  YES  Bed in low position and wheels locked: YES  Call bell within reach: YES  Comfort addressed: YES    Toileting needs addressed: YES  Plan of care reviewed/updated with patient and or family members: YES  IV site assessed: YES  Pain assessed and addressed: YES, 0    Patient is laying in bed on right side, eyes closed, no distress observed. Skin warm and dry to touch.

## 2021-11-06 NOTE — ED NOTES
TRANSFER - OUT REPORT:    Verbal report given to [facility] RN on [facility]  being transferred to [facility]  for routine progression of care       Report consisted of patients Situation, Background, Assessment and   Recommendations(SBAR). Information from the following report(s) SBAR was reviewed with the receiving nurse. Lines:   Peripheral IV 11/06/21 Left Antecubital (Active)   Site Assessment Clean, dry, & intact 11/06/21 0232   Phlebitis Assessment 0 11/06/21 0232   Infiltration Assessment 0 11/06/21 0232   Dressing Status Clean, dry, & intact 11/06/21 0232       Peripheral IV 11/06/21 Posterior; Right Forearm (Active)        Opportunity for questions and clarification was provided.

## 2021-11-06 NOTE — PROGRESS NOTES
OCCUPATIONAL THERAPY EVALUATION/DISCHARGE    Patient: Costa Zelaya (15 y.o. female)  Date: 11/6/2021  Primary Diagnosis: SIRENA (acute kidney injury) ([facility] [address] [N17.9]        Precautions:    (Droplet plus)  PLOF: Pt reports being independent with ADls and functional mobility w/o AD. ASSESSMENT AND RECOMMENDATIONS:  Pt cleared to participate in OT evaluation by RN. Upon entering room, pt received standing in the basin with soapy water, finishing up bathing. Pt was educated on safety with bathing tasks and was encouraged to sit down to have her feet washed. Pt verbalized understanding and was able to follow commands well, however, remained impulsive with all functional mobility. Based on the objective data described below, the patient presents with ability to perform basic ADLs and functional transfers near baseline level of function. Pt performed/simulated UB/LB ADLs with Mod Ind for seated and Supervision for std aspects due to decreased safety awareness. Pt reports she has family who can stay with her and assist if needed. Recommend Supervision at home for safety. Skilled occupational therapy is not indicated at this time. Discharge Recommendations: Home Health for safety check  Further Equipment Recommendations for Discharge: shower chair      SUBJECTIVE:   Patient stated I am doing fine, just my back hurts. 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   Barriers to Learning/Limitations: None  Compensate with: visual, verbal, tactile, kinesthetic cues/model    Home Situation:   Home Situation  Home Environment: Apartment  One/Two Story Residence: One story  Living Alone: Yes  Current DME Used/Available at Home: None  Tub or Shower Type: Tub/Shower combination  []     Right hand dominant   []     Left hand dominant    Cognitive/Behavioral Status:  Neurologic State: Alert  Orientation Level: Oriented X4  Cognition: Follows commands; Poor safety awareness  Safety/Judgement: Awareness of environment; Fall prevention; Home safety    Skin: visible skin intact  Edema: none noted    Vision/Perceptual:       Acuity: Able to read clock/calendar on wall without difficulty     Coordination: BUE  Coordination: Generally decreased, functional  Fine Motor Skills-Upper: Left Intact; Right Intact    Gross Motor Skills-Upper: Left Intact; Right Intact    Balance:  Sitting: Intact  Standing: Impaired; Without support  Standing - Static: Good  Standing - Dynamic : Fair (plus)    Strength: BUE  Strength: Generally decreased, functional   Tone & Sensation: BUE  Tone: Normal  Sensation: Impaired   Range of Motion: BUE  AROM: Generally decreased, functional   Functional Mobility and Transfers for ADLs:  Bed Mobility:   Sit to Supine: Modified independent  Scooting: Modified independent  Transfers:  Sit to Stand: Modified independent  Stand to Sit: Modified independent   Toilet Transfer : Modified independent    ADL Assessment:  Feeding: Modified independent  Oral Facial Hygiene/Grooming: Modified Independent  Bathing: Supervision  Upper Body Dressing: Modified independent  Lower Body Dressing: Supervision  Toileting: Modified independent   ADL Intervention:   Cognitive Retraining  Safety/Judgement: Awareness of environment; Fall prevention; Home safety  Pain:  Pain level pre-treatment: not rated, c/o pain in back  Pain level post-treatment: not rated    Activity Tolerance:   Fair  Please refer to the flowsheet for vital signs taken during this treatment.   After treatment:   []  Patient left in no apparent distress sitting up in chair  [x]  Patient left in no apparent distress in bed  [x]  Call bell left within reach  [x]  Nursing notified  []  Caregiver present  []  Bed alarm activated    COMMUNICATION/EDUCATION:   [x]      Role of Occupational Therapy in the acute care setting  [x]      Home safety education was provided and the patient/caregiver indicated understanding. [x]      Patient/family have participated as able and agree with findings and recommendations. []      Patient is unable to participate in plan of care at this time. Thank you for this referral.  Harper Bender OTR/L  Time Calculation: 13 mins      Eval Complexity: History: LOW Complexity : Brief history review ; Examination: LOW Complexity : 1-3 performance deficits relating to physical, cognitive , or psychosocial skils that result in activity limitations and / or participation restrictions ;    Decision Making:LOW Complexity : No comorbidities that affect functional and no verbal or physical assistance needed to complete eval tasks

## 2021-11-06 NOTE — PROGRESS NOTES
Provided prayer outside the glass door. Patient is comfortably resting in a dark room. Patient is not available to be assessed at this time.       Alessandro Soto [phone]

## 2021-11-06 NOTE — ED TRIAGE NOTES
Pt to ED with complaints of HA and bilateral lower back pain that started 2 weeks and worsened today. Pt denies any known injury.

## 2021-11-06 NOTE — ED NOTES
Patient c/o discomfort and pain while laying in bed and when moving. Endorses back is uncomfortable and painful at times when moving. Refuses to give pain number on number scale. Asked multiple times for pain rating 0 out of 10 pain level.

## 2021-11-06 NOTE — ED NOTES
Purposeful rounding completed:    Side rails up x 2:  YES  Bed in low position and wheels locked: YES  Call bell within reach: YES  Comfort addressed: YES    Toileting needs addressed: YES  Plan of care reviewed/updated with patient and or family members: YES  IV site assessed: YES  Pain assessed and addressed: YES, 0    Patient is washing up for the day. New sheets, gown, and monitor leads applied. No distress observed.

## 2021-11-06 NOTE — Clinical Note
Status[de-identified] INPATIENT [101] Type of Bed: Medical [8] Cardiac Monitoring Required?: Yes Inpatient Hospitalization Certified Necessary for the Following Reasons: 3. Patient receiving treatment that can only be provided in an inpatient setting (further clarification in H&P documentation) Admitting Diagnosis: SIRENA (acute kidney injury) ([facility] [[id number]] Admitting Physician: Wen Del Valle [[id number]] Attending Physician: Wen Del Valle [[id number]] Estimated Length of Stay: 3-4 Midnights Discharge Plan[de-identified] Home with Office Follow-up

## 2021-11-06 NOTE — ED PROVIDER NOTES
EMERGENCY DEPARTMENT HISTORY AND PHYSICAL EXAM    2:20 AM    Date: 11/6/2021  Patient Name: Costa Zelaya    History of Presenting Illness     Chief Complaint   Patient presents with    Headache    Back Pain       History Provided By: Patient  Location/Duration/Severity/Modifying factors   HPI  Costa Zelaya is a 80 y.o. female intestinal history of angina, anxiety, prior CVA, potential malignant neoplasm of right lung presenting for evaluation of headache and back pain. She says that for the last 2 weeks she has had a diffuse headache, no photophobia, changes in vision, neck pain or stiffness or fevers. She has not taken anything for it, nothing seems to make it better. Pain is moderate to severe and throbbing. She also complains of poorly localized back pain \"all over my back\", but does not have any chest pain or difficulty breathing. She reports no bowel or bladder incontinence, saddle anesthesia, difficulty ambulating. No known trauma. She says she has been coughing, but this is normal because of her smoking. She also complains of dysuria, but not having any abdominal pain, bowel problems, nausea or vomiting. She smokes cigarettes, occasionally drinks alcohol and denies recreational drug use. Patient is very poor historian, difficult to obtain additional information. Not vaccinated against COVID.      PCP: None    Current Facility-Administered Medications   Medication Dose Route Frequency Provider Last Rate Last Admin    sodium chloride (NS) flush 5-10 mL  5-10 mL IntraVENous PRN Tamanna Kay MD        piperacillin-tazobactam (ZOSYN) 3.375 g in 0.9% sodium chloride (MBP/ADV) 100 mL MBP  3.375 g IntraVENous Q6H Tamanna Kay MD        levoFLOXacin (LEVAQUIN) 750 mg in D5W IVPB  750 mg IntraVENous Q24H Tamanna Kay MD   IV Completed at 11/06/21 0541    VANCOMYCIN INFORMATION NOTE   Other Rx Dosing/Monitoring Tamanna Kay MD        enoxaparin (LOVENOX) injection 30 mg  30 mg SubCUTAneous Q12H Hall, [place] Jardo, DO        acetaminophen (TYLENOL) tablet 650 mg  650 mg Oral Q6H PRN Jennifer Gregory DO        Or    acetaminophen (TYLENOL) suppository 650 mg  650 mg Rectal Q6H PRN Yara Del Valle DO         Current Outpatient Medications   Medication Sig Dispense Refill    acetaminophen (Tylenol Extra Strength) 500 mg tablet Take 1 Tab by mouth every six (6) hours as needed for Pain. 20 Tab 0    permethrin (NIX) 1 % lotion Apply to entire body below the chin leave on for 10 hours and then wash off 2 Bottle 0       Past History     Past Medical History:  Past Medical History:   Diagnosis Date    Angina at rest [facility])     Anxiety     CVA, old, facial weakness 2012    left ptosis    Dental caries        Past Surgical History:  No past surgical history on file. Family History:  No family history on file. Social History:  Social History     Tobacco Use    Smoking status: Former Smoker     Years: 15.00    Smokeless tobacco: Never Used   Substance Use Topics    Alcohol use: No    Drug use: No     Comment: none in 2 years       Allergies: Allergies   Allergen Reactions    Aspirin Nausea Only       I reviewed and confirmed the above information with patient and updated as necessary. Review of Systems     Review of Systems   Constitutional: Negative for diaphoresis and fever. HENT: Negative for ear pain and sore throat. Eyes:        No acute change in vision   Respiratory: Negative for cough and shortness of breath. Cardiovascular: Negative for chest pain and leg swelling. Gastrointestinal: Negative for abdominal pain and vomiting. Genitourinary: Negative for dysuria. Musculoskeletal: Positive for back pain. Negative for neck pain. Skin: Negative for wound. Neurological: Positive for headaches. Negative for syncope, weakness and numbness.        Physical Exam     Visit Vitals  BP (!) 110/57   Pulse 83   Temp 98.3 °F (36.8 °C)   Resp 29   Ht 4' 11\" (1.499 m) Wt 64 kg (141 lb)   SpO2 96%   BMI 28.48 kg/m²       Physical Exam  Vitals and nursing note reviewed. Constitutional:       Comments: No female lying in a hospital stretcher. Awake and alert. Independently ambulatory in the emergency department   HENT:      Mouth/Throat:      Mouth: Mucous membranes are moist.   Eyes:      Extraocular Movements: Extraocular movements intact. Pupils: Pupils are equal, round, and reactive to light. Cardiovascular:      Rate and Rhythm: Regular rhythm. Tachycardia present. Pulses: Normal pulses. Comments: Proximal pulses are strong and symmetric  Pulmonary:      Effort: Pulmonary effort is normal. No respiratory distress. Breath sounds: Normal breath sounds. No wheezing. Abdominal:      Palpations: Abdomen is soft. Tenderness: There is no abdominal tenderness. There is no guarding. Hernia: No hernia is present. Musculoskeletal:         General: No signs of injury. Normal range of motion. Cervical back: Normal range of motion. Comments: Back pain not reproducible on exam   Skin:     General: Skin is warm. Neurological:      General: No focal deficit present. Mental Status: She is alert. Cranial Nerves: No cranial nerve deficit. Sensory: No sensory deficit. Motor: No weakness. Comments: Seems slightly confused         Diagnostic Study Results     Labs -  Recent Results (from the past 24 hour(s))   EKG, 12 LEAD, INITIAL    Collection Time: 11/06/21  2:11 AM   Result Value Ref Range    Ventricular Rate 124 BPM    Atrial Rate 124 BPM    P-R Interval 118 ms    QRS Duration 68 ms    Q-T Interval 308 ms    QTC Calculation (Bezet) 442 ms    Calculated P Axis 73 degrees    Calculated R Axis 20 degrees    Calculated T Axis 46 degrees    Diagnosis       Sinus tachycardia  Biatrial enlargement  Abnormal ECG  When compared with ECG of 21-SEP-2021 20:37,  Vent.  rate has increased BY  61 BPM     CBC WITH AUTOMATED DIFF Collection Time: 11/06/21  2:21 AM   Result Value Ref Range    WBC 8.5 4.6 - 13.2 K/uL    RBC 6.07 (H) 4.20 - 5.30 M/uL    HGB 17.3 (H) 12.0 - 16.0 g/dL    HCT 48.3 (H) 35.0 - 45.0 %    MCV 79.6 78.0 - 100.0 FL    MCH 28.5 24.0 - 34.0 PG    MCHC 35.8 31.0 - 37.0 g/dL    RDW 13.0 11.6 - 14.5 %    PLATELET 820 942 - 185 K/uL    MPV 10.2 9.2 - 11.8 FL    NEUTROPHILS 84 (H) 40 - 73 %    LYMPHOCYTES 8 (L) 21 - 52 %    MONOCYTES 4 3 - 10 %    EOSINOPHILS 3 0 - 5 %    BASOPHILS 0 0 - 2 %    ABS. NEUTROPHILS 7.1 1.8 - 8.0 K/UL    ABS. LYMPHOCYTES 0.7 (L) 0.9 - 3.6 K/UL    ABS. MONOCYTES 0.3 0.05 - 1.2 K/UL    ABS. EOSINOPHILS 0.3 0.0 - 0.4 K/UL    ABS. BASOPHILS 0.0 0.0 - 0.1 K/UL    DF AUTOMATED     METABOLIC PANEL, COMPREHENSIVE    Collection Time: 11/06/21  2:21 AM   Result Value Ref Range    Sodium 137 136 - 145 mmol/L    Potassium 3.7 3.5 - 5.5 mmol/L    Chloride 103 100 - 111 mmol/L    CO2 26 21 - 32 mmol/L    Anion gap 8 3.0 - 18 mmol/L    Glucose 175 (H) 74 - 99 mg/dL    BUN 40 (H) 7.0 - 18 MG/DL    Creatinine 1.42 (H) 0.6 - 1.3 MG/DL    BUN/Creatinine ratio 28 (H) 12 - 20      GFR est AA 44 (L) >60 ml/min/1.73m2    GFR est non-AA 37 (L) >60 ml/min/1.73m2    Calcium 9.2 8.5 - 10.1 MG/DL    Bilirubin, total 0.9 0.2 - 1.0 MG/DL    ALT (SGPT) 65 (H) 13 - 56 U/L    AST (SGOT) 98 (H) 10 - 38 U/L    Alk.  phosphatase 42 (L) 45 - 117 U/L    Protein, total 8.7 (H) 6.4 - 8.2 g/dL    Albumin 3.0 (L) 3.4 - 5.0 g/dL    Globulin 5.7 (H) 2.0 - 4.0 g/dL    A-G Ratio 0.5 (L) 0.8 - 1.7     CARDIAC PANEL,(CK, CKMB & TROPONIN)    Collection Time: 11/06/21  2:21 AM   Result Value Ref Range    CK - MB 1.3 <3.6 ng/ml    CK-MB Index 0.4 0.0 - 4.0 %     (H) 26 - 192 U/L    Troponin-I, QT <0.02 0.0 - 0.045 NG/ML   CULTURE, BLOOD    Collection Time: 11/06/21  2:21 AM    Specimen: Blood   Result Value Ref Range    Special Requests: RESISTANT      Culture result: NO GROWTH AFTER 3 HOURS     CULTURE, BLOOD    Collection Time: 11/06/21 2:21 AM    Specimen: Blood   Result Value Ref Range    Special Requests: LAB      Culture result: NO GROWTH AFTER 3 HOURS     POC LACTIC ACID    Collection Time: 11/06/21  2:30 AM   Result Value Ref Range    Lactic Acid (POC) 3.58 (HH) 0.40 - 2.00 mmol/L   URINALYSIS W/ RFLX MICROSCOPIC    Collection Time: 11/06/21  5:22 AM   Result Value Ref Range    Color DARK YELLOW      Appearance CLOUDY      Specific gravity >1.030 (H) 1.005 - 1.030    pH (UA) 5.5 5.0 - 8.0      Protein 100 (A) NEG mg/dL    Glucose Negative NEG mg/dL    Ketone TRACE (A) NEG mg/dL    Bilirubin Negative NEG      Blood SMALL (A) NEG      Urobilinogen 1.0 0.2 - 1.0 EU/dL    Nitrites Negative NEG      Leukocyte Esterase LARGE (A) NEG     URINE MICROSCOPIC ONLY    Collection Time: 11/06/21  5:22 AM   Result Value Ref Range    WBC 25 to 30 0 - 4 /hpf    RBC Negative 0 - 5 /hpf    Epithelial cells FEW 0 - 5 /lpf    Bacteria 2+ (A) NEG /hpf    Granular cast 1 to 3 NEG /lpf   POC LACTIC ACID    Collection Time: 11/06/21  5:49 AM   Result Value Ref Range    Lactic Acid (POC) 0.93 0.40 - 2.00 mmol/L         Radiologic Studies -   CT HEAD WO CONT   Final Result      No acute intracranial findings. .      Mild chronic microvascular ischemic change. Benign-appearing paranasal sinus disease. XR CHEST PORT    (Results Pending)   CTA CHEST ABD PELV W WO CONT    (Results Pending)           Medical Decision Making   I am the first provider for this patient. I reviewed the vital signs, available nursing notes, past medical history, past surgical history, family history and social history. Vital Signs-Reviewed the patient's vital signs. EKG: Sinus tachycardia, nondiagnostic for ischemia    Records Reviewed: Nursing Notes and Old Medical Records (Time of Review: 2:20 AM)    Provider Notes (Medical Decision Making):   MDM  28-year-old female here with back pain and headache, appears somewhat unwell and the differential is broad. Could include closed head process such as hemorrhage. Clinically doubt bacterial meningitis, full range of motion in the neck, and no fever here. With her poorly localized back pain certainly could be musculoskeletal, no red flag symptoms however also consider aortic dissection and pulmonary embolism, ACS, PNA, sepsis, others    ED Course: Progress Notes, Reevaluation, and Consults:  Patient arrives to triage afebrile but tachycardic to 135, hypotensive to 89/55. She is brought back emergently into a critical bay for further assessment. Repeat blood pressure now 120s over 90s, however tachycardia remains  Her neurologic exam is nonfocal, she is alert and oriented to herself and her location. No cranial nerve deficits, moving all 4 extremities with 5/5 strength. No sensory deficits. Cardiopulmonary exam notable just for tachycardia    Given her presenting hypotension, tachycardia and complaints of vague back pain will obtain a broad work-up including labs, cardiac panel, EKG, chest x-ray. We will also obtain a CT of her head and CTA chest abdomen pelvis for potential dissection. 2:32 AM  Lactic 3.58, will call sepsis alert and add abx coverage. CTA chest shows no acute aortic pathology, however consistent with COVID 19. Patient not hypoxic, however noted to have a creatinine of 1.42 with a normal baseline recently. Also has a urinary tract infection, which will be covered by the broad-spectrum antibiotics that she received  Troponin negative, EKG nondiagnostic    Patient will need to be medically admitted for SIRENA, suspected Covid. Have discussed with Dr. Steve Ferrell who agrees to admit. Appreciate her assistance. Procedures    Critical Care Time: 45 minutes of critical care time for hypotension, sepsis    Diagnosis     Clinical Impression:   1. Sepsis, due to unspecified organism, unspecified whether acute organ dysfunction present ([facility] [address]    2. Acute cystitis without hematuria    3. Suspected COVID-19 virus infection        Disposition:Admit    Follow-up Information    None          Patient's Medications   Start Taking    No medications on file   Continue Taking    ACETAMINOPHEN (TYLENOL EXTRA STRENGTH) 500 MG TABLET    Take 1 Tab by mouth every six (6) hours as needed for Pain. PERMETHRIN (NIX) 1 % LOTION    Apply to entire body below the chin leave on for 10 hours and then wash off   These Medications have changed    No medications on file   Stop Taking    No medications on file       Mary Lane MD   Emergency Medicine   November 6, 2021, 2:20 AM     This note is dictated utilizing Dragon voice recognition software. Unfortunately this leads to occasional typographical errors using the voice recognition. I apologize in advance if the situation occurs. If questions occur please do not hesitate to contact me directly.     Matthias Valle MD

## 2021-11-06 NOTE — ED NOTES
Grand daughter, Jayde Del Rosario, called to inquire about patient's plan of care. Asked patient, if I was allowed to or had her permission to, speak with Jayde Del Rosario about her current admission issues. Patient would not allow any information to be given to family. Nurse explained to Jayde Del Rosario we are not able to give any information to her or other family unless permission comes from patient.

## 2021-11-06 NOTE — ED NOTES
Purposeful rounding completed:    Side rails up x 2:  YES  Bed in low position and wheels locked: YES  Call bell within reach: YES  Comfort addressed: YES    Toileting needs addressed: YES  Plan of care reviewed/updated with patient and or family members: YES  IV site assessed: YES  Pain assessed and addressed: YES, 0    Patient is laying in bed, semi perez position, eyes closed, no distress observed.

## 2021-11-06 NOTE — H&P
History & Physical    Patient: Alona Hou MRN: [id number]  CSN: [id number]    YOB: 1950  Age: 80 y.o. Sex: female      DOA: 11/6/2021    Chief Complaint:   Chief Complaint   Patient presents with    Headache    Back Pain          HPI:     Alona Hou is a 80 y.o.  female with hx of angina, anxiety, prior CVA, possible malignant neoplasm of right lung. Patient presented to [facility] ED today secondary to back pain and headache. Headache has lasted approximately 2 weeks and described as diffuse. She has no aura or photophobia or changes in vision. Not associated with any fevers, neck pain or stiffness. She also has diffuse back pain and dysuria. Denies any nausea or vomiting. Difficult historian. Patient tearful during exam - \"I've been dehydrated for two weeks\". She states her daughter has been sick for the past 2 weeks and has been unable to care for her. ED - Abnormal UA, normal CT head, CTA chest with possible signs of COVID pneumonia. On room air. VSS. Past Medical History:   Diagnosis Date    Angina at rest [facility])     Anxiety     CVA, old, facial weakness 2012    left ptosis    Dental caries        No past surgical history. Patient unable to provide family history. Social History     Socioeconomic History    Marital status: SINGLE   Tobacco Use    Smoking status: Former Smoker     Years: 15.00    Smokeless tobacco: Never Used   Substance and Sexual Activity    Alcohol use: No    Drug use: No     Comment: none in 2 years    Sexual activity: Never       Prior to Admission medications    Medication Sig Start Date End Date Taking? Authorizing Provider   acetaminophen (Tylenol Extra Strength) 500 mg tablet Take 1 Tab by mouth every six (6) hours as needed for Pain.  8/6/20   Meme Anderson NP   permethrin (NIX) 1 % lotion Apply to entire body below the chin leave on for 10 hours and then wash off 1/19/20   Phuong Ramires PA-C       Allergies Allergen Reactions    Aspirin Nausea Only         Review of Systems  Comprehensive ROS obtained; negative except as documented in HPI       Physical Exam:     Physical Exam:  Visit Vitals  BP (!) 110/57   Pulse 83   Temp 98.3 °F (36.8 °C)   Resp 29   Ht 4' 11\" (1.499 m)   Wt 64 kg (141 lb)   SpO2 96%   BMI 28.48 kg/m²      O2 Device: None (Room air)    Temp (24hrs), Av.6 °F (37 °C), Min:98.3 °F (36.8 °C), Max:98.8 °F (37.1 °C)    No intake/output data recorded. No intake/output data recorded. General:  Frail. Alert, emotional, distressed, appears stated age. Head: Normocephalic, without obvious abnormality, atraumatic. Eyes:  Conjunctivae/corneas clear. PERRL. Nose: Nares normal.    Neck: Supple, symmetrical, trachea midline   Lungs:   Clear to auscultation bilaterally. Heart:   tachycardia, normal rhythm, S1, S2 normal.     Abdomen: Soft, non-tender. No suprapubic tenderness   Extremities: No edema. Pulses: 2+ and symmetric all extremities. Skin:  No rashes or lesions   Neurologic: AAOx3, No focal motor or sensory deficit. Labs Reviewed: All lab results for the last 24 hours reviewed. Recent Results (from the past 24 hour(s))   EKG, 12 LEAD, INITIAL    Collection Time: 21  2:11 AM   Result Value Ref Range    Ventricular Rate 124 BPM    Atrial Rate 124 BPM    P-R Interval 118 ms    QRS Duration 68 ms    Q-T Interval 308 ms    QTC Calculation (Bezet) 442 ms    Calculated P Axis 73 degrees    Calculated R Axis 20 degrees    Calculated T Axis 46 degrees    Diagnosis       Sinus tachycardia  Biatrial enlargement  Abnormal ECG  When compared with ECG of 21-SEP-2021 20:37,  Vent.  rate has increased BY  61 BPM     CBC WITH AUTOMATED DIFF    Collection Time: 21  2:21 AM   Result Value Ref Range    WBC 8.5 4.6 - 13.2 K/uL    RBC 6.07 (H) 4.20 - 5.30 M/uL    HGB 17.3 (H) 12.0 - 16.0 g/dL    HCT 48.3 (H) 35.0 - 45.0 %    MCV 79.6 78.0 - 100.0 FL    MCH 28.5 24.0 - 34.0 PG    MCHC 35.8 31.0 - 37.0 g/dL    RDW 13.0 11.6 - 14.5 %    PLATELET 217 039 - 079 K/uL    MPV 10.2 9.2 - 11.8 FL    NEUTROPHILS 84 (H) 40 - 73 %    LYMPHOCYTES 8 (L) 21 - 52 %    MONOCYTES 4 3 - 10 %    EOSINOPHILS 3 0 - 5 %    BASOPHILS 0 0 - 2 %    ABS. NEUTROPHILS 7.1 1.8 - 8.0 K/UL    ABS. LYMPHOCYTES 0.7 (L) 0.9 - 3.6 K/UL    ABS. MONOCYTES 0.3 0.05 - 1.2 K/UL    ABS. EOSINOPHILS 0.3 0.0 - 0.4 K/UL    ABS. BASOPHILS 0.0 0.0 - 0.1 K/UL    DF AUTOMATED     METABOLIC PANEL, COMPREHENSIVE    Collection Time: 11/06/21  2:21 AM   Result Value Ref Range    Sodium 137 136 - 145 mmol/L    Potassium 3.7 3.5 - 5.5 mmol/L    Chloride 103 100 - 111 mmol/L    CO2 26 21 - 32 mmol/L    Anion gap 8 3.0 - 18 mmol/L    Glucose 175 (H) 74 - 99 mg/dL    BUN 40 (H) 7.0 - 18 MG/DL    Creatinine 1.42 (H) 0.6 - 1.3 MG/DL    BUN/Creatinine ratio 28 (H) 12 - 20      GFR est AA 44 (L) >60 ml/min/1.73m2    GFR est non-AA 37 (L) >60 ml/min/1.73m2    Calcium 9.2 8.5 - 10.1 MG/DL    Bilirubin, total 0.9 0.2 - 1.0 MG/DL    ALT (SGPT) 65 (H) 13 - 56 U/L    AST (SGOT) 98 (H) 10 - 38 U/L    Alk.  phosphatase 42 (L) 45 - 117 U/L    Protein, total 8.7 (H) 6.4 - 8.2 g/dL    Albumin 3.0 (L) 3.4 - 5.0 g/dL    Globulin 5.7 (H) 2.0 - 4.0 g/dL    A-G Ratio 0.5 (L) 0.8 - 1.7     CARDIAC PANEL,(CK, CKMB & TROPONIN)    Collection Time: 11/06/21  2:21 AM   Result Value Ref Range    CK - MB 1.3 <3.6 ng/ml    CK-MB Index 0.4 0.0 - 4.0 %     (H) 26 - 192 U/L    Troponin-I, QT <0.02 0.0 - 0.045 NG/ML   CULTURE, BLOOD    Collection Time: 11/06/21  2:21 AM    Specimen: Blood   Result Value Ref Range    Special Requests: RESISTANT      Culture result: NO GROWTH AFTER 3 HOURS     CULTURE, BLOOD    Collection Time: 11/06/21  2:21 AM    Specimen: Blood   Result Value Ref Range    Special Requests: LAB      Culture result: NO GROWTH AFTER 3 HOURS     POC LACTIC ACID    Collection Time: 11/06/21  2:30 AM   Result Value Ref Range    Lactic Acid (POC) 3.58 (HH) 0.40 - 2.00 mmol/L   URINALYSIS W/ RFLX MICROSCOPIC    Collection Time: 11/06/21  5:22 AM   Result Value Ref Range    Color DARK YELLOW      Appearance CLOUDY      Specific gravity >1.030 (H) 1.005 - 1.030    pH (UA) 5.5 5.0 - 8.0      Protein 100 (A) NEG mg/dL    Glucose Negative NEG mg/dL    Ketone TRACE (A) NEG mg/dL    Bilirubin Negative NEG      Blood SMALL (A) NEG      Urobilinogen 1.0 0.2 - 1.0 EU/dL    Nitrites Negative NEG      Leukocyte Esterase LARGE (A) NEG     URINE MICROSCOPIC ONLY    Collection Time: 11/06/21  5:22 AM   Result Value Ref Range    WBC 25 to 30 0 - 4 /hpf    RBC Negative 0 - 5 /hpf    Epithelial cells FEW 0 - 5 /lpf    Bacteria 2+ (A) NEG /hpf    Granular cast 1 to 3 NEG /lpf   POC LACTIC ACID    Collection Time: 11/06/21  5:49 AM   Result Value Ref Range    Lactic Acid (POC) 0.93 0.40 - 2.00 mmol/L        XR Results (most recent):  Results from Hospital Encounter encounter on 09/21/21    XR CHEST PA LAT    Narrative  EXAM: XR CHEST PA LAT    INDICATION: gibson discomfort    COMPARISON: 2/25/2019. FINDINGS: PA and lateral radiographs of the chest demonstrate spiculated nodular  density in the right lower lobe measuring approximately 1.7 cm. Question a few  other smaller scattered pulmonary nodules. . The cardiac and mediastinal contours  and pulmonary vascularity are normal. Rotoscoliosis. Osseous degenerative  changes. .    Impression  Nodules suspect for malignancy. Recommend continued workup with potential PET CT  and/or biopsy. Please see subsequent CT chest for further findings. CT Results  (Last 48 hours)               11/06/21 0420  CT HEAD WO CONT Final result    Impression:      No acute intracranial findings. .       Mild chronic microvascular ischemic change. Benign-appearing paranasal sinus disease.                            Narrative:  EXAM: CT Head without Contrast       CLINICAL INDICATION/HISTORY: Severe headache       COMPARISON: None       TECHNIQUE: Standard axial CT imaging of the head without contrast.         One or more dose reduction techniques were used on this CT: automated exposure   control, adjustment of the mAs and/or kVp according to patient size, and   iterative reconstruction techniques. The specific techniques used on this CT   exam have been documented in the patient's electronic medical record. Digital   Imaging and Communications in Medicine (DICOM) format image data are available   to nonaffiliated external healthcare facilities or entities on a secure, media   free, reciprocally searchable basis with patient authorization for at least a   12-month period after this study. FINDINGS:         Brain: Cortical sulci, basilar cisterns and ventricles appear within normal   limits in size and configuration. No hemorrhage, mass effect or edema. No   intra-axial or extra-axial fluid collections. Gray-white differentiation is   maintained. Circumscribed water density structure, left basal ganglia, likely   prominent perivascular space. Mild burden of low-attenuation change within   cerebral white matter. Sinuses and mastoids: Mucosal thickening of the paranasal sinuses is evident. Procedures/imaging: see electronic medical records for all procedures/Xrays and details which were not copied into this note but were reviewed prior to creation of Plan      Assessment/Plan     Active Problems:    SIRENA (acute kidney injury) ([facility] [address] (11/6/2021)       Assessment  1. Severe Sepsis likely secondary to UTI and possible COVID  2. Urinary Tract Infection  3. PUI for COVID - CT findings suspicious for COVID pneumonia, on room air. 4. SIRENA    #1-4: admit. Follow up on COVID test. IVF. Continue broad spectrum AB. Inflammatory markers ordered.    5. Current every day smoker    #5: declines nicotine patch     Diet: regular   DVT/GI Prophylaxis: Lovenox  Code Status: full     Contact: Vola Maker daughter [phone]    Discussed with patient at bedside about hospital admission and plan care. He understands and agrees. All questions answered. Disclaimer: Sections of this note are dictated using utilizing voice recognition software. Minor typographical errors may be present. If questions arise, please do not hesitate to contact me or call our department.         Rosmery Stubbs, [facility]

## 2021-11-06 NOTE — ED NOTES
Patient alert and oriented x 4, undresses self, up to bedside commode, washed up at sink with soap and water. Dressed, ECG leads and SpO2 applied. Patient threw jeans away endorses she can't wear them and does not want them. Patient laying in bed, right side, laying quietly with eyes closed.

## 2021-11-07 LAB
ALBUMIN SERPL-MCNC: 2.3 G/DL (ref 3.4–5)
ALBUMIN/GLOB SERPL: 0.5 {RATIO} (ref 0.8–1.7)
ALP SERPL-CCNC: 31 U/L (ref 45–117)
ALT SERPL-CCNC: 43 U/L (ref 13–56)
ANION GAP SERPL CALC-SCNC: 4 MMOL/L (ref 3–18)
AST SERPL-CCNC: 53 U/L (ref 10–38)
BASOPHILS # BLD: 0 K/UL (ref 0–0.1)
BASOPHILS NFR BLD: 1 % (ref 0–2)
BILIRUB SERPL-MCNC: 1.2 MG/DL (ref 0.2–1)
BUN SERPL-MCNC: 12 MG/DL (ref 7–18)
BUN/CREAT SERPL: 14 (ref 12–20)
CALCIUM SERPL-MCNC: 8.4 MG/DL (ref 8.5–10.1)
CHLORIDE SERPL-SCNC: 110 MMOL/L (ref 100–111)
CO2 SERPL-SCNC: 27 MMOL/L (ref 21–32)
CREAT SERPL-MCNC: 0.86 MG/DL (ref 0.6–1.3)
CRP SERPL-MCNC: 8.1 MG/DL (ref 0–0.3)
D DIMER PPP FEU-MCNC: 1.07 UG/ML(FEU)
DIFFERENTIAL METHOD BLD: ABNORMAL
EOSINOPHIL # BLD: 0 K/UL (ref 0–0.4)
EOSINOPHIL NFR BLD: 0 % (ref 0–5)
ERYTHROCYTE [DISTWIDTH] IN BLOOD BY AUTOMATED COUNT: 12.8 % (ref 11.6–14.5)
FERRITIN SERPL-MCNC: 1917 NG/ML (ref 8–388)
FIBRINOGEN PPP-MCNC: 576 MG/DL (ref 210–451)
GLOBULIN SER CALC-MCNC: 4.2 G/DL (ref 2–4)
GLUCOSE SERPL-MCNC: 105 MG/DL (ref 74–99)
HCT VFR BLD AUTO: 39.2 % (ref 35–45)
HGB BLD-MCNC: 13.8 G/DL (ref 12–16)
INR PPP: 1.2 (ref 0.8–1.2)
LACTATE SERPL-SCNC: 1.5 MMOL/L (ref 0.4–2)
LYMPHOCYTES # BLD: 0.7 K/UL (ref 0.9–3.6)
LYMPHOCYTES NFR BLD: 13 % (ref 21–52)
MCH RBC QN AUTO: 28.1 PG (ref 24–34)
MCHC RBC AUTO-ENTMCNC: 35.2 G/DL (ref 31–37)
MCV RBC AUTO: 79.8 FL (ref 78–100)
MONOCYTES # BLD: 0.3 K/UL (ref 0.05–1.2)
MONOCYTES NFR BLD: 6 % (ref 3–10)
NEUTS SEG # BLD: 4.3 K/UL (ref 1.8–8)
NEUTS SEG NFR BLD: 81 % (ref 40–73)
PLATELET # BLD AUTO: 362 K/UL (ref 135–420)
PMV BLD AUTO: 10.5 FL (ref 9.2–11.8)
POTASSIUM SERPL-SCNC: 3.3 MMOL/L (ref 3.5–5.5)
PROT SERPL-MCNC: 6.5 G/DL (ref 6.4–8.2)
PROTHROMBIN TIME: 14.6 SEC (ref 11.5–15.2)
RBC # BLD AUTO: 4.91 M/UL (ref 4.2–5.3)
SODIUM SERPL-SCNC: 141 MMOL/L (ref 136–145)
TROPONIN I SERPL-MCNC: 0.02 NG/ML (ref 0–0.04)
VANCOMYCIN SERPL-MCNC: <0.8 UG/ML (ref 5–40)
WBC # BLD AUTO: 5.3 K/UL (ref 4.6–13.2)

## 2021-11-07 PROCEDURE — 83605 ASSAY OF LACTIC ACID: CPT

## 2021-11-07 PROCEDURE — 74011250636 HC RX REV CODE- 250/636: Performed by: FAMILY MEDICINE

## 2021-11-07 PROCEDURE — 96372 THER/PROPH/DIAG INJ SC/IM: CPT

## 2021-11-07 PROCEDURE — 80202 ASSAY OF VANCOMYCIN: CPT

## 2021-11-07 PROCEDURE — 65660000000 HC RM CCU STEPDOWN

## 2021-11-07 PROCEDURE — 2709999900 HC NON-CHARGEABLE SUPPLY

## 2021-11-07 PROCEDURE — 36415 COLL VENOUS BLD VENIPUNCTURE: CPT

## 2021-11-07 PROCEDURE — 96375 TX/PRO/DX INJ NEW DRUG ADDON: CPT

## 2021-11-07 PROCEDURE — 85610 PROTHROMBIN TIME: CPT

## 2021-11-07 PROCEDURE — 85025 COMPLETE CBC W/AUTO DIFF WBC: CPT

## 2021-11-07 PROCEDURE — 99252 IP/OBS CONSLTJ NEW/EST SF 35: CPT | Performed by: INTERNAL MEDICINE

## 2021-11-07 PROCEDURE — 97161 PT EVAL LOW COMPLEX 20 MIN: CPT

## 2021-11-07 PROCEDURE — 74011250636 HC RX REV CODE- 250/636: Performed by: STUDENT IN AN ORGANIZED HEALTH CARE EDUCATION/TRAINING PROGRAM

## 2021-11-07 PROCEDURE — 74011000250 HC RX REV CODE- 250: Performed by: FAMILY MEDICINE

## 2021-11-07 PROCEDURE — 84484 ASSAY OF TROPONIN QUANT: CPT

## 2021-11-07 PROCEDURE — G0378 HOSPITAL OBSERVATION PER HR: HCPCS

## 2021-11-07 PROCEDURE — 86140 C-REACTIVE PROTEIN: CPT

## 2021-11-07 PROCEDURE — 97530 THERAPEUTIC ACTIVITIES: CPT

## 2021-11-07 PROCEDURE — 82728 ASSAY OF FERRITIN: CPT

## 2021-11-07 PROCEDURE — 85379 FIBRIN DEGRADATION QUANT: CPT

## 2021-11-07 PROCEDURE — 80053 COMPREHEN METABOLIC PANEL: CPT

## 2021-11-07 PROCEDURE — 85384 FIBRINOGEN ACTIVITY: CPT

## 2021-11-07 PROCEDURE — 99233 SBSQ HOSP IP/OBS HIGH 50: CPT | Performed by: FAMILY MEDICINE

## 2021-11-07 RX ORDER — VANCOMYCIN/0.9 % SOD CHLORIDE 1.5G/250ML
1500 PLASTIC BAG, INJECTION (ML) INTRAVENOUS EVERY 24 HOURS
Status: DISCONTINUED | OUTPATIENT
Start: 2021-11-07 | End: 2021-11-07

## 2021-11-07 RX ADMIN — WATER 1 G: 1 INJECTION INTRAMUSCULAR; INTRAVENOUS; SUBCUTANEOUS at 10:44

## 2021-11-07 RX ADMIN — ENOXAPARIN SODIUM 30 MG: 100 INJECTION SUBCUTANEOUS at 10:44

## 2021-11-07 RX ADMIN — ENOXAPARIN SODIUM 30 MG: 100 INJECTION SUBCUTANEOUS at 23:20

## 2021-11-07 RX ADMIN — Medication 10 ML: at 10:44

## 2021-11-07 NOTE — PROGRESS NOTES
Problem: Mobility Impaired (Adult and Pediatric)  Goal: *Acute Goals and Plan of Care (Insert Text)  Description: Physical Therapy Goals  Initiated 11/7/2021 and to be accomplished within 7 day(s)  1. Patient will move from supine to sit and sit to supine , scoot up and down, and roll side to side in bed with modified independence. 2.  Patient will transfer from bed to chair and chair to bed with modified independence using the least restrictive device. 3.  Patient will perform sit to stand with modified independence. 4.  Patient will ambulate with modified independence for 200 feet with the least restrictive device. PLOF: Pt reporting she lives alone in 1 story house with 0 CHRISTIANA, no AD. Pt has children who check up on her in the area. Outcome: Progressing Towards Goal   PHYSICAL THERAPY EVALUATION    Patient: Steve Garcia (57 y.o. female)  Date: 11/7/2021  Primary Diagnosis: SIRENA (acute kidney injury) ([facility] [address] [N17.9]        Precautions:   (Droplet plus)      ASSESSMENT :  Pt cleared to participate in PT session, pt received semi-reclined in bed and agreeable to therapy session with max encouragement and increased time. Based on the objective data described below, the patient presents with decreased endurance, decreased strength, decreased balance reactions, gait deviations, and decreased independence in functional mobility. Pt completing bed mobility with supervision. Pt educated on role of PT and safety while in hospital. Pt sitting on EOB with fair balance, preference for elbow support on thighs. Pt standing with SBA and ambulating to toilet with SBA-CGA. Pt toileting with supervision. Pt then standing with SBA and self recovered loss of balance utilizing stepping strategy and second loss of balance with reaching out to wall for support. Pt reporting \" I am only falling because I am nervous you are watching me! .\" Pt with increased time to perform pericare, asking for wash cloth and soap.  Pt performing with semi squat position. Pt returned to bed with quick steps and returning to supine with supervision. Bed alarm activated for safety. Pt would benefit from RW and 24/7 assist at home. Pt positioned for comfort and educated to call for assist before getting up, pt verbalized understanding. Pt left with all needs met and call bell in reach. RN notified of position and participation. Patient will benefit from skilled intervention to address the above impairments. Patient's rehabilitation potential is considered to be Fair  Factors which may influence rehabilitation potential include:   []         None noted  []         Mental ability/status  []         Medical condition  []         Home/family situation and support systems  [x]         Safety awareness  []         Pain tolerance/management  []         Other:      PLAN :  Recommendations and Planned Interventions:   [x]           Bed Mobility Training             []    Neuromuscular Re-Education  [x]           Transfer Training                   []    Orthotic/Prosthetic Training  [x]           Gait Training                          []    Modalities  [x]           Therapeutic Exercises           []    Edema Management/Control  [x]           Therapeutic Activities            [x]    Family Training/Education  [x]           Patient Education  []           Other (comment):    Frequency/Duration: Patient will be followed by physical therapy 1-2 times per day/4-7 days per week to address goals. Discharge Recommendations: Home Health with 24/7 assist from family vs Rehab if unable to to have assist at home for safety. Further Equipment Recommendations for Discharge: rolling walker     SUBJECTIVE:   Patient stated I thought they wanted me to be independent, why do you have to be here? 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 Barriers to Learning/Limitations: yes;  altered mental status (i.e.Sedation, Confusion)  Compensate with: Visual Cues, Verbal Cues, and Tactile Cues  Home Situation:  Home Situation  Home Environment: Apartment  One/Two Story Residence: One story  Living Alone: Yes  Support Systems: Child(roxanne)  Patient Expects to be Discharged to[de-identified] Apartment  Current DME Used/Available at Home: None  Tub or Shower Type: Tub/Shower combination  Critical Behavior:  Neurologic State: Alert  Orientation Level: Oriented X4  Cognition: Impaired decision making  Safety/Judgement: Decreased awareness of environment; Decreased insight into deficits; Fall prevention  Psychosocial  Patient Behaviors: Cooperative  Purposeful Interaction: Yes  Pt Identified Daily Priority: Clinical issues (comment)  Caritas Process: Establish trust; Nurture loving kindness  Caring Interventions: Reassure; Therapeutic modalities  Therapeutic Modalities: Intentional therapeutic touch  Skin Condition/Temp: Warm        Skin Integumentary  Skin Color: Appropriate for ethnicity  Skin Condition/Temp: Warm     Strength:    Strength: Generally decreased, functional    Tone & Sensation:   Tone: Normal    Sensation: Intact    Range Of Motion:  AROM: Within functional limits    Posture:  Posture (WDL): Exceptions to WDL  Posture Assessment: Forward head  Functional Mobility:  Bed Mobility:     Supine to Sit: Supervision  Sit to Supine: Supervision  Scooting: Supervision  Transfers:  Sit to Stand: Stand-by assistance  Stand to Sit: Stand-by assistance    Balance:   Sitting: Intact  Standing: Impaired;  Without support  Standing - Static: Fair  Standing - Dynamic : Fair (-)    Ambulation/Gait Training:  Distance (ft): 15 Feet (ft) (x2)  Assistive Device:  (none )  Ambulation - Level of Assistance: Stand-by assistance; Contact guard assistance     Gait Description (WDL): Exceptions to WDL  Gait Abnormalities: Decreased step clearance    Base of Support: Narrowed     Speed/Kandice: Shuffled  Step Length: Right shortened; Left shortened    Pain:  Pain level pre-treatment: 0/10   Pain level post-treatment: 0/10     Activity Tolerance:   Fair tolerance in room     Please refer to the flowsheet for vital signs taken during this treatment. After treatment:   []         Patient left in no apparent distress sitting up in chair  [x]         Patient left in no apparent distress in bed  [x]         Call bell left within reach  [x]         Nursing notified  []         Caregiver present  [x]         Bed alarm activated  []         SCDs applied    COMMUNICATION/EDUCATION:   [x]         Role of Physical Therapy in the acute care setting. [x]         Fall prevention education was provided and the patient/caregiver indicated understanding. [x]         Patient/family have participated as able in goal setting and plan of care. [x]         Patient/family agree to work toward stated goals and plan of care. []         Patient understands intent and goals of therapy, but is neutral about his/her participation. []         Patient is unable to participate in goal setting/plan of care: ongoing with therapy staff.  []         Other:     Thank you for this referral.  Alyssia Pierre, PT   Time Calculation: 32 mins      Eval Complexity: History: MEDIUM  Complexity : 1-2 comorbidities / personal factors will impact the outcome/ POC Exam:LOW Complexity : 1-2 Standardized tests and measures addressing body structure, function, activity limitation and / or participation in recreation  Presentation: LOW Complexity : Stable, uncomplicated  Clinical Decision Making:Low Complexity low  Overall Complexity:LOW

## 2021-11-07 NOTE — CONSULTS
[facility]  Pulmonary, Critical Care, and Sleep Medicine    Name: Alfredo Joyner MRN: [id number]   : 1950 Hospital: [address] [place] [address]   Date: 2021        Pulmonary Initial In-Patient Consult                                              Consult requesting physician: Dr. Cecilia May  Reason for Consult: Pulmonary Nodule    IMPRESSION:   · 79 yo woman presenting with COVID though with very little pulmonary involvement at this time as imaging did not show any ground glass opacities or infiltrates but who has a significant smoking history and a RLL spiculated nodule that is new at least from when first noted 2 months ago; though likely the nodule has been there for some time already - no obvious hilar or mediastinal LAD. · COVID positive currently on RA  · History of CVA with residual facial weakness  · Anxiety disorder  · Prior cocaine use  · Prior smoker      RECOMMENDATIONS:   · Further work-up of nodule is warranted. Likely will need a biopsy. · Recommend PET scan for further characterization of nodule and staging. · Nutrition: per primary team  · Antibiotics reasonable though no obvious infiltrate on imaging. · Since patient does not have O2 requirement, consider stopping decadron. · Replace electrolytes  · HOB >=30 degree, aggressive pulmonary toileting, incentive spirometry, PT/OT eval and treat  · GI Prophylaxis: per primary team   · DVT Prophylaxis: Lovenox  · Further recommendations will be based on the patient's response to recommended treatment and results of the investigation ordered. Subjective/History:      Alfredo Joyner is a 80 y.o. female with a PMHx of stroke with residual left-sided facial weakness, angina, and anxiety who is admitted for severe sepsis likely secondary to UTI and Covid19 pneumonia along with SIRENA. Patient is currently lying comfortably in bed on RA and denies any discomfort.   Pulmonary medicine consulted for finding of RLL spiculated nodule noted on CTA upon admission and which was also noted on CT chest about 2 months ago but has not been worked-up. When questioning patient about the nodule, she gave little indication that she was aware of this finding and did not give any further history of symptoms. She did state that she smoked for 40 years but no longer smokes. I was unable to obtain further information from patient as she was not very communicative and gave impression that she did not fully understand the significance of the finding despite attempts to explain further. Review of Systems:  Review of systems not obtained due to patient factors. 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 No family history on file. Social History     Tobacco Use    Smoking status: Former Smoker     Years: 15.00    Smokeless tobacco: Never Used   Substance Use Topics    Alcohol use: No        Prior to Admission medications    Medication Sig Start Date End Date Taking? Authorizing Provider   acetaminophen (Tylenol Extra Strength) 500 mg tablet Take 1 Tab by mouth every six (6) hours as needed for Pain.  8/6/20   Londell Cramp, NP   permethrin (NIX) 1 % lotion Apply to entire body below the chin leave on for 10 hours and then wash off 1/19/20   Fausto Hoyt PA-C       Current Facility-Administered Medications   Medication Dose Route Frequency    sodium chloride (NS) flush 5-10 mL  5-10 mL IntraVENous PRN    enoxaparin (LOVENOX) injection 30 mg  30 mg SubCUTAneous Q12H    acetaminophen (TYLENOL) tablet 650 mg  650 mg Oral Q6H PRN    Or    acetaminophen (TYLENOL) suppository 650 mg  650 mg Rectal Q6H PRN    dexamethasone (DECADRON) 4 mg/mL injection 6 mg  6 mg IntraVENous Q24H    cefTRIAXone (ROCEPHIN) 1 g in sterile water (preservative free) 10 mL IV syringe  1 g IntraVENous Q24H         Objective:   Vital Signs:    Visit Vitals  /82   Pulse 92   Temp 97.9 °F (36.6 °C)   Resp 20   Ht 4' 11\" (1.499 m)   Wt 61.3 kg (135 lb 1.6 oz)   SpO2 94%   BMI 27.29 kg/m²       O2 Device: None (Room air)       Temp (24hrs), Av °F (37.2 °C), Min:97.9 °F (36.6 °C), Max:99.8 °F (37.7 °C)       Intake/Output:   Last shift:      No intake/output data recorded. Last 3 shifts:  1901 -  0700  In: 1726.3 [P.O.:360; I.V.:1366.3]  Out: 400 [Urine:400]    Intake/Output Summary (Last 24 hours) at 2021 1301  Last data filed at 2021 0507  Gross per 24 hour   Intake 1726.25 ml   Output 400 ml   Net 1326.25 ml       Physical Exam:     General:  Alert, Awake, NAD, cooperative, no distress, appears stated age. Head:   Normocephalic, without obvious abnormality, atraumatic. Eye:   Conjunctivae/corneas clear. PERRLA, no scleral icterus, no pallor, no cyanosis  Nose:   Nares normal. Septum midline. Mucosa normal without erythema/exudate. No drainage/discharge. No sinus tenderness. Throat:  Lips, mucosa, and tongue normal. Teeth and gums normal. No tonsillar enlargement, no erythema, no exudates, no oral thrush  Neck:   Supple, symmetric, thyroid: no enlargement/tenderness/nodule, no JVD, no carotid bruit, no lymphadenopathy. Trachea midline  Back & spine: Symmetric, no curvature. Chest wall: No tenderness or deformity. No rash  Lung:   Adequate air entry bilateral equal, no rales, no rhonchi, no wheezing. No dullness on percussion. Heart:   Regular rate & rhythm. S1 S2 present, no murmur, no gallop, no click, no rub  Abdomen:  Soft, NT, ND, +BS, no masses, no organomegaly  Extremities:  No pedal edema, no cyanosis, no clubbing  Pulses: 2+ and symmetric in DP  Lymphatic:  No cervical, supraclavicular and axillary palpable lymphadenopathy. Musculoskeletal: No joint swelling or tenderness.   Neurologic:  Grossly non focal.        Data: Chemistry Recent Labs     11/07/21 0029 11/06/21 0221   * 175*    137   K 3.3* 3.7    103   CO2 27 26   BUN 12 40*   CREA 0.86 1.42*   CA 8.4* 9.2   AGAP 4 8   BUCR 14 28*   AP 31* 42*   TP 6.5 8.7*   ALB 2.3* 3.0*   GLOB 4.2* 5.7*   AGRAT 0.5* 0.5*        Lactic Acid Lactic acid   Date Value Ref Range Status   11/07/2021 1.5 0.4 - 2.0 MMOL/L Final     Recent Labs     11/07/21 0029   LAC 1.5        Liver Enzymes Protein, total   Date Value Ref Range Status   11/07/2021 6.5 6.4 - 8.2 g/dL Final     Albumin   Date Value Ref Range Status   11/07/2021 2.3 (L) 3.4 - 5.0 g/dL Final     Globulin   Date Value Ref Range Status   11/07/2021 4.2 (H) 2.0 - 4.0 g/dL Final     A-G Ratio   Date Value Ref Range Status   11/07/2021 0.5 (L) 0.8 - 1.7   Final     Alk. phosphatase   Date Value Ref Range Status   11/07/2021 31 (L) 45 - 117 U/L Final     Recent Labs     11/07/21 0029 11/06/21 0221   TP 6.5 8.7*   ALB 2.3* 3.0*   GLOB 4.2* 5.7*   AGRAT 0.5* 0.5*   AP 31* 42*        CBC w/Diff Recent Labs     11/07/21 0029 11/06/21 0221   WBC 5.3 8.5   RBC 4.91 6.07*   HGB 13.8 17.3*   HCT 39.2 48.3*    366   GRANS 81* 84*   LYMPH 13* 8*   EOS 0 3        Cardiac Enzymes Lab Results   Component Value Date/Time    TROIQ 0.02 11/07/2021 12:29 AM        BNP No results found for: BNP, BNPP, XBNPT     Coagulation Recent Labs     11/07/21 0029   PTP 14.6   INR 1.2         Thyroid  No results found for: T4, T3U, TSH, TSHEXT       Lipid Panel No results found for: CHOL, CHOLPOCT, CHOLX, CHLST, CHOLV, 222866, HDL, HDLP, LDL, LDLC, DLDLP, 136582, VLDLC, VLDL, TGLX, TRIGL, TRIGP, TGLPOCT, CHHD, CHHDX     ABG No results for input(s): PHI, PHI, POC2, PCO2I, PO2, PO2I, HCO3, HCO3I, FIO2, FIO2I in the last 72 hours.      Urinalysis Lab Results   Component Value Date/Time    Color DARK YELLOW 11/06/2021 05:22 AM    Appearance CLOUDY 11/06/2021 05:22 AM    Specific gravity >1.030 (H) 11/06/2021 05:22 AM    pH (UA) 5.5 11/06/2021 05:22 AM    Protein 100 (A) 11/06/2021 05:22 AM    Glucose Negative 11/06/2021 05:22 AM    Ketone TRACE (A) 11/06/2021 05:22 AM    Bilirubin Negative 11/06/2021 05:22 AM    Urobilinogen 1.0 11/06/2021 05:22 AM    Nitrites Negative 11/06/2021 05:22 AM    Leukocyte Esterase LARGE (A) 11/06/2021 05:22 AM    Epithelial cells FEW 11/06/2021 05:22 AM    Bacteria 2+ (A) 11/06/2021 05:22 AM    WBC 25 to 30 11/06/2021 05:22 AM    RBC Negative 11/06/2021 05:22 AM        Micro  Recent Labs     11/06/21  0121   CULT NO GROWTH 1 DAY  NO GROWTH 1 DAY     Recent Labs     11/06/21  0121   CULT NO GROWTH 1 DAY  NO GROWTH 1 DAY        XR (Most Recent). CXR reviewed by me and compared with previous CXR Results from Hospital Encounter encounter on 11/06/21    XR CHEST PORT    Narrative  EXAM: PORTABLE CHEST 0231 hours    CLINICAL HISTORY/INDICATION: Chest pain , bilateral low back pain, and headache  x2 weeks worsening day of arrival    COMPARISON: Chest x-ray 9/21/2021. TECHNIQUE: Single AP view    FINDINGS:    Multifocal bilateral groundglass opacities are demonstrated. The costophrenic  angles are sharp. Pulmonary vascularity is normal. The heart is normal in size. .    Impression  Commonly reported features of Covid - 19 pneumonia are present. Other processes such as influenza pneumonia and organizing pneumonia as can be  seen with drug toxicity and connective tissue disease can cause a similar  imaging pattern. Classification typical.       CT (Most Recent) Results from Hospital Encounter encounter on 11/06/21    CT HEAD WO CONT    Narrative  EXAM: CT Head without Contrast    CLINICAL INDICATION/HISTORY: Severe headache    COMPARISON: None    TECHNIQUE:  Standard axial CT imaging of the head without contrast.    One or more dose reduction techniques were used on this CT: automated exposure  control, adjustment of the mAs and/or kVp according to patient size, and  iterative reconstruction techniques.   The specific techniques used on this CT  exam have been documented in the patient's electronic medical record. Digital  Imaging and Communications in Medicine (DICOM) format image data are available  to nonaffiliated external healthcare facilities or entities on a secure, media  free, reciprocally searchable basis with patient authorization for at least a  12-month period after this study. FINDINGS:    Brain: Cortical sulci, basilar cisterns and ventricles appear within normal  limits in size and configuration. No hemorrhage, mass effect or edema. No  intra-axial or extra-axial fluid collections. Gray-white differentiation is  maintained. Circumscribed water density structure, left basal ganglia, likely  prominent perivascular space. Mild burden of low-attenuation change within  cerebral white matter. Sinuses and mastoids: Mucosal thickening of the paranasal sinuses is evident. Impression  No acute intracranial findings. .    Mild chronic microvascular ischemic change. Benign-appearing paranasal sinus disease. EKG No results found for this or any previous visit. ECHO No results found for this or any previous visit. PFT No flowsheet data found.      Other ASA reactivity:   Pre-albumin:   Ionized Calcium:   NH4:   T3, FT4:  Cortisol:  Urine Osm:  Urine Lytes:   HbA1c:      Recent Results (from the past 24 hour(s))   RESPIRATORY VIRUS PANEL W/COVID-19, PCR    Collection Time: 11/06/21  3:41 PM    Specimen: Nasopharyngeal   Result Value Ref Range    Adenovirus Not detected NOTD      Coronavirus 229E Not detected NOTD      Coronavirus HKU1 Not detected NOTD      Coronavirus CVNL63 Not detected NOTD      Coronavirus OC43 Not detected NOTD      SARS-CoV-2, PCR Detected (AA) NOTD      Metapneumovirus Not detected NOTD      Rhinovirus and Enterovirus Not detected NOTD      Influenza A Not detected NOTD      Influenza A, subtype H1 Not detected NOTD      Influenza A, subtype H3 Not detected NOTD INFLUENZA A H1N1 PCR Not detected NOTD      Influenza B Not detected NOTD      Parainfluenza 1 Not detected NOTD      Parainfluenza 2 Not detected NOTD      Parainfluenza 3 Not detected NOTD      Parainfluenza virus 4 Not detected NOTD      RSV by PCR Not detected NOTD      B. parapertussis, PCR Not detected NOTD      Bordetella pertussis - PCR Not detected NOTD      Chlamydophila pneumoniae DNA, QL, PCR Not detected NOTD      Mycoplasma pneumoniae DNA, QL, PCR Not detected NOTD     VANCOMYCIN, RANDOM    Collection Time: 11/07/21 12:29 AM   Result Value Ref Range    Vancomycin, random <0.8 (L) 5.0 - 61.2 UG/ML   METABOLIC PANEL, COMPREHENSIVE    Collection Time: 11/07/21 12:29 AM   Result Value Ref Range    Sodium 141 136 - 145 mmol/L    Potassium 3.3 (L) 3.5 - 5.5 mmol/L    Chloride 110 100 - 111 mmol/L    CO2 27 21 - 32 mmol/L    Anion gap 4 3.0 - 18 mmol/L    Glucose 105 (H) 74 - 99 mg/dL    BUN 12 7.0 - 18 MG/DL    Creatinine 0.86 0.6 - 1.3 MG/DL    BUN/Creatinine ratio 14 12 - 20      GFR est AA >60 >60 ml/min/1.73m2    GFR est non-AA >60 >60 ml/min/1.73m2    Calcium 8.4 (L) 8.5 - 10.1 MG/DL    Bilirubin, total 1.2 (H) 0.2 - 1.0 MG/DL    ALT (SGPT) 43 13 - 56 U/L    AST (SGOT) 53 (H) 10 - 38 U/L    Alk.  phosphatase 31 (L) 45 - 117 U/L    Protein, total 6.5 6.4 - 8.2 g/dL    Albumin 2.3 (L) 3.4 - 5.0 g/dL    Globulin 4.2 (H) 2.0 - 4.0 g/dL    A-G Ratio 0.5 (L) 0.8 - 1.7     PROTHROMBIN TIME + INR    Collection Time: 11/07/21 12:29 AM   Result Value Ref Range    Prothrombin time 14.6 11.5 - 15.2 sec    INR 1.2 0.8 - 1.2     FIBRINOGEN    Collection Time: 11/07/21 12:29 AM   Result Value Ref Range    Fibrinogen 576 (H) 210 - 451 mg/dL   TROPONIN I    Collection Time: 11/07/21 12:29 AM   Result Value Ref Range    Troponin-I, QT 0.02 0.0 - 0.045 NG/ML   LACTIC ACID    Collection Time: 11/07/21 12:29 AM   Result Value Ref Range    Lactic acid 1.5 0.4 - 2.0 MMOL/L   C REACTIVE PROTEIN, QT    Collection Time: 11/07/21 12:29 AM   Result Value Ref Range    C-Reactive protein 8.1 (H) 0 - 0.3 mg/dL   CBC WITH AUTOMATED DIFF    Collection Time: 11/07/21 12:29 AM   Result Value Ref Range    WBC 5.3 4.6 - 13.2 K/uL    RBC 4.91 4.20 - 5.30 M/uL    HGB 13.8 12.0 - 16.0 g/dL    HCT 39.2 35.0 - 45.0 %    MCV 79.8 78.0 - 100.0 FL    MCH 28.1 24.0 - 34.0 PG    MCHC 35.2 31.0 - 37.0 g/dL    RDW 12.8 11.6 - 14.5 %    PLATELET 596 696 - 682 K/uL    MPV 10.5 9.2 - 11.8 FL    NEUTROPHILS 81 (H) 40 - 73 %    LYMPHOCYTES 13 (L) 21 - 52 %    MONOCYTES 6 3 - 10 %    EOSINOPHILS 0 0 - 5 %    BASOPHILS 1 0 - 2 %    ABS. NEUTROPHILS 4.3 1.8 - 8.0 K/UL    ABS. LYMPHOCYTES 0.7 (L) 0.9 - 3.6 K/UL    ABS. MONOCYTES 0.3 0.05 - 1.2 K/UL    ABS. EOSINOPHILS 0.0 0.0 - 0.4 K/UL    ABS. BASOPHILS 0.0 0.0 - 0.1 K/UL    DF AUTOMATED     D DIMER    Collection Time: 11/07/21 12:29 AM   Result Value Ref Range    D DIMER 1.07 (H) <0.46 ug/ml(FEU)   FERRITIN    Collection Time: 11/07/21 12:29 AM   Result Value Ref Range    Ferritin 1,917 (H) 8 - 388 NG/ML         Telemetry:    The patient is: [] acutely ill Risk of deterioration: [] moderate    [] critically ill  [] high     [x]See my orders for details    My assessment, plan of care, findings, medications, side effects etc were discussed with:  [x] Nurse [] PT/OT    [] Respiratory therapy [x] Dr. Armando Patel   [] Family: answered all questions to satisfaction [x] Patient: answered all questions to satisfaction   [] Pharmacist []      [x] Total critical care time exclusive of procedures 45 minutes with complex decision making, coordination of care and counseling patient performed and > 50% time spent in face to face evaluation.       Karlie Crum MD  11/7/2021

## 2021-11-07 NOTE — PROGRESS NOTES
Reason for Admission:  SIRENA (acute kidney injury) ([facility] [address] [N17.9]                 RUR Score:    10            Plan for utilizing home health:    yes                      Likelihood of Readmission:   LOW                         Transition of Care Plan:              Initial assessment completed with daughter Cognitive status of patient: oriented to time, place, person and situation. Face sheet information confirmed:  yes. The patient designates her daughter to participate in her discharge plan and to receive any needed information. This patient lives in a apartment alone. Patient is able to navigate steps as needed. Prior to hospitalization, patient was considered to be independent with ADLs/IADLS : yes . Patient has a current ACP document on file: no      Healthcare Decision Maker:     Click here to complete [address] including selection of the Healthcare Decision Maker Relationship (ie \"Primary\")    The patient and daughter will be available to transport patient home upon discharge. The patient already has no medical equipment available in the home. Patient is not currently active with home health. Patient has not stayed in a skilled nursing facility or rehab. This patient is on dialysis :no    List of available Home Health agencies were provided and reviewed with the patient prior to discharge. Freedom of choice signed: yes, for any agency. Currently, the discharge plan is Home with [address]. The patient states that she can obtain her medications from the pharmacy, and take her medications as directed.     Patient's current insurance is Medicaid      Care Management Interventions  PCP Verified by CM: No  Mode of Transport at Discharge: [address] (CM Consult): Home Health  Physical Therapy Consult: Yes  Occupational Therapy Consult: Yes  Support Systems: Child(roxanne)  Confirm Follow Up Transport: [employer] for Transition of Care is Related to the Following Treatment Goals : Home with home health  The Patient and/or Patient Representative was Provided with a Choice of Provider and Agrees with the Discharge Plan?: Yes  Freedom of Choice List was Provided with Basic Dialogue that Supports the Patient's Individualized Plan of Care/Goals, Treatment Preferences and Shares the Quality Data Associated with the Providers?: Yes  Discharge Location  Discharge Placement: Home with home health    Patient had one dose of COVID vaccine    Myke Farmer RN

## 2021-11-07 NOTE — PROGRESS NOTES
Progress Note         Patient: Steve Garcia MRN: [id number]  CSN: [id number]    YOB: 1950  Age: 80 y.o. Sex: female    DOA: 11/6/2021 LOS:  LOS: 0 days                    Subjective:     Steve Garcia is a 80 y.o. female with a PMHx of stroke with residual left-sided facial weakness, angina, and anxiety who is admitted for severe sepsis likely secondary to UTI and Covid19 pneumonia along with SIRENA. Patient was swabbed for flu this morning which was negative  [employer] respiratory virus panel was ordered  Patient refused this initially  She then later agreed    Seen in the ED earlier today  Lying in bed, in mild distress  Quite tearful when I saw her, she is a very poor historian  Complains of diffuse headache and body aches all over  Denies any shortness of breath, TREVIZO  Tells me that she quit smoking 1 month ago  Does not appear to be able to answer other questions accurately    CTA chest done early this a.m. shows no PE, but does show stable RLL 2 cm irregular soft tissue mass concerning for primary lung carcinoma, along with features of Covid19  Bio fire respiratory panel positive for Covid19 today  Patient cannot tell me if she was vaccinated for [place] or not  Reports she was told about the concerning lung mass, but cannot give me any further details as to when or where      Objective:     Physical Exam:  Visit Vitals  /72   Pulse 75   Temp 99.8 °F (37.7 °C)   Resp 17   Ht 4' 11\" (1.499 m)   Wt 64 kg (141 lb)   SpO2 96%   BMI 28.48 kg/m²        General:         Alert, cooperative, in mild distress    HEENT: NC, Atraumatic. Anicteric sclerae. Lungs: CTA Bilaterally. No Wheezing/Rhonchi/Rales. Heart:  Regular  rhythm,  No murmur, No Rubs, No Gallops  Abdomen: Soft, Non distended, Non tender. +Bowel sounds, no HSM  Extremities: No c/c/e  Psych:   Not anxious or agitated. Neurologic:  Alert and oriented X 3. No acute neurological deficits.      Intake and Output:  Current Shift:  No intake/output data recorded. Last three shifts:  No intake/output data recorded. Labs: Results:       Chemistry Recent Labs     11/06/21 0221   *      K 3.7      CO2 26   BUN 40*   CREA 1.42*   CA 9.2   AGAP 8   BUCR 28*   AP 42*   TP 8.7*   ALB 3.0*   GLOB 5.7*   AGRAT 0.5*      CBC w/Diff Recent Labs     11/06/21 0221   WBC 8.5   RBC 6.07*   HGB 17.3*   HCT 48.3*      GRANS 84*   LYMPH 8*   EOS 3      Cardiac Enzymes Recent Labs     11/06/21 0221   *   CKND1 0.4      Coagulation No results for input(s): PTP, INR, APTT, INREXT in the last 72 hours. Lipid Panel No results found for: CHOL, CHOLPOCT, CHOLX, CHLST, CHOLV, 295494, HDL, HDLP, LDL, LDLC, DLDLP, 682376, VLDLC, VLDL, TGLX, TRIGL, TRIGP, TGLPOCT, CHHD, CHHDX   BNP No results for input(s): BNPP in the last 72 hours. Liver Enzymes Recent Labs     11/06/21 0221   TP 8.7*   ALB 3.0*   AP 42*      Thyroid Studies No results found for: T4, T3U, TSH, TSHEXT             Assessment and Plan:     Rogelio Ledesma is a 80 y.o. female with a PMHx of stroke with residual left-sided facial weakness, angina, and anxiety who is admitted for severe sepsis likely secondary to UTI and Covid19 pneumonia along with SIRENA. 1. Severe sepsisPOA, with tachycardia, tachypnea, lactic acidosis and SIRENA  2. [id number] pneumonia without hypoxia  3. UTI  4. SIRENA  5. Lactic acidosis  6. Hx stroke with residual left-sided facial weakness  7. Hx [place]  8. Hx of anxiety  9. Emotional lability  10. Tobacco abuse  11. RLL lung mass concerning for primary lung carcinoma    Pulmonology consulted, will see in a.m.   Discussed with pulmonology, work-up for lung mass will likely have to wait until patient clears Covid  Start IV steroidsdexamethasone 6 mg daily  Continue IV ABXceftriaxone, discontinue Levaquin and Zosyn  Follow-up blood and urine cultures  Follow-up inflammatory markers  Follow-up CBC, CMP  Incentive spirometry  PT, OT  Droplet plus precautions    Patient gave me permission to call and discuss her case with her daughter Scotty Nolen listed below. Attempted to call but no answer, voicemail left. Will attempt again tomorrow. Case discussed with:  [x]Patient  []Family  [x]Nursing  []Case Management  DVT prophylaxis: Lovenox  Diet: Regular  Contact: Adilson Raymundo (daughter)     [phone]  Code Status: FULL   Disposition: Continue current care, greater than 2 nights      H. Caryn Romero,   11/6/2021       Dragon medical dictation software was used for portions of this report. Unintended errors may occur.

## 2021-11-08 LAB
ANION GAP SERPL CALC-SCNC: 5 MMOL/L (ref 3–18)
BACTERIA SPEC CULT: NORMAL
BASOPHILS # BLD: 0 K/UL (ref 0–0.1)
BASOPHILS NFR BLD: 0 % (ref 0–2)
BUN SERPL-MCNC: 8 MG/DL (ref 7–18)
BUN/CREAT SERPL: 12 (ref 12–20)
CALCIUM SERPL-MCNC: 8.8 MG/DL (ref 8.5–10.1)
CC UR VC: NORMAL
CHLORIDE SERPL-SCNC: 107 MMOL/L (ref 100–111)
CO2 SERPL-SCNC: 27 MMOL/L (ref 21–32)
CREAT SERPL-MCNC: 0.66 MG/DL (ref 0.6–1.3)
CRP SERPL-MCNC: 6.2 MG/DL (ref 0–0.3)
D DIMER PPP FEU-MCNC: 0.91 UG/ML(FEU)
DIFFERENTIAL METHOD BLD: ABNORMAL
EOSINOPHIL # BLD: 0 K/UL (ref 0–0.4)
EOSINOPHIL NFR BLD: 1 % (ref 0–5)
ERYTHROCYTE [DISTWIDTH] IN BLOOD BY AUTOMATED COUNT: 12.7 % (ref 11.6–14.5)
FERRITIN SERPL-MCNC: 1481 NG/ML (ref 8–388)
GLUCOSE SERPL-MCNC: 94 MG/DL (ref 74–99)
HCT VFR BLD AUTO: 36.6 % (ref 35–45)
HGB BLD-MCNC: 13.2 G/DL (ref 12–16)
LYMPHOCYTES # BLD: 0.9 K/UL (ref 0.9–3.6)
LYMPHOCYTES NFR BLD: 20 % (ref 21–52)
MCH RBC QN AUTO: 28.6 PG (ref 24–34)
MCHC RBC AUTO-ENTMCNC: 36.1 G/DL (ref 31–37)
MCV RBC AUTO: 79.2 FL (ref 78–100)
MONOCYTES # BLD: 0 K/UL (ref 0.05–1.2)
MONOCYTES NFR BLD: 1 % (ref 3–10)
NEUTS SEG # BLD: 3.2 K/UL (ref 1.8–8)
NEUTS SEG NFR BLD: 73 % (ref 40–73)
OTHER CELLS NFR BLD MANUAL: 5 %
PLATELET # BLD AUTO: 396 K/UL (ref 135–420)
PLATELET COMMENTS,PCOM: ABNORMAL
PMV BLD AUTO: 10.3 FL (ref 9.2–11.8)
POTASSIUM SERPL-SCNC: 3.1 MMOL/L (ref 3.5–5.5)
PROCALCITONIN SERPL-MCNC: 0.06 NG/ML
RBC # BLD AUTO: 4.62 M/UL (ref 4.2–5.3)
RBC MORPH BLD: ABNORMAL
SERVICE CMNT-IMP: NORMAL
SODIUM SERPL-SCNC: 139 MMOL/L (ref 136–145)
WBC # BLD AUTO: 4.4 K/UL (ref 4.6–13.2)

## 2021-11-08 PROCEDURE — 74011000250 HC RX REV CODE- 250: Performed by: FAMILY MEDICINE

## 2021-11-08 PROCEDURE — 36415 COLL VENOUS BLD VENIPUNCTURE: CPT

## 2021-11-08 PROCEDURE — 74011250636 HC RX REV CODE- 250/636: Performed by: STUDENT IN AN ORGANIZED HEALTH CARE EDUCATION/TRAINING PROGRAM

## 2021-11-08 PROCEDURE — 74011250636 HC RX REV CODE- 250/636: Performed by: FAMILY MEDICINE

## 2021-11-08 PROCEDURE — 84145 PROCALCITONIN (PCT): CPT

## 2021-11-08 PROCEDURE — 96372 THER/PROPH/DIAG INJ SC/IM: CPT

## 2021-11-08 PROCEDURE — 96376 TX/PRO/DX INJ SAME DRUG ADON: CPT

## 2021-11-08 PROCEDURE — 85025 COMPLETE CBC W/AUTO DIFF WBC: CPT

## 2021-11-08 PROCEDURE — 74011250637 HC RX REV CODE- 250/637: Performed by: STUDENT IN AN ORGANIZED HEALTH CARE EDUCATION/TRAINING PROGRAM

## 2021-11-08 PROCEDURE — 99233 SBSQ HOSP IP/OBS HIGH 50: CPT | Performed by: FAMILY MEDICINE

## 2021-11-08 PROCEDURE — 65660000000 HC RM CCU STEPDOWN

## 2021-11-08 PROCEDURE — 80048 BASIC METABOLIC PNL TOTAL CA: CPT

## 2021-11-08 PROCEDURE — 82728 ASSAY OF FERRITIN: CPT

## 2021-11-08 PROCEDURE — G0378 HOSPITAL OBSERVATION PER HR: HCPCS

## 2021-11-08 PROCEDURE — 85379 FIBRIN DEGRADATION QUANT: CPT

## 2021-11-08 PROCEDURE — 99232 SBSQ HOSP IP/OBS MODERATE 35: CPT | Performed by: INTERNAL MEDICINE

## 2021-11-08 PROCEDURE — 86140 C-REACTIVE PROTEIN: CPT

## 2021-11-08 RX ADMIN — Medication 10 ML: at 10:31

## 2021-11-08 RX ADMIN — ENOXAPARIN SODIUM 30 MG: 100 INJECTION SUBCUTANEOUS at 10:29

## 2021-11-08 RX ADMIN — ENOXAPARIN SODIUM 30 MG: 100 INJECTION SUBCUTANEOUS at 22:14

## 2021-11-08 RX ADMIN — WATER 1 G: 1 INJECTION INTRAMUSCULAR; INTRAVENOUS; SUBCUTANEOUS at 10:28

## 2021-11-08 NOTE — PROGRESS NOTES
[facility]  Pulmonary, Critical Care, and Sleep Medicine    Name: Maryuri Valerio MRN: [id number]   : 1950 Hospital: [address] [place] [address]   Date: 2021        Pulmonary follow-up                                                  IMPRESSION:   Lung nodule -right lower lobe spiculated Irregular mass in the right lower lobe, approximately 2 x 1.6 cm without change. Smaller adjacent 2 nodules are also stable.-70 yo woman presenting with COVID though with very little pulmonary involvement at this time as imaging did not show any ground glass opacities or infiltrates but who has a significant smoking history and a RLL spiculated nodule that is new at least from when first noted 2 months ago; though likely the nodule has been there for some time already - no obvious hilar or mediastinal LAD. · COVID positive currently on RA  · History of CVA with residual facial weakness  · Anxiety disorder  · Prior cocaine use  · Prior smoker      RECOMMENDATIONS:   · Further work-up of nodule is warranted. Likely will need a biopsy. Can be done as outpatient after acute Covid resolved. · Will order outpatient PET scan for further characterization of nodule and staging. · HOB >=30 degree, aggressive pulmonary toileting, incentive spirometry, PT/OT eval and treat  · GI Prophylaxis: per primary team   · DVT Prophylaxis: Lovenox  · Further recommendations will be based on the patient's response to recommended treatment and results of the investigation ordered. · We will follow-up in pulmonary clinic in 4 weeks for further management     Subjective/History:   HPI   Maryuri Valerio is a 80 y.o. female with a PMHx of stroke with residual left-sided facial weakness, angina, and anxiety who is admitted for severe sepsis likely secondary to UTI and Covid19 pneumonia along with SIRENA. Patient is currently lying comfortably in bed on RA and denies any discomfort.   Pulmonary medicine consulted for finding of RLL spiculated nodule noted on CTA upon admission and which was also noted on CT chest about 2 months ago but has not been worked-up. When questioning patient about the nodule, she gave little indication that she was aware of this finding and did not give any further history of symptoms. She did state that she smoked for 40 years but no longer smokes. I was unable to obtain further information from patient as she was not very communicative and gave impression that she did not fully understand the significance of the finding despite attempts to explain further. 21  Patient on room air  Laying flat comfortably  No new complaints    Review of Systems:  Review of systems not obtained due to patient factors. Allergies   Allergen Reactions    Aspirin Nausea Only      Past Medical History:   Diagnosis Date    Angina at rest [facility])     Anxiety     CVA, old, facial weakness     left ptosis    Dental caries       Current Facility-Administered Medications   Medication Dose Route Frequency    sodium chloride (NS) flush 5-10 mL  5-10 mL IntraVENous PRN    enoxaparin (LOVENOX) injection 30 mg  30 mg SubCUTAneous Q12H    acetaminophen (TYLENOL) tablet 650 mg  650 mg Oral Q6H PRN    Or    acetaminophen (TYLENOL) suppository 650 mg  650 mg Rectal Q6H PRN    cefTRIAXone (ROCEPHIN) 1 g in sterile water (preservative free) 10 mL IV syringe  1 g IntraVENous Q24H       Objective:   Vital Signs:    Visit Vitals  BP (!) 147/82 (BP 1 Location: Right upper arm, BP Patient Position: At rest)   Pulse 82   Temp 98 °F (36.7 °C)   Resp 20   Ht 4' 11\" (1.499 m)   Wt 61.3 kg (135 lb 1.6 oz)   SpO2 92%   BMI 27.29 kg/m²       O2 Device: None (Room air)       Temp (24hrs), Av.4 °F (36.9 °C), Min:98 °F (36.7 °C), Max:98.7 °F (37.1 °C)       Intake/Output:   Last shift:      No intake/output data recorded.   Last 3 shifts:  1901 -  0700  In: 1726.3 [P.O.:360; I.V.:1366.3]  Out: 400 [Urine:400]  No intake or output data in the 24 hours ending 11/08/21 1618    Physical Exam:     General:  Alert, Awake, NAD, cooperative, no distress, appears stated age. Head:   Normocephalic, without obvious abnormality, atraumatic. Eye:   Conjunctivae/corneas clear. PERRLA, no scleral icterus, no pallor, no cyanosis  Nose:   Nares normal. Septum midline. Mucosa normal without erythema/exudate. No drainage/discharge. No sinus tenderness. Throat:  Lips, mucosa, and tongue normal. Teeth and gums normal. No tonsillar enlargement, no erythema, no exudates, no oral thrush  Neck:   Supple, symmetric, thyroid: no enlargement/tenderness/nodule, no JVD, no carotid bruit, no lymphadenopathy. Trachea midline  Back & spine: Symmetric, no curvature. Chest wall: No tenderness or deformity. No rash  Lung:   Adequate air entry bilateral equal, no rales, no rhonchi, no wheezing. No dullness on percussion. Heart:   Regular rate & rhythm. S1 S2 present, no murmur, no gallop, no click, no rub  Abdomen:  Soft, NT, ND, +BS, no masses, no organomegaly  Extremities:  No pedal edema, no cyanosis, no clubbing  Pulses: 2+ and symmetric in DP  Lymphatic:  No cervical, supraclavicular and axillary palpable lymphadenopathy. Musculoskeletal: No joint swelling or tenderness.   Neurologic:  Grossly non focal.        Data:         Chemistry Recent Labs     11/08/21  0453 11/07/21  0029 11/06/21  0221   GLU 94 105* 175*    141 137   K 3.1* 3.3* 3.7    110 103   CO2 27 27 26   BUN 8 12 40*   CREA 0.66 0.86 1.42*   CA 8.8 8.4* 9.2   AGAP 5 4 8   BUCR 12 14 28*   AP  --  31* 42*   TP  --  6.5 8.7*   ALB  --  2.3* 3.0*   GLOB  --  4.2* 5.7*   AGRAT  --  0.5* 0.5*        Lactic Acid Lactic acid   Date Value Ref Range Status   11/07/2021 1.5 0.4 - 2.0 MMOL/L Final     Recent Labs     11/07/21  0029   LAC 1.5        Liver Enzymes Protein, total   Date Value Ref Range Status   11/07/2021 6.5 6.4 - 8.2 g/dL Final     Albumin   Date Value Ref Range Status 11/07/2021 2.3 (L) 3.4 - 5.0 g/dL Final     Globulin   Date Value Ref Range Status   11/07/2021 4.2 (H) 2.0 - 4.0 g/dL Final     A-G Ratio   Date Value Ref Range Status   11/07/2021 0.5 (L) 0.8 - 1.7   Final     Alk. phosphatase   Date Value Ref Range Status   11/07/2021 31 (L) 45 - 117 U/L Final     Recent Labs     11/07/21 0029 11/06/21 0221   TP 6.5 8.7*   ALB 2.3* 3.0*   GLOB 4.2* 5.7*   AGRAT 0.5* 0.5*   AP 31* 42*        CBC w/Diff Recent Labs     11/08/21 0453 11/07/21 0029 11/06/21 0221   WBC 4.4* 5.3 8.5   RBC 4.62 4.91 6.07*   HGB 13.2 13.8 17.3*   HCT 36.6 39.2 48.3*    362 366   GRANS 73 81* 84*   LYMPH 20* 13* 8*   EOS 1 0 3        Cardiac Enzymes No results found for: CPK, CK, CKMMB, CKMB, RCK3, CKMBT, CKNDX, CKND1, SHAHLA, TROPT, TROIQ, SERGEY, TROPT, TNIPOC, BNP, BNPP     BNP No results found for: BNP, BNPP, XBNPT     Coagulation Recent Labs     11/07/21 0029   PTP 14.6   INR 1.2         Thyroid  No results found for: T4, T3U, TSH, TSHEXT, TSHEXT       Lipid Panel No results found for: CHOL, CHOLPOCT, CHOLX, CHLST, CHOLV, 229733, HDL, HDLP, LDL, LDLC, DLDLP, 032946, VLDLC, VLDL, TGLX, TRIGL, TRIGP, TGLPOCT, CHHD, CHHDX     ABG No results for input(s): PHI, PHI, POC2, PCO2I, PO2, PO2I, HCO3, HCO3I, FIO2, FIO2I in the last 72 hours.      Urinalysis Lab Results   Component Value Date/Time    Color DARK YELLOW 11/06/2021 05:22 AM    Appearance CLOUDY 11/06/2021 05:22 AM    Specific gravity >1.030 (H) 11/06/2021 05:22 AM    pH (UA) 5.5 11/06/2021 05:22 AM    Protein 100 (A) 11/06/2021 05:22 AM    Glucose Negative 11/06/2021 05:22 AM    Ketone TRACE (A) 11/06/2021 05:22 AM    Bilirubin Negative 11/06/2021 05:22 AM    Urobilinogen 1.0 11/06/2021 05:22 AM    Nitrites Negative 11/06/2021 05:22 AM    Leukocyte Esterase LARGE (A) 11/06/2021 05:22 AM    Epithelial cells FEW 11/06/2021 05:22 AM    Bacteria 2+ (A) 11/06/2021 05:22 AM    WBC 25 to 30 11/06/2021 05:22 AM    RBC Negative 11/06/2021 05:22 AM Micro  Recent Labs     11/06/21  0422 11/06/21  0121   CULT MIXED UROGENITAL DOUGIE ISOLATED NO GROWTH 2 DAYS  NO GROWTH 2 DAYS     Recent Labs     11/06/21  0422 11/06/21  0121   CULT MIXED UROGENITAL DOUGIE ISOLATED NO GROWTH 2 DAYS  NO GROWTH 2 DAYS        XR (Most Recent). CXR reviewed by me and compared with previous CXR Results from Hospital Encounter encounter on 11/06/21    XR CHEST PORT    Narrative  EXAM: PORTABLE CHEST 0231 hours    CLINICAL HISTORY/INDICATION: Chest pain , bilateral low back pain, and headache  x2 weeks worsening day of arrival    COMPARISON: Chest x-ray 9/21/2021. TECHNIQUE: Single AP view    FINDINGS:    Multifocal bilateral groundglass opacities are demonstrated. The costophrenic  angles are sharp. Pulmonary vascularity is normal. The heart is normal in size. .    Impression  Commonly reported features of Covid - 19 pneumonia are present. Other processes such as influenza pneumonia and organizing pneumonia as can be  seen with drug toxicity and connective tissue disease can cause a similar  imaging pattern. Classification typical.       CT (Most Recent) Results from Hospital Encounter encounter on 11/06/21    CT HEAD WO CONT    Narrative  EXAM: CT Head without Contrast    CLINICAL INDICATION/HISTORY: Severe headache    COMPARISON: None    TECHNIQUE:  Standard axial CT imaging of the head without contrast.    One or more dose reduction techniques were used on this CT: automated exposure  control, adjustment of the mAs and/or kVp according to patient size, and  iterative reconstruction techniques. The specific techniques used on this CT  exam have been documented in the patient's electronic medical record.   Digital  Imaging and Communications in Medicine (DICOM) format image data are available  to nonaffiliated external healthcare facilities or entities on a secure, media  free, reciprocally searchable basis with patient authorization for at least a  12-month period after this study. FINDINGS:    Brain: Cortical sulci, basilar cisterns and ventricles appear within normal  limits in size and configuration. No hemorrhage, mass effect or edema. No  intra-axial or extra-axial fluid collections. Gray-white differentiation is  maintained. Circumscribed water density structure, left basal ganglia, likely  prominent perivascular space. Mild burden of low-attenuation change within  cerebral white matter. Sinuses and mastoids: Mucosal thickening of the paranasal sinuses is evident. Impression  No acute intracranial findings. .    Mild chronic microvascular ischemic change. Benign-appearing paranasal sinus disease. EKG No results found for this or any previous visit. ECHO No results found for this or any previous visit. PFT No flowsheet data found. Other ASA reactivity:   Pre-albumin:   Ionized Calcium:   NH4:   T3, FT4:  Cortisol:  Urine Osm:  Urine Lytes:   HbA1c:      Recent Results (from the past 24 hour(s))   C REACTIVE PROTEIN, QT    Collection Time: 11/08/21  4:53 AM   Result Value Ref Range    C-Reactive protein 6.2 (H) 0 - 0.3 mg/dL   CBC WITH AUTOMATED DIFF    Collection Time: 11/08/21  4:53 AM   Result Value Ref Range    WBC 4.4 (L) 4.6 - 13.2 K/uL    RBC 4.62 4.20 - 5.30 M/uL    HGB 13.2 12.0 - 16.0 g/dL    HCT 36.6 35.0 - 45.0 %    MCV 79.2 78.0 - 100.0 FL    MCH 28.6 24.0 - 34.0 PG    MCHC 36.1 31.0 - 37.0 g/dL    RDW 12.7 11.6 - 14.5 %    PLATELET 168 198 - 070 K/uL    MPV 10.3 9.2 - 11.8 FL    NEUTROPHILS 73 40 - 73 %    LYMPHOCYTES 20 (L) 21 - 52 %    MONOCYTES 1 (L) 3 - 10 %    EOSINOPHILS 1 0 - 5 %    BASOPHILS 0 0 - 2 %    OTHER CELL 5      ABS. NEUTROPHILS 3.2 1.8 - 8.0 K/UL    ABS. LYMPHOCYTES 0.9 0.9 - 3.6 K/UL    ABS. MONOCYTES 0.0 (L) 0.05 - 1.2 K/UL    ABS. EOSINOPHILS 0.0 0.0 - 0.4 K/UL    ABS.  BASOPHILS 0.0 0.0 - 0.1 K/UL    DF MANUAL      PLATELET COMMENTS ADEQUATE PLATELETS      RBC COMMENTS NORMOCYTIC, NORMOCHROMIC     D DIMER Collection Time: 11/08/21  4:53 AM   Result Value Ref Range    D DIMER 0.91 (H) <0.46 ug/ml(FEU)   METABOLIC PANEL, BASIC    Collection Time: 11/08/21  4:53 AM   Result Value Ref Range    Sodium 139 136 - 145 mmol/L    Potassium 3.1 (L) 3.5 - 5.5 mmol/L    Chloride 107 100 - 111 mmol/L    CO2 27 21 - 32 mmol/L    Anion gap 5 3.0 - 18 mmol/L    Glucose 94 74 - 99 mg/dL    BUN 8 7.0 - 18 MG/DL    Creatinine 0.66 0.6 - 1.3 MG/DL    BUN/Creatinine ratio 12 12 - 20      GFR est AA >60 >60 ml/min/1.73m2    GFR est non-AA >60 >60 ml/min/1.73m2    Calcium 8.8 8.5 - 10.1 MG/DL   FERRITIN    Collection Time: 11/08/21  4:53 AM   Result Value Ref Range    Ferritin 1,481 (H) 8 - 388 NG/ML   PROCALCITONIN    Collection Time: 11/08/21  4:53 AM   Result Value Ref Range    Procalcitonin 0.06 ng/mL         Gurvinder Soliz MD  11/8/2021

## 2021-11-08 NOTE — PROGRESS NOTES
Progress Note         Patient: Radha Tineo MRN: [id number]  CSN: [id number]    YOB: 1950  Age: 80 y.o. Sex: female    DOA: 11/6/2021 LOS:  LOS: 2 days                    Subjective:     Radha Tineo is a 80 y.o. female with a PMHx of stroke with residual left-sided facial weakness, angina, and anxiety who is admitted for severe sepsis likely secondary to UTI and Covid19 pneumonia along with SIRENA, and who has been found to have a RLL mass concerning for lung cancer. CTA chest shows no PE, but does show stable RLL 2 cm irregular soft tissue mass concerning for primary lung carcinoma, along with features of Covid19  Reports she was told about the concerning lung mass, but cannot give me any further details as to when or where    Seen in room today  Lying in bed, NAD   She is a very poor historian  Reports feeling better   Denies any shortness of breath, TREVIZO  Does not appear to be able to answer many other questions accurately    Called and discussed patient's case with her granddaughter over the phone today. Discussed her overall lung mass concerning for cancer and how she will need follow-up with PCP, pulmonology and oncology. Granddaughter and her daughter are on board with helping her get to appointments. Objective:     Physical Exam:  Visit Vitals  /71 (BP 1 Location: Right upper arm, BP Patient Position: At rest)   Pulse 71   Temp 97.1 °F (36.2 °C)   Resp 18   Ht 4' 11\" (1.499 m)   Wt 61.3 kg (135 lb 1.6 oz)   SpO2 93%   BMI 27.29 kg/m²        General:         Alert, cooperative, NAD    HEENT: NC, Atraumatic. Anicteric sclerae. Lungs: CTA Bilaterally. No Wheezing/Rhonchi/Rales. Heart:  Regular  rhythm,  No murmur, No Rubs, No Gallops  Abdomen: Soft, Non distended, Non tender. +Bowel sounds, no HSM  Extremities: No c/c/e  Psych:   Not anxious or agitated. Neurologic:  Alert and oriented X 3. No acute neurological deficits.      Intake and Output:  Current Shift:  No intake/output data recorded. Last three shifts:  11/06 1901 - 11/08 0700  In: 1726.3 [P.O.:360; I.V.:1366.3]  Out: 400 [Urine:400]    Labs: Results:       Chemistry Recent Labs     11/08/21 0453 11/07/21 0029 11/06/21 0221   GLU 94 105* 175*    141 137   K 3.1* 3.3* 3.7    110 103   CO2 27 27 26   BUN 8 12 40*   CREA 0.66 0.86 1.42*   CA 8.8 8.4* 9.2   AGAP 5 4 8   BUCR 12 14 28*   AP  --  31* 42*   TP  --  6.5 8.7*   ALB  --  2.3* 3.0*   GLOB  --  4.2* 5.7*   AGRAT  --  0.5* 0.5*      CBC w/Diff Recent Labs     11/08/21 0453 11/07/21 0029 11/06/21 0221   WBC 4.4* 5.3 8.5   RBC 4.62 4.91 6.07*   HGB 13.2 13.8 17.3*   HCT 36.6 39.2 48.3*    362 366   GRANS 73 81* 84*   LYMPH 20* 13* 8*   EOS 1 0 3      Cardiac Enzymes Recent Labs     11/06/21 0221   *   CKND1 0.4      Coagulation Recent Labs     11/07/21 0029   PTP 14.6   INR 1.2       Lipid Panel No results found for: CHOL, CHOLPOCT, CHOLX, CHLST, CHOLV, 161843, HDL, HDLP, LDL, LDLC, DLDLP, 024118, VLDLC, VLDL, TGLX, TRIGL, TRIGP, TGLPOCT, CHHD, CHHDX   BNP No results for input(s): BNPP in the last 72 hours. Liver Enzymes Recent Labs     11/07/21 0029   TP 6.5   ALB 2.3*   AP 31*      Thyroid Studies No results found for: T4, T3U, TSH, TSHEXT, TSHEXT             Assessment and Plan:     Dov Augustin is a 80 y.o. female with a PMHx of stroke with residual left-sided facial weakness, angina, and anxiety who is admitted for severe sepsis likely secondary to UTI and Covid19 pneumonia along with SIRENA. 1. Severe sepsisPOA, with tachycardia, tachypnea, lactic acidosis and SIRENA  2. SDMDM41 pneumonia without hypoxia  3. UTI  4. SIRENA  5. Lactic acidosis  6. Elevated total bilirubin   7. Hx stroke with residual left-sided facial weakness  8. Hx angina  9. Hx of anxiety  10. Emotional lability  11. Tobacco abuse  12.  RLL lung mass concerning for primary lung carcinoma      Pulmonology following  Discontinue and monitor off ABX  Follow-up blood cultures- NGTD, urine culture negative  Follow-up inflammatory markers  Follow-up CBC, CMP  Incentive spirometry  PT, OTrecommending HH, orders placed  Droplet plus precautions    Needs f/u with pulmonology in 4 weeks for biopsy. Needs f/u with oncology as outpatient. Needs PET scan as outpatient. Called and discussed with patient's granddaughter over the phone today. Explained the significance of her lung mass and that she will need close follow-up as an outpatient with pulmonology, oncology and possibly surgery. Explained that she will need assistance with getting to these appointments and everything else involved. Daughter and granddaughter are in agreement and understood. Case discussed with:  [x]Patient  [x]Family  [x]Nursing  [x]Case Management  DVT prophylaxis: Lovenox  Diet: Regular  Contact: Cherelle Shukla (daughter)      [phone]      Aniyah Deleon (granddaughter)   [phone]  Code Status: FULL   Disposition: Likely discharge home tomorrow      RICARDO Alcazar DO  11/8/2021       Dragon medical dictation software was used for portions of this report. Unintended errors may occur.

## 2021-11-08 NOTE — PROGRESS NOTES
Progress Note         Patient: Hardeep Hernandez MRN: [id number]  CSN: [id number]    YOB: 1950  Age: 80 y.o. Sex: female    DOA: 11/6/2021 LOS:  LOS: 1 day                    Subjective:     Hardeep Hernandez is a 80 y.o. female with a PMHx of stroke with residual left-sided facial weakness, angina, and anxiety who is admitted for severe sepsis likely secondary to UTI and Covid19 pneumonia along with SIRENA, and who has been found to have a RLL mass concerning for lung cancer. CTA chest shows no PE, but does show stable RLL 2 cm irregular soft tissue mass concerning for primary lung carcinoma, along with features of Covid19  Reports she was told about the concerning lung mass, but cannot give me any further details as to when or where    Seen in room today  Lying in bed,NAD   She is a very poor historian  Reports generally feeling better   Denies any shortness of breath, TREVIZO  Does not appear to be able to answer many other questions accurately    Called and discussed patient's case with her daughter over the phone today. Daughter is in agreement that her mother needs help taking care of herself. Reports that she and her granddaughter have tried, but patient has been dismissive. Objective:     Physical Exam:  Visit Vitals  /70 (BP 1 Location: Left upper arm, BP Patient Position: Supine)   Pulse 92   Temp 98.5 °F (36.9 °C)   Resp 18   Ht 4' 11\" (1.499 m)   Wt 61.3 kg (135 lb 1.6 oz)   SpO2 93%   BMI 27.29 kg/m²        General:         Alert, cooperative, NAD    HEENT: NC, Atraumatic. Anicteric sclerae. Lungs: CTA Bilaterally. No Wheezing/Rhonchi/Rales. Heart:  Regular  rhythm,  No murmur, No Rubs, No Gallops  Abdomen: Soft, Non distended, Non tender. +Bowel sounds, no HSM  Extremities: No c/c/e  Psych:   Not anxious or agitated. Neurologic:  Alert and oriented X 3. No acute neurological deficits. Intake and Output:  Current Shift:  No intake/output data recorded.   Last three shifts:  11/06 0701 - 11/07 1900  In: 1726.3 [P.O.:360; I.V.:1366.3]  Out: 400 [Urine:400]    Labs: Results:       Chemistry Recent Labs     11/07/21 0029 11/06/21 0221   * 175*    137   K 3.3* 3.7    103   CO2 27 26   BUN 12 40*   CREA 0.86 1.42*   CA 8.4* 9.2   AGAP 4 8   BUCR 14 28*   AP 31* 42*   TP 6.5 8.7*   ALB 2.3* 3.0*   GLOB 4.2* 5.7*   AGRAT 0.5* 0.5*      CBC w/Diff Recent Labs     11/07/21 0029 11/06/21 0221   WBC 5.3 8.5   RBC 4.91 6.07*   HGB 13.8 17.3*   HCT 39.2 48.3*    366   GRANS 81* 84*   LYMPH 13* 8*   EOS 0 3      Cardiac Enzymes Recent Labs     11/06/21 0221   *   CKND1 0.4      Coagulation Recent Labs     11/07/21 0029   PTP 14.6   INR 1.2       Lipid Panel No results found for: CHOL, CHOLPOCT, CHOLX, CHLST, CHOLV, 597827, HDL, HDLP, LDL, LDLC, DLDLP, 241864, VLDLC, VLDL, TGLX, TRIGL, TRIGP, TGLPOCT, CHHD, CHHDX   BNP No results for input(s): BNPP in the last 72 hours. Liver Enzymes Recent Labs     11/07/21 0029   TP 6.5   ALB 2.3*   AP 31*      Thyroid Studies No results found for: T4, T3U, TSH, TSHEXT, TSHEXT             Assessment and Plan:     Jose G Duke is a 80 y.o. female with a PMHx of stroke with residual left-sided facial weakness, angina, and anxiety who is admitted for severe sepsis likely secondary to UTI and Covid19 pneumonia along with SIRENA. 1. Severe sepsisPOA, with tachycardia, tachypnea, lactic acidosis and SIRENA  2. PYUPV76 pneumonia without hypoxia  3. UTI  4. SIRENA  5. Lactic acidosis  6. Elevated total bilirubin   7. Hx stroke with residual left-sided facial weakness  8. Hx angina  9. Hx of anxiety  10. Emotional lability  11. Tobacco abuse  12. RLL lung mass concerning for primary lung carcinoma    Pulmonology consulted, will see in a.m.   Discussed with pulmonology, work-up for lung mass will likely have to wait until patient clears Covid  Start IV steroidsdexamethasone 6 mg daily  Continue IV ABXceftriaxone, discontinue Levaquin and Zosyn  Follow-up blood cultures- NGTD   Follow-up urine culture  Follow-up inflammatory markers  Follow-up CBC, CMP  Incentive spirometry  PT, OT  Droplet plus precautions    Needs f/u with pulmonology for biopsy. Needs f/u with oncology as outpatient. Needs PET scan as outpatient. Called and discussed with patient's daughter Chante Teresa over the phone today. Explained the significance of her lung mass and that she will need close follow-up as an outpatient with pulmonology, oncology and possibly surgery. Explained that she will need assistance with getting to these appointments and everything else involved. Daughter was in agreement and understood. Case discussed with:  [x]Patient  [x]Family  [x]Nursing  []Case Management  DVT prophylaxis: Lovenox  Diet: Regular  Contact: Zay Jorge (daughter)      [phone]      Tammie Mann (granddaughter)   [phone]  Code Status: FULL   Disposition: Continue current care, greater than 2 nights      H. Shahid Wilkins,   11/7/2021       Dragon medical dictation software was used for portions of this report. Unintended errors may occur.

## 2021-11-08 NOTE — ROUTINE PROCESS
Bedside shift change report given to MARITA Brown (oncoming nurse) by Rina Lyons RN (offgoing nurse).  Report included the following information SBAR, Kardex, MAR and Cardiac Rhythm NSR, ST.

## 2021-11-09 ENCOUNTER — HOME HEALTH ADMISSION (OUTPATIENT)
Dept: HOME HEALTH SERVICES | Facility: HOME HEALTH | Age: 71
End: 2021-11-09
Payer: MEDICAID

## 2021-11-09 VITALS
SYSTOLIC BLOOD PRESSURE: 131 MMHG | DIASTOLIC BLOOD PRESSURE: 78 MMHG | RESPIRATION RATE: 19 BRPM | OXYGEN SATURATION: 96 % | BODY MASS INDEX: 27.21 KG/M2 | HEIGHT: 59 IN | HEART RATE: 77 BPM | TEMPERATURE: 97.6 F | WEIGHT: 135 LBS

## 2021-11-09 LAB
ANION GAP SERPL CALC-SCNC: 6 MMOL/L (ref 3–18)
BASOPHILS # BLD: 0 K/UL (ref 0–0.1)
BASOPHILS NFR BLD: 1 % (ref 0–2)
BUN SERPL-MCNC: 7 MG/DL (ref 7–18)
BUN/CREAT SERPL: 11 (ref 12–20)
CALCIUM SERPL-MCNC: 8.9 MG/DL (ref 8.5–10.1)
CHLORIDE SERPL-SCNC: 107 MMOL/L (ref 100–111)
CO2 SERPL-SCNC: 26 MMOL/L (ref 21–32)
CREAT SERPL-MCNC: 0.63 MG/DL (ref 0.6–1.3)
CRP SERPL-MCNC: 5.5 MG/DL (ref 0–0.3)
DIFFERENTIAL METHOD BLD: ABNORMAL
EOSINOPHIL # BLD: 0.1 K/UL (ref 0–0.4)
EOSINOPHIL NFR BLD: 2 % (ref 0–5)
ERYTHROCYTE [DISTWIDTH] IN BLOOD BY AUTOMATED COUNT: 12.7 % (ref 11.6–14.5)
FERRITIN SERPL-MCNC: 1243 NG/ML (ref 8–388)
GLUCOSE SERPL-MCNC: 92 MG/DL (ref 74–99)
HCT VFR BLD AUTO: 40 % (ref 35–45)
HGB BLD-MCNC: 14.1 G/DL (ref 12–16)
LYMPHOCYTES # BLD: 1.4 K/UL (ref 0.9–3.6)
LYMPHOCYTES NFR BLD: 30 % (ref 21–52)
MCH RBC QN AUTO: 28.1 PG (ref 24–34)
MCHC RBC AUTO-ENTMCNC: 35.3 G/DL (ref 31–37)
MCV RBC AUTO: 79.7 FL (ref 78–100)
MONOCYTES # BLD: 0.2 K/UL (ref 0.05–1.2)
MONOCYTES NFR BLD: 4 % (ref 3–10)
NEUTS BAND NFR BLD MANUAL: 1 %
NEUTS SEG # BLD: 2.7 K/UL (ref 1.8–8)
NEUTS SEG NFR BLD: 59 % (ref 40–73)
OTHER CELLS NFR BLD MANUAL: 3 %
PLATELET # BLD AUTO: 422 K/UL (ref 135–420)
PLATELET COMMENTS,PCOM: ABNORMAL
PMV BLD AUTO: 10.1 FL (ref 9.2–11.8)
POTASSIUM SERPL-SCNC: 3.3 MMOL/L (ref 3.5–5.5)
RBC # BLD AUTO: 5.02 M/UL (ref 4.2–5.3)
RBC MORPH BLD: ABNORMAL
RBC MORPH BLD: ABNORMAL
SODIUM SERPL-SCNC: 139 MMOL/L (ref 136–145)
WBC # BLD AUTO: 4.5 K/UL (ref 4.6–13.2)

## 2021-11-09 PROCEDURE — 86140 C-REACTIVE PROTEIN: CPT

## 2021-11-09 PROCEDURE — G0378 HOSPITAL OBSERVATION PER HR: HCPCS

## 2021-11-09 PROCEDURE — 74011250636 HC RX REV CODE- 250/636: Performed by: STUDENT IN AN ORGANIZED HEALTH CARE EDUCATION/TRAINING PROGRAM

## 2021-11-09 PROCEDURE — 96372 THER/PROPH/DIAG INJ SC/IM: CPT

## 2021-11-09 PROCEDURE — 36415 COLL VENOUS BLD VENIPUNCTURE: CPT

## 2021-11-09 PROCEDURE — 74011250637 HC RX REV CODE- 250/637: Performed by: FAMILY MEDICINE

## 2021-11-09 PROCEDURE — 85025 COMPLETE CBC W/AUTO DIFF WBC: CPT

## 2021-11-09 PROCEDURE — 82728 ASSAY OF FERRITIN: CPT

## 2021-11-09 PROCEDURE — 99239 HOSP IP/OBS DSCHRG MGMT >30: CPT | Performed by: FAMILY MEDICINE

## 2021-11-09 PROCEDURE — 80048 BASIC METABOLIC PNL TOTAL CA: CPT

## 2021-11-09 RX ADMIN — POTASSIUM BICARBONATE 40 MEQ: 782 TABLET, EFFERVESCENT ORAL at 13:49

## 2021-11-09 RX ADMIN — ENOXAPARIN SODIUM 30 MG: 100 INJECTION SUBCUTANEOUS at 09:50

## 2021-11-09 NOTE — PROGRESS NOTES
Bedside and Verbal shift change report given to [employer] (oncoming nurse) by Destin Romo LPN (offgoing nurse). Report included the following information SBAR and Kardex.

## 2021-11-09 NOTE — HOME CARE
Received home health referral for [facility] for (SN, PT, OT). Spoke with patient's primary caregiver granddaughter Heather Pickard via phone;  patient identifiers verified. Explained home care services and routines. Verbalizes understanding. Demographics verified including insurance, phone and address confirmed. Patient will reside in grand-daughter's home temporarily in . Patient has the following DME: None. Orders noted and arranged to be processed to central intake.      ---   Olman Ashraf LPN  [facility] - INPATIENT Liaison

## 2021-11-09 NOTE — DISCHARGE SUMMARY
Discharge Summary      Patient: Edelmira Donald MRN: [id number]  CSN: [id number]    YOB: 1950  Age: 80 y.o. Sex: female    DOA: 11/6/2021 LOS:  LOS: 3 days   Discharge Date: 11/9/21     Admission Diagnoses: SIRENA (acute kidney injury) ([facility] [address] [N17.9]    Discharge Diagnoses:    1. Severe SIRSPOA, with tachycardia, tachypnea, lactic acidosis and SIRENA, with no clear source of infection   2. OURYK88 pneumonia without hypoxia  3. SIRENA  4. Lactic acidosis  5. Elevated total bilirubin   6. Hx stroke with residual left-sided facial weakness  7. Hx angina  8. Hx of anxiety  9. Emotional lability  10. Tobacco abuse  11. RLL lung mass concerning for primary lung carcinoma      Discharge Condition: Stable    PHYSICAL EXAM  Visit Vitals  /78   Pulse 77   Temp 97.6 °F (36.4 °C)   Resp 19   Ht 4' 11\" (1.499 m)   Wt 61.2 kg (135 lb)   SpO2 96%   BMI 27.27 kg/m²       General: Alert, cooperative, no acute distress    HEENT: NC, Atraumatic. EOMI. Anicteric sclerae. Lungs:  CTA Bilaterally. No Wheezing/Rhonchi/Rales. Heart:  Regular  rhythm,  No murmur, No Rubs, No Gallops  Abdomen: Soft, Non distended, Non tender. +Bowel sounds, no HSM  Extremities: No c/c/e  Psych:   Not anxious or agitated. Neurologic:  Alert and oriented X 3. No acute neurological deficits. Hospital Course: Edelmira Donald is a 80 y.o. female with a PMHx of recently discovered LLL lung mass, stroke with residual left-sided facial weakness, angina, and anxiety who presented to the ED with complaints of headache, back pain and generalized aches and pains. She was noted to be a very poor historian, but reported her headache had begun 2 weeks prior and was diffuse, and that her generalized aches and pains had become worse over the past few days. She was also noted to be emotionally labile and became quite tearful, but could not explain why. In the ED, she was tachycardic and tachypneic but had SPO2 in the upper 90s on room air. Labs were notable for lactic acid 3.58, WBCs WNL, Hgb 17.3, glucose 175, BUN 40, creatinine 1.42 (baseline 0.8), and elevated LFTs. A UA was abnormal while CXR showed commonly reported features of Covid19 pneumonia. CTA chest showed a stable RLL 2 cm irregular soft tissue mass concerning for primary lung carcinoma, along with the commonly reported features of Covidand a pneumonia. CT head was negative for acute findings. Ms. Medhat Huggins was then started on IVF and IV ABX and admitted for severe sepsis secondary to possible UTI and possible Covid19 pneumonia along with SIRENA. The following day, a respiratory virus panel with Covid19 was done that showed she was positive for Covid19. Her inflammatory markers are elevated and she was started on IV steroids. Pulmonology was consulted and recommended further work-up of her RLL lung mass including a biopsy, but that this could be done as an outpatient after she gets over [place] [address]. Over the next couple days, Ms. Medhat Huggins was continued on IV ABX and given additional supportive care. Because she had no hypoxia, her IV steroids were discontinued. Her SIRENA resolved and after her urine culture returned negative, her IV ABX were stopped. Ms. Medhat Huggins had reported that she had been told about her lung nodule/mass, but did not know that she was supposed to follow-up regarding this. Because of this and the fact that she was a poor historian and emotionally labile, her case was discussed with her daughter and granddaughter over the phone. They agreed that she could use some additional help at home, and reported that they had been trying to get her to move in with them, but that she had been resistant. Very strong recommendations were given including that upon discharge, she follow-up with the PCP, pulmonology for biopsy, and oncology as well. Both Ms. Fern Pierson daughter and granddaughter understood and reported that they would help with her follow-up.     PT and OT made recommendations for home health with 24/7 assist versus rehab. This was discussed after which Ms. Leland Ellison reported she wished to go home with home health which was ordered. Over the course of her admission, Ms. Leland Ellison was given supportive care and remained comfortable overall. She denied any respiratory complaints and at no time became hypoxic. On 11/9/2021, she had remained hemodynamically stable. Quarantine protocols and return precautions were discussed. She was also instructed to follow up with her PCP, pulmonology and oncology and was then discharged home with home health. Consults:  Fallon Noel MD      Significant Diagnostic Studies:   CXR 11/6/2021:  Commonly reported features of Covid - 19 pneumonia are present. Other processes such as influenza pneumonia and organizing pneumonia as can be seen with drug toxicity and connective tissue disease can cause a similar imaging pattern. Classification typical.     CT head without contrast 11/6/2021:  No acute intracranial findings. .  Mild chronic microvascular ischemic change. Benign-appearing paranasal sinus disease. CTA chest abdomen pelvis with and without contrast 11/6/2021:  No evidence of aortic aneurysm, dissection, nor significant stenosis. No evidence of pulmonary embolus. Stable right lower lobe 2 cm irregular soft tissue mass suspicious for primary lung carcinoma. Commonly reported features of Covid - 19 pneumonia are present. Other processes such as influenza pneumonia and organizing pneumonia as can be seen with drug toxicity and connective tissue disease can cause a similar imaging pattern. Classification typical.      Procedures Performed: None      Discharge Medications: There are no discharge medications for this patient.        Activity: activity as tolerated    Diet: Regular Diet    Wound Care: None needed      Follow-up Information     Follow up With Specialties Details Why Contact Dora Bradshaw MD Pulmonary Disease Schedule an appointment as soon as possible for a visit on 12/16/2021 F/U @ 1:30 [address] [facility]      Samira Mendocino  Your preferred agency, Chosen to continue managing health care needs [facility] [address] [facility] [address]    [address] [facility] [facility] Oncology On 12/20/2021 @ 1:30 referred, from  [facility] office   [id number] [place] [address]  [phone]      Mitchell Santamaria NP Nurse Practitioner On 11/16/2021 @ 10am as an tele health vitual we are calling patient to get an  email on file, to send patient the link  [address]  [phone]             Minutes spent on discharge: >30 minutes spent coordinating this discharge (review instructions/follow-up, prescriptions, preparing report for sign off)          RICARDO Zayas DO   November 14, 2021       [facility] medical dictation software was used for portions of this report. Unintended errors may occur.

## 2021-11-09 NOTE — PROGRESS NOTES
Discharge planning    Reviewed chart. Home health order noted and placed in que for [address] Spoke with [facility] concerning [facility] referral.  Unit secretary will setup PCP. CM will continue to assist with transition of care needs.      MANGO BrandtN, RN  Pager # [phone]  Care Manager

## 2021-11-09 NOTE — PROGRESS NOTES
Writer reapplied leads for tele box and patient removed them as soon as they were attached, second attempt was made to reapply and patient refused to allow the leads to be reattached. Tele was notified of patients refusal to connect tele box.

## 2021-11-09 NOTE — ROUTINE PROCESS
Bedside shift change report given to Jamila Morales LPN (oncoming nurse) by Gilford Reason, RN (offgoing nurse). Report included the following information SBAR, Kardex, MAR and Cardiac Rhythm NSR.

## 2021-11-09 NOTE — PROGRESS NOTES
Discharge planning    Discharge order noted for today. HH referral placed que for [address] agency. Patient and agreeable to the transition plan today. Transport has been arranged with daughter. Patient's home health  orders have been forwarded to [place] home health  agency via [employer]. Updated bedside RN, Alejandra Mayes,  to the transition plan.   Discharge information has been documented on the AVS.       RENALDO Carrero, RN  Pager # [phone]  Care Manager

## 2021-11-09 NOTE — DISCHARGE INSTRUCTIONS
Patient Education      Patient armband removed and shredded  MyChart Activation    Thank you for requesting access to bluebottlebiz. Please follow the instructions below to securely access and download your online medical record. bluebottlebiz allows you to send messages to your doctor, view your test results, renew your prescriptions, schedule appointments, and more. How Do I Sign Up? 1. In your internet browser, go to [email or web address]  2. Click on the First Time User? Click Here link in the Sign In box. You will be redirect to the New Member Sign Up page. 3. Enter your bluebottlebiz Access Code exactly as it appears below. You will not need to use this code after youve completed the sign-up process. If you do not sign up before the expiration date, you must request a new code. [email or web address] Access Code: [email or web address]  Expires: 2021  9:19 PM (This is the date your bluebottlebiz access code will )    4. Enter the last four digits of your Social Security Number (xxxx) and Date of Birth (mm/dd/yyyy) as indicated and click Submit. You will be taken to the next sign-up page. 5. Create a bluebottlebiz ID. This will be your bluebottlebiz login ID and cannot be changed, so think of one that is secure and easy to remember. 6. Create a bluebottlebiz password. You can change your password at any time. 7. Enter your Password Reset Question and Answer. This can be used at a later time if you forget your password. 8. Enter your e-mail address. You will receive e-mail notification when new information is available in [address] 9. Click Sign Up. You can now view and download portions of your medical record. 10. Click the Download Summary menu link to download a portable copy of your medical information. Additional Information    If you have questions, please visit the Frequently Asked Questions section of the bluebottlebiz website at [email or web address] Remember, bluebottlebiz is NOT to be used for urgent needs.  For medical emergencies, dial 911. As part of the discharge instructions, medications already given today were discussed with the patient. The next dose due of all ordered meds was highlighted as part of the medication discharge instructions. Discussed with the patient the importance of taking medications as directed, as well as the side effects and adverse reactions to medications ordered. DISCHARGE SUMMARY from Nurse    PATIENT INSTRUCTIONS:    After general anesthesia or intravenous sedation, for 24 hours or while taking prescription Narcotics:  · Limit your activities  · Do not drive and operate hazardous machinery  · Do not make important personal or business decisions  · Do  not drink alcoholic beverages  · If you have not urinated within 8 hours after discharge, please contact your surgeon on call. Report the following to your surgeon:  · Excessive pain, swelling, redness or odor of or around the surgical area  · Temperature over 100.5  · Nausea and vomiting lasting longer than 4 hours or if unable to take medications  · Any signs of decreased circulation or nerve impairment to extremity: change in color, persistent  numbness, tingling, coldness or increase pain  · Any questions    What to do at Home:  Recommended activity: Activity as tolerated,     If you experience any of the following symptoms shortness of breath, chest pain, swelling in feet, legs or abdomen. Temp 101 or above sore throat loss of smell, please follow up with Emergency Department or Primary Md. *  Please give a list of your current medications to your Primary Care Provider. *  Please update this list whenever your medications are discontinued, doses are      changed, or new medications (including over-the-counter products) are added. *  Please carry medication information at all times in case of emergency situations.     These are general instructions for a healthy lifestyle:    No smoking/ No tobacco products/ Avoid exposure to second hand smoke  Surgeon General's Warning:  Quitting smoking now greatly reduces serious risk to your health. Obesity, smoking, and sedentary lifestyle greatly increases your risk for illness    A healthy diet, regular physical exercise & weight monitoring are important for maintaining a healthy lifestyle    You may be retaining fluid if you have a history of heart failure or if you experience any of the following symptoms:  Weight gain of 3 pounds or more overnight or 5 pounds in a week, increased swelling in our hands or feet or shortness of breath while lying flat in bed. Please call your doctor as soon as you notice any of these symptoms; do not wait until your next office visit. The discharge information has been reviewed with the patient. The patient verbalized understanding. Discharge medications reviewed with the patient and appropriate educational materials and side effects teaching were provided. ___________________________________________________________________________________________________________________________________  10 Things to Do When You Have COVID-19    Stay home. Don't go to school, work, or public areas. And don't use public transportation, ride-shares, or taxis unless you have no choice. Leave your home only if you need to get medical care. But call the doctor's office first so they know you're coming. And wear a mask. Ask before leaving isolation. Follow your doctor's advice about when it is safe for you to leave isolation. Wear a mask when you are around other people. It can help stop the spread of the virus. Limit contact with people in your home. If possible, stay in a separate bedroom and use a separate bathroom. Avoid contact with pets and other animals. If possible, have a friend or family member care for them while you're sick. Cover your mouth and nose with a tissue when you cough or sneeze. Then throw the tissue in the trash right away. Wash your hands often, especially after you cough or sneeze. Use soap and water, and scrub for at least 20 seconds. If soap and water aren't available, use an alcohol-based hand . Don't share personal household items. These include bedding, towels, cups and glasses, and eating utensils. Clean and disinfect your home every day. Use household  or disinfectant wipes or sprays. If needed, take acetaminophen (Tylenol) or ibuprofen (Advil, Motrin) to relieve fever and body aches. Read and follow all instructions on the label. Current as of: March 26, 2021               Content Version: 13.0  © 2006-2021 [employer]. Care instructions adapted under license by [employer] (which disclaims liability or warranty for this information). If you have questions about a medical condition or this instruction, always ask your healthcare professional. [place] [address] any warranty or liability for your use of this information. Patient Education        [address] After Treatment for COVID-19: Care Instructions  Overview     You are being sent home from the hospital after being treated for COVID-19. Being in the hospital can be hard, especially if you've been in the intensive care unit (ICU). Even though you're going home, you probably don't feel well yet. Healing from COVID-19 takes time. You may feel very tired for weeks or months afterward, especially if you were on a ventilator. It will take time to get back to your old level of activity. Some people may have long-lasting health problems. But most people can look forward to feeling a little better every day. If you were on a ventilator, your throat may be sore and your voice hoarse or raspy for a while. After leaving the hospital, some people have feelings of anxiety and depression. They may have nightmares.  Or in their mind they may relive events that happened in the hospital (flashbacks). Reach out to your doctor if you're having trouble with these symptoms. Your doctor will tell you if you need to isolate yourself at home, and when you can end isolation. How can you self-isolate when you have COVID-19? If you have COVID-19, there are things you can do to help avoid spreading the virus to others. · Limit contact with people in your home. If possible, stay in a separate bedroom and use a separate bathroom. · Wear a mask when you are around other people. · If you have to leave home, avoid crowds and try to stay at least 6 feet away from other people. · Avoid contact with pets and other animals. · Cover your mouth and nose with a tissue when you cough or sneeze. Then throw it in the trash right away. · Wash your hands often, especially after you cough or sneeze. Use soap and water, and scrub for at least 20 seconds. If soap and water aren't available, use an alcohol-based hand . · Don't share personal household items. These include bedding, towels, cups and glasses, and eating utensils. · [address] in the warmest water allowed for the fabric type, and dry it completely. It's okay to wash other people's laundry with yours. · Clean and disinfect your home. Use household  and disinfectant wipes or sprays. Follow-up care is a key part of your treatment and safety. Be sure to make and go to all appointments, and call your doctor if you are having problems. It's also a good idea to know your test results and keep a list of the medicines you take. How can you care for yourself at home? · Get plenty of rest. It can help you feel better. · Be kind to yourself if it's taking longer than you expected to feel better. You've been through a stressful time. · Get up and walk around every hour or two while you're resting. Slowly increase your activity as you start to feel better. · Eat healthy foods. · Drink plenty of fluids.  If you have kidney, heart, or liver disease and have to limit fluids, talk with your doctor before you increase the amount of fluids you drink. · If needed, take acetaminophen (Tylenol) or ibuprofen (Advil, Motrin) to reduce a fever. It may also help with muscle aches. Read and follow all instructions on the label. When should you call for help? Call 911 anytime you think you may need emergency care. For example, call if you have life-threatening symptoms, such as:    · You have severe trouble breathing. (You can't talk at all.)     · You have constant chest pain or pressure.     · You are severely dizzy or lightheaded.     · You are confused or can't think clearly.     · You have pale, gray, or blue-colored skin or lips.     · You pass out (lose consciousness) or are very hard to wake up. Call your doctor now or seek immediate medical care if:    · You have moderate trouble breathing. (You can't speak a full sentence.)     · You are coughing up blood (more than about 1 teaspoon).     · You have signs of low blood pressure. These include feeling lightheaded; being too weak to stand; and having cold, pale, clammy skin. Watch closely for changes in your health, and be sure to contact your doctor if:    · Your symptoms get worse.     · You are not getting better as expected.     · You have new or worse symptoms of anxiety, depression, nightmares, or flashbacks. Call before you go to the doctor's office. Follow their instructions. And wear a mask. Current as of: July 1, 2021               Content Version: 13.0  © 2006-2021 Healthwise, Incorporated. Care instructions adapted under license by [employer] (which disclaims liability or warranty for this information). If you have questions about a medical condition or this instruction, always ask your healthcare professional. [place] [address] any warranty or liability for your use of this information.        Patient Education        Urinary Tract Infection (UTI) in Women: Care Instructions  Overview     A urinary tract infection, or UTI, is a general term for an infection anywhere between the kidneys and the urethra (where urine comes out). Most UTIs are bladder infections. They often cause pain or burning when you urinate. UTIs are caused by bacteria and can be cured with antibiotics. Be sure to complete your treatment so that the infection does not get worse. Follow-up care is a key part of your treatment and safety. Be sure to make and go to all appointments, and call your doctor if you are having problems. It's also a good idea to know your test results and keep a list of the medicines you take. How can you care for yourself at home? · Take your antibiotics as directed. Do not stop taking them just because you feel better. You need to take the full course of antibiotics. · Drink extra water and other fluids for the next day or two. This will help make the urine less concentrated and help wash out the bacteria that are causing the infection. (If you have kidney, heart, or liver disease and have to limit fluids, talk with your doctor before you increase the amount of fluids you drink.)  · Avoid drinks that are carbonated or have caffeine. They can irritate the bladder. · Urinate often. Try to empty your bladder each time. · To relieve pain, take a hot bath or lay a heating pad set on low over your lower belly or genital area. Never go to sleep with a heating pad in place. To prevent UTIs  · Drink plenty of water each day. This helps you urinate often, which clears bacteria from your system. (If you have kidney, heart, or liver disease and have to limit fluids, talk with your doctor before you increase the amount of fluids you drink.)  · Urinate when you need to. · If you are sexually active, urinate right after you have sex. · Change sanitary pads often.   · Avoid douches, bubble baths, feminine hygiene sprays, and other feminine hygiene products that have deodorants. · After going to the bathroom, wipe from front to back. When should you call for help? Call your doctor now or seek immediate medical care if:    · Symptoms such as fever, chills, nausea, or vomiting get worse or appear for the first time.     · You have new pain in your back just below your rib cage. This is called flank pain.     · There is new blood or pus in your urine.     · You have any problems with your antibiotic medicine. Watch closely for changes in your health, and be sure to contact your doctor if:    · You are not getting better after taking an antibiotic for 2 days.     · Your symptoms go away but then come back. Where can you learn more? Go to http://www.gray.com/  Enter J165 in the search box to learn more about \"Urinary Tract Infection (UTI) in Women: Care Instructions. \"  Current as of: February 10, 2021               Content Version: 13.0  © 8356-3791 [employer]. Care instructions adapted under license by Marco Vasco (which disclaims liability or warranty for this information). If you have questions about a medical condition or this instruction, always ask your healthcare professional. [place] [address] any warranty or liability for your use of this information.

## 2021-11-10 ENCOUNTER — HOME CARE VISIT (OUTPATIENT)
Dept: SCHEDULING | Facility: HOME HEALTH | Age: 71
End: 2021-11-10

## 2021-11-10 ENCOUNTER — PATIENT OUTREACH (OUTPATIENT)
Dept: CASE MANAGEMENT | Age: 71
End: 2021-11-10

## 2021-11-10 PROCEDURE — G0162 HHC RN E&M PLAN SVS, 15 MIN: HCPCS

## 2021-11-10 NOTE — PROGRESS NOTES
Care Transitions Initial Call    Call within 2 business days of discharge: Yes     Patient: Tawny Platt Patient : 1950 MRN: [id number]    Last Discharge [address]       Complaint Diagnosis Description Type Department Provider    21 Headache; Back Pain Sepsis, due to unspecified organism, unspecified whether acute organ dysfunction present ([facility] [address] . .. ED to Hosp-Admission (Discharged) (ADMIT) Graeme Beltran 1579, DO; Marilyn Serrano... Was this an external facility discharge? No Discharge Facility: n/a    Challenges to be reviewed by the provider   Additional needs identified to be addressed with provider: no  none         Method of communication with provider : none    Discussed COVID-19 related testing which was available at this time. Test results were positive. Patient informed of results, if available? Pt. Is aware of her covid test result. RESPIRATORY VIRUS PANEL W/COVID-19, PCRCollected 2021  Final result, Panic            Ref Range & Units 4 d ago   Adenovirus NOTD  Not detected     Coronavirus 229E NOTD  Not detected     Coronavirus HKU1 NOTD  Not detected     Coronavirus CVNL63 NOTD  Not detected     Coronavirus OC43 NOTD  Not detected     SARS-CoV-2, PCR NOTD  DetectedPanic     Comment: CALLED TO AND CORRECTLY REPEATED BY:        Advance Care Planning:   Does patient have an Advance Directive:  none noted on file at this time. Inpatient Readmission Risk score: Unplanned Readmit Risk Score: 10.4 ( )    Was this a readmission? no   Patient stated reason for the admission: back pain and headache. No D/C Summary noted on file at this time. Patients top risk factors for readmission: chronic medical condition, recent hospital admission. Interventions to address risk factors: closely follow up with PCP. Care Transition Nurse (CTN) contacted the patient by telephone to perform post hospital discharge assessment.  Verified name and  with patient as identifiers. Provided introduction to self, and explanation of the CTN role. Patient reports that she is doing well. Pt. States that she has good support from family. Pt. Given CTN permission to speak with her granddaughter Ms. Jamie Rob on phone. No new or worsening of symptoms reported by  Pt. At this time. Home health has seen her today as per Pt. No questions, concerns and/or needs at this time as per Pt. CTN reviewed  D/C AVS with Pt. And Granddaughter and they both verbalized understanding. Pt. Granddaughter states that she will call all the doctors listed on Pt. D/C AVS for apt and to confirm apt. Covid -19 CDC guidelines regarding  Isolation, safety and when to safely be around with others were reviewed with Pt. And Pt. Granddaughter. Pt. and. Granddaughter  verbalized understanding. Reminded Pt. to go to the nearest emergency room for chest pain, shortness of breath, returning of symptoms that brought her to the emergency room and/or worsening of symptoms. Pt. Verbalized and repeated back understanding. No questions, concerns and/or needs at this time as per Pt. CTN reviewed discharge instructions, medical action plan and red flags with patient who verbalized understanding. Were discharge instructions available to patient? yes. Reviewed appropriate site of care based on symptoms and resources available to patient including: PCP, Specialist, [facility] and When to call 911. Patient given an opportunity to ask questions and does not have any further questions or concerns at this time. The patient agrees to contact the PCP office for questions related to their healthcare. Medication reconciliation was performed with patient, who verbalizes understanding of administration of home medications. Advised obtaining a 90-day supply of all daily and as-needed medications. Referral to Pharm D needed: will defer with Pt. PCP.      Home Health/Outpatient orders at discharge: home [address]: [facility]   Date of initial visit: 11/10/21    Durable Medical Equipment ordered at discharge: None noted and none as per Pt. Covid Risk Education    Educated patient about risk for severe COVID-19 due to risk factors according to CDC guidelines. CTN reviewed discharge instructions, medical action plan and red flag symptoms with the patient who verbalized understanding. Discussed COVID vaccination status: yes. Education provided on COVID-19 vaccination as appropriate. Discussed exposure protocols and quarantine with CDC Guidelines. Patient was given an opportunity to verbalize any questions and concerns and agrees to contact CTN or health care provider for questions related to their healthcare. Was patient discharged with a pulse oximeter? no. Discussed and confirmed pulse oximeter discharge instructions and when to notify provider or seek emergency care. Discussed follow-up appointments. Pt. And Pt. Granddaughter will set up PCP [address] follow up appointment(s):   Future Appointments   Date Time Provider Dipesh Carrillo   12/16/2021  2:00 PM Delphine Valle MD [facility]   12/20/2021  1:30 PM Maldonado Roberson [facility]     Non-[facility] follow up appointment(s): none noted at this time. Plan for follow-up call in 7-10 days based on severity of symptoms and risk factors. Plan for next call: PCP apt. symptoms, assess for any needs  CTN provided contact information for future needs. Goals Addressed                 This Visit's Progress     Prevent complications post hospitalization. Patient will establish care with a provider. Pt. Will call CTN or PCP office for any questions concerns and/or needs. Pt. Will close follow up with pulmonology, oncology.  Understands red flags post discharge.         Patient will go to the nearest emergency room for chest pain, shortness of breath, returning of symptoms that brought her to the emergency room and/or worsening of symptoms. Patient will contact PCP office for any questions or concerns related to healthcare.

## 2021-11-11 ENCOUNTER — HOME CARE VISIT (OUTPATIENT)
Dept: HOME HEALTH SERVICES | Facility: HOME HEALTH | Age: 71
End: 2021-11-11

## 2021-11-12 LAB
BACTERIA SPEC CULT: NORMAL
BACTERIA SPEC CULT: NORMAL
SERVICE CMNT-IMP: NORMAL
SERVICE CMNT-IMP: NORMAL

## 2021-11-14 ENCOUNTER — HOME CARE VISIT (OUTPATIENT)
Dept: HOME HEALTH SERVICES | Facility: HOME HEALTH | Age: 71
End: 2021-11-14

## 2021-11-16 ENCOUNTER — HOME CARE VISIT (OUTPATIENT)
Dept: HOME HEALTH SERVICES | Facility: HOME HEALTH | Age: 71
End: 2021-11-16

## 2021-11-18 ENCOUNTER — PATIENT OUTREACH (OUTPATIENT)
Dept: CASE MANAGEMENT | Age: 71
End: 2021-11-18

## 2021-11-18 NOTE — PROGRESS NOTES
Care Transitions Follow Up Call    CTN reached a female voice on phone. The female voice is requesting for CTN to call Pt. On her mobile phone. No Pt. Medical/Helath information given to the female voice who answered the Phone. CTN Attempt to contact patient via telephone (mobile number) on 11/18/21 for  follow up. Unable to reach. Left message on voicemail with office contact information. No Patient medical information  left on message.       [address] follow up appointment(s):   Future Appointments   Date Time Provider Dipesh Carrillo   12/16/2021  2:00 PM Umberto Sofia MD [facility]   12/20/2021  1:30 PM Anish Madison MD [facility]

## 2021-12-22 ENCOUNTER — HOSPITAL ENCOUNTER (OUTPATIENT)
Dept: LAB | Age: 71
Discharge: HOME | End: 2021-12-22
Payer: MEDICAID

## 2021-12-22 ENCOUNTER — NURSE NAVIGATOR (OUTPATIENT)
Dept: OTHER | Age: 71
End: 2021-12-22

## 2021-12-22 ENCOUNTER — OFFICE VISIT (OUTPATIENT)
Age: 71
End: 2021-12-22
Payer: MEDICAID

## 2021-12-22 VITALS
WEIGHT: 141 LBS | RESPIRATION RATE: 16 BRPM | BODY MASS INDEX: 28.43 KG/M2 | SYSTOLIC BLOOD PRESSURE: 130 MMHG | HEIGHT: 59 IN | DIASTOLIC BLOOD PRESSURE: 85 MMHG | HEART RATE: 81 BPM | TEMPERATURE: 97.2 F | OXYGEN SATURATION: 97 %

## 2021-12-22 DIAGNOSIS — U07.1 COVID-19: ICD-10-CM

## 2021-12-22 DIAGNOSIS — N63.10 BREAST MASS, RIGHT: ICD-10-CM

## 2021-12-22 DIAGNOSIS — R91.8 LUNG NODULES: ICD-10-CM

## 2021-12-22 DIAGNOSIS — I63.40 CEREBROVASCULAR ACCIDENT (CVA) DUE TO EMBOLISM OF CEREBRAL ARTERY (HCC): ICD-10-CM

## 2021-12-22 DIAGNOSIS — R91.8 RIGHT LOWER LOBE LUNG MASS: Primary | ICD-10-CM

## 2021-12-22 DIAGNOSIS — K08.9 POOR DENTITION: ICD-10-CM

## 2021-12-22 DIAGNOSIS — R91.8 RIGHT LOWER LOBE LUNG MASS: ICD-10-CM

## 2021-12-22 LAB
25(OH)D3 SERPL-MCNC: 7.7 NG/ML (ref 30–100)
ALBUMIN SERPL-MCNC: 3.7 G/DL (ref 3.4–5)
ALBUMIN/GLOB SERPL: 0.9 {RATIO} (ref 0.8–1.7)
ALP SERPL-CCNC: 57 U/L (ref 45–117)
ALT SERPL-CCNC: 22 U/L (ref 13–56)
ANION GAP SERPL CALC-SCNC: 5 MMOL/L (ref 3–18)
APTT PPP: 31.4 SEC (ref 23–36.4)
AST SERPL-CCNC: 15 U/L (ref 10–38)
BASOPHILS # BLD: 0.1 K/UL (ref 0–0.1)
BASOPHILS NFR BLD: 2 % (ref 0–2)
BILIRUB SERPL-MCNC: 0.5 MG/DL (ref 0.2–1)
BUN SERPL-MCNC: 13 MG/DL (ref 7–18)
BUN/CREAT SERPL: 16 (ref 12–20)
CALCIUM SERPL-MCNC: 9.2 MG/DL (ref 8.5–10.1)
CANCER AG125 SERPL-ACNC: 19 U/ML (ref 1.5–35)
CEA SERPL-MCNC: 13.5 NG/ML
CHLORIDE SERPL-SCNC: 106 MMOL/L (ref 100–111)
CO2 SERPL-SCNC: 30 MMOL/L (ref 21–32)
CREAT SERPL-MCNC: 0.82 MG/DL (ref 0.6–1.3)
DIFFERENTIAL METHOD BLD: ABNORMAL
EOSINOPHIL # BLD: 0.2 K/UL (ref 0–0.4)
EOSINOPHIL NFR BLD: 4 % (ref 0–5)
ERYTHROCYTE [DISTWIDTH] IN BLOOD BY AUTOMATED COUNT: 14.8 % (ref 11.6–14.5)
FERRITIN SERPL-MCNC: 253 NG/ML (ref 8–388)
FOLATE SERPL-MCNC: 6.9 NG/ML (ref 3.1–17.5)
GLOBULIN SER CALC-MCNC: 4 G/DL (ref 2–4)
GLUCOSE SERPL-MCNC: 88 MG/DL (ref 74–99)
HCT VFR BLD AUTO: 38.8 % (ref 35–45)
HGB BLD-MCNC: 13.4 G/DL (ref 12–16)
IMM GRANULOCYTES # BLD AUTO: 0 K/UL (ref 0–0.04)
IMM GRANULOCYTES NFR BLD AUTO: 0 % (ref 0–0.5)
INR PPP: 1 (ref 0.8–1.2)
IRON SATN MFR SERPL: 24 % (ref 20–50)
IRON SERPL-MCNC: 87 UG/DL (ref 50–175)
LYMPHOCYTES # BLD: 2.7 K/UL (ref 0.9–3.6)
LYMPHOCYTES NFR BLD: 57 % (ref 21–52)
MCH RBC QN AUTO: 28.8 PG (ref 24–34)
MCHC RBC AUTO-ENTMCNC: 34.5 G/DL (ref 31–37)
MCV RBC AUTO: 83.4 FL (ref 78–100)
MONOCYTES # BLD: 0.6 K/UL (ref 0.05–1.2)
MONOCYTES NFR BLD: 12 % (ref 3–10)
NEUTS SEG # BLD: 1.2 K/UL (ref 1.8–8)
NEUTS SEG NFR BLD: 25 % (ref 40–73)
NRBC # BLD: 0 K/UL (ref 0–0.01)
NRBC BLD-RTO: 0 PER 100 WBC
PLATELET # BLD AUTO: 310 K/UL (ref 135–420)
PMV BLD AUTO: 10.4 FL (ref 9.2–11.8)
POTASSIUM SERPL-SCNC: 4.7 MMOL/L (ref 3.5–5.5)
PROT SERPL-MCNC: 7.7 G/DL (ref 6.4–8.2)
PROTHROMBIN TIME: 12.7 SEC (ref 11.5–15.2)
RBC # BLD AUTO: 4.65 M/UL (ref 4.2–5.3)
SODIUM SERPL-SCNC: 141 MMOL/L (ref 136–145)
TIBC SERPL-MCNC: 363 UG/DL (ref 250–450)
TSH SERPL DL<=0.05 MIU/L-ACNC: 1.45 UIU/ML (ref 0.36–3.74)
VIT B12 SERPL-MCNC: 506 PG/ML (ref 211–911)
WBC # BLD AUTO: 4.7 K/UL (ref 4.6–13.2)

## 2021-12-22 PROCEDURE — 82728 ASSAY OF FERRITIN: CPT

## 2021-12-22 PROCEDURE — 82306 VITAMIN D 25 HYDROXY: CPT

## 2021-12-22 PROCEDURE — 86304 IMMUNOASSAY TUMOR CA 125: CPT

## 2021-12-22 PROCEDURE — 82378 CARCINOEMBRYONIC ANTIGEN: CPT

## 2021-12-22 PROCEDURE — 85610 PROTHROMBIN TIME: CPT

## 2021-12-22 PROCEDURE — 36415 COLL VENOUS BLD VENIPUNCTURE: CPT

## 2021-12-22 PROCEDURE — 99205 OFFICE O/P NEW HI 60 MIN: CPT | Performed by: INTERNAL MEDICINE

## 2021-12-22 PROCEDURE — 80053 COMPREHEN METABOLIC PANEL: CPT

## 2021-12-22 PROCEDURE — 84443 ASSAY THYROID STIM HORMONE: CPT

## 2021-12-22 PROCEDURE — 86300 IMMUNOASSAY TUMOR CA 15-3: CPT

## 2021-12-22 PROCEDURE — 83540 ASSAY OF IRON: CPT

## 2021-12-22 PROCEDURE — 85730 THROMBOPLASTIN TIME PARTIAL: CPT

## 2021-12-22 PROCEDURE — 85025 COMPLETE CBC W/AUTO DIFF WBC: CPT

## 2021-12-22 PROCEDURE — 82607 VITAMIN B-12: CPT

## 2021-12-22 NOTE — LETTER
12/22/2021    Patient: Reina Thomas   YOB: 1950   Date of Visit: 12/22/2021     Campbell Marcial DO  [address] [facility] [address]  Via In Basket    Dear Campbell Marcial DO,      Thank you for referring Ms. Reina Thomas to [facility] for evaluation. My notes for this consultation are attached. If you have questions, please do not hesitate to call me. I look forward to following your patient along with you.       Sincerely,    Beryle Lacer, MD

## 2021-12-22 NOTE — PROGRESS NOTES
Hematology/Oncology  Progress Note    Name: Brie Shoemaker  Date: 2021  : 1950  Primary Care Provider: None    Ms. Joel Catalan is a 80y.o. year old female with dominant right middle lobe nodule approximately 2-1/2 cm. Patient has several other smaller nodules in the lung by CAT scan. CAT scan also showed abnormality in the right breast.  Patient had a CAT scan when she was admitted to the hospital for her Covid pneumonia and sepsis patient's has had a stroke and is having difficulty absorbing all of these difficult to absorb information. Her daughter brought her to the clinic today. Daughter has a child so we will be speaking with the daughter in a few minutes up to the patient has the blood work done. Current Therapy: Right now patient is in the process of being evaluated as to the cause of the right lung mass and was going on with the breast and the multiple other nodules in the lung    Subjective:     1. Severe SIRSPOA, with tachycardia, tachypnea, lactic acidosis and SIRENA, with no clear source of infection   2. BPYWF05 pneumonia without hypoxia  3. SIRENA  4. Lactic acidosis  5. Elevated total bilirubin   6. Hx stroke with residual left-sided facial weakness  7. Hx angina  8. Hx of anxiety  9. Emotional lability  10. Tobacco abuse  11. RLL lung mass concerning for primary lung carcinoma      The past medical, surgical and social history has been reviewed and remains unchanged. Past Medical History:   Diagnosis Date    [facility] rest [facility])     Anxiety     CVA, old, facial weakness 2012    left ptosis    Dental caries      History reviewed. No pertinent surgical history.   Social History     Socioeconomic History    Marital status: SINGLE     Spouse name: Not on file    Number of children: Not on file    Years of education: Not on file    Highest education level: Not on file   Occupational History    Not on file   Tobacco Use    Smoking status: Current Some Day Smoker     Packs/day: 0.50 Years: 15.00     Pack years: 7.50    Smokeless tobacco: Never Used    Tobacco comment: half a pack per day   Substance and Sexual Activity    Alcohol use: Yes     Comment: 8 drinks per week    Drug use: No     Comment: none in 2 years    Sexual activity: Never   Other Topics Concern    Not on file   Social History Narrative    Not on file     Social Determinants of Health     Financial Resource Strain:     Difficulty of Paying Living Expenses: Not on file   Food Insecurity:     Worried About Running Out of Food in the Last Year: Not on file    Vicente of Food in the Last Year: Not on file   Transportation Needs:     Lack of Transportation (Medical): Not on file    Lack of Transportation (Non-Medical): Not on file   Physical Activity:     Days of Exercise per Week: Not on file    Minutes of Exercise per Session: Not on file   Stress:     Feeling of Stress : Not on file   Social Connections:     Frequency of Communication with Friends and Family: Not on file    Frequency of Social Gatherings with Friends and Family: Not on file    Attends Rastafarian Services: Not on file    Active Member of [address] [place] [address] or Organizations: Not on file    Attends Club or Organization Meetings: Not on file    Marital Status: Not on file   Intimate Partner Violence:     Fear of Current or Ex-Partner: Not on file    Emotionally Abused: Not on file    Physically Abused: Not on file    Sexually Abused: Not on file   Housing Stability:     Unable to Pay for Housing in the Last Year: Not on file    Number of [place] in the Last Year: Not on file    Unstable Housing in the Last Year: Not on file     History reviewed. No pertinent family history. Review of Systems:    General :The patient has  complaints terms of what is going on is overwhelmed by all these medical issues    Psychological : patient denies having any psychological symptoms such as hallucinations depression or anxiety.      Ophthalmic:the patient denies having any visual impairment or eye discomfort. ENT: there are no abnormalities reported. She has many fractured teeth and periodontal disease that she is aware of    Allergy and Immunology:the patient denies having any seasonal allergies or allergies to medications other than those already outlined above. Hematological and Lymphatic: the patient denies having any bruising, bleeding or lymphadenopathy. Endocrine: the patient denies having any heat or cold intolerance. There is no history of diabetes or thyroid disorders. Breast: the patient denies having any history of breast mass or lumps. Respiratory:the patient denies having any cough, shortness of breath, or dyspnea on exertion. Cardiovascular: there are no complaints of chest pain, palpitations, chest pounding, or dyspnea on exertion. Gastrointestinal: the patient denies having nausea, emesis, diarrhea, constipation, or blood in the stool. Genito-Urinary: the patient denies having urinary urgency, frequency, or dysuria. Musculoskeletal: with the exception of mild arthralgias the patient has no other musculoskeletal complaints. Neurological:  denies having any numbness, tingling, or neurologic deficits. Patient has had a CVA and has difficulty processing information    Dermatological: patient denies having any unexplained rash, skin ulcerations, or hives. Objective:     Visit Vitals  /85   Pulse 81   Temp 97.2 °F (36.2 °C)   Resp 16   Ht 4' 11\" (1.499 m)   Wt 64 kg (141 lb)   SpO2 97%   BMI 28.48 kg/m²     Pain Score: 3    Physical Exam:     ECO    General: Chronically ill appearing, in NAD    Psychologic: mood and affect are appropriate,  anxiety what is going on and what will happen     Skin: examination of the skin reveals no bruising, rash or petechiae    HEENT: Normocephalic, atraumatic. Conjunctiva and sclera are clear. Pupils are equal, round and reactive to light. EOMs are intact.      ENT without oral mucosal lesions, stomatitis or thrush fractured teeth are evident    Neck: supple without lymphadenopathy, JVD or thyromegaly    Lymphatics: no palpable cervical, supraclavicular, infraclavicular, axillary or inguinal lymphadenopathy    Breast: Breast examination performed supine and erect. For both breasts. Lungs: clear breath sounds bilaterally, no rhonchi or wheezes noted    Heart: Regular rate and rhythm, no murmurs, rubs or gallops, S1-S2 noted. Positive peripheral pulses bilaterally upper and lower extremities    Abdomen: soft, non-tender, non-distended, no HSM, positive bowel sounds    Extremities: without clubbing, cyanosis or edema    Neurologic: no focal deficits, steady gait, Alert and oriented x 3. Laboratory Data:     No results found for this or any previous visit. Patient Active Problem List   Diagnosis Code    Gingivitis K05.10    Homelessness Z59.00    SIRENA (acute kidney injury) ([address] N17.9    Sepsis ([address] A41.9         Assessment:     1. Right lower lobe lung mass    2. Lung nodules    3. COVID-19    4. Poor dentition    5. Breast mass, right    6. Cerebrovascular accident (CVA) due to embolism of cerebral artery ([address]        Plan:     1. Patient has given us permission to speak with her daughter explaining the situation which we did  2. We are repeating blood test today. 3. We will obtain a PET scan mammogram and IR biopsy of the right lung mass all of those as soon as possible. 4. We will see the patient back in about 3 weeks. 5. Patient and her daughter had opportunity to ask questions which were answered.   6. Patient and her daughter agree to the plan outlined above       Orders Placed This Encounter    OLIVER MAMMO BI DX INCL CAD     Standing Status:   Future     Standing Expiration Date:   1/22/2023     Order Specific Question:   Reason for Exam     Answer:   abnormal image of breast on CT. lung massess    PET/CT TUMOR IMAGE SKULL THIGH W (INI)     Standing Status: Future     Standing Expiration Date:   1/22/2023    CT BX LUNG RT W IMAGING     Standing Status:   Future     Standing Expiration Date:   1/22/2023    CBC WITH AUTOMATED DIFF     Standing Status:   Future     Standing Expiration Date:   12/23/2022    CEA     Standing Status:   Future     Standing Expiration Date:   12/23/2022    CA 27.29     Standing Status:   Future     Standing Expiration Date:   12/23/2022    CANCER ANTIGEN 125     Standing Status:   Future     Standing Expiration Date:   12/23/2022    FERRITIN     Standing Status:   Future     Standing Expiration Date:   12/23/2022    VITAMIN D, 25 HYDROXY     Standing Status:   Future     Standing Expiration Date:   12/23/2022    VITAMIN B12 & FOLATE     Standing Status:   Future     Standing Expiration Date:   12/23/2022    TSH 3RD GENERATION     Standing Status:   Future     Standing Expiration Date:   52/73/0584    METABOLIC PANEL, COMPREHENSIVE     Standing Status:   Future     Standing Expiration Date:   12/23/2022    IRON PROFILE     Standing Status:   Future     Standing Expiration Date:   12/23/2022    PTT     Standing Status:   Future     Standing Expiration Date:   12/23/2022    PROTHROMBIN TIME + INR     Standing Status:   Future     Standing Expiration Date:   12/23/2022       Kira Henderson MD  12/22/2021

## 2021-12-24 LAB — CANCER AG27-29 SERPL-ACNC: 63.9 U/ML (ref 0–38.6)

## 2021-12-27 DIAGNOSIS — R91.8 RIGHT LOWER LOBE LUNG MASS: ICD-10-CM

## 2021-12-28 ENCOUNTER — NURSE NAVIGATOR (OUTPATIENT)
Dept: OTHER | Age: 71
End: 2021-12-28

## 2021-12-28 NOTE — NURSE NAVIGATOR
Called pt's daughter Destin Caputo to f/u on appointment dates for PET scan and Mammogram, unable to speak to daughter. Detail VM left with contact information for call back to assist with coordination of care. Spoke to [facility] in IR about date for lung Bx, pt will need a negative COVID test prior to Bx. Provided nurse [facility] with pt's daughter number to help coordinate appointment date as daughter is the primary caregiver and assist with transportation to and from pt's appointments.

## 2021-12-29 DIAGNOSIS — U07.1 COVID-19: ICD-10-CM

## 2021-12-29 DIAGNOSIS — N63.10 BREAST MASS, RIGHT: ICD-10-CM

## 2021-12-29 DIAGNOSIS — R91.8 RIGHT LOWER LOBE LUNG MASS: ICD-10-CM

## 2021-12-29 DIAGNOSIS — R91.8 LUNG NODULES: Primary | ICD-10-CM

## 2021-12-29 NOTE — NURSE NAVIGATOR
Initial assessment for Alanna Rocio, newly diagnosed with lung mass. Meeting with pt included pt and daughter. Patient was assessed for the following barriers:    Communication:       Yes    No  -Ability to read/write,understand [place]       [x]      []    -Ability to talk with family/children,friends about diagnosis     [x]      []    -Other:  Comments/Referrals/Services provided: Pt was carlos to discuss diagnosis with daughter  Employment/Financial/Legal:     Yes    No  -Loss of employment/income         []      [x]    -Insurance coverage          [x]      []     -Needs help applying for Social Security/Disability/FMLA     []      [x]     -Help with Co-Pays for office visits         [x]      []    -Medication assistance/medical supplies or equipment     [x]      []    -Difficulty paying bills: utilities/housing       [x]      []    -Means to buy food                     [x]      []    -Legal issues           []      [x]    -Other:  Comments/Referrals/Services provided: Pt is currently receiving food stamps, referred to [employer] for additional food assistance.   Psychosocial        Yes    No  Housing:  -Homeless           []      [x]    -Extended care needs: long term care/home care/Hospice     []      [x]    -Other:  Comments/Referrals/Services provided: Pt is currently renting rent $750/month  Transportation:       Yes    No  -Lack of vehicle or public transportation options      [x]      []    -Funds need for public transportation: bus/taxi      [x]      []    -Other:  Comments/Referrals/Services provided: Daughter takes pt to appointments, will refer to [employer] for assistance  Support system:       Yes No  -Family members at home: spouse/significant other/children    [x]      []    -Has a support system         [x]      []    Other:  Comments/Referrals/Services provided: Daughter and friends, pt has 4 daughters and 1 son  Spirituality:        Yes No  -Spiritual issues          []      [x] -Cultural concerns          []      [x]    -Other:  -Comments/Referrals/Services provided: No concerns today  Sexuality:        Yes No  -Body image concerns                     []      [x]    -Relationships/significant other issues        []      [x]    -Other:  Comments/Referrals/Services provided: No concerns today  Coping:        Yes    No  -Able to manage emotions         [x]      []    -Feeling fearful or anxious         [x]      []    -Interest in attending support groups        []      [x]    -Cancer related pain/control         []      [x]    -Other:  Comments/Referrals/Services provided: Pt stated she feels depress, referred to PCP  Tobacco dependency:      Yes No  -Currently smokes: cigarettes/cigars        []      [x]    -Uses smokeless tobacco         []      [x]    -Other:  Comments/Referrals/Services provided: No concerns today    Education/review of Disease process and Management  Yes No  -Explanation of navigator role/contact information      [x]      []    New Patient Guide for Lung Mass provided                                  [x]     []         -Pathology/Staging          [x]      []    -Diagnostic tests          [x]      []    -Genetic testing needed         [x]      []    -Treatment options/plan: surgery/chemotherapy/radiation     [x]      []    -Mediport placement as needed                              []      [x]    -Tobacco cessation as needed        []      [x]    -Need for a second opinion         []      [x]    -Importance of bringing family/friends to medical appts     [x]      []   -Other:  Patient/family verbalized understanding of treatment plan: Yes. PET Scan and Mammogram schedule for 21, RTC in 3 weeks, all questions answered. NCCN Distress Tool    Casper Merida was assessed for management of distress using the NCCN Distress Thermometer for Patients tool.       Mild to moderate distress  Scorin-4        Yes    No    -Practical/physical issues       []      [] -Provide community resources      []      []      -Provide list of support groups      []      []      -Provide list of national organizations and websites               []      []      -Provide ongoing supportive care by oncology medical team        []      []                  Comments/Referrals/Services provided:  Yes. Moderate to severe distress  Scorin or greater                   Yes    No    -Navigator consulted with MD      [x]      []     -Navigator follow up         [x]      []     -Spiritual concerns        []      [x]     -Emotional/family concerns       [x]      []     -Refer to /mental health professional/PCP   [x]      []      Comments/Referral/Services provided:  Yes. Score 10, will reassess in the future.

## 2021-12-30 ENCOUNTER — HOSPITAL ENCOUNTER (OUTPATIENT)
Dept: LAB | Age: 71
Discharge: HOME | End: 2021-12-30
Payer: MEDICAID

## 2021-12-30 PROCEDURE — U0003 INFECTIOUS AGENT DETECTION BY NUCLEIC ACID (DNA OR RNA); SEVERE ACUTE RESPIRATORY SYNDROME CORONAVIRUS 2 (SARS-COV-2) (CORONAVIRUS DISEASE [COVID-19]), AMPLIFIED PROBE TECHNIQUE, MAKING USE OF HIGH THROUGHPUT TECHNOLOGIES AS DESCRIBED BY CMS-2020-01-R: HCPCS

## 2021-12-31 LAB — SARS-COV-2, COV2NT: NOT DETECTED

## 2022-01-05 ENCOUNTER — TELEPHONE (OUTPATIENT)
Age: 72
End: 2022-01-05

## 2022-01-05 DIAGNOSIS — N63.10 BREAST MASS, RIGHT: Primary | ICD-10-CM

## 2022-01-05 DIAGNOSIS — R91.8 RIGHT LOWER LOBE LUNG MASS: ICD-10-CM

## 2022-01-05 DIAGNOSIS — N63.10 BREAST MASS, RIGHT: ICD-10-CM

## 2022-01-05 NOTE — TELEPHONE ENCOUNTER
Mammogram dept called asking if an order can be placed in the system for patient to have a Ultrasound on both Breast

## 2022-01-12 ENCOUNTER — DOCUMENTATION ONLY (OUTPATIENT)
Dept: PULMONOLOGY | Age: 72
End: 2022-01-12

## 2022-01-20 ENCOUNTER — HOSPITAL ENCOUNTER (OUTPATIENT)
Dept: MAMMOGRAPHY | Age: 72
Discharge: HOME | End: 2022-01-20
Attending: INTERNAL MEDICINE
Payer: MEDICAID

## 2022-01-20 ENCOUNTER — APPOINTMENT (OUTPATIENT)
Dept: PET IMAGING | Age: 72
End: 2022-01-20
Attending: INTERNAL MEDICINE
Payer: MEDICAID

## 2022-01-20 DIAGNOSIS — R91.8 RIGHT LOWER LOBE LUNG MASS: ICD-10-CM

## 2022-01-20 PROCEDURE — 77062 BREAST TOMOSYNTHESIS BI: CPT

## 2022-01-24 ENCOUNTER — HOSPITAL ENCOUNTER (EMERGENCY)
Age: 72
Discharge: HOME | End: 2022-01-24
Attending: STUDENT IN AN ORGANIZED HEALTH CARE EDUCATION/TRAINING PROGRAM
Payer: MEDICAID

## 2022-01-24 VITALS
SYSTOLIC BLOOD PRESSURE: 137 MMHG | RESPIRATION RATE: 20 BRPM | TEMPERATURE: 97.7 F | HEART RATE: 86 BPM | DIASTOLIC BLOOD PRESSURE: 89 MMHG | OXYGEN SATURATION: 97 %

## 2022-01-24 DIAGNOSIS — K12.0 CANKER SORES ORAL: Primary | ICD-10-CM

## 2022-01-24 DIAGNOSIS — K02.9 DENTAL CARIES: ICD-10-CM

## 2022-01-24 PROCEDURE — 99281 EMR DPT VST MAYX REQ PHY/QHP: CPT

## 2022-01-24 RX ORDER — LIDOCAINE HYDROCHLORIDE 20 MG/ML
15 SOLUTION OROPHARYNGEAL AS NEEDED
Qty: 100 ML | Refills: 0 | Status: SHIPPED | OUTPATIENT
Start: 2022-01-24 | End: 2022-04-19 | Stop reason: SDUPTHER

## 2022-01-24 RX ORDER — CEPHALEXIN 500 MG/1
500 CAPSULE ORAL 4 TIMES DAILY
Qty: 28 CAPSULE | Refills: 0 | Status: SHIPPED | OUTPATIENT
Start: 2022-01-24 | End: 2022-01-31

## 2022-01-24 NOTE — ED TRIAGE NOTES
Client report jaw pain in l. Upper jaw X 2 DAYS. Client NAD. Reports having dental appiontment in 3 weeks. Worse with eating. Breathing even unlabored.

## 2022-01-25 NOTE — DISCHARGE INSTRUCTIONS
[facility] Activation    Thank you for requesting access to [facility]. Please follow the instructions below to securely access and download your online medical record. [facility] allows you to send messages to your doctor, view your test results, renew your prescriptions, schedule appointments, and more. How Do I Sign Up? In your internet browser, go to [email or web address]  Click on the First Time User? Click Here link in the Sign In box. You will be redirect to the New Member Sign Up page. Enter your [facility] Access Code exactly as it appears below. You will not need to use this code after youve completed the sign-up process. If you do not sign up before the expiration date, you must request a new code. [facility]iQ Access Code: [email or web address]  Expires: 2022  9:26 PM (This is the date your EdeniQ access code will )    Enter the last four digits of your Social Security Number (xxxx) and Date of Birth (mm/dd/yyyy) as indicated and click Submit. You will be taken to the next sign-up page. Create a EdeniQ ID. This will be your [facility]iQ login ID and cannot be changed, so think of one that is secure and easy to remember. Create a EdeniQ password. You can change your password at any time. Enter your Password Reset Question and Answer. This can be used at a later time if you forget your password. Enter your e-mail address. You will receive e-mail notification when new information is available in [address] Click Sign Up. You can now view and download portions of your medical record. Click the [place] link to download a portable copy of your medical information. Additional Information    If you have questions, please visit the Frequently Asked Questions section of the [employer]iQ website at [email or web address] Remember, [facility]Q is NOT to be used for urgent needs. For medical emergencies, dial 911.

## 2022-01-25 NOTE — ED PROVIDER NOTES
EMERGENCY DEPARTMENT HISTORY AND PHYSICAL EXAM    Date: 1/24/2022  Patient Name: Oliva Camejo    History of Presenting Illness     Chief Complaint   Patient presents with    Dental Pain         History Provided By: Patient    Chief Complaint: Gum pain  Duration: 24 Hours  Timing:  Constant  Location: upper jaw anteriorly, left side  Quality: Sharp  Severity: Moderate  Modifying Factors: putting pressure on the jaw with hand  Associated Symptoms: denies any other associated signs or symptoms      Additional History (Context): Oliva Camejo is a 80 y.o. female with past medical history of angina at rest, 15 pack year smoking hx, dental caries, and tooth loss who presents with recurrent mouth/gum pain, most recent episode lasting the last 24 hours. She is edentulous and does not wear any dentures or implants. Patient was eating chicken last night when her mouth started to hurt. She has had similar episodes of pain before; states it tends to occur after eating. She has not used any medications. Pt has tried icing the area but this made the pain worse. States she only gets relief when she uses her hand to put firm pressure against the mouth on the L side. States her daughter has made her a dental appointment 3 weeks from now (unsure where, but thinks it is \"with the [facility] dentist\"). Denies fever, chills, bleeding or purulent discharge from the mouth. Denies chest pain, SOB, neck pain, arm pain, nausea, or vomiting. No other complaints reported at this time. PCP: None        Past History     Past Medical History:  Past Medical History:   Diagnosis Date    Angina at rest [facility])     Anxiety     CVA, old, facial weakness 2012    left ptosis    Dental caries        Past Surgical History:  No past surgical history on file. Family History:  No family history on file.     Social History:  Social History     Tobacco Use    Smoking status: Current Some Day Smoker     Packs/day: 0.50     Years: 15.00 Pack years: 7.50    Smokeless tobacco: Never Used    Tobacco comment: half a pack per day   Substance Use Topics    Alcohol use: Yes     Comment: 8 drinks per week    Drug use: No     Comment: none in 2 years       Allergies: Allergies   Allergen Reactions    Aspirin Nausea Only         Review of Systems   Review of Systems   Constitutional: Negative for appetite change and fever. HENT: Positive for dental problem and mouth sores. Negative for drooling, facial swelling and trouble swallowing. Respiratory: Negative for chest tightness and shortness of breath. Cardiovascular: Negative for chest pain and palpitations. Gastrointestinal: Negative for abdominal pain, nausea and vomiting. Genitourinary: Negative for difficulty urinating and pelvic pain. Musculoskeletal: Negative for arthralgias and back pain. Neurological: Negative for speech difficulty and light-headedness. Psychiatric/Behavioral: Negative for confusion. The patient is not nervous/anxious. All other systems reviewed and are negative. All Other Systems Negative  Physical Exam     Vitals:    01/24/22 1826   BP: 137/89   Pulse: 86   Resp: 20   Temp: 97.7 °F (36.5 °C)   SpO2: 97%     Physical Exam  Vitals and nursing note reviewed. Constitutional:       General: She is not in acute distress. Appearance: Normal appearance. She is normal weight. She is not ill-appearing. HENT:      Head: Normocephalic and atraumatic. Mouth/Throat:      Mouth: Mucous membranes are moist.      Pharynx: No oropharyngeal exudate or posterior oropharyngeal erythema. Comments: Small area of ulceration over L anterior gums. No bleeding, swelling, discharge, or other sign of infection noted over this area. 2 areas of prior extraction noted to the R lower molar region with notable decay at the stump. No abscess palpated. Eyes:      Extraocular Movements: Extraocular movements intact.       Conjunctiva/sclera: Conjunctivae normal. Neck:      Comments: No warmth, erythema or swelling in the neck on L or R sides. Lymph nodes normal size and consistency  Cardiovascular:      Rate and Rhythm: Normal rate and regular rhythm. Heart sounds: Normal heart sounds. Pulmonary:      Effort: Pulmonary effort is normal.      Breath sounds: Normal breath sounds. Musculoskeletal:      Cervical back: Normal range of motion. No tenderness. Lymphadenopathy:      Cervical: No cervical adenopathy. Skin:     General: Skin is warm and dry. Neurological:      General: No focal deficit present. Mental Status: She is alert and oriented to person, place, and time. Mental status is at baseline. Coordination: Coordination normal.      Comments: Ambulates without difficulty or gait abnormality   Psychiatric:         Mood and Affect: Mood normal.         Behavior: Behavior normal.         Thought Content: Thought content normal.                Diagnostic Study Results     Labs -   No results found for this or any previous visit (from the past 12 hour(s)). Radiologic Studies -   No orders to display     CT Results  (Last 48 hours)    None        CXR Results  (Last 48 hours)    None            Medical Decision Making   I am the first provider for this patient. I reviewed the vital signs, available nursing notes, past medical history, past surgical history, family history and social history. Vital Signs-Reviewed the patient's vital signs. Records Reviewed Jazmin Baevers PA-C     Procedures:  Procedures    Provider Notes (Medical Decision Making): Impression:  Canker sore, dental caries    Clinical presentation suggestive of mouth pain secondary to gum ulceration and dental caries, will plan to d/c with keflex and viscous lidocaine with dental  follow-up. Pt agrees. Jazmin Beavers PA-C  8:18 PM     MED RECONCILIATION:  No current facility-administered medications for this encounter.      Current Outpatient Medications   Medication Sig  cephALEXin (Keflex) 500 mg capsule Take 1 Capsule by mouth four (4) times daily for 7 days.  lidocaine (Lidocaine Viscous) 2 % solution Take 15 mL by mouth as needed for Pain. Disposition:  D/c    DISCHARGE NOTE:   Patient is stable for discharge at this time. I have discussed all the findings from today's work up with the patient, including lab results and imaging. I have answered all questions. Rx for viscous lidocaine and keflex given. Rest and close follow-up with the dentist recommended this week. Return to the ED immediately for any new or worsening symptoms. Jazmin Beavers PA-C     Follow-up Information     Follow up With Specialties Details Why Contact Info    [address]  In 1 week  Penny Austin [address] [facility] EMERGENCY DEPT Emergency Medicine  As needed, If symptoms worsen [address] [place] [id number]  [phone]          Current Discharge Medication List      START taking these medications    Details   cephALEXin (Keflex) 500 mg capsule Take 1 Capsule by mouth four (4) times daily for 7 days. Qty: 28 Capsule, Refills: 0  Start date: 1/24/2022, End date: 1/31/2022      lidocaine (Lidocaine Viscous) 2 % solution Take 15 mL by mouth as needed for Pain. Qty: 100 mL, Refills: 0  Start date: 1/24/2022                 Diagnosis     Clinical Impression:   1. Canker sores oral    2.  Dental caries

## 2022-01-26 ENCOUNTER — TELEPHONE (OUTPATIENT)
Age: 72
End: 2022-01-26

## 2022-01-28 ENCOUNTER — HOSPITAL ENCOUNTER (OUTPATIENT)
Dept: PET IMAGING | Age: 72
Discharge: HOME | End: 2022-01-28
Attending: INTERNAL MEDICINE
Payer: MEDICAID

## 2022-01-28 DIAGNOSIS — N63.10 BREAST MASS, RIGHT: ICD-10-CM

## 2022-01-28 DIAGNOSIS — R91.8 RIGHT LOWER LOBE LUNG MASS: Primary | ICD-10-CM

## 2022-01-28 PROCEDURE — A9552 F18 FDG: HCPCS

## 2022-01-28 PROCEDURE — 74011000250 HC RX REV CODE- 250: Performed by: INTERNAL MEDICINE

## 2022-01-28 RX ORDER — BARIUM SULFATE 20 MG/ML
450 SUSPENSION ORAL
Status: COMPLETED | OUTPATIENT
Start: 2022-01-28 | End: 2022-01-28

## 2022-01-28 RX ORDER — FLUDEOXYGLUCOSE F18 300 MCI/ML
10 INJECTION INTRAVENOUS ONCE
Status: COMPLETED | OUTPATIENT
Start: 2022-01-28 | End: 2022-01-28

## 2022-01-28 RX ADMIN — FLUDEOXYGLUCOSE F18 7.8 MILLICURIE: 300 INJECTION INTRAVENOUS at 10:42

## 2022-01-28 RX ADMIN — BARIUM SULFATE 450 ML: 20 SUSPENSION ORAL at 11:35

## 2022-02-04 DIAGNOSIS — R91.8 RIGHT LOWER LOBE LUNG MASS: ICD-10-CM

## 2022-02-04 DIAGNOSIS — N63.10 BREAST MASS, RIGHT: ICD-10-CM

## 2022-03-18 PROBLEM — Z59.00 HOMELESSNESS: Status: ACTIVE | Noted: 2017-06-11

## 2022-03-19 PROBLEM — K05.10 GINGIVITIS: Status: ACTIVE | Noted: 2017-06-11

## 2022-03-19 PROBLEM — A41.9 SEPSIS (HCC): Status: ACTIVE | Noted: 2021-11-06

## 2022-03-19 PROBLEM — N17.9 AKI (ACUTE KIDNEY INJURY) (HCC): Status: ACTIVE | Noted: 2021-11-06

## 2022-04-04 ENCOUNTER — TELEPHONE (OUTPATIENT)
Age: 72
End: 2022-04-04

## 2022-04-14 ENCOUNTER — VIRTUAL VISIT (OUTPATIENT)
Age: 72
End: 2022-04-14
Payer: MEDICAID

## 2022-04-14 ENCOUNTER — TELEPHONE (OUTPATIENT)
Age: 72
End: 2022-04-14

## 2022-04-14 ENCOUNTER — NURSE NAVIGATOR (OUTPATIENT)
Dept: OTHER | Age: 72
End: 2022-04-14

## 2022-04-14 DIAGNOSIS — R91.8 RIGHT LOWER LOBE LUNG MASS: Primary | ICD-10-CM

## 2022-04-14 DIAGNOSIS — R91.8 LUNG NODULES: ICD-10-CM

## 2022-04-14 DIAGNOSIS — I63.40 CEREBROVASCULAR ACCIDENT (CVA) DUE TO EMBOLISM OF CEREBRAL ARTERY (HCC): ICD-10-CM

## 2022-04-14 PROCEDURE — 99443 PR PHYS/QHP TELEPHONE EVALUATION 21-30 MIN: CPT | Performed by: INTERNAL MEDICINE

## 2022-04-14 NOTE — PROGRESS NOTES
Juan David Leon is a 80 y.o. female, evaluated via audio-only technology on 4/14/2022 for Lung Mass  . by PET/CT this appears to be a metastatic cancer of the Lung. Matti Scott has been advised to have a biopsy by IR. During this conversation her daughter was present and agreed as well. When the IR is calling the patient she indicates that she does not want the biopsy. Today we had a telephone visit and the patient does not want her daughter to be involved in the conversation after all. Patient wants to speak face to face. And not over the telephone. We will make an appointment for the patient tomorrow at 2 PM.    If she does not come in tomorrow then she can see covering Oncologist in the near future  as I will be out of town  for a month. If patient decides that she does not want to con  treatment then we may refer her to palliative care. Assessment & Plan:       12  Subjective:       Prior to Admission medications    Medication Sig Start Date End Date Taking? Authorizing Provider   lidocaine (Lidocaine Viscous) 2 % solution Take 15 mL by mouth as needed for Pain. 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 She (or guardian if applicable) is aware that it is a billable service, which includes applicable co-pays, with coverage as determined by her insurance carrier. This visit was conducted with the patient's (and/or [place]field Aiden guardian's) verbal consent. She has not had a related appointment within my department in the past 7 days or scheduled within the next 24 hours. The patient was located in a state where the provider was licensed to provide care.     Total Time: minutes: 21-30 minutes    Yuli Noel MD

## 2022-04-14 NOTE — NURSE NAVIGATOR
Called pt to inform her that [employer] cab will be picking her up at 1:15 pm for Dr. Fuentes Councilman appointment tomorrow. Number busy, unable to leave a message.  Transportation reference number [id number]

## 2022-04-14 NOTE — TELEPHONE ENCOUNTER
Patient called to advise she doesn't have transportation to come in tomorrow and would like to speak with you concerning the need for the BX

## 2022-04-15 ENCOUNTER — OFFICE VISIT (OUTPATIENT)
Age: 72
End: 2022-04-15
Payer: MEDICAID

## 2022-04-15 VITALS
BODY MASS INDEX: 28.51 KG/M2 | TEMPERATURE: 97.9 F | RESPIRATION RATE: 16 BRPM | SYSTOLIC BLOOD PRESSURE: 106 MMHG | HEART RATE: 80 BPM | DIASTOLIC BLOOD PRESSURE: 77 MMHG | WEIGHT: 141.4 LBS | OXYGEN SATURATION: 96 % | HEIGHT: 59 IN

## 2022-04-15 DIAGNOSIS — E53.8 VITAMIN B12 DEFICIENCY: ICD-10-CM

## 2022-04-15 DIAGNOSIS — I63.40 CEREBROVASCULAR ACCIDENT (CVA) DUE TO EMBOLISM OF CEREBRAL ARTERY (HCC): ICD-10-CM

## 2022-04-15 DIAGNOSIS — R91.8 RIGHT LOWER LOBE LUNG MASS: Primary | ICD-10-CM

## 2022-04-15 DIAGNOSIS — R91.8 LUNG NODULES: ICD-10-CM

## 2022-04-15 DIAGNOSIS — E55.9 VITAMIN D DEFICIENCY: ICD-10-CM

## 2022-04-15 DIAGNOSIS — R59.0 MEDIASTINAL ADENOPATHY: ICD-10-CM

## 2022-04-15 DIAGNOSIS — E03.8 OTHER SPECIFIED HYPOTHYROIDISM: ICD-10-CM

## 2022-04-15 PROCEDURE — 99214 OFFICE O/P EST MOD 30 MIN: CPT | Performed by: INTERNAL MEDICINE

## 2022-04-15 NOTE — PROGRESS NOTES
Hematology/Oncology  Progress Note    Name: Tiff Troncoso  Date: 4/15/2022  : 1950  Primary Care Provider: None    Ms. Shun Leos is a 80y.o. year old female with dominant right middle lobe nodule approximately 2-1/2 cm. Patient has several other smaller nodules in the lung by CAT scan. CAT scan also showed abnormality in the right breast.  Mammogram benign    PET/CT revealed thet the lung nodules are hypermetabolic and also mediastinal, subcarinal LND are hypermetabolic as well. Even though in December we discussed with the patient and her daughter the IR guided biopsy the patient did not respond to IR for scheduling of the biopsy. We spoke yesterday via phone and patient requested that we speak again  in person and we did so today. The patient did allow her daughter to be present. We reviewed the PET/CT and the biopsy. It may be performed by IR or by Pulmonary by EBUS. Patient is not ready to decide for or against the biopsy today. She told us that she will tell us in six weeks. She agrees that at that time she will tell us to go ahead with biopsy or we shall arrange for palliative care. She understands that as she waits the cancer is  growing.       Current Therapy: diagnostic work up is ongoing    Subjective: The past medical, surgical and social history has been reviewed and remains unchanged. 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 No pertinent surgical history.   Social History     Socioeconomic History    Marital status: SINGLE     Spouse name: Not on file    Number of children: Not on file    Years of education: Not on file    Highest education level: Not on file   Occupational History    Not on file   Tobacco Use    Smoking status: Current Some Day Smoker     Packs/day: 0.50     Years: 15.00     Pack years: 7.50    Smokeless tobacco: Never Used    Tobacco comment: half a pack per day   Substance and Sexual Activity    Alcohol use: Yes     Comment: 8 drinks per week    Drug use: No     Comment: none in 2 years    Sexual activity: Never   Other Topics Concern    Not on file   Social History Narrative    Not on file     Social Determinants of Health     Financial Resource Strain:     Difficulty of Paying Living Expenses: Not on file   Food Insecurity:     Worried About Running Out of Food in the Last Year: Not on file    Vicente of Food in the Last Year: Not on file   Transportation Needs:     Lack of Transportation (Medical): Not on file    Lack of Transportation (Non-Medical): Not on file   Physical Activity:     Days of Exercise per Week: Not on file    Minutes of Exercise per Session: Not on file   Stress:     Feeling of Stress : Not on file   Social Connections:     Frequency of Communication with Friends and Family: Not on file    Frequency of Social Gatherings with Friends and Family: Not on file    Attends Jehovah's witness Services: Not on file    Active Member of [address] [place] [address] or Organizations: Not on file    Attends Club or Organization Meetings: Not on file    Marital Status: Not on file   Intimate Partner Violence:     Fear of Current or Ex-Partner: Not on file    Emotionally Abused: Not on file    Physically Abused: Not on file    Sexually Abused: Not on file   Housing Stability:     Unable to Pay for Housing in the Last Year: Not on file    Number of [place] in the Last Year: Not on file    Unstable Housing in the Last Year: Not on file     History reviewed. No pertinent family history. Current Outpatient Medications   Medication Sig Dispense Refill    lidocaine (Lidocaine Viscous) 2 % solution Take 15 mL by mouth as needed for Pain. 100 mL 0       Review of Systems:    General :The patient has no complaints and there is no physical distress evident.      Psychological : patient denies having any psychological symptoms such as hallucinations depression or anxiety. Ophthalmic:the patient denies having any visual impairment or eye discomfort. ENT: there are no abnormalities reported. Allergy and Immunology:the patient denies having any seasonal allergies or allergies to medications other than those already outlined above. Hematological and Lymphatic: the patient denies having any bruising, bleeding or lymphadenopathy. Endocrine: the patient denies having any heat or cold intolerance. There is no history of diabetes or thyroid disorders. Breast: the patient denies having any history of breast mass or lumps. Respiratory:the patient denies having any cough, shortness of breath, or dyspnea on exertion. Cardiovascular: there are no complaints of chest pain, palpitations, chest pounding, or dyspnea on exertion. Gastrointestinal: the patient denies having nausea, emesis, diarrhea, constipation, or blood in the stool. Genito-Urinary: the patient denies having urinary urgency, frequency, or dysuria. Musculoskeletal: with the exception of mild arthralgias the patient has no other musculoskeletal complaints. Neurological:  denies having any numbness, tingling, or neurologic deficits. Dermatological: patient denies having any unexplained rash, skin ulcerations, or hives. Objective:     Visit Vitals  /77   Pulse 80   Temp 97.9 °F (36.6 °C)   Resp 16   Ht 4' 11\" (1.499 m)   Wt 64.1 kg (141 lb 6.4 oz)   SpO2 96%   BMI 28.56 kg/m²     Pain Score: 2    Physical Exam: Vidal Molina DNP    ECO    General: Well appearing, in NAD    Psychologic: mood and affect are appropriate, yes  anxiety    Skin: examination of the skin reveals no bruising, rash or petechiae    HEENT: Normocephalic, atraumatic. Conjunctiva and sclera are clear. Pupils are equal, round and reactive to light. EOMs are intact.      Neck: supple without lymphadenopathy, JVD or thyromegaly    Lymphatics: no palpable cervical, supraclavicular, infraclavicular lymphadenopathy    Lungs:decreased breath sounds on the right      Heart: Regular rate and rhythm S1-S2 noted. Abdomen: soft, non-tender, non-distended, no HSM    Extremities: without clubbing, cyanosis or edema    Neurologic: no focal deficits, steady gait, Alert and oriented x 3. Laboratory Data:     No results found for this or any previous visit. Patient Active Problem List   Diagnosis Code    Gingivitis K05.10    Homelessness Z59.00    SIRENA (acute kidney injury) ([address] N17.9    Sepsis ([address] A41.9         Assessment:     1. Right lower lobe lung mass    2. Lung nodules    3. Mediastinal adenopathy    4. Cerebrovascular accident (CVA) due to embolism of cerebral artery ([address]    5. Vitamin D deficiency    6. Vitamin B12 deficiency    7. Other specified hypothyroidism        Plan:     1. Patient had extensive details and prolonged discussion with the patient presented in the presence of her daughter. 2. Patient ultimately agreed to telehealth in 6 weeks whether or not she will proceed you biopsy so whether she will forego further diagnostic procedures and will request palliative care. 3. Either 1 is reasonable in the wish to help have a clear pattern plan for the patient. 4. In 6  weeks patient will return  with laboratory tests and hopefully her daughter    Follow-up and Dispositions  ·   Return in about 6 weeks (around 5/27/2022) for Follow up with labs, Follow_up In Person 30 min. No orders of the defined types were placed in this encounter.       Gama Stubbs MD  4/15/2022

## 2022-04-19 ENCOUNTER — HOSPITAL ENCOUNTER (EMERGENCY)
Age: 72
Discharge: HOME | End: 2022-04-19
Attending: EMERGENCY MEDICINE | Admitting: EMERGENCY MEDICINE
Payer: MEDICAID

## 2022-04-19 VITALS
WEIGHT: 146 LBS | RESPIRATION RATE: 18 BRPM | BODY MASS INDEX: 29.43 KG/M2 | DIASTOLIC BLOOD PRESSURE: 86 MMHG | SYSTOLIC BLOOD PRESSURE: 138 MMHG | TEMPERATURE: 97.9 F | HEIGHT: 59 IN | HEART RATE: 86 BPM | OXYGEN SATURATION: 100 %

## 2022-04-19 DIAGNOSIS — K05.10 GINGIVITIS: Primary | ICD-10-CM

## 2022-04-19 PROCEDURE — 99283 EMERGENCY DEPT VISIT LOW MDM: CPT

## 2022-04-19 RX ORDER — OXYCODONE AND ACETAMINOPHEN 5; 325 MG/1; MG/1
1 TABLET ORAL
Qty: 12 TABLET | Refills: 0 | Status: SHIPPED | OUTPATIENT
Start: 2022-04-19 | End: 2022-04-21 | Stop reason: SDUPTHER

## 2022-04-19 RX ORDER — LIDOCAINE HYDROCHLORIDE 20 MG/ML
15 SOLUTION OROPHARYNGEAL
Qty: 100 ML | Refills: 0 | Status: SHIPPED | OUTPATIENT
Start: 2022-04-19 | End: 2022-04-21 | Stop reason: SDUPTHER

## 2022-04-19 RX ORDER — AMOXICILLIN AND CLAVULANATE POTASSIUM 875; 125 MG/1; MG/1
1 TABLET, FILM COATED ORAL 2 TIMES DAILY
Qty: 20 TABLET | Refills: 0 | Status: SHIPPED | OUTPATIENT
Start: 2022-04-19 | End: 2022-04-19 | Stop reason: SDUPTHER

## 2022-04-19 RX ORDER — OXYCODONE AND ACETAMINOPHEN 5; 325 MG/1; MG/1
1 TABLET ORAL
Qty: 12 TABLET | Refills: 0 | Status: SHIPPED | OUTPATIENT
Start: 2022-04-19 | End: 2022-04-19 | Stop reason: SDUPTHER

## 2022-04-19 RX ORDER — LIDOCAINE HYDROCHLORIDE 20 MG/ML
15 SOLUTION OROPHARYNGEAL
Qty: 100 ML | Refills: 0 | Status: SHIPPED | OUTPATIENT
Start: 2022-04-19 | End: 2022-04-19 | Stop reason: SDUPTHER

## 2022-04-19 RX ORDER — AMOXICILLIN AND CLAVULANATE POTASSIUM 875; 125 MG/1; MG/1
1 TABLET, FILM COATED ORAL 2 TIMES DAILY
Qty: 20 TABLET | Refills: 0 | Status: SHIPPED | OUTPATIENT
Start: 2022-04-19 | End: 2022-04-21 | Stop reason: SDUPTHER

## 2022-04-19 NOTE — ED PROVIDER NOTES
EMERGENCY DEPARTMENT HISTORY AND PHYSICAL EXAM    Date: 4/19/2022  Patient Name: Zelda Love    History of Presenting Illness     Chief Complaint   Patient presents with    Dental Pain     right upper and lower         History Provided By: Patient    Additional History (Context): Zelda Love is a 80 y.o. female with stroke who presents with complaint of right-sided gingival pain. Has no teeth. Says she has been having soreness on the upper right and lower right side for day. Denies fever drooling trismus. Made a dental appointment but is. PCP: None    Current Outpatient Medications   Medication Sig Dispense Refill    lidocaine (Lidocaine Viscous) 2 % solution Take 15 mL by mouth two (2) times daily as needed for Pain. 100 mL 0    amoxicillin-clavulanate (Augmentin) 875-125 mg per tablet Take 1 Tablet by mouth two (2) times a day for 10 days. 20 Tablet 0    oxyCODONE-acetaminophen (Percocet) 5-325 mg per tablet Take 1 Tablet by mouth every six (6) hours as needed for Pain for up to 3 days. Max Daily Amount: 4 Tablets. 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 Family History:  No family history on file. Social History:  Social History     Tobacco Use    Smoking status: Current Some Day Smoker     Packs/day: 0.50     Years: 15.00     Pack years: 7.50    Smokeless tobacco: Never Used    Tobacco comment: half a pack per day   Substance Use Topics    Alcohol use: Yes     Comment: 8 drinks per week    Drug use: No     Comment: none in 2 years       Allergies: Allergies   Allergen Reactions    Aspirin Nausea Only         Review of Systems   Review of Systems   Constitutional: Negative for fever. HENT: Positive for dental problem. Negative for drooling.       All Other Systems Negative  Physical Exam     Vitals:    04/19/22 1720   BP: 138/86   Pulse: 86   Resp: 18   Temp: 97.9 °F (36.6 °C)   SpO2: 100%   Weight: 66.2 kg (146 lb)   Height: 4' 11\" (1.499 m)     Physical Exam  Vitals and nursing note reviewed. Constitutional:       Appearance: She is well-developed. HENT:      Head: Normocephalic and atraumatic. Mouth/Throat:      Comments: Lower right and upper right gingiva with several areas of inflammation with slight swelling but no definite fluctuance palpated. Patient is edentulous. No facial swelling. Eyes:      Pupils: Pupils are equal, round, and reactive to light. Neck:      Thyroid: No thyromegaly. Vascular: No JVD. Trachea: No tracheal deviation. Cardiovascular:      Rate and Rhythm: Normal rate and regular rhythm. Heart sounds: Normal heart sounds. No murmur heard. No friction rub. No gallop. Pulmonary:      Effort: Pulmonary effort is normal. No respiratory distress. Breath sounds: Normal breath sounds. No stridor. No wheezing or rales. Chest:      Chest wall: No tenderness. Abdominal:      General: There is no distension. Palpations: Abdomen is soft. There is no mass. Tenderness: There is no abdominal tenderness. There is no guarding or rebound. Musculoskeletal:         General: No tenderness. Lymphadenopathy:      Cervical: No cervical adenopathy. Skin:     General: Skin is warm and dry. Coloration: Skin is not pale. Findings: No erythema or rash. Neurological:      Mental Status: She is alert and oriented to person, place, and time. Psychiatric:         Behavior: Behavior normal.         Thought Content: Thought content normal.          Diagnostic Study Results     Labs -   No results found for this or any previous visit (from the past 12 hour(s)).     Radiologic Studies -   No orders to display     CT Results  (Last 48 hours)    None        CXR Results  (Last 48 hours)    None Medical Decision Making   I am the first provider for this patient. I reviewed the vital signs, available nursing notes, past medical history, past surgical history, family history and social history. Vital Signs-Reviewed the patient's vital signs. Records Reviewed: Nursing Notes    Procedures:  Procedures    Provider Notes (Medical Decision Making): Treat gingivitis and pain. Refer to affordable dentures as well as regular dental for follow-up. MED RECONCILIATION:  No current facility-administered medications for this encounter. Current Outpatient Medications   Medication Sig    lidocaine (Lidocaine Viscous) 2 % solution Take 15 mL by mouth two (2) times daily as needed for Pain.  amoxicillin-clavulanate (Augmentin) 875-125 mg per tablet Take 1 Tablet by mouth two (2) times a day for 10 days.  oxyCODONE-acetaminophen (Percocet) 5-325 mg per tablet Take 1 Tablet by mouth every six (6) hours as needed for Pain for up to 3 days. Max Daily Amount: 4 Tablets. Disposition:  home    DISCHARGE NOTE:   6:24 PM    Pt has been reexamined. Patient has no new complaints, changes, or physical findings. Care plan outlined and precautions discussed. Results of exam were reviewed with the patient. All medications were reviewed with the patient; will d/c home with augmentin, lidocaine, percocet. All of pt's questions and concerns were addressed. Patient was instructed and agrees to follow up with dental, as well as to return to the ED upon further deterioration. Patient is ready to go home.     Follow-up Information     Follow up With Specialties Details Why [facility]  Schedule an appointment as soon as possible for a visit in 1 day  [address] [facility]  [address]  Schedule an appointment as soon as possible for a visit in 1 day  [address]  [address]  [address]    [facility] EMERGENCY DEPT Emergency Medicine  If symptoms worsen return immediately [address]  [phone]          Current Discharge Medication List      START taking these medications    Details   amoxicillin-clavulanate (Augmentin) 875-125 mg per tablet Take 1 Tablet by mouth two (2) times a day for 10 days. Qty: 20 Tablet, Refills: 0  Start date: 4/19/2022, End date: 4/29/2022      oxyCODONE-acetaminophen (Percocet) 5-325 mg per tablet Take 1 Tablet by mouth every six (6) hours as needed for Pain for up to 3 days. Max Daily Amount: 4 Tablets. Qty: 12 Tablet, Refills: 0  Start date: 4/19/2022, End date: 4/22/2022    Associated Diagnoses: Gingivitis         CONTINUE these medications which have CHANGED    Details   lidocaine (Lidocaine Viscous) 2 % solution Take 15 mL by mouth two (2) times daily as needed for Pain. Qty: 100 mL, Refills: 0  Start date: 4/19/2022             Diagnosis     Clinical Impression:   1.  Gingivitis

## 2022-04-21 RX ORDER — OXYCODONE AND ACETAMINOPHEN 5; 325 MG/1; MG/1
1 TABLET ORAL
Qty: 12 TABLET | Refills: 0 | Status: SHIPPED | OUTPATIENT
Start: 2022-04-21 | End: 2022-04-24

## 2022-04-21 RX ORDER — AMOXICILLIN AND CLAVULANATE POTASSIUM 875; 125 MG/1; MG/1
1 TABLET, FILM COATED ORAL 2 TIMES DAILY
Qty: 20 TABLET | Refills: 0 | Status: SHIPPED | OUTPATIENT
Start: 2022-04-21 | End: 2022-05-01

## 2022-04-21 RX ORDER — LIDOCAINE HYDROCHLORIDE 20 MG/ML
15 SOLUTION OROPHARYNGEAL
Qty: 100 ML | Refills: 0 | OUTPATIENT
Start: 2022-04-21 | End: 2022-09-07

## 2022-05-25 ENCOUNTER — HOSPITAL ENCOUNTER (EMERGENCY)
Age: 72
Discharge: HOME | End: 2022-05-25
Attending: STUDENT IN AN ORGANIZED HEALTH CARE EDUCATION/TRAINING PROGRAM
Payer: MEDICAID

## 2022-05-25 VITALS
HEART RATE: 78 BPM | TEMPERATURE: 98.4 F | WEIGHT: 130 LBS | RESPIRATION RATE: 18 BRPM | DIASTOLIC BLOOD PRESSURE: 104 MMHG | SYSTOLIC BLOOD PRESSURE: 144 MMHG | OXYGEN SATURATION: 98 % | HEIGHT: 59 IN | BODY MASS INDEX: 26.21 KG/M2

## 2022-05-25 DIAGNOSIS — K13.0 TRAUMATIC LIP PAIN: Primary | ICD-10-CM

## 2022-05-25 PROCEDURE — 99283 EMERGENCY DEPT VISIT LOW MDM: CPT

## 2022-05-25 RX ORDER — BENZOCAINE/MENTHOL/ZINC CHLOR 20 %-0.26%
1 GEL (GRAM) MUCOUS MEMBRANE AS NEEDED
Qty: 11.9 G | Refills: 0 | Status: SHIPPED | OUTPATIENT
Start: 2022-05-25 | End: 2022-06-04

## 2022-05-25 RX ORDER — BENZOCAINE/MENTHOL/ZINC CHLOR 20 %-0.26%
1 GEL (GRAM) MUCOUS MEMBRANE AS NEEDED
Qty: 11.9 G | Refills: 0 | Status: SHIPPED | OUTPATIENT
Start: 2022-05-25 | End: 2022-05-25 | Stop reason: SDUPTHER

## 2022-05-25 RX ORDER — ACETAMINOPHEN 325 MG/1
650 TABLET ORAL
Qty: 20 TABLET | Refills: 0 | OUTPATIENT
Start: 2022-05-25 | End: 2022-07-27

## 2022-05-25 RX ORDER — ACETAMINOPHEN 325 MG/1
650 TABLET ORAL
Qty: 20 TABLET | Refills: 0 | Status: SHIPPED | OUTPATIENT
Start: 2022-05-25 | End: 2022-05-25 | Stop reason: SDUPTHER

## 2022-05-25 NOTE — ED TRIAGE NOTES
Pt endorses right upper dental pain, states that she bit into a porkchop wrong last night. During triage patient is intent on answering the phone, and when asked to have a seat in the waiting room until her phone call is finished patient becomes irritable and states, \"Ok well I'll just hang up then and wait another three weeks for my appointment. \"

## 2022-05-25 NOTE — ED PROVIDER NOTES
EMERGENCY DEPARTMENT HISTORY AND PHYSICAL EXAM      Date: 5/25/2022  Patient Name: Oscar Bettencourt    History of Presenting Illness     Chief Complaint   Patient presents with    Dental Pain       History (Context): Oscar Bettencourt is a 80 y.o. female with a past medical history significant for CVA, dental caries comes into the ED today due to oral mucosa lip pain. Patient states symptoms began yesterday after \"coming down in a pork chop. \"  Patient states she is had pain to the superior lip just on the anterior oromucosa. Patient states she is not taking any medication for treatment of her symptoms prior to arrival.  Patient states symptoms have not worsened or improved since onset. She denies any alleviating or exacerbating factors for her pain. She does admit to subjective fevers earlier this morning but otherwise denies any further symptoms. States her symptoms are moderate in severity. PCP: None    Current Outpatient Medications   Medication Sig Dispense Refill    benzocaine-menthoL-zinc chlor (Orajel 3X Toothache-Gum) 20-0.26-0.15 % gel mucosal gel 1 g by Mucous Membrane route as needed for Pain for up to 10 days. 11.9 g 0    acetaminophen (TYLENOL) 325 mg tablet Take 2 Tablets by mouth every four (4) hours as needed for Pain. 20 Tablet 0    lidocaine (Lidocaine Viscous) 2 % solution Take 15 mL by mouth two (2) times daily as needed for Pain. 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 Family History:  No family history on file.     Social History:   Social History     Tobacco Use    Smoking status: Current Some Day Smoker     Packs/day: 0.50     Years: 15.00     Pack years: 7.50    Smokeless tobacco: Never Used    Tobacco comment: half a pack per day   Substance Use Topics    Alcohol use: Yes     Comment: 8 drinks per week    Drug use: No     Comment: none in 2 years       Allergies: Allergies   Allergen Reactions    Aspirin Nausea Only       PMH, PSH, family history, social history, allergies reviewed with the patient with significant items noted above. Review of Systems   Review of Systems   Constitutional: Negative for chills and fever. HENT: Negative for sore throat. Lip pain   Eyes: Negative for visual disturbance. Respiratory: Negative for shortness of breath. Cardiovascular: Negative for chest pain. Gastrointestinal: Negative for abdominal pain, nausea and vomiting. Genitourinary: Negative for difficulty urinating. Musculoskeletal: Negative for myalgias. Skin: Negative for rash. Neurological: Negative for headaches. Physical Exam     Vitals:    05/25/22 1234   BP: (!) 144/104   Pulse: 78   Resp: 18   Temp: 98.4 °F (36.9 °C)   SpO2: 98%   Weight: 59 kg (130 lb)   Height: 4' 11\" (1.499 m)       Physical Exam  Vitals and nursing note reviewed. Constitutional:       General: She is not in acute distress. Appearance: Normal appearance. HENT:      Head: Normocephalic and atraumatic. Mouth/Throat:      Mouth: Mucous membranes are moist.      Comments: Small mildly ttp lump to the superior midline lip. Not consistent with gingivostomatitis or aphthous ulcer. Eyes:      General: No scleral icterus. Conjunctiva/sclera: Conjunctivae normal.   Cardiovascular:      Rate and Rhythm: Normal rate and regular rhythm. Comments: Normal peripheral perfusion  Pulmonary:      Effort: Pulmonary effort is normal. No respiratory distress. Abdominal:      General: There is no distension. Palpations: Abdomen is soft. Tenderness: There is no abdominal tenderness. Musculoskeletal:         General: No deformity. Normal range of motion. Cervical back: Normal range of motion and neck supple.    Skin:     General: Skin is warm and dry. Findings: No rash. Neurological:      General: No focal deficit present. Mental Status: She is alert and oriented to person, place, and time. Mental status is at baseline. Psychiatric:         Mood and Affect: Mood normal.         Thought Content: Thought content normal.         Diagnostic Study Results     Labs -   No results found for this or any previous visit (from the past 12 hour(s)). Labs Reviewed - No data to display    Radiologic Studies -   No orders to display     CT Results  (Last 48 hours)    None        CXR Results  (Last 48 hours)    None          The laboratory results, imaging results, and other diagnostic exams were reviewed in the EMR. Medical Decision Making   I am the first provider for this patient. I reviewed the vital signs, available nursing notes, past medical history, past surgical history, family history and social history. Vital Signs-Reviewed the patient's vital signs. Records Reviewed: Personally, on initial evaluation    MDM:   Georgina Daniels presents with complaint of Lip pain  DDX includes but is not limited to: Lip pain, aphthous ulcer, traumatic lip pain    Patient overall well-appearing, no acute distress, and vital signs grossly within normal limits. Patient's symptoms likely secondary to traumatic injury to the lip. No evidence of systemic illness. Will discharge patient home with return precautions and follow-up recommendations. Patient verbalized understanding and is without any further questions. Orders as below:  Orders Placed This Encounter    benzocaine-menthoL-zinc chlor (Orajel 3X Toothache-Gum) 20-0.26-0.15 % gel mucosal gel    acetaminophen (TYLENOL) 325 mg tablet        ED Course:            Procedures:  Procedures        Diagnosis and Disposition     CLINICAL IMPRESSION:  1.  Traumatic lip pain      Current Discharge Medication List      START taking these medications    Details   benzocaine-menthoL-zinc chlor (Orajel 3X Toothache-Gum) 20-0.26-0.15 % gel mucosal gel 1 g by Mucous Membrane route as needed for Pain for up to 10 days. Qty: 11.9 g, Refills: 0  Start date: 5/25/2022, End date: 6/4/2022      acetaminophen (TYLENOL) 325 mg tablet Take 2 Tablets by mouth every four (4) hours as needed for Pain. Qty: 20 Tablet, Refills: 0  Start date: 5/25/2022             Disposition: Home    Patient condition at time of disposition: Stable    DISCHARGE NOTE:   Pt has been reexamined. Patient has no new complaints, changes, or physical findings. Care plan outlined and precautions discussed. Results were reviewed with the patient. All medications were reviewed with the patient. All of pt's questions and concerns were addressed. Alarm symptoms and return precautions associated with chief complaint and evaluation were reviewed with the patient in detail. The patient demonstrated adequate understanding. Patient was instructed to follow up with PCP, Dentistry as well as strict return precautions to the ED upon further deterioration. Patient is ready to go home. The patient is happy with this plan      Dragon Disclaimer     Please note that this dictation was completed with [employer], the computer voice recognition software. Quite often unanticipated grammatical, syntax, homophones, and other interpretive errors are inadvertently transcribed by the computer software. Please disregard these errors. Please excuse any errors that have escaped final proofreading. Dao HEARD.

## 2022-05-27 ENCOUNTER — APPOINTMENT (OUTPATIENT)
Dept: INFUSION THERAPY | Age: 72
End: 2022-05-27

## 2022-06-01 ENCOUNTER — TELEPHONE (OUTPATIENT)
Age: 72
End: 2022-06-01

## 2022-06-06 ENCOUNTER — APPOINTMENT (OUTPATIENT)
Dept: GENERAL RADIOLOGY | Age: 72
End: 2022-06-06
Attending: PHYSICIAN ASSISTANT
Payer: MEDICAID

## 2022-06-06 ENCOUNTER — HOSPITAL ENCOUNTER (EMERGENCY)
Age: 72
Discharge: HOME | End: 2022-06-06
Attending: EMERGENCY MEDICINE
Payer: MEDICAID

## 2022-06-06 VITALS
OXYGEN SATURATION: 100 % | HEART RATE: 69 BPM | HEIGHT: 59 IN | SYSTOLIC BLOOD PRESSURE: 171 MMHG | BODY MASS INDEX: 26.21 KG/M2 | DIASTOLIC BLOOD PRESSURE: 98 MMHG | TEMPERATURE: 98.1 F | WEIGHT: 130 LBS | RESPIRATION RATE: 13 BRPM

## 2022-06-06 DIAGNOSIS — R07.9 CHEST PAIN, UNSPECIFIED TYPE: Primary | ICD-10-CM

## 2022-06-06 LAB
ALBUMIN SERPL-MCNC: 3.6 G/DL (ref 3.4–5)
ALBUMIN/GLOB SERPL: 1.1 {RATIO} (ref 0.8–1.7)
ALP SERPL-CCNC: 57 U/L (ref 45–117)
ALT SERPL-CCNC: 20 U/L (ref 13–56)
ANION GAP SERPL CALC-SCNC: 2 MMOL/L (ref 3–18)
AST SERPL-CCNC: 15 U/L (ref 10–38)
ATRIAL RATE: 72 BPM
BASOPHILS # BLD: 0.1 K/UL (ref 0–0.1)
BASOPHILS NFR BLD: 1 % (ref 0–2)
BILIRUB SERPL-MCNC: 0.6 MG/DL (ref 0.2–1)
BUN SERPL-MCNC: 17 MG/DL (ref 7–18)
BUN/CREAT SERPL: 18 (ref 12–20)
CALCIUM SERPL-MCNC: 9 MG/DL (ref 8.5–10.1)
CALCULATED P AXIS, ECG09: 50 DEGREES
CALCULATED R AXIS, ECG10: 11 DEGREES
CALCULATED T AXIS, ECG11: 25 DEGREES
CHLORIDE SERPL-SCNC: 110 MMOL/L (ref 100–111)
CO2 SERPL-SCNC: 29 MMOL/L (ref 21–32)
CREAT SERPL-MCNC: 0.96 MG/DL (ref 0.6–1.3)
D DIMER PPP FEU-MCNC: 0.38 UG/ML(FEU)
DIAGNOSIS, 93000: NORMAL
DIFFERENTIAL METHOD BLD: NORMAL
EOSINOPHIL # BLD: 0.1 K/UL (ref 0–0.4)
EOSINOPHIL NFR BLD: 2 % (ref 0–5)
ERYTHROCYTE [DISTWIDTH] IN BLOOD BY AUTOMATED COUNT: 14.2 % (ref 11.6–14.5)
GLOBULIN SER CALC-MCNC: 3.3 G/DL (ref 2–4)
GLUCOSE SERPL-MCNC: 120 MG/DL (ref 74–99)
HCT VFR BLD AUTO: 38.8 % (ref 35–45)
HGB BLD-MCNC: 13.9 G/DL (ref 12–16)
IMM GRANULOCYTES # BLD AUTO: 0 K/UL (ref 0–0.04)
IMM GRANULOCYTES NFR BLD AUTO: 0 % (ref 0–0.5)
LYMPHOCYTES # BLD: 2.2 K/UL (ref 0.9–3.6)
LYMPHOCYTES NFR BLD: 43 % (ref 21–52)
MCH RBC QN AUTO: 29.1 PG (ref 24–34)
MCHC RBC AUTO-ENTMCNC: 35.8 G/DL (ref 31–37)
MCV RBC AUTO: 81.2 FL (ref 78–100)
MONOCYTES # BLD: 0.4 K/UL (ref 0.05–1.2)
MONOCYTES NFR BLD: 8 % (ref 3–10)
NEUTS SEG # BLD: 2.3 K/UL (ref 1.8–8)
NEUTS SEG NFR BLD: 45 % (ref 40–73)
NRBC # BLD: 0 K/UL (ref 0–0.01)
NRBC BLD-RTO: 0 PER 100 WBC
P-R INTERVAL, ECG05: 164 MS
PLATELET # BLD AUTO: 302 K/UL (ref 135–420)
PMV BLD AUTO: 9.6 FL (ref 9.2–11.8)
POTASSIUM SERPL-SCNC: 4.2 MMOL/L (ref 3.5–5.5)
PROT SERPL-MCNC: 6.9 G/DL (ref 6.4–8.2)
Q-T INTERVAL, ECG07: 422 MS
QRS DURATION, ECG06: 64 MS
QTC CALCULATION (BEZET), ECG08: 462 MS
RBC # BLD AUTO: 4.78 M/UL (ref 4.2–5.3)
SODIUM SERPL-SCNC: 141 MMOL/L (ref 136–145)
TROPONIN-HIGH SENSITIVITY: 5 NG/L (ref 0–54)
TROPONIN-HIGH SENSITIVITY: 5 NG/L (ref 0–54)
VENTRICULAR RATE, ECG03: 72 BPM
WBC # BLD AUTO: 5.1 K/UL (ref 4.6–13.2)

## 2022-06-06 PROCEDURE — 84484 ASSAY OF TROPONIN QUANT: CPT

## 2022-06-06 PROCEDURE — 99285 EMERGENCY DEPT VISIT HI MDM: CPT

## 2022-06-06 PROCEDURE — 85379 FIBRIN DEGRADATION QUANT: CPT

## 2022-06-06 PROCEDURE — 74011250637 HC RX REV CODE- 250/637: Performed by: PHYSICIAN ASSISTANT

## 2022-06-06 PROCEDURE — 71046 X-RAY EXAM CHEST 2 VIEWS: CPT

## 2022-06-06 PROCEDURE — 80053 COMPREHEN METABOLIC PANEL: CPT

## 2022-06-06 PROCEDURE — 85025 COMPLETE CBC W/AUTO DIFF WBC: CPT

## 2022-06-06 PROCEDURE — 93005 ELECTROCARDIOGRAM TRACING: CPT

## 2022-06-06 RX ORDER — NITROGLYCERIN 0.4 MG/1
0.4 TABLET SUBLINGUAL
Status: COMPLETED | OUTPATIENT
Start: 2022-06-06 | End: 2022-06-06

## 2022-06-06 RX ORDER — IBUPROFEN 600 MG/1
600 TABLET ORAL
Status: COMPLETED | OUTPATIENT
Start: 2022-06-06 | End: 2022-06-06

## 2022-06-06 RX ORDER — ACETAMINOPHEN 500 MG
1000 TABLET ORAL
Status: COMPLETED | OUTPATIENT
Start: 2022-06-06 | End: 2022-06-06

## 2022-06-06 RX ADMIN — ACETAMINOPHEN 1000 MG: 500 TABLET ORAL at 09:40

## 2022-06-06 RX ADMIN — Medication 0.4 MG: at 10:39

## 2022-06-06 RX ADMIN — IBUPROFEN 600 MG: 600 TABLET ORAL at 12:06

## 2022-06-06 NOTE — ED NOTES
Patient called RN to room, sitting up on edge of stretcher, states she was \"overmedicated\" with nitro and has a headache, patient informed this is common side effect we expect to occur as it is a vasodilator, patient states she has never had a headache like this with nitro and also states her blood pressure is too high. Patient appears anxious, offered emotional support, would like to see SKYE CHEUNG informed.
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
## 2022-07-27 ENCOUNTER — HOSPITAL ENCOUNTER (EMERGENCY)
Age: 72
Discharge: HOME | End: 2022-07-27
Attending: EMERGENCY MEDICINE
Payer: MEDICAID

## 2022-07-27 VITALS
OXYGEN SATURATION: 98 % | SYSTOLIC BLOOD PRESSURE: 124 MMHG | TEMPERATURE: 98.3 F | DIASTOLIC BLOOD PRESSURE: 85 MMHG | HEART RATE: 85 BPM | WEIGHT: 130 LBS | HEIGHT: 59 IN | RESPIRATION RATE: 18 BRPM | BODY MASS INDEX: 26.21 KG/M2

## 2022-07-27 DIAGNOSIS — K04.7 DENTAL INFECTION: Primary | ICD-10-CM

## 2022-07-27 PROCEDURE — 99283 EMERGENCY DEPT VISIT LOW MDM: CPT

## 2022-07-27 RX ORDER — ACETAMINOPHEN 325 MG/1
650 TABLET ORAL
Qty: 20 TABLET | Refills: 0 | Status: SHIPPED | OUTPATIENT
Start: 2022-07-27 | End: 2022-07-27

## 2022-07-27 RX ORDER — PENICILLIN V POTASSIUM 500 MG/1
500 TABLET, FILM COATED ORAL 4 TIMES DAILY
Qty: 28 TABLET | Refills: 0 | Status: SHIPPED | OUTPATIENT
Start: 2022-07-27 | End: 2022-07-27

## 2022-07-27 RX ORDER — PENICILLIN V POTASSIUM 500 MG/1
500 TABLET, FILM COATED ORAL 4 TIMES DAILY
Qty: 28 TABLET | Refills: 0 | Status: SHIPPED | OUTPATIENT
Start: 2022-07-27 | End: 2022-08-03

## 2022-07-27 RX ORDER — ACETAMINOPHEN 325 MG/1
650 TABLET ORAL
Qty: 20 TABLET | Refills: 0 | OUTPATIENT
Start: 2022-07-27 | End: 2022-09-07

## 2022-07-27 NOTE — PROGRESS NOTES
Received discharge prescriptions for patient at outpatient pharmacy. Patient arrived at [facility] and picked up discharge prescriptions.

## 2022-07-27 NOTE — ED PROVIDER NOTES
EMERGENCY DEPARTMENT HISTORY AND PHYSICAL EXAM      Date: 7/27/2022  Patient Name: Israel Rogers    History of Presenting Illness     Chief Complaint   Patient presents with    Gum Problem       History Provided By: Patient    HPI: Israel Rogers, 71 y.o. female PMHx significant for anxiety, dental caries, angina, CVA presents ambulatory to the ED. Pt reports left upper dental pain x 2 days. Denies trauma or injury. Increased pain with chewing. Pt reports noticing drainage this morning. Denies trouble swallowing or throat swelling. Pt has not taken anything for symptoms. There are no other complaints, changes, or physical findings at this time. PCP: None    No current facility-administered medications on file prior to encounter. Current Outpatient Medications on File Prior to Encounter   Medication Sig Dispense Refill    [DISCONTINUED] acetaminophen (TYLENOL) 325 mg tablet Take 2 Tablets by mouth every four (4) hours as needed for Pain. 20 Tablet 0    lidocaine (Lidocaine Viscous) 2 % solution Take 15 mL by mouth two (2) times daily as needed for Pain. 100 mL 0       Past History     Past Medical History:  Past Medical History:   Diagnosis Date    Angina at rest 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 Family History:  No family history on file. Social History:  Social History     Tobacco Use    Smoking status: Some Days     Packs/day: 0.50     Years: 15.00     Pack years: 7.50     Types: Cigarettes    Smokeless tobacco: Never    Tobacco comments:     half a pack per day   Substance Use Topics    Alcohol use: Yes     Comment: 8 drinks per week    Drug use: No     Comment: none in 2 years       Allergies: Allergies   Allergen Reactions    Aspirin Nausea Only         Review of Systems   Review of Systems   Constitutional:  Negative for chills and fever.    HENT:  Positive for dental problem. Respiratory:  Negative for shortness of breath. Cardiovascular:  Negative for chest pain. Gastrointestinal:  Negative for abdominal pain, nausea and vomiting. Genitourinary:  Negative for flank pain. Musculoskeletal:  Negative for back pain and myalgias. Skin:  Negative for color change, pallor, rash and wound. Neurological:  Negative for dizziness, weakness and light-headedness. All other systems reviewed and are negative. Physical Exam   Physical Exam  Vitals and nursing note reviewed. Constitutional:       General: She is not in acute distress. Appearance: She is well-developed. Comments: Pt in NAD   HENT:      Head: Normocephalic and atraumatic. Mouth/Throat:      Comments: Missing all upper teeth  Gum swelling and induration with small amount of pustular drainage  No sublingual or submandibular swelling  Maintaining airway without difficulty  Eyes:      Conjunctiva/sclera: Conjunctivae normal.   Cardiovascular:      Rate and Rhythm: Normal rate and regular rhythm. Heart sounds: Normal heart sounds. Pulmonary:      Effort: Pulmonary effort is normal. No respiratory distress. Breath sounds: Normal breath sounds. Abdominal:      General: Bowel sounds are normal. There is no distension. Palpations: Abdomen is soft. Musculoskeletal:         General: Normal range of motion. Skin:     General: Skin is warm. Findings: No rash. Neurological:      Mental Status: She is alert and oriented to person, place, and time. Psychiatric:         Behavior: Behavior normal.       Diagnostic Study Results     Labs -   No results found for this or any previous visit (from the past 12 hour(s)). Radiologic Studies -   No orders to display     CT Results  (Last 48 hours)      None          CXR Results  (Last 48 hours)      None            Medical Decision Making   I am the first provider for this patient.     I reviewed the vital signs, available nursing notes, past medical history, past surgical history, family history and social history. Vital Signs-Reviewed the patient's vital signs. Patient Vitals for the past 12 hrs:   Temp Pulse Resp BP SpO2   07/27/22 1244 98.3 °F (36.8 °C) 85 18 124/85 98 %         Records Reviewed: Nursing Notes, Old Medical Records, Previous Radiology Studies, and Previous Laboratory Studies    Provider Notes (Medical Decision Making):   DDx: Dental pain vs infection vs abscess    72 yo F who presents with left upper dental pain x 2 days. On exam TTP to upper gum with small amount of drainage. Pt afebrile not tachycardic. Will discharge home with antibiotic and discussed need of prompt dental follow-up. At time of discharge, pt non-toxic appearing in NAD. Pt stable for prompt outpatient follow-up with PCP 1 to 2 days. Patient given strict instructions to return if symptoms worsen. ED Course:   Initial assessment performed. The patients presenting problems have been discussed, and they are in agreement with the care plan formulated and outlined with them. I have encouraged them to ask questions as they arise throughout their visit. Disposition:  12:50 PM  Discussed dx and treatment plan. Discussed importance of PCP follow up. All questions answered. Pt voiced they understood. Return if sx worsen. PLAN:  1. Current Discharge Medication List        START taking these medications    Details   penicillin v potassium (VEETID) 500 mg tablet Take 1 Tablet by mouth four (4) times daily for 7 days. Qty: 28 Tablet, Refills: 0  Start date: 7/27/2022, End date: 8/3/2022           CONTINUE these medications which have CHANGED    Details   acetaminophen (TYLENOL) 325 mg tablet Take 2 Tablets by mouth every six (6) hours as needed for Pain. Qty: 20 Tablet, Refills: 0  Start date: 7/27/2022           2.    Follow-up Information       Follow up With Specialties Details Why [facility]  Schedule an appointment as soon as possible for a visit in 1 day  NicolaaTristan Sullivan 3  [facility] [place] [address] [place]    [address] [facility] [address]  Schedule an appointment as soon as possible for a visit in 1 day  [address] [id number]  [address] [facility]  Schedule an appointment as soon as possible for a visit   [address] [place] [address]  [phone]          Return to ED if worse     Diagnosis     Clinical Impression:   1. Dental infection        Attestations:    Lazarus Griffon, PA    Please note that this dictation was completed with [employer], the [employer] voice recognition software. Quite often unanticipated grammatical, syntax, homophones, and other interpretive errors are inadvertently transcribed by the computer software. Please disregard these errors. Please excuse any errors that have escaped final proofreading. Thank you.

## 2022-07-27 NOTE — ED TRIAGE NOTES
Reports left upper gum pain that started out as a \"little bump\" and when eating a bone stuck in it. Pt states she has a funny taste in her mouth because something is draining from the area.

## 2022-09-07 ENCOUNTER — APPOINTMENT (OUTPATIENT)
Dept: GENERAL RADIOLOGY | Age: 72
End: 2022-09-07
Attending: PHYSICIAN ASSISTANT
Payer: MEDICAID

## 2022-09-07 ENCOUNTER — APPOINTMENT (OUTPATIENT)
Dept: CT IMAGING | Age: 72
End: 2022-09-07
Attending: PHYSICIAN ASSISTANT
Payer: MEDICAID

## 2022-09-07 ENCOUNTER — HOSPITAL ENCOUNTER (EMERGENCY)
Age: 72
Discharge: HOME | End: 2022-09-07
Attending: EMERGENCY MEDICINE
Payer: MEDICAID

## 2022-09-07 VITALS
HEART RATE: 72 BPM | RESPIRATION RATE: 16 BRPM | OXYGEN SATURATION: 97 % | DIASTOLIC BLOOD PRESSURE: 83 MMHG | SYSTOLIC BLOOD PRESSURE: 127 MMHG

## 2022-09-07 DIAGNOSIS — R91.8 PULMONARY MASS: ICD-10-CM

## 2022-09-07 DIAGNOSIS — R07.9 CHEST PAIN, UNSPECIFIED TYPE: Primary | ICD-10-CM

## 2022-09-07 DIAGNOSIS — K06.8 PAIN IN GUMS: ICD-10-CM

## 2022-09-07 DIAGNOSIS — R91.8 PULMONARY NODULES: ICD-10-CM

## 2022-09-07 LAB
ALBUMIN SERPL-MCNC: 3.5 G/DL (ref 3.4–5)
ALBUMIN/GLOB SERPL: 1.1 {RATIO} (ref 0.8–1.7)
ALP SERPL-CCNC: 57 U/L (ref 45–117)
ALT SERPL-CCNC: 21 U/L (ref 13–56)
ANION GAP SERPL CALC-SCNC: 4 MMOL/L (ref 3–18)
AST SERPL-CCNC: 18 U/L (ref 10–38)
ATRIAL RATE: 64 BPM
ATRIAL RATE: 70 BPM
BASOPHILS # BLD: 0.1 K/UL (ref 0–0.1)
BASOPHILS NFR BLD: 1 % (ref 0–2)
BILIRUB SERPL-MCNC: 0.3 MG/DL (ref 0.2–1)
BNP SERPL-MCNC: 51 PG/ML (ref 0–900)
BUN SERPL-MCNC: 20 MG/DL (ref 7–18)
BUN/CREAT SERPL: 25 (ref 12–20)
CALCIUM SERPL-MCNC: 9 MG/DL (ref 8.5–10.1)
CALCULATED P AXIS, ECG09: 34 DEGREES
CALCULATED P AXIS, ECG09: 40 DEGREES
CALCULATED R AXIS, ECG10: 2 DEGREES
CALCULATED R AXIS, ECG10: 6 DEGREES
CALCULATED T AXIS, ECG11: 21 DEGREES
CALCULATED T AXIS, ECG11: 21 DEGREES
CHLORIDE SERPL-SCNC: 111 MMOL/L (ref 100–111)
CO2 SERPL-SCNC: 26 MMOL/L (ref 21–32)
CREAT SERPL-MCNC: 0.81 MG/DL (ref 0.6–1.3)
DIAGNOSIS, 93000: NORMAL
DIAGNOSIS, 93000: NORMAL
DIFFERENTIAL METHOD BLD: ABNORMAL
EOSINOPHIL # BLD: 0.2 K/UL (ref 0–0.4)
EOSINOPHIL NFR BLD: 3 % (ref 0–5)
ERYTHROCYTE [DISTWIDTH] IN BLOOD BY AUTOMATED COUNT: 13.8 % (ref 11.6–14.5)
GLOBULIN SER CALC-MCNC: 3.3 G/DL (ref 2–4)
GLUCOSE SERPL-MCNC: 92 MG/DL (ref 74–99)
HCT VFR BLD AUTO: 39 % (ref 35–45)
HGB BLD-MCNC: 13.9 G/DL (ref 12–16)
IMM GRANULOCYTES # BLD AUTO: 0 K/UL (ref 0–0.04)
IMM GRANULOCYTES NFR BLD AUTO: 0 % (ref 0–0.5)
LYMPHOCYTES # BLD: 2.1 K/UL (ref 0.9–3.6)
LYMPHOCYTES NFR BLD: 35 % (ref 21–52)
MAGNESIUM SERPL-MCNC: 2.3 MG/DL (ref 1.6–2.6)
MCH RBC QN AUTO: 28.7 PG (ref 24–34)
MCHC RBC AUTO-ENTMCNC: 35.6 G/DL (ref 31–37)
MCV RBC AUTO: 80.6 FL (ref 78–100)
MONOCYTES # BLD: 0.6 K/UL (ref 0.05–1.2)
MONOCYTES NFR BLD: 9 % (ref 3–10)
NEUTS SEG # BLD: 3.1 K/UL (ref 1.8–8)
NEUTS SEG NFR BLD: 52 % (ref 40–73)
NRBC # BLD: 0 K/UL (ref 0–0.01)
NRBC BLD-RTO: 0 PER 100 WBC
P-R INTERVAL, ECG05: 150 MS
P-R INTERVAL, ECG05: 152 MS
PLATELET # BLD AUTO: 293 K/UL (ref 135–420)
PMV BLD AUTO: 9.1 FL (ref 9.2–11.8)
POTASSIUM SERPL-SCNC: 4.6 MMOL/L (ref 3.5–5.5)
PROT SERPL-MCNC: 6.8 G/DL (ref 6.4–8.2)
Q-T INTERVAL, ECG07: 416 MS
Q-T INTERVAL, ECG07: 418 MS
QRS DURATION, ECG06: 70 MS
QRS DURATION, ECG06: 70 MS
QTC CALCULATION (BEZET), ECG08: 431 MS
QTC CALCULATION (BEZET), ECG08: 449 MS
RBC # BLD AUTO: 4.84 M/UL (ref 4.2–5.3)
SODIUM SERPL-SCNC: 141 MMOL/L (ref 136–145)
TROPONIN-HIGH SENSITIVITY: 5 NG/L (ref 0–54)
TROPONIN-HIGH SENSITIVITY: 6 NG/L (ref 0–54)
VENTRICULAR RATE, ECG03: 64 BPM
VENTRICULAR RATE, ECG03: 70 BPM
WBC # BLD AUTO: 6.1 K/UL (ref 4.6–13.2)

## 2022-09-07 PROCEDURE — 93005 ELECTROCARDIOGRAM TRACING: CPT

## 2022-09-07 PROCEDURE — 83880 ASSAY OF NATRIURETIC PEPTIDE: CPT

## 2022-09-07 PROCEDURE — 83735 ASSAY OF MAGNESIUM: CPT

## 2022-09-07 PROCEDURE — 85025 COMPLETE CBC W/AUTO DIFF WBC: CPT

## 2022-09-07 PROCEDURE — 71250 CT THORAX DX C-: CPT

## 2022-09-07 PROCEDURE — 74011250637 HC RX REV CODE- 250/637: Performed by: PHYSICIAN ASSISTANT

## 2022-09-07 PROCEDURE — 80053 COMPREHEN METABOLIC PANEL: CPT

## 2022-09-07 PROCEDURE — 74011000250 HC RX REV CODE- 250: Performed by: PHYSICIAN ASSISTANT

## 2022-09-07 PROCEDURE — 84484 ASSAY OF TROPONIN QUANT: CPT

## 2022-09-07 PROCEDURE — 71046 X-RAY EXAM CHEST 2 VIEWS: CPT

## 2022-09-07 PROCEDURE — 99285 EMERGENCY DEPT VISIT HI MDM: CPT

## 2022-09-07 RX ORDER — LIDOCAINE HYDROCHLORIDE 20 MG/ML
15 SOLUTION OROPHARYNGEAL
Status: COMPLETED | OUTPATIENT
Start: 2022-09-07 | End: 2022-09-07

## 2022-09-07 RX ORDER — ACETAMINOPHEN 500 MG
1000 TABLET ORAL
Status: COMPLETED | OUTPATIENT
Start: 2022-09-07 | End: 2022-09-07

## 2022-09-07 RX ORDER — LIDOCAINE HYDROCHLORIDE 20 MG/ML
SOLUTION OROPHARYNGEAL
Qty: 200 ML | Refills: 0 | Status: SHIPPED | OUTPATIENT
Start: 2022-09-07

## 2022-09-07 RX ORDER — ACETAMINOPHEN 325 MG/1
650 TABLET ORAL
Qty: 20 TABLET | Refills: 0 | Status: SHIPPED | OUTPATIENT
Start: 2022-09-07

## 2022-09-07 RX ADMIN — LIDOCAINE HYDROCHLORIDE 15 ML: 20 SOLUTION ORAL at 10:55

## 2022-09-07 RX ADMIN — ACETAMINOPHEN 1000 MG: 500 TABLET ORAL at 10:55

## 2022-09-07 NOTE — DISCHARGE INSTRUCTIONS
Take medication as prescribed. Follow-up with your primary care physician and oncologist within 2 days for reassessment. Bring the results from this visit with you for their review. Return to the ED immediately for any new, worsening, or persistent symptoms, including fever, shortness of breath, or any other medical concerns. Follow-up with one of the provided dental clinics below:    [facility] [address] [phone]  [place] [address] [phone]    Call to schedule an appointment.

## 2022-09-07 NOTE — Clinical Note
[address] [place] [address] Dr JUSTINO CARTER BEH [facility] EMERGENCY DEPT  [address] [address] [phone]    Work/School Note    Date: 9/7/2022    To Whom It May concern: Albin Reyes was seen and treated today in the emergency room by the following provider(s):  Attending Provider: Madhu Reddy MD  Physician Assistant: Fito Mchugh, [facility] Juliocesar White. Albin Reyes is excused from work/school on 09/07/22 and 09/08/22. She is medically clear to return to work/school on 9/9/2022.        Sincerely,          Lilia Cavazos PA

## 2022-09-07 NOTE — Clinical Note
[address] [place] [address] Dr JUSTINO CARTER BEH [facility] EMERGENCY DEPT  [address] [address] [phone]    Work/School Note    Date: 9/7/2022    To Whom It May concern: Jose Currie was seen and treated today in the emergency room by the following provider(s):  Attending Provider: Ar Blanco MD  Physician Assistant: Candida Mayfield. Jose Currie is excused from work/school on 09/07/22 and 09/08/22. She is medically clear to return to work/school on 9/9/2022.        Sincerely,          SKYE Jennings

## 2022-09-07 NOTE — ED PROVIDER NOTES
EMERGENCY DEPARTMENT HISTORY AND PHYSICAL EXAM    10:04 AM      Date: 9/7/2022  Patient Name: Alonso Bunch    History of Presenting Illness     Chief Complaint   Patient presents with    Chest Pain         History Provided By: Patient    Additional History (Context): Alonso Bunch is a 71 y.o. female with  hx of anxiety, dental caries , CVA, angina, tobacco abuse and other noted PMH who presents with complaint of midsternal chest pain x 2 days. Patient notes the pain is constant and worse with movement. Patient notes she was helping her niece move yesterday and the symptoms started after lifting a mattress. Patient denies radiation of pain into neck or arm, diaphoresis, dyspnea, orthopnea, nausea or vomiting. Denies history of cardiac disease, history of DVT or PE, hemoptysis, recent surgery or travel. Patient also notes diffuse gum pain  Patient denies fever or chills, facial swelling, drooling or stridor. Note she has not taken any medication for the symptoms prior to arrival.       PCP: None    Current Outpatient Medications   Medication Sig Dispense Refill    lidocaine (Lidocaine Viscous) 2 % solution Put 10 mL in mouth and swish and spit out QAC and at bedtime 200 mL 0    acetaminophen (TYLENOL) 325 mg tablet Take 2 Tablets by mouth every six (6) hours as needed for Pain. 20 Tablet 0       Past History     Past Medical History:  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    Dental caries        Past Surgical History:  No past surgical history on file. Family History:  No family history on file. Social History:  Social History     Tobacco Use    Smoking status: Some Days     Packs/day: 0.50     Years: 15.00     Pack years: 7.50     Types: Cigarettes    Smokeless tobacco: Never    Tobacco comments:     half a pack per day   Substance Use Topics    Alcohol use: Yes     Comment: 8 drinks per week    Drug use:  No Comment: none in 2 years       Allergies: Allergies   Allergen Reactions    Aspirin Nausea Only         Review of Systems       Review of Systems   Constitutional:  Negative for chills and fever. HENT:  Positive for mouth sores. Respiratory:  Negative for shortness of breath. Cardiovascular:  Positive for chest pain. Gastrointestinal:  Negative for abdominal pain, nausea and vomiting. Skin:  Negative for rash. Neurological:  Negative for weakness. All other systems reviewed and are negative. Physical Exam   Visit Vitals  /83 (BP 1 Location: Left upper arm, BP Patient Position: At rest)   Pulse 72   Resp 16   SpO2 97%         Physical Exam  Vitals and nursing note reviewed. Constitutional:       General: She is not in acute distress. Appearance: She is well-developed. She is not ill-appearing, toxic-appearing or diaphoretic. HENT:      Head: Normocephalic and atraumatic. Mouth/Throat:      Mouth: Mucous membranes are moist.      Dentition: No gingival swelling, dental caries, dental abscesses or gum lesions. Pharynx: Oropharynx is clear. Uvula midline. Comments: Pt without teeth, no fluctuance to gumline, no drooling or stridor, soft floor of mouth, no facial swelling   Cardiovascular:      Rate and Rhythm: Normal rate and regular rhythm. Heart sounds: Normal heart sounds. No murmur heard. No friction rub. No gallop. Pulmonary:      Effort: Pulmonary effort is normal. No respiratory distress. Breath sounds: Normal breath sounds. No wheezing or rales. Musculoskeletal:         General: Normal range of motion. Cervical back: Normal range of motion and neck supple. Skin:     General: Skin is warm. Findings: No rash. Neurological:      Mental Status: She is alert.          Diagnostic Study Results     Labs -  Recent Results (from the past 12 hour(s))   EKG, 12 LEAD, INITIAL    Collection Time: 09/07/22  9:02 AM   Result Value Ref Range Ventricular Rate 64 BPM    Atrial Rate 64 BPM    P-R Interval 152 ms    QRS Duration 70 ms    Q-T Interval 418 ms    QTC Calculation (Bezet) 431 ms    Calculated P Axis 40 degrees    Calculated R Axis 6 degrees    Calculated T Axis 21 degrees    Diagnosis       Normal sinus rhythm  Normal ECG  When compared with ECG of 06-JUN-2022 08:08,  No significant change was found  Confirmed by Rhiannon Middleton MD, Nayeli Cid ([id number]) on 9/7/2022 3:00:59 PM     CBC WITH AUTOMATED DIFF    Collection Time: 09/07/22  9:05 AM   Result Value Ref Range    WBC 6.1 4.6 - 13.2 K/uL    RBC 4.84 4.20 - 5.30 M/uL    HGB 13.9 12.0 - 16.0 g/dL    HCT 39.0 35.0 - 45.0 %    MCV 80.6 78.0 - 100.0 FL    MCH 28.7 24.0 - 34.0 PG    MCHC 35.6 31.0 - 37.0 g/dL    RDW 13.8 11.6 - 14.5 %    PLATELET 740 571 - 066 K/uL    MPV 9.1 (L) 9.2 - 11.8 FL    NRBC 0.0 0  WBC    ABSOLUTE NRBC 0.00 0.00 - 0.01 K/uL    NEUTROPHILS 52 40 - 73 %    LYMPHOCYTES 35 21 - 52 %    MONOCYTES 9 3 - 10 %    EOSINOPHILS 3 0 - 5 %    BASOPHILS 1 0 - 2 %    IMMATURE GRANULOCYTES 0 0.0 - 0.5 %    ABS. NEUTROPHILS 3.1 1.8 - 8.0 K/UL    ABS. LYMPHOCYTES 2.1 0.9 - 3.6 K/UL    ABS. MONOCYTES 0.6 0.05 - 1.2 K/UL    ABS. EOSINOPHILS 0.2 0.0 - 0.4 K/UL    ABS. BASOPHILS 0.1 0.0 - 0.1 K/UL    ABS. IMM. GRANS. 0.0 0.00 - 0.04 K/UL    DF AUTOMATED     METABOLIC PANEL, COMPREHENSIVE    Collection Time: 09/07/22  9:05 AM   Result Value Ref Range    Sodium 141 136 - 145 mmol/L    Potassium 4.6 3.5 - 5.5 mmol/L    Chloride 111 100 - 111 mmol/L    CO2 26 21 - 32 mmol/L    Anion gap 4 3.0 - 18 mmol/L    Glucose 92 74 - 99 mg/dL    BUN 20 (H) 7.0 - 18 MG/DL    Creatinine 0.81 0.6 - 1.3 MG/DL    BUN/Creatinine ratio 25 (H) 12 - 20      GFR est AA >60 >60 ml/min/1.73m2    GFR est non-AA >60 >60 ml/min/1.73m2    Calcium 9.0 8.5 - 10.1 MG/DL    Bilirubin, total 0.3 0.2 - 1.0 MG/DL    ALT (SGPT) 21 13 - 56 U/L    AST (SGOT) 18 10 - 38 U/L    Alk.  phosphatase 57 45 - 117 U/L    Protein, total 6.8 6.4 - 8.2 g/dL    Albumin 3.5 3.4 - 5.0 g/dL    Globulin 3.3 2.0 - 4.0 g/dL    A-G Ratio 1.1 0.8 - 1.7     TROPONIN-HIGH SENSITIVITY    Collection Time: 09/07/22  9:05 AM   Result Value Ref Range    Troponin-High Sensitivity 6 0 - 54 ng/L   NT-PRO BNP    Collection Time: 09/07/22  9:05 AM   Result Value Ref Range    NT pro-BNP 51 0 - 900 PG/ML   MAGNESIUM    Collection Time: 09/07/22  9:05 AM   Result Value Ref Range    Magnesium 2.3 1.6 - 2.6 mg/dL   EKG, 12 LEAD, SUBSEQUENT    Collection Time: 09/07/22 10:43 AM   Result Value Ref Range    Ventricular Rate 70 BPM    Atrial Rate 70 BPM    P-R Interval 150 ms    QRS Duration 70 ms    Q-T Interval 416 ms    QTC Calculation (Bezet) 449 ms    Calculated P Axis 34 degrees    Calculated R Axis 2 degrees    Calculated T Axis 21 degrees    Diagnosis       Normal sinus rhythm  Minimal voltage criteria for LVH, may be normal variant  Borderline ECG  When compared with ECG of 07-SEP-2022 09:02,  No significant change was found  Confirmed by Jocelyn Miranda MD, Annel Velasco ([id number]) on 9/7/2022 4:03:51 PM     TROPONIN-HIGH SENSITIVITY    Collection Time: 09/07/22 10:46 AM   Result Value Ref Range    Troponin-High Sensitivity 5 0 - 54 ng/L       Radiologic Studies -   CT CHEST WO CONT   Final Result      1. Worsened right lung base and right middle lobe number as well as size of   pulmonary nodules. Increase in size of right lung base pulmonary mass. Disease   progression. 2.   Minimal interval increase in size of groundglass opacity left upper lobe   apical posterior segment. 3. Minimally worsened mediastinal adenopathy. 4. No pneumonia or pleural effusions. XR CHEST PA LAT   Final Result    IMPRESSION:      1. Sequela of moderate congestion with minimal increased right lung base   airspace opacity. Follow-up with plain imaging of the chest.            Medical Decision Making   I am the first provider for this patient.     I reviewed the vital signs, available nursing notes, past medical history, past surgical history, family history and social history. Vital Signs-Reviewed the patient's vital signs. Pulse Oximetry Analysis -   97% on room air     EKG: Interpreted by the EP. Time Interpreted: 920   Rate: 64   Rhythm: Normal Sinus Rhythm     Records Reviewed: Nursing Notes, Old Medical Records, and Previous electrocardiograms (Time of Review: 10:04 AM)    ED Course: Progress Notes, Reevaluation, and Consults:  11:14 AM Updated patient we are pending CT.  2:29 PM Reviewed results and plan with patient. Discussed need for close outpatient follow-up with oncologist this week for further assessment. Printed CT report for her records. Discussed strict return precautions, including  fever, shortness of breath, or any other medical concerns. Pt in agreement with plan. Provider Notes (Medical Decision Making): 78-year-old female who presents to the ED due to midsternal chest pain x2 days. Afebrile, nontoxic-appearing, looks well. No evidence of tachycardia, tachypnea, hypoxia. EKG normal sinus rhythm, troponin negative x2. CT chest demonstrates worsening pulmonary nodules and pulmonary mass, no evidence of pneumonia or pleural effusion. Thorough discussion with patient about the importance of close follow-up with her oncologist.  Strict return precautions provided. Diagnosis     Clinical Impression:   1. Chest pain, unspecified type    2. Pain in gums    3. Pulmonary nodules    4.  Pulmonary mass        Disposition: home     Follow-up Information       Follow up With Specialties Details Why [facility]    [facility] EMERGENCY DEPT Emergency Medicine  If symptoms worsen [address] [email or web address]    Tran Sotomayor MD Medical Oncology Schedule an appointment as soon as possible for a visit   [address] [place] [address]  [phone]               Discharge Medication List as of 9/7/2022  2:13 PM        CONTINUE these medications which have CHANGED    Details   lidocaine (Lidocaine Viscous) 2 % solution Put 10 mL in mouth and swish and spit out QAC and at bedtime, Normal, Disp-200 mL, R-0      acetaminophen (TYLENOL) 325 mg tablet Take 2 Tablets by mouth every six (6) hours as needed for Pain., Normal, Disp-20 Tablet, R-0             Dictation disclaimer:  Please note that this dictation was completed with [employer], the computer voice recognition software. Quite often unanticipated grammatical, syntax, homophones, and other interpretive errors are inadvertently transcribed by the computer software. Please disregard these errors. Please excuse any errors that have escaped final proofreading.

## 2022-09-08 DIAGNOSIS — R59.0 MEDIASTINAL ADENOPATHY: ICD-10-CM

## 2022-09-08 DIAGNOSIS — R91.8 RIGHT LOWER LOBE LUNG MASS: Primary | ICD-10-CM

## 2022-09-08 DIAGNOSIS — N63.10 MASS OF RIGHT BREAST, UNSPECIFIED QUADRANT: ICD-10-CM

## 2022-09-08 DIAGNOSIS — R91.8 LUNG NODULES: ICD-10-CM

## 2022-10-14 ENCOUNTER — HOSPITAL ENCOUNTER (EMERGENCY)
Age: 72
Discharge: HOME | End: 2022-10-14
Attending: EMERGENCY MEDICINE
Payer: MEDICAID

## 2022-10-14 ENCOUNTER — APPOINTMENT (OUTPATIENT)
Dept: GENERAL RADIOLOGY | Age: 72
End: 2022-10-14
Attending: EMERGENCY MEDICINE
Payer: MEDICAID

## 2022-10-14 ENCOUNTER — APPOINTMENT (OUTPATIENT)
Dept: CT IMAGING | Age: 72
End: 2022-10-14
Attending: EMERGENCY MEDICINE
Payer: MEDICAID

## 2022-10-14 VITALS
RESPIRATION RATE: 21 BRPM | DIASTOLIC BLOOD PRESSURE: 88 MMHG | TEMPERATURE: 98.3 F | SYSTOLIC BLOOD PRESSURE: 156 MMHG | HEART RATE: 72 BPM | OXYGEN SATURATION: 100 %

## 2022-10-14 DIAGNOSIS — S70.02XA CONTUSION OF LEFT HIP, INITIAL ENCOUNTER: Primary | ICD-10-CM

## 2022-10-14 DIAGNOSIS — M25.559 HIP PAIN: ICD-10-CM

## 2022-10-14 PROCEDURE — 74011250636 HC RX REV CODE- 250/636: Performed by: EMERGENCY MEDICINE

## 2022-10-14 PROCEDURE — 99284 EMERGENCY DEPT VISIT MOD MDM: CPT

## 2022-10-14 PROCEDURE — 73502 X-RAY EXAM HIP UNI 2-3 VIEWS: CPT

## 2022-10-14 PROCEDURE — 73700 CT LOWER EXTREMITY W/O DYE: CPT

## 2022-10-14 PROCEDURE — 94762 N-INVAS EAR/PLS OXIMTRY CONT: CPT

## 2022-10-14 PROCEDURE — 96374 THER/PROPH/DIAG INJ IV PUSH: CPT

## 2022-10-14 PROCEDURE — 74011250637 HC RX REV CODE- 250/637: Performed by: EMERGENCY MEDICINE

## 2022-10-14 RX ORDER — ACETAMINOPHEN AND CODEINE PHOSPHATE 300; 30 MG/1; MG/1
1 TABLET ORAL
Status: COMPLETED | OUTPATIENT
Start: 2022-10-14 | End: 2022-10-14

## 2022-10-14 RX ORDER — KETOROLAC TROMETHAMINE 15 MG/ML
15 INJECTION, SOLUTION INTRAMUSCULAR; INTRAVENOUS
Status: COMPLETED | OUTPATIENT
Start: 2022-10-14 | End: 2022-10-14

## 2022-10-14 RX ORDER — NAPROXEN 500 MG/1
500 TABLET ORAL 2 TIMES DAILY WITH MEALS
Qty: 20 TABLET | Refills: 0 | Status: SHIPPED | OUTPATIENT
Start: 2022-10-14 | End: 2022-10-24

## 2022-10-14 RX ORDER — CYCLOBENZAPRINE HCL 10 MG
10 TABLET ORAL
Status: COMPLETED | OUTPATIENT
Start: 2022-10-14 | End: 2022-10-14

## 2022-10-14 RX ORDER — ACETAMINOPHEN AND CODEINE PHOSPHATE 300; 30 MG/1; MG/1
1 TABLET ORAL
Qty: 4 TABLET | Refills: 0 | Status: SHIPPED | OUTPATIENT
Start: 2022-10-14 | End: 2022-10-17

## 2022-10-14 RX ADMIN — ACETAMINOPHEN AND CODEINE PHOSPHATE 1 TABLET: 300; 30 TABLET ORAL at 10:58

## 2022-10-14 RX ADMIN — KETOROLAC TROMETHAMINE 15 MG: 15 INJECTION, SOLUTION INTRAMUSCULAR; INTRAVENOUS at 07:27

## 2022-10-14 RX ADMIN — CYCLOBENZAPRINE 10 MG: 10 TABLET, FILM COATED ORAL at 10:46

## 2022-10-14 RX ADMIN — CYCLOBENZAPRINE HYDROCHLORIDE 10 MG: 10 TABLET, FILM COATED ORAL at 06:55

## 2022-10-14 NOTE — ED PROVIDER NOTES
EMERGENCY DEPARTMENT HISTORY AND PHYSICAL EXAM    7:15 AM      Date: 10/14/2022  Patient Name: Lorna Roa    History of Presenting Illness     Chief Complaint   Patient presents with    Fall         History Provided By: Patient  Location/Duration/Severity/Modifying factors   Patient is a 76-year-old female with a history of old stroke, angina, left lung mass during admission in the past year, that presents emergency department with complaint of left hip pain after falling from a truck. Patient was climbing up on a step on a truck and fell backwards landing on her left hip. Patient says she was able walk and her friend gave her a Percocet and it helped overnight and then she got this morning have a little snack and went to the bathroom. After she had from bathroom she had a hard time ambulating so called EMS. Patient said the pain was severe and she had a hard time doing anything with her left leg. The patient denies any other injuries. Patient notes that the pain is on the outside of her left leg. Patient denies any other radiation of her pain. Patient denies any numbness or tingling. Patient has hitting her head or having loss of consciousness. Patient admits to being a smoker, drinks alcohol regularly, denies any drug use. PCP: None    Current Facility-Administered Medications   Medication Dose Route Frequency Provider Last Rate Last Admin    cyclobenzaprine (FLEXERIL) tablet 10 mg  10 mg Oral NOW Gavino Armas MD         Current Outpatient Medications   Medication Sig Dispense Refill    naproxen (Naprosyn) 500 mg tablet Take 1 Tablet by mouth two (2) times daily (with meals) for 10 days. 20 Tablet 0    lidocaine (Lidocaine Viscous) 2 % solution Put 10 mL in mouth and swish and spit out QAC and at bedtime 200 mL 0    acetaminophen (TYLENOL) 325 mg tablet Take 2 Tablets by mouth every six (6) hours as needed for Pain.  20 Tablet 0       Past History     Past Medical History:  Past Medical History:   Diagnosis Date    Angina at rest [facility])     Anxiety     CVA, old, facial weakness 2012    left ptosis    Dental caries        Past Surgical History:  No past surgical history on file. Family History:  No family history on file. Social History:  Social History     Tobacco Use    Smoking status: Some Days     Packs/day: 0.50     Years: 15.00     Pack years: 7.50     Types: Cigarettes    Smokeless tobacco: Never    Tobacco comments:     half a pack per day   Substance Use Topics    Alcohol use: Yes     Comment: 8 drinks per week    Drug use: No     Comment: none in 2 years       Allergies: Allergies   Allergen Reactions    Aspirin Nausea Only         Review of Systems       Review of Systems   Constitutional:  Negative for activity change, fatigue and fever. HENT:  Negative for congestion and rhinorrhea. Eyes:  Negative for visual disturbance. Respiratory:  Negative for shortness of breath. Cardiovascular:  Negative for chest pain and palpitations. Gastrointestinal:  Negative for abdominal pain, diarrhea, nausea and vomiting. Genitourinary:  Negative for dysuria and hematuria. Musculoskeletal:  Positive for arthralgias and gait problem. Negative for back pain. Skin:  Negative for rash. Neurological:  Negative for dizziness, weakness and light-headedness. All other systems reviewed and are negative. Physical Exam   Visit Vitals  BP (!) 131/97 (BP 1 Location: Left upper arm, BP Patient Position: Semi fowlers)   Pulse 60   Temp 98.3 °F (36.8 °C)   Resp 17   SpO2 100%         Physical Exam  Vitals and nursing note reviewed. Constitutional:       General: She is not in acute distress. Appearance: She is well-developed. HENT:      Head: Normocephalic and atraumatic. Right Ear: External ear normal.      Left Ear: External ear normal.      Nose: Nose normal.   Eyes:      General: No scleral icterus.      Conjunctiva/sclera: Conjunctivae normal.      Pupils: Pupils are equal, round, and reactive to light. Neck:      Thyroid: No thyromegaly. Vascular: No JVD. Trachea: No tracheal deviation. Cardiovascular:      Rate and Rhythm: Normal rate and regular rhythm. Heart sounds: Normal heart sounds. No murmur heard. No friction rub. No gallop. Pulmonary:      Effort: Pulmonary effort is normal.      Breath sounds: Normal breath sounds. Chest:      Chest wall: No tenderness. Abdominal:      General: Bowel sounds are normal. There is no distension. Palpations: Abdomen is soft. Tenderness: There is no abdominal tenderness. There is no guarding or rebound. Musculoskeletal:         General: Tenderness present. Normal range of motion. Cervical back: Normal range of motion and neck supple. Comments: Left hip with point tenderness, no pain with internal and external rotation, full flexion noted with mild pain, distal pulses and sensation are intact   Lymphadenopathy:      Cervical: No cervical adenopathy. Skin:     General: Skin is warm and dry. Neurological:      Mental Status: She is alert and oriented to person, place, and time. Cranial Nerves: No cranial nerve deficit. Coordination: Coordination normal.      Comments: No sensory loss, Gait normal, Motor 5/5   Psychiatric:         Behavior: Behavior normal.         Thought Content: Thought content normal.         Judgment: Judgment normal.         Diagnostic Study Results     Labs -  No results found for this or any previous visit (from the past 12 hour(s)). Radiologic Studies -   CT HIP LT WO CONT   Final Result      1. Osteopenia. No evidence of acute left hip fracture. Please note if continued   clinical suspicion consider MRI. 2. Mild left hip osteoarthrosis. 3. Advanced degenerative facet arthropathy lower lumbar spine. XR HIP LT W OR WO PELV 2-3 VWS   Final Result   1. Osteopenia is present. No fracture or dislocation appreciated.  If continued   clinical concern for occult fracture or unable to bear weight, MRI is   recommended. Medical Decision Making   I am the first provider for this patient. I reviewed the vital signs, available nursing notes, past medical history, past surgical history, family history and social history. Vital Signs-Reviewed the patient's vital signs. Records Reviewed: Nursing Notes, Old Medical Records, Previous Radiology Studies, and Previous Laboratory Studies (Time of Review: 7:15 AM)    ED Course: Progress Notes, Reevaluation, and Consults: The patient was able to ambulate and weight-bear however had significant left hip pain so was sent for CT of the left hip shows osteopenia with no fracture. Do not feel that further imaging is needed at this time and suspect the patient has a hip contusion. We will proceed with supportive care and have the patient follow closely as an outpatient with Ortho and return if at all worsened or concerned. Workup and recommendations were reviewed with the patient and all questions were answered. The patient understands the plan and will proceed with close outpatient care. I have encouraged the patient to return if at all worsened or concerned. Abhinav Driscoll DO 10:32 AM      Provider Notes (Medical Decision Making):   MDM  Number of Diagnoses or Management Options  Contusion of left hip, initial encounter  Hip pain  Diagnosis management comments: Patient is a 52-year-old female with a history of old stroke, anxiety, poor dentition, lung mass noted on previous CT and the patient stating that she has some cancer but is not clear whether she is being treated for it, that presents emergency department with left hip pain after a fall yesterday. Patient that she was ambulating yesterday and then this morning when she got up and going to the bathroom she had a hard time putting any weight on her left hip.   Patient has no pain with internal and external rotation making fracture less likely however will follow her x-ray, supportive care with Toradol, Flexeril for muscle ache, and then reevaluate. Reviewed the patient's last creatinine it was reassuring. Procedures        Diagnosis     Clinical Impression:   1. Contusion of left hip, initial encounter    2. Hip pain        Disposition: DC    Follow-up Information       Follow up With Specialties Details Why [address] [facility] In 3 days  [address] [place], [address] [facility] [address]    [facility] EMERGENCY DEPT Emergency Medicine  As needed, If symptoms worsen [address]  [phone]             Patient's Medications   Start Taking    NAPROXEN (NAPROSYN) 500 MG TABLET    Take 1 Tablet by mouth two (2) times daily (with meals) for 10 days. Continue Taking    ACETAMINOPHEN (TYLENOL) 325 MG TABLET    Take 2 Tablets by mouth every six (6) hours as needed for Pain. LIDOCAINE (LIDOCAINE VISCOUS) 2 % SOLUTION    Put 10 mL in mouth and swish and spit out QAC and at bedtime   These Medications have changed    No medications on file   Stop Taking    No medications on file     Disclaimer: Sections of this note are dictated using utilizing voice recognition software. Minor typographical errors may be present. If questions arise, please do not hesitate to contact me or call our department.

## 2022-10-14 NOTE — DISCHARGE INSTRUCTIONS
Make sure to take your pain medication and follow-up with orthopedist.  Please return if you have any increase in your pain or you are at all concerned.

## 2022-10-14 NOTE — ED NOTES
Per EMS, pt fell yesterday and hit edge of left hip while getting into the car. No LOC during fall. Pt took pain meds with minium relief.

## 2022-10-14 NOTE — ED TRIAGE NOTES
Pt into ED via EMS for evaluation of left hip pain after fall yesterday. Pt reports pain to hip with ambulation.

## 2023-01-09 ENCOUNTER — HOSPITAL ENCOUNTER (EMERGENCY)
Age: 73
Discharge: HOME | End: 2023-01-09
Attending: EMERGENCY MEDICINE
Payer: MEDICAID

## 2023-01-09 VITALS
BODY MASS INDEX: 31.41 KG/M2 | RESPIRATION RATE: 16 BRPM | HEART RATE: 85 BPM | HEIGHT: 60 IN | TEMPERATURE: 99.4 F | DIASTOLIC BLOOD PRESSURE: 72 MMHG | SYSTOLIC BLOOD PRESSURE: 129 MMHG | OXYGEN SATURATION: 94 % | WEIGHT: 160 LBS

## 2023-01-09 DIAGNOSIS — M54.32 SCIATICA OF LEFT SIDE: Primary | ICD-10-CM

## 2023-01-09 DIAGNOSIS — M25.562 LEFT KNEE PAIN, UNSPECIFIED CHRONICITY: ICD-10-CM

## 2023-01-09 PROCEDURE — 74011250637 HC RX REV CODE- 250/637: Performed by: PHYSICIAN ASSISTANT

## 2023-01-09 PROCEDURE — 99283 EMERGENCY DEPT VISIT LOW MDM: CPT

## 2023-01-09 RX ORDER — PREDNISONE 50 MG/1
50 TABLET ORAL DAILY
Qty: 3 TABLET | Refills: 0 | Status: SHIPPED | OUTPATIENT
Start: 2023-01-09 | End: 2023-01-12

## 2023-01-09 RX ORDER — LIDOCAINE 50 MG/G
PATCH TOPICAL
Qty: 30 EACH | Refills: 0 | Status: SHIPPED | OUTPATIENT
Start: 2023-01-09

## 2023-01-09 RX ORDER — HYDROCODONE BITARTRATE AND ACETAMINOPHEN 5; 325 MG/1; MG/1
1 TABLET ORAL
Status: COMPLETED | OUTPATIENT
Start: 2023-01-09 | End: 2023-01-09

## 2023-01-09 RX ORDER — HYDROCODONE BITARTRATE AND ACETAMINOPHEN 5; 325 MG/1; MG/1
1 TABLET ORAL
Qty: 8 TABLET | Refills: 0 | Status: SHIPPED | OUTPATIENT
Start: 2023-01-09 | End: 2023-01-12

## 2023-01-09 RX ADMIN — HYDROCODONE BITARTRATE AND ACETAMINOPHEN 1 TABLET: 5; 325 TABLET ORAL at 10:45

## 2023-01-09 NOTE — ED PROVIDER NOTES
EMERGENCY DEPARTMENT HISTORY AND PHYSICAL EXAM    Date: 1/9/2023  Patient Name: Pearl More    History of Presenting Illness       History Provided By: Patient      Additional History (Context): Pearl More is a 71 y.o. female with a history of CVA and anxiety who presents today for issues as above. Patient reports she fell some months ago and has since been having intermittent pain in her left knee. Reports she has had multiple negative x-rays. Has not followed up with an orthopedist.  Patient also complaining of left lower back pain that radiates into the left hip and posterior thigh. Denies any recent trauma or injury. Denies any loss of bowel or bladder. Denies any IV drug abuse history, fevers or chills. Has not tried thing for this at home. PCP: None    Current Facility-Administered Medications   Medication Dose Route Frequency Provider Last Rate Last Admin    HYDROcodone-acetaminophen (NORCO) 5-325 mg per tablet 1 Tablet  1 Tablet Oral NOW SKYE Rojas         Current Outpatient Medications   Medication Sig Dispense Refill    HYDROcodone-acetaminophen (Norco) 5-325 mg per tablet Take 1 Tablet by mouth every six (6) hours as needed for Pain for up to 3 days. Max Daily Amount: 4 Tablets. 8 Tablet 0    predniSONE (DELTASONE) 50 mg tablet Take 1 Tablet by mouth daily for 3 days. 3 Tablet 0    lidocaine (LIDODERM) 5 % Apply patch to the affected area for 12 hours a day and remove for 12 hours a day. 30 Each 0    lidocaine (Lidocaine Viscous) 2 % solution Put 10 mL in mouth and swish and spit out QAC and at bedtime 200 mL 0    acetaminophen (TYLENOL) 325 mg tablet Take 2 Tablets by mouth every six (6) hours as needed for Pain.  20 Tablet 0       Past History     Past Medical History:  Past Medical History:   Diagnosis Date    Angina at rest [facility])     Anxiety     CVA, old, facial weakness 2012    left ptosis    Dental caries        Past Surgical History:  No past surgical history on file.    Family History:  No family history on file. Social History:  Social History     Tobacco Use    Smoking status: Some Days     Packs/day: 0.50     Years: 15.00     Pack years: 7.50     Types: Cigarettes    Smokeless tobacco: Never    Tobacco comments:     half a pack per day   Substance Use Topics    Alcohol use: Yes     Comment: 8 drinks per week    Drug use: No     Comment: none in 2 years       Allergies: Allergies   Allergen Reactions    Aspirin Nausea Only         Review of Systems   Review of Systems   Constitutional:  Negative for chills and fever. HENT:  Negative for congestion, rhinorrhea and sore throat. Respiratory:  Negative for cough and shortness of breath. Cardiovascular:  Negative for chest pain. Gastrointestinal:  Negative for abdominal pain, blood in stool, constipation, diarrhea, nausea and vomiting. Genitourinary:  Negative for dysuria, frequency and hematuria. Musculoskeletal:  Positive for arthralgias and back pain. Negative for myalgias. Skin:  Negative for rash and wound. Neurological:  Negative for dizziness and headaches. All other systems reviewed and are negative. All Other Systems Negative  Physical Exam     Vitals:    01/09/23 0811   BP: 129/72   Pulse: 85   Resp: 16   Temp: 99.4 °F (37.4 °C)   SpO2: 94%   Weight: 72.6 kg (160 lb)   Height: 5' (1.524 m)     Physical Exam  Vitals and nursing note reviewed. Constitutional:       General: She is not in acute distress. Appearance: She is well-developed. She is not diaphoretic. Comments: Well appearing for age     HENT:      Head: Normocephalic and atraumatic. Eyes:      Conjunctiva/sclera: Conjunctivae normal.   Cardiovascular:      Rate and Rhythm: Normal rate and regular rhythm. Heart sounds: Normal heart sounds. Pulmonary:      Effort: Pulmonary effort is normal. No respiratory distress. Breath sounds: Normal breath sounds. Chest:      Chest wall: No tenderness.    Abdominal: General: Bowel sounds are normal. There is no distension. Palpations: Abdomen is soft. Tenderness: There is no abdominal tenderness. There is no guarding or rebound. Musculoskeletal:         General: No deformity. Cervical back: Normal range of motion and neck supple. Lumbar back: Tenderness present. Left knee: Swelling (mild, no warmth or redness) present. No effusion, erythema, bony tenderness or crepitus. Normal range of motion. No tenderness. Normal alignment. Comments: Left Lower  paralumbar reproducible tenderness to palpation. No Lumbar midline TTP. No other midline vertebral bony point tenderness or step-off. No foot drop. Distal sensation intact bilaterally, normal gait, no saddle anesthesia. Bilateral DP 3+       Skin:     General: Skin is warm and dry. Neurological:      Mental Status: She is alert and oriented to person, place, and time. Deep Tendon Reflexes: Reflexes are normal and symmetric. Diagnostic Study Results     Labs -   No results found for this or any previous visit (from the past 12 hour(s)). Radiologic Studies -   No orders to display     CT Results  (Last 48 hours)      None          CXR Results  (Last 48 hours)      None              Medical Decision Making   I am the first provider for this patient. I reviewed the vital signs, available nursing notes, past medical history, past surgical history, family history and social history. Vital Signs-Reviewed the patient's vital signs. Records Reviewed: Nursing Notes and Old Medical Records     Procedures: None   Procedures    Provider Notes (Medical Decision Making):     Differential Diagnosis: Musculoskeletal pain, myofascial strain/sprain, muscle spasm, spondylolisthesis, spondylosis, DJD, OA, sciatica, cauda equina syndrome, fracture, dislocation, ligament injury, meniscal injury    Plan: History and physical exam consistent with sciatica.   No red flag signs or emergent need for imaging at this time. No midline tenderness. Will discharge home with pain medication and short course of steroids. Have stressed the importance of stretching and hot baths with Epsom salt. Have advised orthopedic follow-up. Patient agrees with the plan and management and states all questions have been thoroughly answered and there are no more remaining questions. MED RECONCILIATION:  Current Facility-Administered Medications   Medication Dose Route Frequency    HYDROcodone-acetaminophen (NORCO) 5-325 mg per tablet 1 Tablet  1 Tablet Oral NOW     Current Outpatient Medications   Medication Sig    HYDROcodone-acetaminophen (Norco) 5-325 mg per tablet Take 1 Tablet by mouth every six (6) hours as needed for Pain for up to 3 days. Max Daily Amount: 4 Tablets. predniSONE (DELTASONE) 50 mg tablet Take 1 Tablet by mouth daily for 3 days. lidocaine (LIDODERM) 5 % Apply patch to the affected area for 12 hours a day and remove for 12 hours a day. lidocaine (Lidocaine Viscous) 2 % solution Put 10 mL in mouth and swish and spit out QAC and at bedtime    acetaminophen (TYLENOL) 325 mg tablet Take 2 Tablets by mouth every six (6) hours as needed for Pain. Disposition:  Home     DISCHARGE NOTE:   Pt has been reexamined. Patient has no new complaints, changes, or physical findings. Care plan outlined and precautions discussed. Results of workup were reviewed with the patient. All medications were reviewed with the patient. All of pt's questions and concerns were addressed. Patient was instructed and agrees to follow up with PCP/ortho as well as to return to the ED upon further deterioration. Patient is ready to go home.     Follow-up Information       Follow up With Specialties Details Why Contact Info    [address] EMERGENCY DEPT Emergency Medicine  As needed [address] [address]  [address] [facility] Schedule an appointment as soon as possible for a visit   [address], [address] [facility]  [phone]            Current Discharge Medication List        START taking these medications    Details   HYDROcodone-acetaminophen (Norco) 5-325 mg per tablet Take 1 Tablet by mouth every six (6) hours as needed for Pain for up to 3 days. Max Daily Amount: 4 Tablets. Qty: 8 Tablet, Refills: 0  Start date: 1/9/2023, End date: 1/12/2023    Associated Diagnoses: Sciatica of left side      predniSONE (DELTASONE) 50 mg tablet Take 1 Tablet by mouth daily for 3 days. Qty: 3 Tablet, Refills: 0  Start date: 1/9/2023, End date: 1/12/2023      lidocaine (LIDODERM) 5 % Apply patch to the affected area for 12 hours a day and remove for 12 hours a day. Qty: 30 Each, Refills: 0  Start date: 1/9/2023                 Diagnosis     Clinical Impression:   1. Sciatica of left side    2. Left knee pain, unspecified chronicity          \"Please note that this dictation was completed with [employer], the [employer] voice recognition software. Quite often unanticipated grammatical, syntax, homophones, and other interpretive errors are inadvertently transcribed by the computer software. Please disregard these errors. Please excuse any errors that have escaped final proofreading. \"

## 2023-01-09 NOTE — ED TRIAGE NOTES
Pt complaining of episodes of L hip and knee pain x1 week. States she fell on knee, over x1 years ago. Denies recent falls or trauma. Knee XR from multiple months back. No swelling noted to extremity. Pt requesting that the ED, \"get rid of this pain. \"

## 2023-01-12 NOTE — PROGRESS NOTES
Patient: Loc Kumar                MRN: [id number]       SSN: [id number]  YOB: 1950        AGE: 71 y.o. SEX: female    PCP: None  01/13/23    Chief Complaint   Patient presents with    Knee Pain     HISTORY:  Loc Kumar is a 71 y.o. female who sustained lower back, left hip and left knee injuries on 10/13/2022. She fell about 3 feet and landed on her back, left hip, and left knee when she was ascending a step to get into a truck. She  was reaching to close the door and lost her balance. She was seen at [facility] ED on 10/14/2022, where left hip x-rays and CT revealed lumbar and hip osteoarthritis but no fracture. She was given a Toradol injection, Tylenol #3, and Flexeril. She has been experiencing lower back and left knee pain since the injury. Ibuprofen and Voltaren gel have not helped much. She reports relief with 2 pain pills received from a friend. She is allergic to aspirin. Pain Assessment  12/22/2021   Severity of Pain 0     Occupation, etc:  Ms. Surekha Potter is retired. She lives alone in [place]. She is mostly sedentary. She enjoys reading. Ms. Surekha Potter weighs 157 lbs and is 5'0\" tall.        Lab Results   Component Value Date/Time    Hemoglobin A1c 6.0 (H) 11/06/2021 02:21 AM     Weight Metrics 1/13/2023 1/9/2023 7/27/2022 6/6/2022 5/25/2022 4/19/2022 4/15/2022   Weight 157 lb 160 lb 130 lb 130 lb 130 lb 146 lb 141 lb 6.4 oz   BMI 30.66 kg/m2 31.25 kg/m2 26.26 kg/m2 26.26 kg/m2 26.26 kg/m2 29.49 kg/m2 28.56 kg/m2       Patient Active Problem List   Diagnosis Code    Gingivitis K05.10    Homelessness Z59.00    SIRENA (acute kidney injury) ([facility] [address] N17.9    Sepsis ([facility] [address] A41.9     REVIEW OF SYSTEMS:    Constitutional Symptoms: Negative   Eyes: Negative   Ears, Nose, Throat and Mouth: Negative   Cardiovascular: Negative   Respiratory: Negative   Genitourinary: Per HPI   Gastrointestinal: Per HPI   Integumentary (Skin and/or Breast): Negative   Musculoskeletal: Per HPI   Endocrine/Rheumatologic: Negative   Neurological: Per HPI   Hematology/Lymphatic: Negative    Allergic/Immunologic: Negative   Phychiatric: Negative    Social History     Socioeconomic History    Marital status:      Spouse name: Not on file    Number of children: Not on file    Years of education: Not on file    Highest education level: Not on file   Occupational History    Not on file   Tobacco Use    Smoking status: Some Days     Packs/day: 0.50     Years: 15.00     Pack years: 7.50     Types: Cigarettes    Smokeless tobacco: Never    Tobacco comments:     half a pack per day   Substance and Sexual Activity    Alcohol use: Yes     Comment: 8 drinks per week    Drug use: No     Comment: none in 2 years    Sexual activity: Never   Other Topics Concern    Not on file   Social History Narrative    Not on file     Social Determinants of Health     Financial Resource Strain: Not on file   Food Insecurity: Not on file   Transportation Needs: Not on file   Physical Activity: Not on file   Stress: Not on file   Social Connections: Not on file   Intimate Partner Violence: Not on file   Housing Stability: Not on file      Allergies   Allergen Reactions    Aspirin Nausea Only      Current Outpatient Medications   Medication Sig    diclofenac (Voltaren) 1 % gel Apply 4 grams to the left knee up to 4 times per day, maximum 16 grams per joint per day. Dispense 5 100 gram tubes    lidocaine (LIDODERM) 5 % Apply patch to the affected area for 12 hours a day and remove for 12 hours a day. lidocaine (Lidocaine Viscous) 2 % solution Put 10 mL in mouth and swish and spit out QAC and at bedtime    acetaminophen (TYLENOL) 325 mg tablet Take 2 Tablets by mouth every six (6) hours as needed for Pain. No current facility-administered medications for this visit.       PHYSICAL EXAMINATION:  Visit Vitals  Pulse 77   Temp 97.5 °F (36.4 °C) (Temporal)   Wt 157 lb (71.2 kg)   SpO2 100%   BMI 30.66 kg/m²      ORTHO EXAMINATION:  Examination Right knee Left knee   Skin Intact Intact; swelling   Range of motion 120-0 110-0   Effusion - -   Medial joint line tenderness - +   Lateral joint line tenderness - -   Popliteal tenderness - -   Osteophytes palpable - +   Ewelinas - -   Patella crepitus - -   Anterior drawer - -   Lateral laxity - -   Medial laxity - -   Varus deformity - +   Valgus deformity - -   Pretibial edema - -   Calf tenderness - -      Examination Right hip Left hip   Skin Intact Intact   External Rotation ROM 20 20   Internal Rotation ROM 10 10   Trochanteric tenderness - +   Hip flexion contracture - -   Antalgic gait - -   Trendelenberg sign - -   Lumbar tenderness - -   Straight leg raise - -   Calf tenderness - -   Neurovascular Intact Intact     TIME OUT:  Chart reviewed for the following:   I, Alisha Andrade MD, have reviewed the History, Physical and updated the Allergic reactions for [address] performed immediately prior to start of procedure:  Silvino Hong MD, have performed the following reviews on Vaibhav Arreola prior to the start of the procedure:          * Patient was identified by name and date of birth   * Agreement on procedure being performed was verified  * Risks and Benefits explained to the patient  * Procedure site verified and marked as necessary  * Patient was positioned for comfort  * Consent was obtained     Time: 3:18 PM     Date of procedure: 1/13/2023  Procedure performed by:  Alisha Andrade MD  Ms. Salud Richardson tolerated the procedure well with no complications. CT HIP LT WO CONT 10/14/2022  IMPRESSION   1. Osteopenia. No evidence of acute left hip fracture. Please note if continued clinical suspicion consider MRI. 2. Mild left hip osteoarthrosis. 3. Advanced degenerative facet arthropathy lower lumbar spine.     RADIOGRAPHS:  XR KNEE LT 3 V 01/13/2023 [facility]ER  IMPRESSION:  Three views with bilateral knees on AP view - No fractures, no effusion, moderately severe medial left and mild right joint space narrowing, small osteophytes present. Kellgren Junaid grade 3/1, mild left varus deformity. XR HIP LT 2 V 10/14/2022   IMPRESSION  1. Osteopenia is present. No fracture or dislocation appreciated. If continued clinical concern for occult fracture or unable to bear weight, MRI is recommended. IMPRESSION:      ICD-10-CM ICD-9-CM    1. Contusion of left knee, initial encounter  S80. 02XA 924.11 REFERRAL TO PHYSICAL THERAPY      diclofenac (Voltaren) 1 % gel      AMB POC X-RAY KNEE 3 VIEW      2. Contusion of left hip, initial encounter  S70. 02XA 924.01 REFERRAL TO PHYSICAL THERAPY      diclofenac (Voltaren) 1 % gel      AMB POC X-RAY KNEE 3 VIEW      3. Acute pain of left knee  M25.562 719.46 REFERRAL TO PHYSICAL THERAPY      diclofenac (Voltaren) 1 % gel      AMB POC X-RAY KNEE 3 VIEW      4. Primary osteoarthritis of left knee  M17.12 715.16         PLAN:  After discussing treatment options, patient's left knee was injected with 4 cc Sensorcaine and 1/2 cc Celestone. She will start a brief course of outpatient physical therapy. Pt requesting pain medicine. Voltaren gel prescribed. Consider visco supplementation if pain continues. She will follow up as needed.       Scribed by Tarsha Pugh ([address]) as dictated by Brenda Merino MD

## 2023-01-13 ENCOUNTER — OFFICE VISIT (OUTPATIENT)
Dept: ORTHOPEDIC SURGERY | Age: 73
End: 2023-01-13
Payer: MEDICAID

## 2023-01-13 VITALS — OXYGEN SATURATION: 100 % | BODY MASS INDEX: 30.66 KG/M2 | TEMPERATURE: 97.5 F | WEIGHT: 157 LBS | HEART RATE: 77 BPM

## 2023-01-13 DIAGNOSIS — M25.562 ACUTE PAIN OF LEFT KNEE: ICD-10-CM

## 2023-01-13 DIAGNOSIS — M17.12 PRIMARY OSTEOARTHRITIS OF LEFT KNEE: ICD-10-CM

## 2023-01-13 DIAGNOSIS — S80.02XA CONTUSION OF LEFT KNEE, INITIAL ENCOUNTER: Primary | ICD-10-CM

## 2023-01-13 DIAGNOSIS — S70.02XA CONTUSION OF LEFT HIP, INITIAL ENCOUNTER: ICD-10-CM

## 2023-01-13 RX ORDER — BETAMETHASONE SODIUM PHOSPHATE AND BETAMETHASONE ACETATE 3; 3 MG/ML; MG/ML
3 INJECTION, SUSPENSION INTRA-ARTICULAR; INTRALESIONAL; INTRAMUSCULAR; SOFT TISSUE ONCE
Status: COMPLETED | OUTPATIENT
Start: 2023-01-13 | End: 2023-01-13

## 2023-01-13 RX ORDER — DICLOFENAC SODIUM 10 MG/G
GEL TOPICAL
Qty: 500 G | Refills: 1 | Status: SHIPPED | OUTPATIENT
Start: 2023-01-13

## 2023-01-13 RX ADMIN — BETAMETHASONE SODIUM PHOSPHATE AND BETAMETHASONE ACETATE 3 MG: 3; 3 INJECTION, SUSPENSION INTRA-ARTICULAR; INTRALESIONAL; INTRAMUSCULAR; SOFT TISSUE at 15:28

## 2023-01-20 ENCOUNTER — OFFICE VISIT (OUTPATIENT)
Dept: ORTHOPEDIC SURGERY | Age: 73
End: 2023-01-20
Payer: MEDICAID

## 2023-01-20 VITALS — HEIGHT: 61 IN | BODY MASS INDEX: 29.64 KG/M2 | WEIGHT: 157 LBS | TEMPERATURE: 97.1 F

## 2023-01-20 DIAGNOSIS — S80.02XA CONTUSION OF LEFT KNEE, INITIAL ENCOUNTER: ICD-10-CM

## 2023-01-20 DIAGNOSIS — M17.12 PRIMARY OSTEOARTHRITIS OF LEFT KNEE: Primary | ICD-10-CM

## 2023-01-20 DIAGNOSIS — M25.562 ACUTE PAIN OF LEFT KNEE: ICD-10-CM

## 2023-01-20 RX ORDER — BETAMETHASONE SODIUM PHOSPHATE AND BETAMETHASONE ACETATE 3; 3 MG/ML; MG/ML
3 INJECTION, SUSPENSION INTRA-ARTICULAR; INTRALESIONAL; INTRAMUSCULAR; SOFT TISSUE ONCE
Status: DISCONTINUED | OUTPATIENT
Start: 2023-01-20 | End: 2023-01-20

## 2023-01-20 RX ADMIN — BETAMETHASONE SODIUM PHOSPHATE AND BETAMETHASONE ACETATE 3 MG: 3; 3 INJECTION, SUSPENSION INTRA-ARTICULAR; INTRALESIONAL; INTRAMUSCULAR; SOFT TISSUE at 09:21

## 2023-01-20 NOTE — PROGRESS NOTES
Patient: Florencio Hunt                MRN: [id number]       SSN: [id number]  YOB: 1950        AGE: 71 y.o. SEX: female    PCP: None  01/20/23    Chief Complaint   Patient presents with    Knee Pain     Lt      HISTORY:  Florencio Hunt is a 71 y.o. female who is seen for left knee pain and swelling. A steroid injection at her last office visit did not help as much as she would have liked. She fell about 3 feet and landed on her back, left hip, and left knee when she was ascending a step to get into a truck on 10/13/2022. She  was reaching to close the door and lost her balance. She was seen at [facility] ED on 10/14/2022, where left hip x-rays and CT revealed lumbar and hip osteoarthritis but no fracture. She was given a Toradol injection, Tylenol #3, and Flexeril. She has been experiencing lower back and left knee pain since the injury. Ibuprofen and Voltaren gel have not helped much. She reports relief with 2 pain pills received from a friend. She is allergic to aspirin. Pain Assessment  12/22/2021   Severity of Pain 0     Occupation, etc:  Ms. Rock Roberts  is retired. She lives alone in [place]. She is mostly sedentary. She enjoys reading. Ms. Rock Roberts weighs 157 lbs and is 5'7\" tall.        Lab Results   Component Value Date/Time    Hemoglobin A1c 6.0 (H) 11/06/2021 02:21 AM     Weight Metrics 1/20/2023 1/13/2023 1/9/2023 7/27/2022 6/6/2022 5/25/2022 4/19/2022   Weight 157 lb 157 lb 160 lb 130 lb 130 lb 130 lb 146 lb   BMI 29.66 kg/m2 30.66 kg/m2 31.25 kg/m2 26.26 kg/m2 26.26 kg/m2 26.26 kg/m2 29.49 kg/m2       Patient Active Problem List   Diagnosis Code    Gingivitis K05.10    Homelessness Z59.00    SIRENA (acute kidney injury) ([facility] [address] N17.9    Sepsis ([facility] [address] A41.9     REVIEW OF SYSTEMS:    Constitutional Symptoms: Negative   Eyes: Negative   Ears, Nose, Throat and Mouth: Negative   Cardiovascular: Negative   Respiratory: Negative   Genitourinary: Per HPI   Gastrointestinal: Per HPI   Integumentary (Skin and/or Breast): Negative   Musculoskeletal: Per HPI   Endocrine/Rheumatologic: Negative   Neurological: Per HPI   Hematology/Lymphatic: Negative    Allergic/Immunologic: Negative   Phychiatric: Negative    Social History     Socioeconomic History    Marital status:      Spouse name: Not on file    Number of children: Not on file    Years of education: Not on file    Highest education level: Not on file   Occupational History    Not on file   Tobacco Use    Smoking status: Some Days     Packs/day: 0.50     Years: 15.00     Pack years: 7.50     Types: Cigarettes    Smokeless tobacco: Never    Tobacco comments:     half a pack per day   Substance and Sexual Activity    Alcohol use: Yes     Comment: 8 drinks per week    Drug use: No     Comment: none in 2 years    Sexual activity: Never   Other Topics Concern    Not on file   Social History Narrative    Not on file     Social Determinants of Health     Financial Resource Strain: Not on file   Food Insecurity: Not on file   Transportation Needs: Not on file   Physical Activity: Not on file   Stress: Not on file   Social Connections: Not on file   Intimate Partner Violence: Not on file   Housing Stability: Not on file      Allergies   Allergen Reactions    Aspirin Nausea Only      Current Outpatient Medications   Medication Sig    diclofenac (Voltaren) 1 % gel Apply 4 grams to the left knee up to 4 times per day, maximum 16 grams per joint per day. Dispense 5 100 gram tubes    lidocaine (LIDODERM) 5 % Apply patch to the affected area for 12 hours a day and remove for 12 hours a day. lidocaine (Lidocaine Viscous) 2 % solution Put 10 mL in mouth and swish and spit out QAC and at bedtime    acetaminophen (TYLENOL) 325 mg tablet Take 2 Tablets by mouth every six (6) hours as needed for Pain. No current facility-administered medications for this visit.       PHYSICAL EXAMINATION:  Visit Vitals  Temp 97.1 °F (36.2 °C) (Temporal)   Ht 5' 1\" (1.549 m)   Wt 157 lb (71.2 kg)   BMI 29.66 kg/m²      ORTHO EXAMINATION:  Examination Right knee Left knee   Skin Intact Intact; swelling   Range of motion 120-0 110-0   Effusion - -   Medial joint line tenderness - +   Lateral joint line tenderness - -   Popliteal tenderness - -   Osteophytes palpable - +   Ewelinas - -   Patella crepitus - -   Anterior drawer - -   Lateral laxity - -   Medial laxity - -   Varus deformity - +   Valgus deformity - -   Pretibial edema - -   Calf tenderness - -          TIME OUT:  Chart reviewed for the following:   Meliza Galicia MD, have reviewed the History, Physical and updated the Allergic reactions for [address] performed immediately prior to start of procedure:  Meliza Galicia MD, have performed the following reviews on Cole Watkins prior to the start of the procedure:          * Patient was identified by name and date of birth   * Agreement on procedure being performed was verified  * Risks and Benefits explained to the patient  * Procedure site verified and marked as necessary  * Patient was positioned for comfort  * Consent was obtained     Time: 9:13 AM     Date of procedure: 1/20/2023  Procedure performed by:  Mirta Soni MD  Ms. Dottie Bloch tolerated the procedure well with no complications. CT HIP LT WO CONT 10/14/2022  IMPRESSION   1. Osteopenia. No evidence of acute left hip fracture. Please note if continued clinical suspicion consider MRI. 2. Mild left hip osteoarthrosis. 3. Advanced degenerative facet arthropathy lower lumbar spine. RADIOGRAPHS:  XR KNEE LT 3 V 01/13/2023 [facility]  IMPRESSION:  Three views with bilateral knees on AP view - No fractures, no effusion, moderately severe medial left and mild right joint space narrowing, small osteophytes present. Kellgren Junaid grade 3/1, mild left varus deformity. XR HIP LT 2 V 10/14/2022   IMPRESSION  1. Osteopenia is present.  No fracture or dislocation appreciated. If continued clinical concern for occult fracture or unable to bear weight, MRI is recommended. IMPRESSION:      ICD-10-CM ICD-9-CM    1. Primary osteoarthritis of left knee  M17.12 715.16 DRAIN/INJECT LARGE JOINT/BURSA      betamethasone (CELESTONE) injection 3 mg      2. Contusion of left knee, initial encounter  S80. 02XA 924.11 DRAIN/INJECT LARGE JOINT/BURSA      betamethasone (CELESTONE) injection 3 mg      3. Acute pain of left knee  M25.562 719.46 DRAIN/INJECT LARGE JOINT/BURSA      betamethasone (CELESTONE) injection 3 mg        PLAN:  After discussing treatment options, patient's left knee was injected with 4 cc Sensorcaine and 1/2 cc Celestone. She will start physical therapy. She will follow up as needed.       Scribed by Donovan Herron ([address]) as dictated by Claudean Garre, MD

## 2023-01-21 ENCOUNTER — APPOINTMENT (OUTPATIENT)
Dept: CT IMAGING | Age: 73
DRG: 343 | End: 2023-01-21
Attending: EMERGENCY MEDICINE
Payer: MEDICAID

## 2023-01-21 ENCOUNTER — APPOINTMENT (OUTPATIENT)
Dept: GENERAL RADIOLOGY | Age: 73
DRG: 343 | End: 2023-01-21
Attending: EMERGENCY MEDICINE
Payer: MEDICAID

## 2023-01-21 ENCOUNTER — HOSPITAL ENCOUNTER (INPATIENT)
Age: 73
LOS: 6 days | Discharge: HOME | DRG: 343 | End: 2023-01-27
Attending: EMERGENCY MEDICINE | Admitting: INTERNAL MEDICINE
Payer: MEDICAID

## 2023-01-21 DIAGNOSIS — R68.89 SUSPECTED LUNG CANCER: ICD-10-CM

## 2023-01-21 DIAGNOSIS — C34.90 LUNG CANCER METASTATIC TO BONE (HCC): ICD-10-CM

## 2023-01-21 DIAGNOSIS — J18.9 OBSTRUCTIVE PNEUMONIA: ICD-10-CM

## 2023-01-21 DIAGNOSIS — R91.8 LUNG MASS: ICD-10-CM

## 2023-01-21 DIAGNOSIS — D64.9 ANEMIA, UNSPECIFIED TYPE: ICD-10-CM

## 2023-01-21 DIAGNOSIS — M89.9 RIB LESION: ICD-10-CM

## 2023-01-21 DIAGNOSIS — J18.9 COMMUNITY ACQUIRED PNEUMONIA, UNSPECIFIED LATERALITY: ICD-10-CM

## 2023-01-21 DIAGNOSIS — R91.8 PULMONARY INFILTRATE: ICD-10-CM

## 2023-01-21 DIAGNOSIS — C79.51 LUNG CANCER METASTATIC TO BONE (HCC): ICD-10-CM

## 2023-01-21 DIAGNOSIS — R53.81 DEBILITY: ICD-10-CM

## 2023-01-21 DIAGNOSIS — Z51.5 ENCOUNTER FOR PALLIATIVE CARE: ICD-10-CM

## 2023-01-21 DIAGNOSIS — J44.9 CHRONIC OBSTRUCTIVE PULMONARY DISEASE, UNSPECIFIED COPD TYPE (HCC): ICD-10-CM

## 2023-01-21 DIAGNOSIS — R07.9 CHEST PAIN, UNSPECIFIED TYPE: Primary | ICD-10-CM

## 2023-01-21 DIAGNOSIS — Z72.0 TOBACCO ABUSE: Chronic | ICD-10-CM

## 2023-01-21 DIAGNOSIS — I69.392 CVA, OLD, FACIAL WEAKNESS: Chronic | ICD-10-CM

## 2023-01-21 PROBLEM — M19.90 OSTEOARTHRITIS: Chronic | Status: ACTIVE | Noted: 2023-01-21

## 2023-01-21 PROBLEM — M85.80 OSTEOPENIA: Status: ACTIVE | Noted: 2023-01-21

## 2023-01-21 PROBLEM — F41.9 ANXIETY: Status: ACTIVE | Noted: 2023-01-21

## 2023-01-21 PROBLEM — M19.90 OSTEOARTHRITIS: Status: ACTIVE | Noted: 2023-01-21

## 2023-01-21 PROBLEM — F41.9 ANXIETY: Chronic | Status: ACTIVE | Noted: 2023-01-21

## 2023-01-21 PROBLEM — M85.80 OSTEOPENIA: Chronic | Status: ACTIVE | Noted: 2023-01-21

## 2023-01-21 LAB
ALBUMIN SERPL-MCNC: 3.2 G/DL (ref 3.4–5)
ALBUMIN/GLOB SERPL: 0.8 (ref 0.8–1.7)
ALP SERPL-CCNC: 91 U/L (ref 45–117)
ALT SERPL-CCNC: 29 U/L (ref 13–56)
ANION GAP SERPL CALC-SCNC: 3 MMOL/L (ref 3–18)
AST SERPL-CCNC: 21 U/L (ref 10–38)
ATRIAL RATE: 84 BPM
B PERT DNA SPEC QL NAA+PROBE: NOT DETECTED
BASOPHILS # BLD: 0.1 K/UL (ref 0–0.1)
BASOPHILS NFR BLD: 0 % (ref 0–2)
BILIRUB SERPL-MCNC: 0.6 MG/DL (ref 0.2–1)
BORDETELLA PARAPERTUSSIS PCR, BORPAR: NOT DETECTED
BUN SERPL-MCNC: 16 MG/DL (ref 7–18)
BUN/CREAT SERPL: 21 (ref 12–20)
C PNEUM DNA SPEC QL NAA+PROBE: NOT DETECTED
CALCIUM SERPL-MCNC: 9.7 MG/DL (ref 8.5–10.1)
CALCULATED P AXIS, ECG09: 46 DEGREES
CALCULATED R AXIS, ECG10: 24 DEGREES
CALCULATED T AXIS, ECG11: 46 DEGREES
CHLORIDE SERPL-SCNC: 105 MMOL/L (ref 100–111)
CO2 SERPL-SCNC: 28 MMOL/L (ref 21–32)
CREAT SERPL-MCNC: 0.77 MG/DL (ref 0.6–1.3)
D DIMER PPP FEU-MCNC: 1.48 UG/ML(FEU)
DIAGNOSIS, 93000: NORMAL
DIFFERENTIAL METHOD BLD: ABNORMAL
EOSINOPHIL # BLD: 0 K/UL (ref 0–0.4)
EOSINOPHIL NFR BLD: 0 % (ref 0–5)
ERYTHROCYTE [DISTWIDTH] IN BLOOD BY AUTOMATED COUNT: 13.2 % (ref 11.6–14.5)
FLUAV SUBTYP SPEC NAA+PROBE: NOT DETECTED
FLUBV RNA SPEC QL NAA+PROBE: NOT DETECTED
GLOBULIN SER CALC-MCNC: 4 G/DL (ref 2–4)
GLUCOSE SERPL-MCNC: 112 MG/DL (ref 74–99)
HADV DNA SPEC QL NAA+PROBE: NOT DETECTED
HCOV 229E RNA SPEC QL NAA+PROBE: NOT DETECTED
HCOV HKU1 RNA SPEC QL NAA+PROBE: NOT DETECTED
HCOV NL63 RNA SPEC QL NAA+PROBE: NOT DETECTED
HCOV OC43 RNA SPEC QL NAA+PROBE: NOT DETECTED
HCT VFR BLD AUTO: 32.4 % (ref 35–45)
HGB BLD-MCNC: 11.7 G/DL (ref 12–16)
HMPV RNA SPEC QL NAA+PROBE: NOT DETECTED
HPIV1 RNA SPEC QL NAA+PROBE: NOT DETECTED
HPIV2 RNA SPEC QL NAA+PROBE: NOT DETECTED
HPIV3 RNA SPEC QL NAA+PROBE: NOT DETECTED
HPIV4 RNA SPEC QL NAA+PROBE: NOT DETECTED
IMM GRANULOCYTES # BLD AUTO: 0.1 K/UL (ref 0–0.04)
IMM GRANULOCYTES NFR BLD AUTO: 0 % (ref 0–0.5)
LACTATE BLD-SCNC: 0.86 MMOL/L (ref 0.4–2)
LYMPHOCYTES # BLD: 1.4 K/UL (ref 0.9–3.6)
LYMPHOCYTES NFR BLD: 10 % (ref 21–52)
M PNEUMO DNA SPEC QL NAA+PROBE: NOT DETECTED
MAGNESIUM SERPL-MCNC: 2.3 MG/DL (ref 1.6–2.6)
MCH RBC QN AUTO: 29.7 PG (ref 24–34)
MCHC RBC AUTO-ENTMCNC: 36.1 G/DL (ref 31–37)
MCV RBC AUTO: 82.2 FL (ref 78–100)
MONOCYTES # BLD: 0.8 K/UL (ref 0.05–1.2)
MONOCYTES NFR BLD: 6 % (ref 3–10)
NEUTS SEG # BLD: 11.1 K/UL (ref 1.8–8)
NEUTS SEG NFR BLD: 83 % (ref 40–73)
NRBC # BLD: 0 K/UL (ref 0–0.01)
NRBC BLD-RTO: 0 PER 100 WBC
P-R INTERVAL, ECG05: 112 MS
PLATELET # BLD AUTO: 473 K/UL (ref 135–420)
PMV BLD AUTO: 9.4 FL (ref 9.2–11.8)
POTASSIUM SERPL-SCNC: 4.1 MMOL/L (ref 3.5–5.5)
PROT SERPL-MCNC: 7.2 G/DL (ref 6.4–8.2)
Q-T INTERVAL, ECG07: 370 MS
QRS DURATION, ECG06: 60 MS
QTC CALCULATION (BEZET), ECG08: 437 MS
RBC # BLD AUTO: 3.94 M/UL (ref 4.2–5.3)
RSV RNA SPEC QL NAA+PROBE: NOT DETECTED
RV+EV RNA SPEC QL NAA+PROBE: NOT DETECTED
SARS-COV-2 PCR, COVPCR: NOT DETECTED
SODIUM SERPL-SCNC: 136 MMOL/L (ref 136–145)
TROPONIN-HIGH SENSITIVITY: 6 NG/L (ref 0–54)
TROPONIN-HIGH SENSITIVITY: 7 NG/L (ref 0–54)
VENTRICULAR RATE, ECG03: 84 BPM
WBC # BLD AUTO: 13.4 K/UL (ref 4.6–13.2)

## 2023-01-21 PROCEDURE — 71275 CT ANGIOGRAPHY CHEST: CPT

## 2023-01-21 PROCEDURE — 74011250637 HC RX REV CODE- 250/637: Performed by: INTERNAL MEDICINE

## 2023-01-21 PROCEDURE — 74011250636 HC RX REV CODE- 250/636: Performed by: EMERGENCY MEDICINE

## 2023-01-21 PROCEDURE — 80053 COMPREHEN METABOLIC PANEL: CPT

## 2023-01-21 PROCEDURE — 74011000250 HC RX REV CODE- 250: Performed by: INTERNAL MEDICINE

## 2023-01-21 PROCEDURE — 65270000046 HC RM TELEMETRY

## 2023-01-21 PROCEDURE — 74011000636 HC RX REV CODE- 636: Performed by: EMERGENCY MEDICINE

## 2023-01-21 PROCEDURE — 83605 ASSAY OF LACTIC ACID: CPT

## 2023-01-21 PROCEDURE — 74011250637 HC RX REV CODE- 250/637: Performed by: HOSPITALIST

## 2023-01-21 PROCEDURE — 74011000250 HC RX REV CODE- 250: Performed by: EMERGENCY MEDICINE

## 2023-01-21 PROCEDURE — 85379 FIBRIN DEGRADATION QUANT: CPT

## 2023-01-21 PROCEDURE — 94762 N-INVAS EAR/PLS OXIMTRY CONT: CPT

## 2023-01-21 PROCEDURE — 84484 ASSAY OF TROPONIN QUANT: CPT

## 2023-01-21 PROCEDURE — 0202U NFCT DS 22 TRGT SARS-COV-2: CPT

## 2023-01-21 PROCEDURE — 71045 X-RAY EXAM CHEST 1 VIEW: CPT

## 2023-01-21 PROCEDURE — 99285 EMERGENCY DEPT VISIT HI MDM: CPT

## 2023-01-21 PROCEDURE — 87040 BLOOD CULTURE FOR BACTERIA: CPT

## 2023-01-21 PROCEDURE — 83735 ASSAY OF MAGNESIUM: CPT

## 2023-01-21 PROCEDURE — 87077 CULTURE AEROBIC IDENTIFY: CPT

## 2023-01-21 PROCEDURE — 99222 1ST HOSP IP/OBS MODERATE 55: CPT | Performed by: INTERNAL MEDICINE

## 2023-01-21 PROCEDURE — 87150 DNA/RNA AMPLIFIED PROBE: CPT

## 2023-01-21 PROCEDURE — 85025 COMPLETE CBC W/AUTO DIFF WBC: CPT

## 2023-01-21 PROCEDURE — 87186 SC STD MICRODIL/AGAR DIL: CPT

## 2023-01-21 PROCEDURE — 93005 ELECTROCARDIOGRAM TRACING: CPT

## 2023-01-21 PROCEDURE — 99223 1ST HOSP IP/OBS HIGH 75: CPT | Performed by: INTERNAL MEDICINE

## 2023-01-21 RX ORDER — CHOLECALCIFEROL (VITAMIN D3) 125 MCG
5 CAPSULE ORAL
Status: DISCONTINUED | OUTPATIENT
Start: 2023-01-21 | End: 2023-01-27 | Stop reason: HOSPADM

## 2023-01-21 RX ORDER — OXYCODONE AND ACETAMINOPHEN 5; 325 MG/1; MG/1
1 TABLET ORAL
Status: DISCONTINUED | OUTPATIENT
Start: 2023-01-21 | End: 2023-01-27 | Stop reason: HOSPADM

## 2023-01-21 RX ORDER — ONDANSETRON 4 MG/1
4 TABLET, ORALLY DISINTEGRATING ORAL
Status: DISCONTINUED | OUTPATIENT
Start: 2023-01-21 | End: 2023-01-27 | Stop reason: HOSPADM

## 2023-01-21 RX ORDER — IPRATROPIUM BROMIDE AND ALBUTEROL SULFATE 2.5; .5 MG/3ML; MG/3ML
3 SOLUTION RESPIRATORY (INHALATION)
Status: DISCONTINUED | OUTPATIENT
Start: 2023-01-21 | End: 2023-01-21

## 2023-01-21 RX ORDER — NALOXONE HYDROCHLORIDE 0.4 MG/ML
0.4 INJECTION, SOLUTION INTRAMUSCULAR; INTRAVENOUS; SUBCUTANEOUS
Status: DISCONTINUED | OUTPATIENT
Start: 2023-01-21 | End: 2023-01-27 | Stop reason: HOSPADM

## 2023-01-21 RX ORDER — POLYETHYLENE GLYCOL 3350 17 G/17G
17 POWDER, FOR SOLUTION ORAL DAILY PRN
Status: DISCONTINUED | OUTPATIENT
Start: 2023-01-21 | End: 2023-01-27 | Stop reason: HOSPADM

## 2023-01-21 RX ORDER — IBUPROFEN 400 MG/1
600 TABLET ORAL
COMMUNITY

## 2023-01-21 RX ORDER — ACETAMINOPHEN 650 MG/1
650 SUPPOSITORY RECTAL
Status: DISCONTINUED | OUTPATIENT
Start: 2023-01-21 | End: 2023-01-27 | Stop reason: HOSPADM

## 2023-01-21 RX ORDER — ONDANSETRON 2 MG/ML
4 INJECTION INTRAMUSCULAR; INTRAVENOUS
Status: DISCONTINUED | OUTPATIENT
Start: 2023-01-21 | End: 2023-01-27 | Stop reason: HOSPADM

## 2023-01-21 RX ORDER — SODIUM CHLORIDE 0.9 % (FLUSH) 0.9 %
5-40 SYRINGE (ML) INJECTION EVERY 8 HOURS
Status: DISCONTINUED | OUTPATIENT
Start: 2023-01-21 | End: 2023-01-27 | Stop reason: HOSPADM

## 2023-01-21 RX ORDER — ACETAMINOPHEN 325 MG/1
650 TABLET ORAL
Status: DISCONTINUED | OUTPATIENT
Start: 2023-01-21 | End: 2023-01-27 | Stop reason: HOSPADM

## 2023-01-21 RX ORDER — GUAIFENESIN 600 MG/1
600 TABLET, EXTENDED RELEASE ORAL EVERY 12 HOURS
Status: DISCONTINUED | OUTPATIENT
Start: 2023-01-21 | End: 2023-01-27 | Stop reason: HOSPADM

## 2023-01-21 RX ORDER — GUAIFENESIN 100 MG/5ML
324 LIQUID (ML) ORAL
Status: DISPENSED | OUTPATIENT
Start: 2023-01-21 | End: 2023-01-21

## 2023-01-21 RX ORDER — SODIUM CHLORIDE 0.9 % (FLUSH) 0.9 %
5-40 SYRINGE (ML) INJECTION AS NEEDED
Status: DISCONTINUED | OUTPATIENT
Start: 2023-01-21 | End: 2023-01-27 | Stop reason: HOSPADM

## 2023-01-21 RX ADMIN — GUAIFENESIN 600 MG: 600 TABLET, EXTENDED RELEASE ORAL at 20:31

## 2023-01-21 RX ADMIN — OXYCODONE HYDROCHLORIDE AND ACETAMINOPHEN 1 TABLET: 5; 325 TABLET ORAL at 20:37

## 2023-01-21 RX ADMIN — SODIUM CHLORIDE, PRESERVATIVE FREE 10 ML: 5 INJECTION INTRAVENOUS at 20:35

## 2023-01-21 RX ADMIN — SODIUM CHLORIDE, PRESERVATIVE FREE 10 ML: 5 INJECTION INTRAVENOUS at 14:25

## 2023-01-21 RX ADMIN — AZITHROMYCIN MONOHYDRATE 500 MG: 500 INJECTION, POWDER, LYOPHILIZED, FOR SOLUTION INTRAVENOUS at 06:49

## 2023-01-21 RX ADMIN — GUAIFENESIN 600 MG: 600 TABLET, EXTENDED RELEASE ORAL at 08:29

## 2023-01-21 RX ADMIN — OXYCODONE HYDROCHLORIDE AND ACETAMINOPHEN 1 TABLET: 5; 325 TABLET ORAL at 14:27

## 2023-01-21 RX ADMIN — SODIUM CHLORIDE, PRESERVATIVE FREE 10 ML: 5 INJECTION INTRAVENOUS at 14:26

## 2023-01-21 RX ADMIN — CEFTRIAXONE 1 G: 1 INJECTION, POWDER, FOR SOLUTION INTRAMUSCULAR; INTRAVENOUS at 06:46

## 2023-01-21 RX ADMIN — IOPAMIDOL 75 ML: 755 INJECTION, SOLUTION INTRAVENOUS at 05:26

## 2023-01-21 RX ADMIN — SODIUM CHLORIDE, PRESERVATIVE FREE 5 ML: 5 INJECTION INTRAVENOUS at 22:00

## 2023-01-21 NOTE — PROGRESS NOTES
OT orders received and medical chart review completed. Pt observed ambulating in ED independently w/o AD and good balance. Pt reports she is at baseline as independent  with ADLs and functional mobility . Formal OT evaluation not indicated. OT to sign off.

## 2023-01-21 NOTE — ED TRIAGE NOTES
Patient presents to ED with complaints of CP that started an hour ago after an argument with a friend, patient states it may be anxiety. States pain is midsternal- states pain is not aggravated by movement and stays the same. Patient denies any other significant hx.

## 2023-01-21 NOTE — H&P
History and Physical    Patient: Juju Lacey MRN: [id number]  SSN: [id number]    YOB: 1950  Age: 71 y.o. Sex: female      Subjective:      Juju Lacey is a 71 y.o. female who presents to [facility] ER with complaint of Chest Pain. Patient reports that she was having a stressful argument with one of her acquaintances when she began to experience chest pain. Patient describes the chest pain as a 9/10 intensity \"soreness\" that is most prominent in her posterior mid ribs on the left side. Patient reports that she has been having a sometimes productive cough with chest congestion, but denies fevers, chills, and pain with inspiration. Patient states that she has been having weight loss for the last 2 weeks (Patient was informed by friends that she appeared to be losing weight) and reports reduced appetite. Notably, Patient has a known right lower lobe mass and had been discussing having this biopsied with her Oncologist in June 2022; however, it appears that Patient was lost to follow-up and seems to be either avoidant or in denial regarding therapy for this finding. Patient reports that she is a Current Some Day Smoker (and has been so for 20 years). Patient denies home medications other than OTC analgesic, denies a Past Medical History, and reports that her Daughter and Sophia Leigh have both had non-specific Cancers. Patient denies fevers, chills, nausea, vomiting, diarrhea, dysuria, pain with inspiration, abdominal pain, shortness of breath, weakness, fatigue, and night sweats. In [facility] ER, Patient is noted to have Temperature 98.1°F, Heart Rate 81 bpm, Respiration Rate 16 bpm, Blood Pressure 161/91 mm Hg, SpO2 100%, WBC 13.4, HGB 11.7 g/dL, Platelet 863 k, Neut% 83%, Neut# 11.1, D-Dimer 1.48, Glucose 112 mg/dL, Albumin 3.2 g/dL, Troponin 6 ng/L.   CTA Chest w/ w/o Contrast has been read by Radiologist to show \"Essentially complete consolidation of the right middle lobe, and extensive consolidation in the right lower lobe, may represent a combination of tumor involvement or postobstructive atelectasis versus pneumonia. Additional patchy areas of consolidation in the right lung as above may be infectious/inflammatory.  -Small right pleural effusion, possibly parapneumonic or malignant.  -Suspected conglomerate right hilar and subcarinal adenopathy, grossly increased since prior.  -Findings consistent with tumor worsening including increasing metastatic adenopathy. No evidence of pulmonary embolus or right heart strain. Rib lytic metastases with pathologic fractures, and T4 left transverse process lytic metastasis, new since prior. \"  Patient received no agents in [facility] ER. Ordered---but not yet received by Patient--- is: Aspirin 324 mg, IV Azithromycin 500 mg, and IV Ceftriaxone 1 gram in [facility] ER. Patient is admitted to Telemetry Unit for management of Suspected Right Primary Lung Cancer with Metastases to Bone and possibly Post-Obstructive (Community-Acquired) Pneumonia. 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    Dental caries     Osteoarthritis     Osteopenia     Right lower lobe lung mass 04/15/2022    Offered Biopsy and couldn't decide between PAlliative Care and Biopsy     Past Surgical History:   Procedure Laterality Date    HX WISDOM TEETH EXTRACTION        Family History   Problem Relation Age of Onset    Cancer Daughter         Unspecified Cancer    Cancer Cousin         Unspecified Cancer x2     Social History     Tobacco Use    Smoking status: Some Days     Packs/day: 0.50     Years: 15.00     Pack years: 7.50     Types: Cigarettes    Smokeless tobacco: Never    Tobacco comments:     Patient reports that she is a Some Day Smoker x20 years (as of 1/21/2023).    Substance Use Topics    Alcohol use: Yes     Comment: 8 drinks per week      Prior to Admission medications    Medication Sig Start Date End Date Taking? Authorizing Provider   ibuprofen (MOTRIN) 400 mg tablet Take 600 mg by mouth every six (6) hours as needed for Pain. Provider, Historical   acetaminophen (TYLENOL) 325 mg tablet Take 2 Tablets by mouth every six (6) hours as needed for Pain. 9/7/22   SKYE Choudhary        Allergies   Allergen Reactions    Aspirin Nausea Only       Review of Systems:  (+) Weight Change  (+) Loss of Appetite  (-) Fevers  (-) Chills  (-) Night Sweats  (-) Fatigue  (-) Generalized Weakness  (+) Chest Congestion  (+) Cough  (+) Increased Sputum Production  (-) Shortness of Breath  (-) Dyspnea on Exertion  (+) Chest Pain  (-) Abdominal Pain  (-) Nausea  (-) Vomiting  (-) Diarrhea  (-) Dysuria  (+) Stressors  All other systems have been reviewed and are negative      Objective:     Vitals:    01/21/23 0400 01/21/23 0500 01/21/23 0700 01/21/23 0730   BP: 117/71 109/70 134/82 139/81   Pulse: 70 89 74 80   Resp: 14 21 16 21   Temp:       SpO2: 100% 100% 93% 95%        Physical Exam:  General:  Adult female lying in bed in no acute distress  HEENT:  Atraumatic, normocephalic; Pupils equally round and reactive to light with accommodation; Extraocular muscles intact; Moist Oropharynx without erythema, edema, or exudates  Chest:  No pectus carinatum; No pectus excavatum  Cardiovascular:  Regular rate and rhythm without rubs, gallops, or murmurs  Respiratory:  (+) Slight Fine Crackles over Left Upper Lobe; (+) Mildly Decreased Lung Sounds over Right Lung fields and Left Lower Lung field; No wheezes or rhonchi; normal effort of breathing  Abdominal:  Soft, non-tense, non-tender abdomen; BS present without guarding, rebound, or masses  :  Deferred  Extremities:  Pulses 2+ x4 with insignificant edema without clubbing or cyanosis  Musculoskeletal:  Strength 5/5 in BUE and BLE  Integument:  No rash on face, forearms, or legs  Neurological:  Alert & Ostensibly Oriented x4/4;  No gross deficits of Visual Acuity, Eye Movement, Jaw Opening, Facial Expression, Hearing, Phonation, or Head Movement; No gross deficits of Tongue Movement or Slurring of Speech  Psychiatric:  Affect is appropriate; Language is present and fluent; Behavior is appropriate      Laboratory Studies:  CMP:   Lab Results   Component Value Date/Time     01/21/2023 02:39 AM    K 4.1 01/21/2023 02:39 AM     01/21/2023 02:39 AM    CO2 28 01/21/2023 02:39 AM    AGAP 3 01/21/2023 02:39 AM     (H) 01/21/2023 02:39 AM    BUN 16 01/21/2023 02:39 AM    CREA 0.77 01/21/2023 02:39 AM    CA 9.7 01/21/2023 02:39 AM    MG 2.3 01/21/2023 02:39 AM    ALB 3.2 (L) 01/21/2023 02:39 AM    TP 7.2 01/21/2023 02:39 AM    GLOB 4.0 01/21/2023 02:39 AM    AGRAT 0.8 01/21/2023 02:39 AM    ALT 29 01/21/2023 02:39 AM     CBC:   Lab Results   Component Value Date/Time    WBC 13.4 (H) 01/21/2023 02:39 AM    HGB 11.7 (L) 01/21/2023 02:39 AM    HCT 32.4 (L) 01/21/2023 02:39 AM     (H) 01/21/2023 02:39 AM     All Cardiac Markers in the last 24 hours: No results found for: CPK, CK, CKMMB, CKMB, RCK3, CKMBT, CKNDX, CKND1, SHAHLA, TROPT, TROIQ, SERGEY, TROPT, TNIPOC, BNP, BNPP  Recent Glucose Results:   Lab Results   Component Value Date/Time     (H) 01/21/2023 02:39 AM     COAGS: No results found for: APTT, PTP, INR, INREXT, INREXT     Images Reviewed:  CTA CHEST W OR W WO CONT    Result Date: 1/21/2023  EXAMINATION: CTA chest pulmonary INDICATION: Chest pain COMPARISON: CT 9/7/2022 TECHNIQUE: CT angiography of the pulmonary arteries with IV contrast and 3-D MIP reformations. All CT scans at this facility are performed using dose optimization technique as appropriate to a performed exam, to include automated exposure control, adjustment of the mA and/or kV according to patient size (including appropriate matching first site specific examinations), or use of iterative reconstruction technique.  FINDINGS: Pulmonary arteries: No pulmonary artery filling defect to suggest pulmonary embolus. No specific evidence of right heart strain. Cardiovascular: Pulmonary arteries as above. Minimal burden of atherosclerotic calcifications. Thoracic aorta normal in course and caliber. Heart size normal. Mediastinum: Imaged thyroid unremarkable. Conglomerate appearing right hilar and subcarinal suspected jamar mass, difficult to measure due to plaque-like amorphous shape. Esophagus nondistended, and distal segment inseparable from suspected jamar mass described above. Pleura: Small right pleural effusion. No pneumothorax. Lungs/airways: Essentially complete consolidation of the right middle lobe, and extensive confluent consolidation in the right lower lobe. Additional patchy peripheral consolidation in the right lower lobe, and peripheral right upper lobe nodule measuring 0.9 x 0.6 cm. Biapical subpleural nodularity may represent scarring. Upper abdomen: No acute findings. Miscellaneous: Superficial soft tissues unremarkable. Bones: Lytic foci with associated pathologic fractures at right lateral sixth rib and left posterior lateral sixth rib. Left T4 transverse process lytic lesion     Essentially complete consolidation of the right middle lobe, and extensive consolidation in the right lower lobe, may represent a combination of tumor involvement or postobstructive atelectasis versus pneumonia. Additional patchy areas of consolidation in the right lung as above may be infectious/inflammatory. -Small right pleural effusion, possibly parapneumonic or malignant. -Suspected conglomerate right hilar and subcarinal adenopathy, grossly increased since prior. -Findings consistent with tumor worsening including increasing metastatic adenopathy. No evidence of pulmonary embolus or right heart strain. Rib lytic metastases with pathologic fractures, and T4 left transverse process lytic metastasis, new since prior. See additional details above.     XR CHEST PORT    Result Date: 1/21/2023  EXAM: XR CHEST PORT CLINICAL INDICATION/HISTORY : Chest pain. COMPARISON: September 7, 2022 TECHNIQUE: 1 VIEWS FINDINGS: EKG leads and wires overlie the chest. Right lower lung consolidation and small right pleural effusion. Shelli Oh Heart is normal in size. Lytic lesion and suspected pathological fracture lateral right rib 6..      1.  Right lower lung consolidation. Small right pleural effusion. 2.  Lytic lesion with pathological fracture lateral right rib 6. 3.  CT has been performed at the time of interpretation-please refer to that report. Assessment:     Hospital Problems  Date Reviewed: 1/21/2023            Codes Class Noted POA    * (Principal) Community acquired pneumonia ICD-10-CM: J18.9  ICD-9-CM: 486  1/21/2023 Yes        Suspected lung cancer ICD-10-CM: R68.89  ICD-9-CM: 780.99  1/21/2023 Yes        Chest pain ICD-10-CM: R07.9  ICD-9-CM: 786.50  1/21/2023 Yes        CVA, old, facial weakness (Chronic) ICD-10-CM: P11.895  ICD-9-CM: 438.83  1/21/2023 Yes        Anxiety (Chronic) ICD-10-CM: F41.9  ICD-9-CM: 300.00  1/21/2023 Yes        Tobacco abuse (Chronic) ICD-10-CM: Z72.0  ICD-9-CM: 305.1  1/21/2023 Yes        Osteopenia (Chronic) ICD-10-CM: M85.80  ICD-9-CM: 733.90  1/21/2023 Yes        Osteoarthritis (Chronic) ICD-10-CM: M19.90  ICD-9-CM: 715.90  1/21/2023 Yes           Plan:     Suspected Right Primary Lung Cancer with suspected Metastases to Bone and possibly Post-Obstructive (Community-Acquired) Pneumonia; H/O Right Lower Lobe Mass  - Patient is currently unsure whether or not to pursue a biopsy  Telemetry, IV Azithromycin, IV Ceftriaxone, Guaifenesin, Blood Cultures, Sputum Cultures. Consult Pulmonological services. Consider consulting Oncological services ([address] [place] [address] Oncology) when biopsy has been performed. 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 Anxiety  No current home medications for this issue.     Tobacco Abuse  Amount of Nicotine that Patient reports using is too small to treat with therapeutics in our formulary. DVT Prophylaxis  DVT mechanoprophylaxis is achieved with SCDs.      Signed By: Bayron Nair DO     January 21, 2023

## 2023-01-21 NOTE — CONSULTS
3 [facility]  Pulmonary, Critical Care, and Sleep Medicine    Name: Mary Zamorano MRN: [id number]   : 1950 Hospital: [address] [place] [address]   Date: 2023        Pulmonary Initial In-Patient Consult                                              Consult requesting physician: Dr. Alfreda Wheatley  Reason for Consult: Lung mass    IMPRESSION:   Large RLL pulmonary mass with right hilar and subcarinal LAD now with lytic lesions in right lateral 6th rib, L posterior lateral 6th rib and T4 left transverse process. The pulmonary mass and subcarinal LN were both PET avid a year ago and have progressed all pointing to a malignancy likely primary lung. Post-obstructive pneumonia versus extension of malignancy RML/RLL. WBC elevated pointing to acute infectious process. No O2 requirement. Tobacco use  Anxiety disorder  Osteoarthritis        RECOMMENDATIONS:   Recommend IR evaluation to determine if lytic rib lesions can be biopsied as that would be safer than EBUS in the context of active pneumonia and for staging. EBUS also an option for subcarinal LN biopsy if biopsy of rib lesions not possible. Patient requests discharge home to think about options. Patient can safely be treated for her pneumonia as an outpatient if follow-up with IR or pulmonary can be set up. Agree with current antibiotics for CAP. If patient is discharged can treat with azithromycin and Augmentin. Recommend follow-up with her outpatient oncologist following biopsy   Nutrition: per primary team  Replace electrolytes  HOB >=30 degree, aggressive pulmonary toileting, incentive spirometry, PT/OT eval and treat  GI Prophylaxis: per primary team    DVT Prophylaxis: per primary team    Smoking cessation recommended. Further recommendations will be based on the patient's response to recommended treatment and results of the investigation ordered. Case discussed with Dr. Alfreda Wheatley.      Subjective/History:     Mary Zamorano is a 71 y.o. female with PMHx significant for RLL lung mass, tobacco use, anxiety who presented with several day history of increasing chest pain, cough and dyspnea. Denies fever or chills. Decreased appetite and weight loss noted over the last 2 weeks. Has history of RLL lung mass that has been followed for over 1 year and is progressive, and PET positive. Offered biopsy by her oncologist in April 2022 but did not pursue. CTA today showed extension of pulmonary mass, and hilar subcarinal LAD with likely post-obstructive PNA versus extension of likely maligancy. Also note lytic lesions on ribs and T4 transverse process. Pulmonary consulted for recommendations regarding biopsy. Upon extensive discussion with patient she does seem willing to pursue biopsy at this time but would like to go home and follow-up as an outpatient if possible. Patient does smoke daily about a 1/2 pack per day, has been smoking for the last 20 years. Patient not requiring any supplemental O2. Review of Systems:  Review of systems not obtained due to patient factors. Allergies   Allergen Reactions    Aspirin Nausea Only        Past Medical History:   Diagnosis Date    Angina at rest [facility])     Anxiety     CVA, old, facial weakness 2012    left ptosis    Dental caries     Osteoarthritis     Osteopenia     Right lower lobe lung mass 04/15/2022    Offered Biopsy and couldn't decide between PAlliative Care and Biopsy        Past Surgical History:   Procedure Laterality Date    HX WISDOM TEETH EXTRACTION          Family History   Problem Relation Age of Onset    Cancer Daughter         Unspecified Cancer    Cancer Cousin         Unspecified Cancer x2        Social History     Tobacco Use    Smoking status: Some Days     Packs/day: 0.50     Years: 15.00     Pack years: 7.50     Types: Cigarettes    Smokeless tobacco: Never    Tobacco comments:     Patient reports that she is a Some Day Smoker x20 years (as of 1/21/2023).    Substance Use Topics    Alcohol use: Yes     Comment: 8 drinks per week          Prior to Admission medications    Medication Sig Start Date End Date Taking? Authorizing Provider   ibuprofen (MOTRIN) 400 mg tablet Take 600 mg by mouth every six (6) hours as needed for Pain. Provider, Historical   acetaminophen (TYLENOL) 325 mg tablet Take 2 Tablets by mouth every six (6) hours as needed for Pain. 22   SKYE Prajapati       Current Facility-Administered Medications   Medication Dose Route Frequency    aspirin chewable tablet 324 mg  324 mg Oral NOW    guaiFENesin ER (MUCINEX) tablet 600 mg  600 mg Oral Q12H    sodium chloride (NS) flush 5-40 mL  5-40 mL IntraVENous Q8H    sodium chloride (NS) flush 5-40 mL  5-40 mL IntraVENous PRN    acetaminophen (TYLENOL) tablet 650 mg  650 mg Oral Q6H PRN    Or    acetaminophen (TYLENOL) suppository 650 mg  650 mg Rectal Q6H PRN    polyethylene glycol (MIRALAX) packet 17 g  17 g Oral DAILY PRN    ondansetron (ZOFRAN ODT) tablet 4 mg  4 mg Oral Q8H PRN    Or    ondansetron (ZOFRAN) injection 4 mg  4 mg IntraVENous Q6H PRN    melatonin tablet 5 mg  5 mg Oral QHS PRN    albuterol-ipratropium (DUO-NEB) 2.5 MG-0.5 MG/3 ML  3 mL Nebulization Q6H PRN    naloxone (NARCAN) injection 0.4 mg  0.4 mg IntraVENous EVERY 2 MINUTES AS NEEDED     Current Outpatient Medications   Medication Sig    ibuprofen (MOTRIN) 400 mg tablet Take 600 mg by mouth every six (6) hours as needed for Pain. acetaminophen (TYLENOL) 325 mg tablet Take 2 Tablets by mouth every six (6) hours as needed for Pain. Objective:   Vital Signs:    Visit Vitals  /81   Pulse 80   Temp 98.1 °F (36.7 °C)   Resp 21   SpO2 95%               Temp (24hrs), Av.1 °F (36.7 °C), Min:98.1 °F (36.7 °C), Max:98.1 °F (36.7 °C)       Intake/Output:   Last shift:      No intake/output data recorded. Last 3 shifts: No intake/output data recorded.   No intake or output data in the 24 hours ending 23 1201    Physical Exam: General:  Appeared underweight, chronic ill, no distress, alert and oriented  Head:   Normocephalic, without obvious abnormality, atraumatic. Eye:   Conjunctivae/corneas clear. PERRLA, no scleral icterus, no pallor, no cyanosis  Nose:   Nares normal. Septum midline. Mucosa normal without erythema/exudate. No drainage/discharge. No sinus tenderness. Throat:  Lips, mucosa, and tongue normal. Teeth and gums normal. No tonsillar enlargement, no erythema, no exudates, no oral thrush  Neck:   Supple, symmetric, thyroid: no enlargement/tenderness/nodule, no JVD, no carotid bruit, no lymphadenopathy. Trachea midline  Back & spine: Symmetric, no curvature. Chest wall: Mild tenderness to lateral chest wall bilaterally  Lung:   Decreased breath sounds on right with some faint end-exp wheezing and rhonchi, left lung field normal.  Heart:   Regular rate & rhythm. S1 S2 present, no murmur, no gallop, no click, no rub  Abdomen:  Soft, NT, ND, +BS, no masses, no organomegaly  Extremities:  No pedal edema, no cyanosis, no clubbing  Pulses: 2+ and symmetric in DP  Lymphatic:  No cervical, supraclavicular and axillary palpable lymphadenopathy. Musculoskeletal: No joint swelling or tenderness. Neurologic:  Grossly non focal.        Data:         Chemistry Recent Labs     01/21/23  0239   *      K 4.1      CO2 28   BUN 16   CREA 0.77   CA 9.7   MG 2.3   AGAP 3   BUCR 21*   AP 91   TP 7.2   ALB 3.2*   GLOB 4.0   AGRAT 0.8        Lactic Acid Lactic acid   Date Value Ref Range Status   11/07/2021 1.5 0.4 - 2.0 MMOL/L Final     No results for input(s): LAC in the last 72 hours.      Liver Enzymes Protein, total   Date Value Ref Range Status   01/21/2023 7.2 6.4 - 8.2 g/dL Final     Albumin   Date Value Ref Range Status   01/21/2023 3.2 (L) 3.4 - 5.0 g/dL Final     Globulin   Date Value Ref Range Status   01/21/2023 4.0 2.0 - 4.0 g/dL Final     A-G Ratio   Date Value Ref Range Status   01/21/2023 0.8 0.8 - 1.7 Final     Alk. phosphatase   Date Value Ref Range Status   01/21/2023 91 45 - 117 U/L Final     Recent Labs     01/21/23  0239   TP 7.2   ALB 3.2*   GLOB 4.0   AGRAT 0.8   AP 91        CBC w/Diff Recent Labs     01/21/23 0239   WBC 13.4*   RBC 3.94*   HGB 11.7*   HCT 32.4*   *   GRANS 83*   LYMPH 10*   EOS 0        Cardiac Enzymes No results found for: CPK, CK, CKMMB, CKMB, RCK3, CKMBT, CKNDX, CKND1, SHAHLA, TROPT, TROIQ, SERGEY, TROPT, TNIPOC, BNP, BNPP     BNP No results found for: BNP, BNPP, XBNPT     Coagulation No results for input(s): PTP, INR, APTT, INREXT in the last 72 hours. Thyroid  Lab Results   Component Value Date/Time    TSH 1.45 12/22/2021 12:47 PM          Lipid Panel No results found for: CHOL, CHOLPOCT, CHOLX, CHLST, CHOLV, 083681, HDL, HDLP, LDL, LDLC, DLDLP, 138461, VLDLC, VLDL, TGLX, TRIGL, TRIGP, TGLPOCT, CHHD, CHHDX     ABG No results for input(s): PHI, PHI, POC2, PCO2I, PO2, PO2I, HCO3, HCO3I, FIO2, FIO2I in the last 72 hours. Urinalysis Lab Results   Component Value Date/Time    Color DARK YELLOW 11/06/2021 05:22 AM    Appearance CLOUDY 11/06/2021 05:22 AM    Specific gravity >1.030 (H) 11/06/2021 05:22 AM    pH (UA) 5.5 11/06/2021 05:22 AM    Protein 100 (A) 11/06/2021 05:22 AM    Glucose Negative 11/06/2021 05:22 AM    Ketone TRACE (A) 11/06/2021 05:22 AM    Bilirubin Negative 11/06/2021 05:22 AM    Urobilinogen 1.0 11/06/2021 05:22 AM    Nitrites Negative 11/06/2021 05:22 AM    Leukocyte Esterase LARGE (A) 11/06/2021 05:22 AM    Epithelial cells FEW 11/06/2021 05:22 AM    Bacteria 2+ (A) 11/06/2021 05:22 AM    WBC 25 to 30 11/06/2021 05:22 AM    RBC Negative 11/06/2021 05:22 AM        Micro  No results for input(s): SDES, CULT in the last 72 hours. No results for input(s): CULT in the last 72 hours. XR (Most Recent).  CXR reviewed by me and compared with previous CXR Results from Hospital Encounter encounter on 01/21/23    XR CHEST PORT    Narrative  EXAM: XR CHEST PORT    CLINICAL INDICATION/HISTORY : Chest pain. COMPARISON: September 7, 2022    TECHNIQUE: 1 VIEWS    FINDINGS:  EKG leads and wires overlie the chest.  Right lower lung consolidation and small right pleural effusion. [facility]leg[facility] Heart is normal in size. Lytic lesion and suspected pathological fracture lateral right rib 6..    Impression  1. Right lower lung consolidation. Small right pleural effusion. 2.  Lytic lesion with pathological fracture lateral right rib 6.  3.  CT has been performed at the time of interpretation-please refer to that  report. CT (Most Recent) Results from Hospital Encounter encounter on 01/21/23    CTA CHEST W OR W WO CONT    Narrative  EXAMINATION: CTA chest pulmonary    INDICATION: Chest pain    COMPARISON: CT 9/7/2022    TECHNIQUE: CT angiography of the pulmonary arteries with IV contrast and 3-D MIP  reformations. All CT scans at this facility are performed using dose  optimization technique as appropriate to a performed exam, to include automated  exposure control, adjustment of the mA and/or kV according to patient size  (including appropriate matching first site specific examinations), or use of  iterative reconstruction technique. FINDINGS:    Pulmonary arteries: No pulmonary artery filling defect to suggest pulmonary  embolus. No specific evidence of right heart strain. Cardiovascular: Pulmonary arteries as above. Minimal burden of atherosclerotic  calcifications. Thoracic aorta normal in course and caliber. Heart size normal.    Mediastinum: Imaged thyroid unremarkable. Conglomerate appearing right hilar and  subcarinal suspected jamar mass, difficult to measure due to plaque-like  amorphous shape. Esophagus nondistended, and distal segment inseparable from  suspected jamar mass described above. Pleura: Small right pleural effusion. No pneumothorax.     Lungs/airways: Essentially complete consolidation of the right middle lobe, and  extensive confluent consolidation in the right lower lobe. Additional patchy  peripheral consolidation in the right lower lobe, and peripheral right upper  lobe nodule measuring 0.9 x 0.6 cm. Biapical subpleural nodularity may represent  scarring. Upper abdomen: No acute findings. Miscellaneous: Superficial soft tissues unremarkable. Bones: Lytic foci with associated pathologic fractures at right lateral sixth  rib and left posterior lateral sixth rib. Left T4 transverse process lytic  lesion    Impression  Essentially complete consolidation of the right middle lobe, and extensive  consolidation in the right lower lobe, may represent a combination of tumor  involvement or postobstructive atelectasis versus pneumonia. Additional patchy  areas of consolidation in the right lung as above may be  infectious/inflammatory.  -Small right pleural effusion, possibly parapneumonic or malignant.  -Suspected conglomerate right hilar and subcarinal adenopathy, grossly increased  since prior.  -Findings consistent with tumor worsening including increasing metastatic  adenopathy. No evidence of pulmonary embolus or right heart strain. Rib lytic metastases with pathologic fractures, and T4 left transverse process  lytic metastasis, new since prior. See additional details above. EKG No results found for this or any previous visit. ECHO No results found for this or any previous visit. PFT No flowsheet data found.      Other ASA reactivity:   Pre-albumin:   Ionized Calcium:   NH4:   T3, FT4:  Cortisol:  Urine Osm:  Urine Lytes:   HbA1c:      Recent Results (from the past 24 hour(s))   EKG, 12 LEAD, INITIAL    Collection Time: 01/21/23  2:30 AM   Result Value Ref Range    Ventricular Rate 84 BPM    Atrial Rate 84 BPM    P-R Interval 112 ms    QRS Duration 60 ms    Q-T Interval 370 ms    QTC Calculation (Bezet) 437 ms    Calculated P Axis 46 degrees    Calculated R Axis 24 degrees    Calculated T Axis 46 degrees    Diagnosis       Normal sinus rhythm  Early repolarization  Abnormal ECG  When compared with ECG of 07-SEP-2022 10:43,  ST elevation now present in Lateral leads  Nonspecific T wave abnormality now evident in Lateral leads  Confirmed by Hina Jaffe ([id number]) on 1/21/2023 11:56:34 AM     CBC WITH AUTOMATED DIFF    Collection Time: 01/21/23  2:39 AM   Result Value Ref Range    WBC 13.4 (H) 4.6 - 13.2 K/uL    RBC 3.94 (L) 4.20 - 5.30 M/uL    HGB 11.7 (L) 12.0 - 16.0 g/dL    HCT 32.4 (L) 35.0 - 45.0 %    MCV 82.2 78.0 - 100.0 FL    MCH 29.7 24.0 - 34.0 PG    MCHC 36.1 31.0 - 37.0 g/dL    RDW 13.2 11.6 - 14.5 %    PLATELET 153 (H) 108 - 420 K/uL    MPV 9.4 9.2 - 11.8 FL    NRBC 0.0 0  WBC    ABSOLUTE NRBC 0.00 0.00 - 0.01 K/uL    NEUTROPHILS 83 (H) 40 - 73 %    LYMPHOCYTES 10 (L) 21 - 52 %    MONOCYTES 6 3 - 10 %    EOSINOPHILS 0 0 - 5 %    BASOPHILS 0 0 - 2 %    IMMATURE GRANULOCYTES 0 0.0 - 0.5 %    ABS. NEUTROPHILS 11.1 (H) 1.8 - 8.0 K/UL    ABS. LYMPHOCYTES 1.4 0.9 - 3.6 K/UL    ABS. MONOCYTES 0.8 0.05 - 1.2 K/UL    ABS. EOSINOPHILS 0.0 0.0 - 0.4 K/UL    ABS. BASOPHILS 0.1 0.0 - 0.1 K/UL    ABS. IMM. GRANS. 0.1 (H) 0.00 - 0.04 K/UL    DF AUTOMATED     METABOLIC PANEL, COMPREHENSIVE    Collection Time: 01/21/23  2:39 AM   Result Value Ref Range    Sodium 136 136 - 145 mmol/L    Potassium 4.1 3.5 - 5.5 mmol/L    Chloride 105 100 - 111 mmol/L    CO2 28 21 - 32 mmol/L    Anion gap 3 3.0 - 18 mmol/L    Glucose 112 (H) 74 - 99 mg/dL    BUN 16 7.0 - 18 MG/DL    Creatinine 0.77 0.6 - 1.3 MG/DL    BUN/Creatinine ratio 21 (H) 12 - 20      eGFR >60 >60 ml/min/1.73m2    Calcium 9.7 8.5 - 10.1 MG/DL    Bilirubin, total 0.6 0.2 - 1.0 MG/DL    ALT (SGPT) 29 13 - 56 U/L    AST (SGOT) 21 10 - 38 U/L    Alk.  phosphatase 91 45 - 117 U/L    Protein, total 7.2 6.4 - 8.2 g/dL    Albumin 3.2 (L) 3.4 - 5.0 g/dL    Globulin 4.0 2.0 - 4.0 g/dL    A-G Ratio 0.8 0.8 - 1.7     TROPONIN-HIGH SENSITIVITY    Collection Time: 01/21/23  2:39 AM   Result Value Ref Range    Troponin-High Sensitivity 6 0 - 54 ng/L   MAGNESIUM    Collection Time: 01/21/23  2:39 AM   Result Value Ref Range    Magnesium 2.3 1.6 - 2.6 mg/dL   D DIMER    Collection Time: 01/21/23  2:39 AM   Result Value Ref Range    D DIMER 1.48 (H) <0.46 ug/ml(FEU)   TROPONIN-HIGH SENSITIVITY    Collection Time: 01/21/23  5:40 AM   Result Value Ref Range    Troponin-High Sensitivity 7 0 - 54 ng/L   POC LACTIC ACID    Collection Time: 01/21/23  6:59 AM   Result Value Ref Range    Lactic Acid (POC) 0.86 0.40 - 2.00 mmol/L   RESPIRATORY VIRUS PANEL W/COVID-19, PCR    Collection Time: 01/21/23  7:00 AM    Specimen: Nasopharyngeal   Result Value Ref Range    Adenovirus Not detected NOTD      Coronavirus 229E Not detected NOTD      Coronavirus HKU1 Not detected NOTD      Coronavirus CVNL63 Not detected NOTD      Coronavirus OC43 Not detected NOTD      SARS-CoV-2, PCR Not detected NOTD      Metapneumovirus Not detected NOTD      Rhinovirus and Enterovirus Not detected NOTD      Influenza A Not detected NOTD      Influenza B Not detected NOTD      Parainfluenza 1 Not detected NOTD      Parainfluenza 2 Not detected NOTD      Parainfluenza 3 Not detected NOTD      Parainfluenza virus 4 Not detected NOTD      RSV by PCR Not detected NOTD      B. parapertussis, PCR Not detected NOTD      Bordetella pertussis - PCR Not detected NOTD      Chlamydophila pneumoniae DNA, QL, PCR Not detected NOTD      Mycoplasma pneumoniae DNA, QL, PCR Not detected NOTD           Telemetry:    The patient is: [x] acutely ill Risk of deterioration: [x] moderate    [] critically ill  [] high     [x]See my orders for details    My assessment, plan of care, findings, medications, side effects etc were discussed with:  [] Nurse [] PT/OT    [] Respiratory therapy [x] Dr. Cesar Armetna   [] Family: answered all questions to satisfaction [x] Patient: answered all questions to satisfaction   [] Pharmacist []      [x] Total critical care time exclusive of procedures 60 minutes with complex decision making, coordination of care and counseling patient performed and > 50% time spent in face to face evaluation.       Herbie Cain MD  1/21/2023

## 2023-01-21 NOTE — ED PROVIDER NOTES
EMERGENCY DEPARTMENT HISTORY AND PHYSICAL EXAM      Date: 1/21/2023  Patient Name: Juju Lacey      History of Presenting Illness     Chief Complaint   Patient presents with    Chest Pain    Anxiety       Location/Duration/Severity/Modifying factors   Chief Complaint   Patient presents with    Chest Pain    Anxiety       HPI:  Juju Lacey is a 71 y.o. female with history as listed presents with a concern of pain and anxiety. The patient stated she was arguing with her friend and started hurting in her chest.  She said it was just anxiety. She states she usually take a nitroglycerin but did not have any tablets. She said the discomfort improved after she calm down. Has not had fever or cough. She denies nausea and vomiting, dizziness, shortness of breath, or diaphoresis. Associated Symptoms:see ROS      There are no other complaints, changes, or physical findings at this time.     PCP: None    Current Facility-Administered Medications   Medication Dose Route Frequency Provider Last Rate Last Admin    guaiFENesin ER (MUCINEX) tablet 600 mg  600 mg Oral Q12H Fan Christianson, DO   600 mg at 01/21/23 0829    sodium chloride (NS) flush 5-40 mL  5-40 mL IntraVENous Q8H Fan Christianson, DO   10 mL at 01/21/23 1426    sodium chloride (NS) flush 5-40 mL  5-40 mL IntraVENous PRN Fan Christianson,         acetaminophen (TYLENOL) tablet 650 mg  650 mg Oral Q6H PRN Thompson Woody,         Or    acetaminophen (TYLENOL) suppository 650 mg  650 mg Rectal Q6H PRN Fan Christianson,         polyethylene glycol (MIRALAX) packet 17 g  17 g Oral DAILY PRN Fan Christianson,         ondansetron (ZOFRAN ODT) tablet 4 mg  4 mg Oral Q8H PRN Fan Christianson,         Or    ondansetron (ZOFRAN) injection 4 mg  4 mg IntraVENous Q6H PRN Fan Christianson,         melatonin tablet 5 mg  5 mg Oral QHS PRN Fan Christianson,         albuterol-ipratropium (DUO-NEB) 2.5 MG-0.5 MG/3 ML  3 mL Nebulization Q6H PRN Cody Garza E, DO        naloxone (NARCAN) injection 0.4 mg  0.4 mg IntraVENous EVERY 2 MINUTES AS NEEDED Thomas Christiansonica,         oxyCODONE-acetaminophen (PERCOCET) 5-325 mg per tablet 1 Tablet  1 Tablet Oral Q6H PRN Ciro Laguerre MD   1 Tablet at 01/21/23 4197     Current Outpatient Medications   Medication Sig Dispense Refill    ibuprofen (MOTRIN) 400 mg tablet Take 600 mg by mouth every six (6) hours as needed for Pain. acetaminophen (TYLENOL) 325 mg tablet Take 2 Tablets by mouth every six (6) hours as needed for Pain. 20 Tablet 0       Past History     Past Medical History:  Past Medical History:   Diagnosis Date    Angina at rest [facility])     Anxiety     CVA, old, facial weakness 2012    left ptosis    Dental caries     Osteoarthritis     Osteopenia     Right lower lobe lung mass 04/15/2022    Offered Biopsy and couldn't decide between PAlliative Care and Biopsy       Past Surgical History:  Past Surgical History:   Procedure Laterality Date    HX WISDOM TEETH EXTRACTION         Family History:  Family History   Problem Relation Age of Onset    Cancer Daughter         Unspecified Cancer    Cancer Cousin         Unspecified Cancer x2       Social History:  Social History     Tobacco Use    Smoking status: Some Days     Packs/day: 0.50     Years: 15.00     Pack years: 7.50     Types: Cigarettes    Smokeless tobacco: Never    Tobacco comments:     Patient reports that she is a Some Day Smoker x20 years (as of 1/21/2023). Substance Use Topics    Alcohol use: Yes     Comment: 8 drinks per week    Drug use: No     Comment: none in 2 years       Allergies: Allergies   Allergen Reactions    Aspirin Nausea Only         Review of Systems     Review of Systems   All other systems reviewed and are negative. Physical Exam     Physical Exam  Vitals and nursing note reviewed. Constitutional:       General: She is awake. She is not in acute distress. Appearance: Normal appearance.  She is well-developed and well-groomed. She is not ill-appearing, toxic-appearing or diaphoretic. HENT:      Head: Normocephalic and atraumatic. Right Ear: External ear normal.      Left Ear: External ear normal.      Mouth/Throat:      Mouth: Mucous membranes are moist.      Pharynx: Oropharynx is clear. Uvula midline. No pharyngeal swelling, oropharyngeal exudate, posterior oropharyngeal erythema or uvula swelling. Eyes:      General: Lids are normal. Vision grossly intact. No scleral icterus. Right eye: No discharge. Left eye: No discharge. Extraocular Movements: Extraocular movements intact. Conjunctiva/sclera: Conjunctivae normal.      Pupils: Pupils are equal, round, and reactive to light. Neck:      Trachea: Trachea and phonation normal.   Cardiovascular:      Rate and Rhythm: Normal rate and regular rhythm. Pulses: Normal pulses. Heart sounds: Normal heart sounds, S1 normal and S2 normal. No murmur heard. No friction rub. No gallop. Pulmonary:      Effort: Pulmonary effort is normal. No respiratory distress. Breath sounds: Normal air entry. No stridor. Examination of the right-middle field reveals decreased breath sounds. Examination of the right-lower field reveals decreased breath sounds. Examination of the left-lower field reveals decreased breath sounds. Decreased breath sounds present. No wheezing, rhonchi or rales. Chest:      Chest wall: No tenderness. Abdominal:      General: Bowel sounds are normal. There is no distension. Palpations: Abdomen is soft. There is no mass. Tenderness: There is no abdominal tenderness. There is no guarding. Genitourinary:     Vagina: No vaginal discharge. Musculoskeletal:         General: No swelling, tenderness, deformity or signs of injury. Normal range of motion.       Right shoulder: Normal.      Left shoulder: Normal.      Right upper arm: Normal.      Left upper arm: Normal.      Right elbow: Normal.      Left elbow: Normal.      Right forearm: Normal.      Left forearm: Normal.      Right wrist: Normal. Normal pulse. Left wrist: Normal. Normal pulse. Right hand: Normal. Normal capillary refill. Left hand: Normal. Normal capillary refill. Cervical back: Full passive range of motion without pain, normal range of motion and neck supple. No rigidity or tenderness. Right lower leg: Normal. No edema. Left lower leg: Normal. No edema. Right ankle: Normal.      Left ankle: Normal.   Skin:     General: Skin is warm and dry. Capillary Refill: Capillary refill takes less than 2 seconds. Coloration: Skin is not jaundiced or pale. Findings: No bruising, erythema, lesion or rash. Neurological:      General: No focal deficit present. Mental Status: She is alert and oriented to person, place, and time. Mental status is at baseline. GCS: GCS eye subscore is 4. GCS verbal subscore is 5. GCS motor subscore is 6. Cranial Nerves: Cranial nerves 2-12 are intact. No cranial nerve deficit or facial asymmetry. Sensory: Sensation is intact. No sensory deficit. Motor: Motor function is intact. No weakness. Coordination: Coordination is intact. Coordination normal.      Gait: Gait is intact. Psychiatric:         Attention and Perception: Attention and perception normal.         Mood and Affect: Mood and affect normal.         Speech: Speech normal.         Behavior: Behavior normal. Behavior is cooperative. Thought Content:  Thought content normal.         Cognition and Memory: Cognition and memory normal.         Judgment: Judgment normal.       Lab and Diagnostic Study Results     Labs -  Recent Results (from the past 24 hour(s))   EKG, 12 LEAD, INITIAL    Collection Time: 01/21/23  2:30 AM   Result Value Ref Range    Ventricular Rate 84 BPM    Atrial Rate 84 BPM    P-R Interval 112 ms    QRS Duration 60 ms    Q-T Interval 370 ms    QTC Calculation (Bezet) 437 ms Calculated P Axis 46 degrees    Calculated R Axis 24 degrees    Calculated T Axis 46 degrees    Diagnosis       Normal sinus rhythm  Early repolarization  Abnormal ECG  When compared with ECG of 07-SEP-2022 10:43,  ST elevation now present in Lateral leads  Nonspecific T wave abnormality now evident in Lateral leads  Confirmed by Merlyn Jin ([id number]) on 1/21/2023 11:56:34 AM     CBC WITH AUTOMATED DIFF    Collection Time: 01/21/23  2:39 AM   Result Value Ref Range    WBC 13.4 (H) 4.6 - 13.2 K/uL    RBC 3.94 (L) 4.20 - 5.30 M/uL    HGB 11.7 (L) 12.0 - 16.0 g/dL    HCT 32.4 (L) 35.0 - 45.0 %    MCV 82.2 78.0 - 100.0 FL    MCH 29.7 24.0 - 34.0 PG    MCHC 36.1 31.0 - 37.0 g/dL    RDW 13.2 11.6 - 14.5 %    PLATELET 883 (H) 126 - 420 K/uL    MPV 9.4 9.2 - 11.8 FL    NRBC 0.0 0  WBC    ABSOLUTE NRBC 0.00 0.00 - 0.01 K/uL    NEUTROPHILS 83 (H) 40 - 73 %    LYMPHOCYTES 10 (L) 21 - 52 %    MONOCYTES 6 3 - 10 %    EOSINOPHILS 0 0 - 5 %    BASOPHILS 0 0 - 2 %    IMMATURE GRANULOCYTES 0 0.0 - 0.5 %    ABS. NEUTROPHILS 11.1 (H) 1.8 - 8.0 K/UL    ABS. LYMPHOCYTES 1.4 0.9 - 3.6 K/UL    ABS. MONOCYTES 0.8 0.05 - 1.2 K/UL    ABS. EOSINOPHILS 0.0 0.0 - 0.4 K/UL    ABS. BASOPHILS 0.1 0.0 - 0.1 K/UL    ABS. IMM. GRANS. 0.1 (H) 0.00 - 0.04 K/UL    DF AUTOMATED     METABOLIC PANEL, COMPREHENSIVE    Collection Time: 01/21/23  2:39 AM   Result Value Ref Range    Sodium 136 136 - 145 mmol/L    Potassium 4.1 3.5 - 5.5 mmol/L    Chloride 105 100 - 111 mmol/L    CO2 28 21 - 32 mmol/L    Anion gap 3 3.0 - 18 mmol/L    Glucose 112 (H) 74 - 99 mg/dL    BUN 16 7.0 - 18 MG/DL    Creatinine 0.77 0.6 - 1.3 MG/DL    BUN/Creatinine ratio 21 (H) 12 - 20      eGFR >60 >60 ml/min/1.73m2    Calcium 9.7 8.5 - 10.1 MG/DL    Bilirubin, total 0.6 0.2 - 1.0 MG/DL    ALT (SGPT) 29 13 - 56 U/L    AST (SGOT) 21 10 - 38 U/L    Alk.  phosphatase 91 45 - 117 U/L    Protein, total 7.2 6.4 - 8.2 g/dL    Albumin 3.2 (L) 3.4 - 5.0 g/dL    Globulin 4.0 2.0 - 4.0 g/dL    A-G Ratio 0.8 0.8 - 1.7     TROPONIN-HIGH SENSITIVITY    Collection Time: 01/21/23  2:39 AM   Result Value Ref Range    Troponin-High Sensitivity 6 0 - 54 ng/L   MAGNESIUM    Collection Time: 01/21/23  2:39 AM   Result Value Ref Range    Magnesium 2.3 1.6 - 2.6 mg/dL   D DIMER    Collection Time: 01/21/23  2:39 AM   Result Value Ref Range    D DIMER 1.48 (H) <0.46 ug/ml(FEU)   TROPONIN-HIGH SENSITIVITY    Collection Time: 01/21/23  5:40 AM   Result Value Ref Range    Troponin-High Sensitivity 7 0 - 54 ng/L   POC LACTIC ACID    Collection Time: 01/21/23  6:59 AM   Result Value Ref Range    Lactic Acid (POC) 0.86 0.40 - 2.00 mmol/L   RESPIRATORY VIRUS PANEL W/COVID-19, PCR    Collection Time: 01/21/23  7:00 AM    Specimen: Nasopharyngeal   Result Value Ref Range    Adenovirus Not detected NOTD      Coronavirus 229E Not detected NOTD      Coronavirus HKU1 Not detected NOTD      Coronavirus CVNL63 Not detected NOTD      Coronavirus OC43 Not detected NOTD      SARS-CoV-2, PCR Not detected NOTD      Metapneumovirus Not detected NOTD      Rhinovirus and Enterovirus Not detected NOTD      Influenza A Not detected NOTD      Influenza B Not detected NOTD      Parainfluenza 1 Not detected NOTD      Parainfluenza 2 Not detected NOTD      Parainfluenza 3 Not detected NOTD      Parainfluenza virus 4 Not detected NOTD      RSV by PCR Not detected NOTD      B. parapertussis, PCR Not detected NOTD      Bordetella pertussis - PCR Not detected NOTD      Chlamydophila pneumoniae DNA, QL, PCR Not detected NOTD      Mycoplasma pneumoniae DNA, QL, PCR Not detected NOTD           Radiologic Studies -   CTA CHEST W OR W WO CONT   Final Result      Essentially complete consolidation of the right middle lobe, and extensive   consolidation in the right lower lobe, may represent a combination of tumor   involvement or postobstructive atelectasis versus pneumonia.  Additional patchy   areas of consolidation in the right lung as above may be   infectious/inflammatory.   -Small right pleural effusion, possibly parapneumonic or malignant.   -Suspected conglomerate right hilar and subcarinal adenopathy, grossly increased   since prior.   -Findings consistent with tumor worsening including increasing metastatic   adenopathy. No evidence of pulmonary embolus or right heart strain. Rib lytic metastases with pathologic fractures, and T4 left transverse process   lytic metastasis, new since prior. See additional details above. XR CHEST PORT   Final Result   1. Right lower lung consolidation. Small right pleural effusion. 2.  Lytic lesion with pathological fracture lateral right rib 6.   3.  CT has been performed at the time of interpretation-please refer to that   report. Procedures and Critical Care       Performed by: Zeenat Edge MD    Procedures     EKG: unchanged from previous tracings, nonspecific ST and T waves changes, normal axis and intervals. 84 bpm.  No acute ST segment elevation. Motion artifact. Zeenat Edge MD    Medical Decision Making and ED Course   - I am the first and primary provider for this patient AND AM THE PRIMARY PROVIDER OF RECORD. - I reviewed the vital signs, available nursing notes, past medical history, past surgical history, family history and social history. - Initial assessment performed. The patients presenting problems have been discussed, and the staff are in agreement with the care plan formulated and outlined with them. I have encouraged them to ask questions as they arise throughout their visit. Vital Signs-Reviewed the patient's vital signs.     Patient Vitals for the past 12 hrs:   Temp Pulse Resp BP SpO2   01/21/23 1615 -- -- -- -- 99 %   01/21/23 1600 97.8 °F (36.6 °C) 75 -- (!) 143/74 99 %   01/21/23 1200 97.7 °F (36.5 °C) 77 19 (!) 141/72 99 %         Provider Notes (Medical Decision Making):     MDM  Number of Diagnoses or Management Options  Anemia, unspecified type  Chest pain, unspecified type  Community acquired pneumonia, unspecified laterality  Lung cancer metastatic to bone [facility])  Diagnosis management comments: Patient presented to the emergency department with report of chest pain that started after she was having an argument with a friend. The chest x-ray was concerning for pneumonia and atelectasis. The patient was started on Rocephin and azithromycin. However secondary to the elevated D-dimer the patient had CT of the chest.  The CTA did not demonstrate findings concerning for a pulmonary embolus. However, there was significant findings concerning for complete consolidation of right middle lobe extension consolidation right lower lobe likely representing a combination of tumor and postobstructive pneumonia. There is also adenopathy particular at the perihilar area and subcarinal area. When reviewing the findings with the patient she became tearful. She stated about a month or 2 ago she was told about a tumor but she failed to follow-up as instructed. At this time the patient would likely will benefit from admission for further evaluation of the tumor and chest pain as well as initiation of her treatment plan in conjunction with radiology and oncology. She was discussed with the hospitalist, Dr. Lauro Wilson, who accepted patient to his service. At the time of disposition she is stable and in no acute distress or obvious discomfort. ED Course:          ------------------------------------------------------------------------------------------------------------        Consultations:       Consultations:       Disposition         Admitted      Diagnosis     Clinical Impression:   1. Chest pain, unspecified type    2. Lung cancer metastatic to bone ([address]    3. Community acquired pneumonia, unspecified laterality    4.  Anemia, unspecified type        Attestations:    Dede Lang MD

## 2023-01-22 LAB
ACC. NO. FROM MICRO ORDER, ACCP: ABNORMAL
ACINETOBACTER CALCOACETICUS-BAUMANII COMPLEX, ACBCX: NOT DETECTED
ALBUMIN SERPL-MCNC: 2.8 G/DL (ref 3.4–5)
ALBUMIN/GLOB SERPL: 0.7 (ref 0.8–1.7)
ALP SERPL-CCNC: 91 U/L (ref 45–117)
ALT SERPL-CCNC: 37 U/L (ref 13–56)
ANION GAP SERPL CALC-SCNC: 3 MMOL/L (ref 3–18)
AST SERPL-CCNC: 23 U/L (ref 10–38)
BACTEROIDES FRAGILIS, BFRA: NOT DETECTED
BASOPHILS # BLD: 0.1 K/UL (ref 0–0.1)
BASOPHILS NFR BLD: 1 % (ref 0–2)
BILIRUB SERPL-MCNC: 0.9 MG/DL (ref 0.2–1)
BIOFIRE COMMENT, BCIDPF: ABNORMAL
BUN SERPL-MCNC: 15 MG/DL (ref 7–18)
BUN/CREAT SERPL: 21 (ref 12–20)
C GLABRATA DNA VAG QL NAA+PROBE: NOT DETECTED
CALCIUM SERPL-MCNC: 9.2 MG/DL (ref 8.5–10.1)
CANDIDA ALBICANS: NOT DETECTED
CANDIDA AURIS, CAAU: NOT DETECTED
CANDIDA KRUSEI, CKRP: NOT DETECTED
CANDIDA PARAPSILOSIS, CPAUP: NOT DETECTED
CANDIDA TROPICALIS, CTROP: NOT DETECTED
CHLORIDE SERPL-SCNC: 107 MMOL/L (ref 100–111)
CO2 SERPL-SCNC: 28 MMOL/L (ref 21–32)
CREAT SERPL-MCNC: 0.73 MG/DL (ref 0.6–1.3)
CRYPTO NEOFORMANS/GATTII, CRYNEG: NOT DETECTED
DIFFERENTIAL METHOD BLD: ABNORMAL
ENTEROBACTER CLOACAE COMPLEX, ECCP: NOT DETECTED
ENTEROBACTERALES SP. , ENBLS: NOT DETECTED
ENTEROCOCCUS FAECALIS, ENFA: NOT DETECTED
ENTEROCOCCUS FAECIUM, ENFAM: NOT DETECTED
EOSINOPHIL # BLD: 0.2 K/UL (ref 0–0.4)
EOSINOPHIL NFR BLD: 2 % (ref 0–5)
ERYTHROCYTE [DISTWIDTH] IN BLOOD BY AUTOMATED COUNT: 13.4 % (ref 11.6–14.5)
ESCHERICHIA COLI: NOT DETECTED
GLOBULIN SER CALC-MCNC: 4.3 G/DL (ref 2–4)
GLUCOSE SERPL-MCNC: 80 MG/DL (ref 74–99)
HAEMOPHILUS INFLUENZAE, HMI: NOT DETECTED
HCT VFR BLD AUTO: 35.5 % (ref 35–45)
HGB BLD-MCNC: 12.8 G/DL (ref 12–16)
IMM GRANULOCYTES # BLD AUTO: 0 K/UL (ref 0–0.04)
IMM GRANULOCYTES NFR BLD AUTO: 0 % (ref 0–0.5)
INR PPP: 1 (ref 0.8–1.2)
KLEBSIELLA AEROGENES, KLAE: NOT DETECTED
KLEBSIELLA OXYTOCA: NOT DETECTED
KLEBSIELLA PNEUMONIAE GROUP, KPPG: NOT DETECTED
LISTERIA MONOCYTOGENES, LMONP: NOT DETECTED
LYMPHOCYTES # BLD: 1.8 K/UL (ref 0.9–3.6)
LYMPHOCYTES NFR BLD: 18 % (ref 21–52)
MCH RBC QN AUTO: 29.7 PG (ref 24–34)
MCHC RBC AUTO-ENTMCNC: 36.1 G/DL (ref 31–37)
MCV RBC AUTO: 82.4 FL (ref 78–100)
MECA/C (METHICILLIN RESISTANT GENE), MECACP: NOT DETECTED
MONOCYTES # BLD: 0.9 K/UL (ref 0.05–1.2)
MONOCYTES NFR BLD: 9 % (ref 3–10)
NEISSERIA MENINGITIDIS, NMNI: NOT DETECTED
NEUTS SEG # BLD: 6.7 K/UL (ref 1.8–8)
NEUTS SEG NFR BLD: 69 % (ref 40–73)
NRBC # BLD: 0 K/UL (ref 0–0.01)
NRBC BLD-RTO: 0 PER 100 WBC
PLATELET # BLD AUTO: 481 K/UL (ref 135–420)
PMV BLD AUTO: 9.7 FL (ref 9.2–11.8)
POTASSIUM SERPL-SCNC: 4.1 MMOL/L (ref 3.5–5.5)
PROT SERPL-MCNC: 7.1 G/DL (ref 6.4–8.2)
PROTEUS, PRP: NOT DETECTED
PROTHROMBIN TIME: 13.5 SEC (ref 11.5–15.2)
PSEUDOMONAS AERUGINOSA: NOT DETECTED
RBC # BLD AUTO: 4.31 M/UL (ref 4.2–5.3)
RESISTANT GENE SPACE, REGENE: ABNORMAL
SALMONELLA, SALMO: NOT DETECTED
SERRATIA MARCESCENS: NOT DETECTED
SODIUM SERPL-SCNC: 138 MMOL/L (ref 136–145)
STAPH EPIDERMIDIS, STEP: DETECTED
STAPH LUGDUNENSIS, STALUG: NOT DETECTED
STAPHYLOCOCCUS AUREUS: NOT DETECTED
STAPHYLOCOCCUS, STAPP: DETECTED
STENO MALTOPHILIA, STMA: NOT DETECTED
STREPTOCOCCUS , STPSP: NOT DETECTED
STREPTOCOCCUS AGALACTIAE (GROUP B): NOT DETECTED
STREPTOCOCCUS PNEUMONIAE , SPNP: NOT DETECTED
STREPTOCOCCUS PYOGENES (GROUP A), SPYOP: NOT DETECTED
WBC # BLD AUTO: 9.7 K/UL (ref 4.6–13.2)

## 2023-01-22 PROCEDURE — 65270000046 HC RM TELEMETRY

## 2023-01-22 PROCEDURE — 36415 COLL VENOUS BLD VENIPUNCTURE: CPT

## 2023-01-22 PROCEDURE — 85025 COMPLETE CBC W/AUTO DIFF WBC: CPT

## 2023-01-22 PROCEDURE — 74011000250 HC RX REV CODE- 250: Performed by: INTERNAL MEDICINE

## 2023-01-22 PROCEDURE — 74011250636 HC RX REV CODE- 250/636: Performed by: INTERNAL MEDICINE

## 2023-01-22 PROCEDURE — 85610 PROTHROMBIN TIME: CPT

## 2023-01-22 PROCEDURE — 74011000258 HC RX REV CODE- 258: Performed by: INTERNAL MEDICINE

## 2023-01-22 PROCEDURE — 80053 COMPREHEN METABOLIC PANEL: CPT

## 2023-01-22 PROCEDURE — 94762 N-INVAS EAR/PLS OXIMTRY CONT: CPT

## 2023-01-22 PROCEDURE — 74011250637 HC RX REV CODE- 250/637: Performed by: INTERNAL MEDICINE

## 2023-01-22 PROCEDURE — 74011250637 HC RX REV CODE- 250/637: Performed by: HOSPITALIST

## 2023-01-22 PROCEDURE — 74011250636 HC RX REV CODE- 250/636: Performed by: HOSPITALIST

## 2023-01-22 PROCEDURE — 99232 SBSQ HOSP IP/OBS MODERATE 35: CPT | Performed by: HOSPITALIST

## 2023-01-22 RX ORDER — HEPARIN SODIUM 5000 [USP'U]/ML
5000 INJECTION, SOLUTION INTRAVENOUS; SUBCUTANEOUS EVERY 8 HOURS
Status: DISCONTINUED | OUTPATIENT
Start: 2023-01-22 | End: 2023-01-27 | Stop reason: HOSPADM

## 2023-01-22 RX ORDER — VANCOMYCIN/0.9 % SOD CHLORIDE 1.5G/250ML
1500 PLASTIC BAG, INJECTION (ML) INTRAVENOUS ONCE
Status: COMPLETED | OUTPATIENT
Start: 2023-01-22 | End: 2023-01-22

## 2023-01-22 RX ADMIN — PIPERACILLIN AND TAZOBACTAM 3.38 G: 3; .375 INJECTION, POWDER, FOR SOLUTION INTRAVENOUS at 22:09

## 2023-01-22 RX ADMIN — AZITHROMYCIN MONOHYDRATE 500 MG: 500 INJECTION, POWDER, LYOPHILIZED, FOR SOLUTION INTRAVENOUS at 09:42

## 2023-01-22 RX ADMIN — PIPERACILLIN AND TAZOBACTAM 3.38 G: 3; .375 INJECTION, POWDER, FOR SOLUTION INTRAVENOUS at 14:54

## 2023-01-22 RX ADMIN — VANCOMYCIN HYDROCHLORIDE 1500 MG: 10 INJECTION, POWDER, LYOPHILIZED, FOR SOLUTION INTRAVENOUS at 11:13

## 2023-01-22 RX ADMIN — SODIUM CHLORIDE, PRESERVATIVE FREE 5 ML: 5 INJECTION INTRAVENOUS at 06:00

## 2023-01-22 RX ADMIN — OXYCODONE HYDROCHLORIDE AND ACETAMINOPHEN 1 TABLET: 5; 325 TABLET ORAL at 09:39

## 2023-01-22 RX ADMIN — VANCOMYCIN HYDROCHLORIDE 750 MG: 750 INJECTION, POWDER, LYOPHILIZED, FOR SOLUTION INTRAVENOUS at 23:49

## 2023-01-22 RX ADMIN — Medication 5 MG: at 22:10

## 2023-01-22 RX ADMIN — SODIUM CHLORIDE, PRESERVATIVE FREE 10 ML: 5 INJECTION INTRAVENOUS at 13:32

## 2023-01-22 RX ADMIN — GUAIFENESIN 600 MG: 600 TABLET, EXTENDED RELEASE ORAL at 22:09

## 2023-01-22 RX ADMIN — GUAIFENESIN 600 MG: 600 TABLET, EXTENDED RELEASE ORAL at 09:39

## 2023-01-22 RX ADMIN — OXYCODONE HYDROCHLORIDE AND ACETAMINOPHEN 1 TABLET: 5; 325 TABLET ORAL at 19:50

## 2023-01-22 RX ADMIN — SODIUM CHLORIDE, PRESERVATIVE FREE 5 ML: 5 INJECTION INTRAVENOUS at 22:00

## 2023-01-22 RX ADMIN — ACETAMINOPHEN 650 MG: 325 TABLET ORAL at 22:10

## 2023-01-22 RX ADMIN — HEPARIN SODIUM 5000 UNITS: 5000 INJECTION INTRAVENOUS; SUBCUTANEOUS at 17:59

## 2023-01-22 RX ADMIN — HEPARIN SODIUM 5000 UNITS: 5000 INJECTION INTRAVENOUS; SUBCUTANEOUS at 09:39

## 2023-01-22 RX ADMIN — WATER 1 G: 1 INJECTION INTRAMUSCULAR; INTRAVENOUS; SUBCUTANEOUS at 09:41

## 2023-01-22 NOTE — CONSULTS
[facility]  (A Division of [address] [place] [address])      Consultation Note      Date of Admission: 1/21/2023    Date of Note: 1/22/2023      Reason for Referral: GPC sepsis, pneumonia  Referring Physician: Dr. Lainey Manning from this admission:   1/21 blood culture: 3 out of 4 gram-positive cocci in groups  1/21 respiratory viral panel: Negative    Current Antimicrobials:    Prior Antimicrobials:  Azithromycin 1/21 to present  Ceftriaxone 1/21 to present  Vancomycin 1/22 to present        Assessment:         GPC sepsis suspect from a pulmonary source:   Postobstructive pneumonia versus enlarging malignancy: in the right middle and lower lobes. Right lower lobe pulmonary mass with hilar and subcarinal LAD as well as  metastasis to the ribs on the right   -1/21 CTA chest with complete consolidation of the right middle lobe and right lower lobe suspicious for tumor plus or minus pneumonia. Small right pleural effusion. 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 [address]  Office: [phone], Ext [phone]  Mobile/Text: [phone]    Lines / Catheters:  Peripheral    HPI:  Tammy Burgess is a 71 y.o. female with PMHx significant for RLL lung mass, tobacco use, anxiety who presented with several day history of increasing chest pain, cough and dyspnea. Denies fever or chills. Decreased appetite and weight loss noted over the last 2 weeks. Has history of RLL lung mass that has been followed for over 1 year and is progressive, and PET positive.   From my understanding she has not pursued biopsy or treatment for her pulmonary mass although its been known to be present for greater than 1 year. She has been getting progressively more short of breath with an intermittent cough which is what prompted her to come to the emergency department. She has been afebrile. Initial WBCs were 13.4 and have since normalized. She was started on empiric ceftriaxone and azithromycin due to concern for right-sided pneumonia. Blood cultures from 1/21 are now positive and 3 out of 4 bottles and she has been started on vancomycin while final cultures and sensitivities are pending.          Past Medical History:   Diagnosis Date    Angina at rest [facility])     Anxiety     CVA, old, facial weakness 2012    left ptosis    Dental caries     Osteoarthritis     Osteopenia     Right lower lobe lung mass 04/15/2022    Offered Biopsy and couldn't decide between PAlliative Care and Biopsy     Past Surgical History:   Procedure Laterality Date    HX WISDOM TEETH EXTRACTION       Family History   Problem Relation Age of Onset    Cancer Daughter         Unspecified Cancer    Cancer Cousin         Unspecified Cancer x2     Medications reviewed as below:   Current Facility-Administered Medications   Medication Dose Route Frequency Provider Last Rate Last Admin    cefTRIAXone (ROCEPHIN) 1 g in sterile water (preservative free) 10 mL IV syringe  1 g IntraVENous Q24H Ralph Chan MD   1 g at 01/22/23 0941    azithromycin (ZITHROMAX) 500 mg in 0.9% sodium chloride 250 mL (VIAL-MATE)  500 mg IntraVENous Q24H Ralph Chan  mL/hr at 01/22/23 0942 500 mg at 01/22/23 0942    heparin (porcine) injection 5,000 Units  5,000 Units SubCUTAneous Q8H Ralph Chan MD   5,000 Units at 01/22/23 0939    vancomycin (VANCOCIN) 1500 mg in  ml infusion  1,500 mg IntraVENous Silas Ibarra  mL/hr at 01/22/23 1113 1,500 mg at 01/22/23 1113    Followed by    Maye Maguire ON 1/23/2023] vancomycin (VANCOCIN) 750 mg in 0.9% sodium chloride 250 mL (VIAL-MATE)  750 mg IntraVENous Q12H Annie Dodge MD        guaiFENesin ER [facility]) tablet 600 mg  600 mg Oral Q12H Ruby Murrieta DO   600 mg at 01/22/23 2087    sodium chloride (NS) flush 5-40 mL  5-40 mL IntraVENous Q8H Fan Christianson, DO   5 mL at 01/22/23 0600    sodium chloride (NS) flush 5-40 mL  5-40 mL IntraVENous PRN Ruby Murrieta DO   10 mL at 01/21/23 2035    acetaminophen (TYLENOL) tablet 650 mg  650 mg Oral Q6H PRN Ruby Murrieta DO        Or    acetaminophen (TYLENOL) suppository 650 mg  650 mg Rectal Q6H PRN Fan Christianson DO        polyethylene glycol (MIRALAX) packet 17 g  17 g Oral DAILY PRN Fan Christianson DO        ondansetron (ZOFRAN ODT) tablet 4 mg  4 mg Oral Q8H PRN Fan Christianson DO        Or    ondansetron (ZOFRAN) injection 4 mg  4 mg IntraVENous Q6H PRN Fan Christianson DO        melatonin tablet 5 mg  5 mg Oral QHS PRN Fan Christianson DO        naloxone (NARCAN) injection 0.4 mg  0.4 mg IntraVENous EVERY 2 MINUTES AS NEEDED Deykes, Neil Mcburney, DO        oxyCODONE-acetaminophen (PERCOCET) 5-325 mg per tablet 1 Tablet  1 Tablet Oral Q6H PRN Annie Dodge MD   1 Tablet at 01/22/23 1603    albuterol/ipratropium (DUONEB) neb solution  1 Dose Nebulization Q6H PRN Ruby Murrieta DO         Allergies   Allergen Reactions    Aspirin Nausea Only     Social History     Socioeconomic History    Marital status:      Spouse name: Not on file    Number of children: Not on file    Years of education: Not on file    Highest education level: Not on file   Occupational History    Not on file   Tobacco Use    Smoking status: Some Days     Packs/day: 0.50     Years: 15.00     Pack years: 7.50     Types: Cigarettes    Smokeless tobacco: Never    Tobacco comments:     Patient reports that she is a Some Day Smoker x20 years (as of 1/21/2023).    Substance and Sexual Activity    Alcohol use: Yes     Comment: 8 drinks per week    Drug use: No     Comment: none in 2 years    Sexual activity: Never Other Topics Concern    Not on file   Social History Narrative    Not on file     Social Determinants of Health     Financial Resource Strain: Not on file   Food Insecurity: Not on file   Transportation Needs: Not on file   Physical Activity: Not on file   Stress: Not on file   Social Connections: Not on file   Intimate Partner Violence: Not on file   Housing Stability: Not on file        Review of Systems    Negative Unless BOLDED    General: fevers, chills, myalgias, arthralgias, unexplained weight loss, malaise, fatigue. HEENT:  headaches,sinus pain or presure, recent URI, recent dental procedures;  tinnitus, hearing loss, catarats, dizziness   PUlMONARY:  cough , shortness of breath, sputum production, hx of asthma or COPD. previous treatement for TB or PPD. Cardiovascular: chest pain, previous CAD/MI, hx valvular heart disease,  murmurs, peripheral edema, weight gain  GI:   nausea, vomiting, diarrhea, abdominal pain, prior C.diff, heartburn, melena, hematochezia  :  urinary frequency, dysuria, hematuria, bladder incontinence, flank pain   Neurologic:  seizures, syncope, prior CVA/TIA, confusion, memory impairment, neuropathy, weakness of extremities, difficulty speaking/word finding, headache, vision changes  Musculoskeletal:  myalgias, arthralgias, joint pain, joint swelling, back pain  Skin:  Pruritis, rash, chronic stasis changes, diabetic foot ulcers, lymphedema, pressure wounds  Endocrine: polyuria, polydipsia, hair loss  Psych: anxiousness, prior substance abuse, suicidal ideation, depressed mood.   Heme-Onc: prior DVT, easy bruising, fatigue, malignancy        Objective:      Visit Vitals  /65 (BP 1 Location: Right upper arm, BP Patient Position: Lying)   Pulse 75   Temp 99.3 °F (37.4 °C)   Resp 18   SpO2 93%     Temp (24hrs), Av.2 °F (36.8 °C), Min:97.6 °F (36.4 °C), Max:99.3 °F (37.4 °C)        General:   awake alert and oriented, older than states age   Skin:   no rashes or skin lesions noted on limited exam   HEENT:  Normocephalic, atraumatic, PERRL, EOMI, no scleral icterus or pallor; no conjunctival hemmohage;  nasal and oral mucous are moist and without evidence of lesions. No thrush. Dentition good. Neck supple, no bruits. Lymph Nodes:   no cervical, axillary or inguinal adenopathy   Lungs:   non-labored, coarse breath sounds, no wheezes   Heart:  RRR, s1 and s2; no murmurs rubs or gallops, no edema, + pedal pulses   Abdomen:  soft, non-distended, active bowel sounds, no hepatomegaly, no splenomegaly. Non-tender   Genitourinary:  deferred   Extremities:   no clubbing, cyanosis; no joint effusions or swelling; Full ROM of all large joints to the upper and lower extremities; muscle mass appropriate for age   Neurologic:  No gross focal sensory abnormalities; 5/5 muscle strength to upper and lower extremities. Speech appropirate. Cranial nerves intact   Psychiatric:   appropriate and interactive.        Labs: Results:   Chemistry Recent Labs     01/22/23  0502 01/21/23  0239   GLU 80 112*    136   K 4.1 4.1    105   CO2 28 28   BUN 15 16   CREA 0.73 0.77   CA 9.2 9.7   AGAP 3 3   BUCR 21* 21*   AP 91 91   TP 7.1 7.2   ALB 2.8* 3.2*   GLOB 4.3* 4.0   AGRAT 0.7* 0.8      CBC w/Diff Recent Labs     01/22/23  0502 01/21/23  0239   WBC 9.7 13.4*   RBC 4.31 3.94*   HGB 12.8 11.7*   HCT 35.5 32.4*   * 473*   GRANS 69 83*   LYMPH 18* 10*   EOS 2 0            No results found for: SDES Lab Results   Component Value Date/Time    Culture result: CULTURE IN PROGRESS,FURTHER UPDATES TO FOLLOW 01/21/2023 06:55 AM    Culture result: CULTURE IN PROGRESS,FURTHER UPDATES TO FOLLOW 01/21/2023 06:45 AM    Culture result: MIXED UROGENITAL DOUGIE ISOLATED 11/06/2021 04:22 AM    Culture result: NO GROWTH 6 DAYS 11/06/2021 01:21 AM    Culture result: NO GROWTH 6 DAYS 11/06/2021 01:21 AM        Results       Procedure Component Value Units Date/Time    CULTURE, RESPIRATORY/SPUTUM/BRONCH W GRAM STAIN [[id number]]     Order Status: Sent Specimen: Sputum     RESPIRATORY VIRUS PANEL W/COVID-19, PCR [[id number]] Collected: 01/21/23 0700    Order Status: Completed Specimen: Nasopharyngeal Updated: 01/21/23 0817     Adenovirus Not detected        Coronavirus 229E Not detected        Coronavirus HKU1 Not detected        Coronavirus CVNL63 Not detected        Coronavirus OC43 Not detected        SARS-CoV-2, PCR Not detected        Metapneumovirus Not detected        Rhinovirus and Enterovirus Not detected        Influenza A Not detected        Influenza B Not detected        Parainfluenza 1 Not detected        Parainfluenza 2 Not detected        Parainfluenza 3 Not detected        Parainfluenza virus 4 Not detected        RSV by PCR Not detected        B. parapertussis, PCR Not detected        Bordetella pertussis - PCR Not detected        Chlamydophila pneumoniae DNA, QL, PCR Not detected        Mycoplasma pneumoniae DNA, QL, PCR Not detected       CULTURE, BLOOD [[id number]] Collected: 01/21/23 0655    Order Status: Completed Specimen: Blood Updated: 01/22/23 0829     Special Requests: NO SPECIAL REQUESTS        GRAM STAIN       ANAEROBIC BOTTLE Gram positive cocci IN GROUPS                  SMEAR CALLED TO AND CORRECTLY REPEATED BY: YOLANDA PEARL [place] RN 3N [id number] 1/22/23 BY VMB           Culture result:       CULTURE IN PROGRESS,FURTHER UPDATES TO FOLLOW          CULTURE, RESPIRATORY/SPUTUM/BRONCH Walker Dylan STAIN [[id number]]     Order Status: Sent Specimen: Sputum     CULTURE, BLOOD [[id number]] Collected: 01/21/23 0645    Order Status: Completed Specimen: Blood Updated: 01/22/23 0830     Special Requests: NO SPECIAL REQUESTS        GRAM STAIN       AEROBIC AND ANAEROBIC BOTTLES Gram positive cocci IN GROUPS                  SMEAR CALLED TO AND CORRECTLY REPEATED BY: YOLANDA PEARL [place] RN [id number] 1/22/23 BY VMB           Culture result:       CULTURE IN PROGRESS,FURTHER UPDATES TO FOLLOW          BLOOD CULTURE ID PANEL [[id number]] Collected: 01/21/23 0645    Order Status: No result Updated: 01/22/23 0837              Imaging:      All imaging reviewed from Admission to present as per radiology interpretation in [facility]

## 2023-01-22 NOTE — PROGRESS NOTES
Patient seen and evaluated, sitting up in bed, no acute distress. Patient admitted by my colleague earlier this morning    20-year-old female presents to the emergency room with complaints of chest pain, shortness of breath. Patient is a chronic smoker and recently quit about 2 weeks ago. Patient mentions she does not have a PCP and has no follow-up. Patient has a known right lower lobe mass and has been discussing about having this biopsied with her oncologist in June 2022 however it appears that patient was lost to follow-up. ER evaluation-CTA chest showed complete consolidation of the right middle lobe and extensive consolidation in the right lower lobe, likely representing a combination of tumor involvement postobstructive atelectasis versus pneumonia. Findings are consistent with tumor worsening including increasing metastatic adenopathy. No evidence of right heart strain. 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 Patient started on broad-spectrum IV antibiotics, pulmonology consulted. Pulmonology recommended that patient could be discharged home on p.o. antibiotics however patient does not have a PCP and has been lost to follow-up in the past.  I discussed treatment plan with patient and she is currently agreeable to stay, will continue IV antibiotics, possible mass biopsy while patient is in the hospital and will continue to follow.   Further treatment plan as outlined in H&P.

## 2023-01-22 NOTE — PROGRESS NOTES
PT orders received and chart reviewed. PT eval attempted at 0852, pt reporting ambulating independently and toileting with no questions or concerns regarding mobility at this time. Discussed if change in mobility to inform physician for new PT order. Will sign off.       Thank you for this referral  Tr Quiroga  PT DPT

## 2023-01-22 NOTE — PROGRESS NOTES
[facility]  [facility]    Patient: Juju Lacey Age: 71 y.o. : 1950 MR#: [id number] SSN: [id number]  Date/Time: 2023     Subjective:     Patient seen and evaluated, sitting up in bed, no acute distress. 60-year-old female presents to the emergency room with complaints of chest pain, shortness of breath. Patient is a chronic smoker and recently quit about 2 weeks ago. Patient mentions she does not have a PCP and has no follow-up. Patient has a known right lower lobe mass and has been discussing about having this biopsied with her oncologist in 2022 however it appears that patient was lost to follow-up. ER evaluation-CTA chest showed complete consolidation of the right middle lobe and extensive consolidation in the right lower lobe, likely representing a combination of tumor involvement postobstructive atelectasis versus pneumonia. Findings are consistent with tumor worsening including increasing metastatic adenopathy. No evidence of right heart strain. 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 Patient admitted to the hospital for further evaluation. Lab called this morning surrounding that patient's blood cultures were positive. ID consulted      Assessment/Plan:     Pneumonia likely postobstructive with bacteremia and underlying cancer-continue broad-spectrum IV antibiotics, ID consulted  Lung mass suggestive of malignancy-pulmonology on board, will defer further management to them.   Chronic smoker-patient has been counseled to stop, mentions that she recently quit smoking about 2 weeks ago  DVT prophylaxis-Heparin  Full code                Cinthya Box MD  23      Case discussed with:  [x]Patient  []Family  [x]Nursing  []Case Management  DVT Prophylaxis:  []Lovenox  [x]Hep SQ  []SCDs  []Coumadin   []On Heparin gtt    Objective:   VS: Visit Vitals  /75 (BP 1 Location: Right upper arm, BP Patient Position: Sitting)   Pulse 75   Temp 98.4 °F (36.9 °C)   Resp 18   SpO2 95%      Tmax/24hrs: Temp (24hrs), Av.3 °F (36.8 °C), Min:97.6 °F (36.4 °C), Max:99.3 °F (37.4 °C)  IOBRIEF  Intake/Output Summary (Last 24 hours) at 2023 1638  Last data filed at 2023 1300  Gross per 24 hour   Intake 1570 ml   Output --   Net 1570 ml       General:  Alert, cooperative, no acute distress    Pulmonary:  Decreased BS in bases   Cardiovascular: Regular rate and Rhythm. GI:  Soft, Non distended, Non tender. + Bowel sounds. Extremities:  No edema, cyanosis, clubbing. No calf tenderness. Neurologic: Alert and oriented X 3. No acute neuro deficits.   Additional:    Medications:   Current Facility-Administered Medications   Medication Dose Route Frequency    heparin (porcine) injection 5,000 Units  5,000 Units SubCUTAneous Q8H    [START ON 2023] vancomycin (VANCOCIN) 750 mg in 0.9% sodium chloride 250 mL (VIAL-MATE)  750 mg IntraVENous Q12H    piperacillin-tazobactam (ZOSYN) 3.375 g in 0.9% sodium chloride (MBP/ADV) 100 mL MBP  3.375 g IntraVENous Q8H    guaiFENesin ER (MUCINEX) tablet 600 mg  600 mg Oral Q12H    sodium chloride (NS) flush 5-40 mL  5-40 mL IntraVENous Q8H    sodium chloride (NS) flush 5-40 mL  5-40 mL IntraVENous PRN    acetaminophen (TYLENOL) tablet 650 mg  650 mg Oral Q6H PRN    Or    acetaminophen (TYLENOL) suppository 650 mg  650 mg Rectal Q6H PRN    polyethylene glycol (MIRALAX) packet 17 g  17 g Oral DAILY PRN    ondansetron (ZOFRAN ODT) tablet 4 mg  4 mg Oral Q8H PRN    Or    ondansetron (ZOFRAN) injection 4 mg  4 mg IntraVENous Q6H PRN    melatonin tablet 5 mg  5 mg Oral QHS PRN    naloxone (NARCAN) injection 0.4 mg  0.4 mg IntraVENous EVERY 2 MINUTES AS NEEDED    oxyCODONE-acetaminophen (PERCOCET) 5-325 mg per tablet 1 Tablet  1 Tablet Oral Q6H PRN    albuterol/ipratropium (DUONEB) neb solution  1 Dose Nebulization Q6H PRN       Imaging:   XR Results (most recent):  Results from Hospital Encounter encounter on 01/21/23    XR CHEST PORT    Narrative  EXAM: XR CHEST PORT    CLINICAL INDICATION/HISTORY : Chest pain. COMPARISON: September 7, 2022    TECHNIQUE: 1 VIEWS    FINDINGS:  EKG leads and wires overlie the chest.  Right lower lung consolidation and small right pleural effusion. Yoel Majano Heart is normal in size. Lytic lesion and suspected pathological fracture lateral right rib 6..    Impression  1. Right lower lung consolidation. Small right pleural effusion. 2.  Lytic lesion with pathological fracture lateral right rib 6.  3.  CT has been performed at the time of interpretation-please refer to that  report. CT Results (most recent):  Results from Hospital Encounter encounter on 01/21/23    CTA CHEST W OR W WO CONT    Narrative  EXAMINATION: CTA chest pulmonary    INDICATION: Chest pain    COMPARISON: CT 9/7/2022    TECHNIQUE: CT angiography of the pulmonary arteries with IV contrast and 3-D MIP  reformations. All CT scans at this facility are performed using dose  optimization technique as appropriate to a performed exam, to include automated  exposure control, adjustment of the mA and/or kV according to patient size  (including appropriate matching first site specific examinations), or use of  iterative reconstruction technique. FINDINGS:    Pulmonary arteries: No pulmonary artery filling defect to suggest pulmonary  embolus. No specific evidence of right heart strain. Cardiovascular: Pulmonary arteries as above. Minimal burden of atherosclerotic  calcifications. Thoracic aorta normal in course and caliber. Heart size normal.    Mediastinum: Imaged thyroid unremarkable. Conglomerate appearing right hilar and  subcarinal suspected jamar mass, difficult to measure due to plaque-like  amorphous shape. Esophagus nondistended, and distal segment inseparable from  suspected jamar mass described above. Pleura: Small right pleural effusion.  No pneumothorax. Lungs/airways: Essentially complete consolidation of the right middle lobe, and  extensive confluent consolidation in the right lower lobe. Additional patchy  peripheral consolidation in the right lower lobe, and peripheral right upper  lobe nodule measuring 0.9 x 0.6 cm. Biapical subpleural nodularity may represent  scarring. Upper abdomen: No acute findings. Miscellaneous: Superficial soft tissues unremarkable. Bones: Lytic foci with associated pathologic fractures at right lateral sixth  rib and left posterior lateral sixth rib. Left T4 transverse process lytic  lesion    Impression  Essentially complete consolidation of the right middle lobe, and extensive  consolidation in the right lower lobe, may represent a combination of tumor  involvement or postobstructive atelectasis versus pneumonia. Additional patchy  areas of consolidation in the right lung as above may be  infectious/inflammatory.  -Small right pleural effusion, possibly parapneumonic or malignant.  -Suspected conglomerate right hilar and subcarinal adenopathy, grossly increased  since prior.  -Findings consistent with tumor worsening including increasing metastatic  adenopathy. No evidence of pulmonary embolus or right heart strain. Rib lytic metastases with pathologic fractures, and T4 left transverse process  lytic metastasis, new since prior. See additional details above. MRI Results (most recent):  No results found for this or any previous visit.         Labs:    Recent Results (from the past 48 hour(s))   EKG, 12 LEAD, INITIAL    Collection Time: 01/21/23  2:30 AM   Result Value Ref Range    Ventricular Rate 84 BPM    Atrial Rate 84 BPM    P-R Interval 112 ms    QRS Duration 60 ms    Q-T Interval 370 ms    QTC Calculation (Bezet) 437 ms    Calculated P Axis 46 degrees    Calculated R Axis 24 degrees    Calculated T Axis 46 degrees    Diagnosis       Normal sinus rhythm  Early repolarization  Abnormal ECG  When compared with ECG of 07-SEP-2022 10:43,  ST elevation now present in Lateral leads  Nonspecific T wave abnormality now evident in Lateral leads  Confirmed by Taylor Dowling ([id number]) on 1/21/2023 11:56:34 AM     CBC WITH AUTOMATED DIFF    Collection Time: 01/21/23  2:39 AM   Result Value Ref Range    WBC 13.4 (H) 4.6 - 13.2 K/uL    RBC 3.94 (L) 4.20 - 5.30 M/uL    HGB 11.7 (L) 12.0 - 16.0 g/dL    HCT 32.4 (L) 35.0 - 45.0 %    MCV 82.2 78.0 - 100.0 FL    MCH 29.7 24.0 - 34.0 PG    MCHC 36.1 31.0 - 37.0 g/dL    RDW 13.2 11.6 - 14.5 %    PLATELET 526 (H) 442 - 420 K/uL    MPV 9.4 9.2 - 11.8 FL    NRBC 0.0 0  WBC    ABSOLUTE NRBC 0.00 0.00 - 0.01 K/uL    NEUTROPHILS 83 (H) 40 - 73 %    LYMPHOCYTES 10 (L) 21 - 52 %    MONOCYTES 6 3 - 10 %    EOSINOPHILS 0 0 - 5 %    BASOPHILS 0 0 - 2 %    IMMATURE GRANULOCYTES 0 0.0 - 0.5 %    ABS. NEUTROPHILS 11.1 (H) 1.8 - 8.0 K/UL    ABS. LYMPHOCYTES 1.4 0.9 - 3.6 K/UL    ABS. MONOCYTES 0.8 0.05 - 1.2 K/UL    ABS. EOSINOPHILS 0.0 0.0 - 0.4 K/UL    ABS. BASOPHILS 0.1 0.0 - 0.1 K/UL    ABS. IMM. GRANS. 0.1 (H) 0.00 - 0.04 K/UL    DF AUTOMATED     METABOLIC PANEL, COMPREHENSIVE    Collection Time: 01/21/23  2:39 AM   Result Value Ref Range    Sodium 136 136 - 145 mmol/L    Potassium 4.1 3.5 - 5.5 mmol/L    Chloride 105 100 - 111 mmol/L    CO2 28 21 - 32 mmol/L    Anion gap 3 3.0 - 18 mmol/L    Glucose 112 (H) 74 - 99 mg/dL    BUN 16 7.0 - 18 MG/DL    Creatinine 0.77 0.6 - 1.3 MG/DL    BUN/Creatinine ratio 21 (H) 12 - 20      eGFR >60 >60 ml/min/1.73m2    Calcium 9.7 8.5 - 10.1 MG/DL    Bilirubin, total 0.6 0.2 - 1.0 MG/DL    ALT (SGPT) 29 13 - 56 U/L    AST (SGOT) 21 10 - 38 U/L    Alk.  phosphatase 91 45 - 117 U/L    Protein, total 7.2 6.4 - 8.2 g/dL    Albumin 3.2 (L) 3.4 - 5.0 g/dL    Globulin 4.0 2.0 - 4.0 g/dL    A-G Ratio 0.8 0.8 - 1.7     TROPONIN-HIGH SENSITIVITY    Collection Time: 01/21/23  2:39 AM   Result Value Ref Range    Troponin-High Sensitivity 6 0 - 54 ng/L MAGNESIUM    Collection Time: 01/21/23  2:39 AM   Result Value Ref Range    Magnesium 2.3 1.6 - 2.6 mg/dL   D DIMER    Collection Time: 01/21/23  2:39 AM   Result Value Ref Range    D DIMER 1.48 (H) <0.46 ug/ml(FEU)   TROPONIN-HIGH SENSITIVITY    Collection Time: 01/21/23  5:40 AM   Result Value Ref Range    Troponin-High Sensitivity 7 0 - 54 ng/L   CULTURE, BLOOD    Collection Time: 01/21/23  6:45 AM    Specimen: Blood   Result Value Ref Range    Special Requests: NO SPECIAL REQUESTS      GRAM STAIN        AEROBIC AND ANAEROBIC BOTTLES Gram positive cocci IN GROUPS    GRAM STAIN        SMEAR CALLED TO AND CORRECTLY REPEATED BY: YOLANDA HOANG [facility] RN [id number] 1/22/23 BY VMB    Culture result: (A)       Gram positive cocci IN CLUSTERS GROWING IN BOTH BOTTLES DRAWN NO SITE   CULTURE, BLOOD    Collection Time: 01/21/23  6:55 AM    Specimen: Blood   Result Value Ref Range    Special Requests: NO SPECIAL REQUESTS      GRAM STAIN ANAEROBIC BOTTLE Gram positive cocci IN GROUPS      GRAM STAIN        SMEAR CALLED TO AND CORRECTLY REPEATED BY: YOLANDA HOANG [facility] RN 3N [id number] 1/22/23 BY VMB    Culture result: (A)       Gram positive cocci IN CLUSTERS GROWING IN 1 OF 2 BOTTLES DRAWN NO SITE   POC LACTIC ACID    Collection Time: 01/21/23  6:59 AM   Result Value Ref Range    Lactic Acid (POC) 0.86 0.40 - 2.00 mmol/L   RESPIRATORY VIRUS PANEL W/COVID-19, PCR    Collection Time: 01/21/23  7:00 AM    Specimen: Nasopharyngeal   Result Value Ref Range    Adenovirus Not detected NOTD      Coronavirus 229E Not detected NOTD      Coronavirus HKU1 Not detected NOTD      Coronavirus CVNL63 Not detected NOTD      Coronavirus OC43 Not detected NOTD      SARS-CoV-2, PCR Not detected NOTD      Metapneumovirus Not detected NOTD      Rhinovirus and Enterovirus Not detected NOTD      Influenza A Not detected NOTD      Influenza B Not detected NOTD      Parainfluenza 1 Not detected NOTD      Parainfluenza 2 Not detected NOTD      Parainfluenza 3 Not detected NOTD Parainfluenza virus 4 Not detected NOTD      RSV by PCR Not detected NOTD      B. parapertussis, PCR Not detected NOTD      Bordetella pertussis - PCR Not detected NOTD      Chlamydophila pneumoniae DNA, QL, PCR Not detected NOTD      Mycoplasma pneumoniae DNA, QL, PCR Not detected NOTD     METABOLIC PANEL, COMPREHENSIVE    Collection Time: 01/22/23  5:02 AM   Result Value Ref Range    Sodium 138 136 - 145 mmol/L    Potassium 4.1 3.5 - 5.5 mmol/L    Chloride 107 100 - 111 mmol/L    CO2 28 21 - 32 mmol/L    Anion gap 3 3.0 - 18 mmol/L    Glucose 80 74 - 99 mg/dL    BUN 15 7.0 - 18 MG/DL    Creatinine 0.73 0.6 - 1.3 MG/DL    BUN/Creatinine ratio 21 (H) 12 - 20      eGFR >60 >60 ml/min/1.73m2    Calcium 9.2 8.5 - 10.1 MG/DL    Bilirubin, total 0.9 0.2 - 1.0 MG/DL    ALT (SGPT) 37 13 - 56 U/L    AST (SGOT) 23 10 - 38 U/L    Alk. phosphatase 91 45 - 117 U/L    Protein, total 7.1 6.4 - 8.2 g/dL    Albumin 2.8 (L) 3.4 - 5.0 g/dL    Globulin 4.3 (H) 2.0 - 4.0 g/dL    A-G Ratio 0.7 (L) 0.8 - 1.7     CBC WITH AUTOMATED DIFF    Collection Time: 01/22/23  5:02 AM   Result Value Ref Range    WBC 9.7 4.6 - 13.2 K/uL    RBC 4.31 4.20 - 5.30 M/uL    HGB 12.8 12.0 - 16.0 g/dL    HCT 35.5 35.0 - 45.0 %    MCV 82.4 78.0 - 100.0 FL    MCH 29.7 24.0 - 34.0 PG    MCHC 36.1 31.0 - 37.0 g/dL    RDW 13.4 11.6 - 14.5 %    PLATELET 563 (H) 648 - 420 K/uL    MPV 9.7 9.2 - 11.8 FL    NRBC 0.0 0  WBC    ABSOLUTE NRBC 0.00 0.00 - 0.01 K/uL    NEUTROPHILS 69 40 - 73 %    LYMPHOCYTES 18 (L) 21 - 52 %    MONOCYTES 9 3 - 10 %    EOSINOPHILS 2 0 - 5 %    BASOPHILS 1 0 - 2 %    IMMATURE GRANULOCYTES 0 0.0 - 0.5 %    ABS. NEUTROPHILS 6.7 1.8 - 8.0 K/UL    ABS. LYMPHOCYTES 1.8 0.9 - 3.6 K/UL    ABS. MONOCYTES 0.9 0.05 - 1.2 K/UL    ABS. EOSINOPHILS 0.2 0.0 - 0.4 K/UL    ABS. BASOPHILS 0.1 0.0 - 0.1 K/UL    ABS. IMM.  GRANS. 0.0 0.00 - 0.04 K/UL    DF AUTOMATED     PROTHROMBIN TIME + INR    Collection Time: 01/22/23  5:02 AM   Result Value Ref Range    Prothrombin time 13.5 11.5 - 15.2 sec    INR 1.0 0.8 - 1.2         Signed By: Debora Paulson MD     January 22, 2023      I spent 35 minutes with the patient in face-to-face consultation, of which greater than 50% was spent in counseling and coordination of care as described above    Disclaimer: Sections of this note are dictated using utilizing voice recognition software. Minor typographical errors may be present. If questions arise, please do not hesitate to contact me or call our department.

## 2023-01-22 NOTE — ED NOTES
Assumed care of patient. Laying in bed, eyes open, blanket covering up to waist. Cardiac, SpO2 and blood pressure monitors no monitoring. Skin is warm and dry to touch. No respiratory distress observed. IV flushed, saline locked. Will continue to monitor.

## 2023-01-22 NOTE — ROUTINE PROCESS
8100 - Received critical results report regarding positive blood cultures (gram pos cocci in groups in both sets) from [facility]. MD paged.

## 2023-01-22 NOTE — PROGRESS NOTES
[address] [facility] Pharmacokinetic Monitoring Service - Vancomycin     Tammy Burgess is a 71 y.o. female starting on vancomycin therapy for Bloodstream Infection. Pharmacy consulted by Walt Willis MD for monitoring and adjustment. Target Concentration: Goal AUC/ADRIEN 400-600 mg*hr/L    Additional Antimicrobials: Ceftriaxone, Azithromycin    Pertinent Laboratory Values:   Temp: 98.8 °F (37.1 °C)  Recent Labs     01/22/23  0502 01/21/23  0239   CREA 0.73 0.77   BUN 15 16   WBC 9.7 13.4*     Estimated Creatinine Clearance: 63.8 mL/min (based on SCr of 0.73 mg/dL). Pertinent Cultures:  Culture Date Source Results   1/21 Blood  Gram positive cocci IN GROUPS   MRSA Nasal Swab: N/A.  Non-respiratory infection    Plan:  Dosing recommendations based on Bayesian software  Start vancomycin 1500 mg IV x 1 dose followed by 750 mg q12h  Anticipated AUC of 458 and trough concentration of 14.8 at steady state  Renal labs as indicated   Vancomycin concentration to be ordered at a later time  Pharmacy will continue to monitor patient and adjust therapy as indicated    Thank you for the consult,  EMMA Avery  1/22/2023

## 2023-01-22 NOTE — PROGRESS NOTES
[facility]  Pulmonary, Critical Care, and Sleep Medicine    Name: Shari Wells MRN: [id number]   : 1950 Hospital: [address] [place] [address]   Date: 2023        Pulmonary Consult Progress Note                                              Consult requesting physician: Dr. Monika Powers  Reason for Consult: Lung mass    IMPRESSION:   Large RLL pulmonary mass with right hilar and subcarinal LAD now with lytic lesions in right lateral 6th rib, L posterior lateral 6th rib and T4 left transverse process. The pulmonary mass and subcarinal LN were both PET avid a year ago and have progressed all pointing to a malignancy likely primary lung. Post-obstructive pneumonia versus extension of malignancy RML/RLL. WBC elevated pointing to acute infectious process. No O2 requirement. Tobacco use  Anxiety disorder  Osteoarthritis  Gram + cocci in BC x 2        RECOMMENDATIONS:   Recommend IR evaluation to determine if lytic rib lesions can be biopsied as that would be safer than EBUS in the context of active pneumonia and preferred for staging. EBUS can also be considered as an option if not able to obtain biopsy of rib lesions, prefer to wait until pneumonia improved and bacteremia resolved. Agree  with rocephin and azithromycin which I have reordered since order from ED did not continue. Also added vancomycin given gram + cocci in blood, can deescalate antibiotics based on speciation of cultures, added sputum culture. Nutrition: per primary team  Replace electrolytes  HOB >=30 degree, aggressive pulmonary toileting, incentive spirometry, PT/OT eval and treat  GI Prophylaxis: per primary team    DVT Prophylaxis: not currently on DVT prophylaxis - recommend starting subq heparin as high risk given likely underlying active malignancy and now infection. Smoking cessation recommended.   Will follow       Subjective/History:     Shari Wells is a 71 y.o. female with PMHx significant for RLL lung mass, tobacco use, anxiety who presented with several day history of increasing chest pain, cough and dyspnea. Denies fever or chills. Decreased appetite and weight loss noted over the last 2 weeks. Has history of RLL lung mass that has been followed for over 1 year and is progressive, and PET positive. Offered biopsy by her oncologist in April 2022 but did not pursue. CTA today showed extension of pulmonary mass, and hilar subcarinal LAD with likely post-obstructive PNA versus extension of likely maligancy. Also note lytic lesions on ribs and T4 transverse process. Pulmonary consulted for recommendations regarding biopsy. Upon extensive discussion with patient she does seem willing to pursue biopsy at this time but would like to go home and follow-up as an outpatient if possible. Patient does smoke daily about a 1/2 pack per day, has been smoking for the last 20 years.   Patient not requiring any supplemental O2.    1/22/2023    - Patient decided to remain in hospital for pneumonia treatment and facilitate biopsy  - Feeling much better this morning - less dyspnea and chest pain  - Notes improved appetite  - Note gram + cocci in groups in 2 sets of BC      Allergies   Allergen Reactions    Aspirin Nausea Only        Past Medical History:   Diagnosis Date    Angina at rest [facility])     Anxiety     CVA, old, facial weakness 2012    left ptosis    Dental caries     Osteoarthritis     Osteopenia     Right lower lobe lung mass 04/15/2022    Offered Biopsy and couldn't decide between PAlliative Care and Biopsy        Past Surgical History:   Procedure Laterality Date    HX WISDOM TEETH EXTRACTION          Family History   Problem Relation Age of Onset    Cancer Daughter         Unspecified Cancer    Cancer Cousin         Unspecified Cancer x2        Social History     Tobacco Use    Smoking status: Some Days     Packs/day: 0.50     Years: 15.00     Pack years: 7.50     Types: Cigarettes    Smokeless tobacco: Never Tobacco comments:     Patient reports that she is a Some Day Smoker x20 years (as of 2023). Substance Use Topics    Alcohol use: Yes     Comment: 8 drinks per week          Prior to Admission medications    Medication Sig Start Date End Date Taking? Authorizing Provider   ibuprofen (MOTRIN) 400 mg tablet Take 600 mg by mouth every six (6) hours as needed for Pain. Provider, Historical   acetaminophen (TYLENOL) 325 mg tablet Take 2 Tablets by mouth every six (6) hours as needed for Pain. 22   SKYE Clraos       Current Facility-Administered Medications   Medication Dose Route Frequency    cefTRIAXone (ROCEPHIN) 1 g in sterile water (preservative free) 10 mL IV syringe  1 g IntraVENous Q24H    azithromycin (ZITHROMAX) 500 mg in 0.9% sodium chloride 250 mL (VIAL-MATE)  500 mg IntraVENous Q24H    guaiFENesin ER (MUCINEX) tablet 600 mg  600 mg Oral Q12H    sodium chloride (NS) flush 5-40 mL  5-40 mL IntraVENous Q8H    sodium chloride (NS) flush 5-40 mL  5-40 mL IntraVENous PRN    acetaminophen (TYLENOL) tablet 650 mg  650 mg Oral Q6H PRN    Or    acetaminophen (TYLENOL) suppository 650 mg  650 mg Rectal Q6H PRN    polyethylene glycol (MIRALAX) packet 17 g  17 g Oral DAILY PRN    ondansetron (ZOFRAN ODT) tablet 4 mg  4 mg Oral Q8H PRN    Or    ondansetron (ZOFRAN) injection 4 mg  4 mg IntraVENous Q6H PRN    melatonin tablet 5 mg  5 mg Oral QHS PRN    naloxone (NARCAN) injection 0.4 mg  0.4 mg IntraVENous EVERY 2 MINUTES AS NEEDED    oxyCODONE-acetaminophen (PERCOCET) 5-325 mg per tablet 1 Tablet  1 Tablet Oral Q6H PRN    albuterol/ipratropium (DUONEB) neb solution  1 Dose Nebulization Q6H PRN         Objective:   Vital Signs:    Visit Vitals  /79 (BP 1 Location: Right upper arm, BP Patient Position: Semi fowlers; Lying)   Pulse 78   Temp 98.8 °F (37.1 °C)   Resp 18   SpO2 95%       O2 Device: None (Room air)       Temp (24hrs), Av °F (36.7 °C), Min:97.6 °F (36.4 °C), Max:98.8 °F (37.1 °C) Intake/Output:   Last shift:      No intake/output data recorded. Last 3 shifts: 01/20 1901 - 01/22 0700  In: 2650 [P.O.:2400; I.V.:250]  Out: -     Intake/Output Summary (Last 24 hours) at 1/22/2023 0855  Last data filed at 1/21/2023 1855  Gross per 24 hour   Intake 2410 ml   Output --   Net 2410 ml       Physical Exam:     General:  Appeared underweight, chronic ill, no distress, alert and oriented  Head:   Normocephalic, without obvious abnormality, atraumatic. Eye:   Conjunctivae/corneas clear. PERRLA, no scleral icterus, no pallor, no cyanosis  Nose:   Nares normal. Septum midline. Mucosa normal without erythema/exudate. No drainage/discharge. No sinus tenderness. Throat:  Lips, mucosa, and tongue normal. Teeth and gums normal. No tonsillar enlargement, no erythema, no exudates, no oral thrush  Neck:   Supple, symmetric, thyroid: no enlargement/tenderness/nodule, no JVD, no carotid bruit, no lymphadenopathy. Trachea midline  Back & spine: Symmetric, no curvature. Chest wall: Mild tenderness to lateral chest wall bilaterally  Lung:   Decreased breath sounds on right with some faint end-exp wheezing and rhonchi, left lung field normal.  Heart:   Regular rate & rhythm. S1 S2 present, no murmur, no gallop, no click, no rub  Abdomen:  Soft, NT, ND, +BS, no masses, no organomegaly  Extremities:  No pedal edema, no cyanosis, no clubbing  Pulses: 2+ and symmetric in DP  Lymphatic:  No cervical, supraclavicular and axillary palpable lymphadenopathy. Musculoskeletal: No joint swelling or tenderness.   Neurologic:  Grossly non focal.        Data:         Chemistry Recent Labs     01/22/23  0502 01/21/23  0239   GLU 80 112*    136   K 4.1 4.1    105   CO2 28 28   BUN 15 16   CREA 0.73 0.77   CA 9.2 9.7   MG  --  2.3   AGAP 3 3   BUCR 21* 21*   AP 91 91   TP 7.1 7.2   ALB 2.8* 3.2*   GLOB 4.3* 4.0   AGRAT 0.7* 0.8          Lactic Acid Lactic acid   Date Value Ref Range Status   11/07/2021 1.5 0.4 - 2.0 MMOL/L Final     No results for input(s): LAC in the last 72 hours. Liver Enzymes Protein, total   Date Value Ref Range Status   01/22/2023 7.1 6.4 - 8.2 g/dL Final     Albumin   Date Value Ref Range Status   01/22/2023 2.8 (L) 3.4 - 5.0 g/dL Final     Globulin   Date Value Ref Range Status   01/22/2023 4.3 (H) 2.0 - 4.0 g/dL Final     A-G Ratio   Date Value Ref Range Status   01/22/2023 0.7 (L) 0.8 - 1.7   Final     Alk. phosphatase   Date Value Ref Range Status   01/22/2023 91 45 - 117 U/L Final     Recent Labs     01/22/23  0502 01/21/23  0239   TP 7.1 7.2   ALB 2.8* 3.2*   GLOB 4.3* 4.0   AGRAT 0.7* 0.8   AP 91 91          CBC w/Diff Recent Labs     01/22/23  0502 01/21/23  0239   WBC 9.7 13.4*   RBC 4.31 3.94*   HGB 12.8 11.7*   HCT 35.5 32.4*   * 473*   GRANS 69 83*   LYMPH 18* 10*   EOS 2 0          Cardiac Enzymes No results found for: CPK, CK, CKMMB, CKMB, RCK3, CKMBT, CKNDX, CKND1, SHAHLA, TROPT, TROIQ, SERGEY, TROPT, TNIPOC, BNP, BNPP     BNP No results found for: BNP, BNPP, XBNPT     Coagulation Recent Labs     01/22/23  0502   PTP 13.5   INR 1.0           Thyroid  Lab Results   Component Value Date/Time    TSH 1.45 12/22/2021 12:47 PM          Lipid Panel No results found for: CHOL, CHOLPOCT, CHOLX, CHLST, CHOLV, 063005, HDL, HDLP, LDL, LDLC, DLDLP, 688209, VLDLC, VLDL, TGLX, TRIGL, TRIGP, TGLPOCT, CHHD, CHHDX     ABG No results for input(s): PHI, PHI, POC2, PCO2I, PO2, PO2I, HCO3, HCO3I, FIO2, FIO2I in the last 72 hours.      Urinalysis Lab Results   Component Value Date/Time    Color DARK YELLOW 11/06/2021 05:22 AM    Appearance CLOUDY 11/06/2021 05:22 AM    Specific gravity >1.030 (H) 11/06/2021 05:22 AM    pH (UA) 5.5 11/06/2021 05:22 AM    Protein 100 (A) 11/06/2021 05:22 AM    Glucose Negative 11/06/2021 05:22 AM    Ketone TRACE (A) 11/06/2021 05:22 AM    Bilirubin Negative 11/06/2021 05:22 AM    Urobilinogen 1.0 11/06/2021 05:22 AM    Nitrites Negative 11/06/2021 05:22 AM    Leukocyte Esterase LARGE (A) 11/06/2021 05:22 AM    Epithelial cells FEW 11/06/2021 05:22 AM    Bacteria 2+ (A) 11/06/2021 05:22 AM    WBC 25 to 30 11/06/2021 05:22 AM    RBC Negative 11/06/2021 05:22 AM        Micro  Recent Labs     01/21/23  0655 01/21/23 0645   CULT CULTURE IN PROGRESS,FURTHER UPDATES TO FOLLOW CULTURE IN PROGRESS,FURTHER UPDATES TO FOLLOW     Recent Labs     01/21/23  0655 01/21/23 0645   CULT CULTURE IN PROGRESS,FURTHER UPDATES TO FOLLOW CULTURE IN PROGRESS,FURTHER UPDATES TO FOLLOW        XR (Most Recent). CXR reviewed by me and compared with previous CXR Results from Hospital Encounter encounter on 01/21/23    XR CHEST PORT    Narrative  EXAM: XR CHEST PORT    CLINICAL INDICATION/HISTORY : Chest pain. COMPARISON: September 7, 2022    TECHNIQUE: 1 VIEWS    FINDINGS:  EKG leads and wires overlie the chest.  Right lower lung consolidation and small right pleural effusion. Graylon Charles Heart is normal in size. Lytic lesion and suspected pathological fracture lateral right rib 6..    Impression  1. Right lower lung consolidation. Small right pleural effusion. 2.  Lytic lesion with pathological fracture lateral right rib 6.  3.  CT has been performed at the time of interpretation-please refer to that  report. CT (Most Recent) Results from Hospital Encounter encounter on 01/21/23    CTA CHEST W OR W WO CONT    Narrative  EXAMINATION: CTA chest pulmonary    INDICATION: Chest pain    COMPARISON: CT 9/7/2022    TECHNIQUE: CT angiography of the pulmonary arteries with IV contrast and 3-D MIP  reformations. All CT scans at this facility are performed using dose  optimization technique as appropriate to a performed exam, to include automated  exposure control, adjustment of the mA and/or kV according to patient size  (including appropriate matching first site specific examinations), or use of  iterative reconstruction technique.     FINDINGS:    Pulmonary arteries: No pulmonary artery filling defect to suggest pulmonary  embolus. No specific evidence of right heart strain. Cardiovascular: Pulmonary arteries as above. Minimal burden of atherosclerotic  calcifications. Thoracic aorta normal in course and caliber. Heart size normal.    Mediastinum: Imaged thyroid unremarkable. Conglomerate appearing right hilar and  subcarinal suspected jamar mass, difficult to measure due to plaque-like  amorphous shape. Esophagus nondistended, and distal segment inseparable from  suspected jamar mass described above. Pleura: Small right pleural effusion. No pneumothorax. Lungs/airways: Essentially complete consolidation of the right middle lobe, and  extensive confluent consolidation in the right lower lobe. Additional patchy  peripheral consolidation in the right lower lobe, and peripheral right upper  lobe nodule measuring 0.9 x 0.6 cm. Biapical subpleural nodularity may represent  scarring. Upper abdomen: No acute findings. Miscellaneous: Superficial soft tissues unremarkable. Bones: Lytic foci with associated pathologic fractures at right lateral sixth  rib and left posterior lateral sixth rib. Left T4 transverse process lytic  lesion    Impression  Essentially complete consolidation of the right middle lobe, and extensive  consolidation in the right lower lobe, may represent a combination of tumor  involvement or postobstructive atelectasis versus pneumonia. Additional patchy  areas of consolidation in the right lung as above may be  infectious/inflammatory.  -Small right pleural effusion, possibly parapneumonic or malignant.  -Suspected conglomerate right hilar and subcarinal adenopathy, grossly increased  since prior.  -Findings consistent with tumor worsening including increasing metastatic  adenopathy. No evidence of pulmonary embolus or right heart strain. Rib lytic metastases with pathologic fractures, and T4 left transverse process  lytic metastasis, new since prior. See additional details above. EKG No results found for this or any previous visit. ECHO No results found for this or any previous visit. PFT No flowsheet data found. Other ASA reactivity:   Pre-albumin:   Ionized Calcium:   NH4:   T3, FT4:  Cortisol:  Urine Osm:  Urine Lytes:   HbA1c:      Recent Results (from the past 24 hour(s))   METABOLIC PANEL, COMPREHENSIVE    Collection Time: 01/22/23  5:02 AM   Result Value Ref Range    Sodium 138 136 - 145 mmol/L    Potassium 4.1 3.5 - 5.5 mmol/L    Chloride 107 100 - 111 mmol/L    CO2 28 21 - 32 mmol/L    Anion gap 3 3.0 - 18 mmol/L    Glucose 80 74 - 99 mg/dL    BUN 15 7.0 - 18 MG/DL    Creatinine 0.73 0.6 - 1.3 MG/DL    BUN/Creatinine ratio 21 (H) 12 - 20      eGFR >60 >60 ml/min/1.73m2    Calcium 9.2 8.5 - 10.1 MG/DL    Bilirubin, total 0.9 0.2 - 1.0 MG/DL    ALT (SGPT) 37 13 - 56 U/L    AST (SGOT) 23 10 - 38 U/L    Alk. phosphatase 91 45 - 117 U/L    Protein, total 7.1 6.4 - 8.2 g/dL    Albumin 2.8 (L) 3.4 - 5.0 g/dL    Globulin 4.3 (H) 2.0 - 4.0 g/dL    A-G Ratio 0.7 (L) 0.8 - 1.7     CBC WITH AUTOMATED DIFF    Collection Time: 01/22/23  5:02 AM   Result Value Ref Range    WBC 9.7 4.6 - 13.2 K/uL    RBC 4.31 4.20 - 5.30 M/uL    HGB 12.8 12.0 - 16.0 g/dL    HCT 35.5 35.0 - 45.0 %    MCV 82.4 78.0 - 100.0 FL    MCH 29.7 24.0 - 34.0 PG    MCHC 36.1 31.0 - 37.0 g/dL    RDW 13.4 11.6 - 14.5 %    PLATELET 674 (H) 542 - 420 K/uL    MPV 9.7 9.2 - 11.8 FL    NRBC 0.0 0  WBC    ABSOLUTE NRBC 0.00 0.00 - 0.01 K/uL    NEUTROPHILS 69 40 - 73 %    LYMPHOCYTES 18 (L) 21 - 52 %    MONOCYTES 9 3 - 10 %    EOSINOPHILS 2 0 - 5 %    BASOPHILS 1 0 - 2 %    IMMATURE GRANULOCYTES 0 0.0 - 0.5 %    ABS. NEUTROPHILS 6.7 1.8 - 8.0 K/UL    ABS. LYMPHOCYTES 1.8 0.9 - 3.6 K/UL    ABS. MONOCYTES 0.9 0.05 - 1.2 K/UL    ABS. EOSINOPHILS 0.2 0.0 - 0.4 K/UL    ABS. BASOPHILS 0.1 0.0 - 0.1 K/UL    ABS. IMM.  GRANS. 0.0 0.00 - 0.04 K/UL    DF AUTOMATED     PROTHROMBIN TIME + INR    Collection Time: 01/22/23 5:02 AM   Result Value Ref Range    Prothrombin time 13.5 11.5 - 15.2 sec    INR 1.0 0.8 - 1.2           Telemetry:    The patient is: [x] acutely ill Risk of deterioration: [x] moderate    [] critically ill  [] high     [x]See my orders for details    My assessment, plan of care, findings, medications, side effects etc were discussed with:  [] Nurse [] PT/OT    [] Respiratory therapy [x] Dr. Charles Short   [] Family: answered all questions to satisfaction [x] Patient: answered all questions to satisfaction   [] Pharmacist []      [x] Total critical care time exclusive of procedures 30 minutes with complex decision making, coordination of care and counseling patient performed and > 50% time spent in face to face evaluation.       Kwame Diaz MD  1/22/2023

## 2023-01-22 NOTE — ED NOTES
TRANSFER - OUT REPORT:    Verbal report given to [facility] AT DEYANIRA PACKER, RN(name) on Ian Nichole  being transferred to [facility](unit) for routine progression of care       Report consisted of patients Situation, Background, Assessment and   Recommendations(SBAR). Information from the following report(s) SBAR, Kardex, ED Summary, MAR, Recent Results, and Cardiac Rhythm NSR  was reviewed with the receiving nurse. Lines:   Peripheral IV 01/21/23 Right Antecubital (Active)   Site Assessment Clean, dry, & intact 01/21/23 0239   Phlebitis Assessment 0 01/21/23 0239   Infiltration Assessment 0 01/21/23 0239   Dressing Status Clean, dry, & intact 01/21/23 0239        Opportunity for questions and clarification was provided.       Patient transported with:   [employer]

## 2023-01-22 NOTE — ED NOTES
Bedside shift report rec'd from [place], [address] [place] [address]. Pt is awake and alert resting on gurney. No acute distress noted at this time.

## 2023-01-23 ENCOUNTER — APPOINTMENT (OUTPATIENT)
Dept: CT IMAGING | Age: 73
DRG: 343 | End: 2023-01-23
Attending: STUDENT IN AN ORGANIZED HEALTH CARE EDUCATION/TRAINING PROGRAM
Payer: MEDICAID

## 2023-01-23 ENCOUNTER — APPOINTMENT (OUTPATIENT)
Dept: NON INVASIVE DIAGNOSTICS | Age: 73
DRG: 343 | End: 2023-01-23
Attending: INTERNAL MEDICINE
Payer: MEDICAID

## 2023-01-23 LAB
ANION GAP SERPL CALC-SCNC: 3 MMOL/L (ref 3–18)
BUN SERPL-MCNC: 9 MG/DL (ref 7–18)
BUN/CREAT SERPL: 13 (ref 12–20)
CALCIUM SERPL-MCNC: 8.8 MG/DL (ref 8.5–10.1)
CHLORIDE SERPL-SCNC: 107 MMOL/L (ref 100–111)
CO2 SERPL-SCNC: 27 MMOL/L (ref 21–32)
CREAT SERPL-MCNC: 0.71 MG/DL (ref 0.6–1.3)
ECHO AO ASC DIAM: 3.6 CM
ECHO AO ASCENDING AORTA INDEX: 2.12 CM/M2
ECHO AO ROOT DIAM: 3.3 CM
ECHO AO ROOT INDEX: 1.94 CM/M2
ECHO AV AREA PEAK VELOCITY: 2 CM2
ECHO AV AREA VTI: 2.3 CM2
ECHO AV AREA/BSA PEAK VELOCITY: 1.2 CM2/M2
ECHO AV AREA/BSA VTI: 1.4 CM2/M2
ECHO AV MEAN GRADIENT: 6 MMHG
ECHO AV MEAN VELOCITY: 1.2 M/S
ECHO AV PEAK GRADIENT: 12 MMHG
ECHO AV PEAK VELOCITY: 1.7 M/S
ECHO AV VELOCITY RATIO: 0.65
ECHO AV VTI: 30.1 CM
ECHO EST RA PRESSURE: 3 MMHG
ECHO LA VOL 2C: 25 ML (ref 22–52)
ECHO LA VOL 4C: 38 ML (ref 22–52)
ECHO LA VOLUME AREA LENGTH: 34 ML
ECHO LA VOLUME INDEX A2C: 15 ML/M2 (ref 16–34)
ECHO LA VOLUME INDEX A4C: 22 ML/M2 (ref 16–34)
ECHO LA VOLUME INDEX AREA LENGTH: 20 ML/M2 (ref 16–34)
ECHO LV E' LATERAL VELOCITY: 5 CM/S
ECHO LV E' SEPTAL VELOCITY: 5 CM/S
ECHO LV FRACTIONAL SHORTENING: 29 % (ref 28–44)
ECHO LV INTERNAL DIMENSION DIASTOLE INDEX: 2.24 CM/M2
ECHO LV INTERNAL DIMENSION DIASTOLIC: 3.8 CM (ref 3.9–5.3)
ECHO LV INTERNAL DIMENSION SYSTOLIC INDEX: 1.59 CM/M2
ECHO LV INTERNAL DIMENSION SYSTOLIC: 2.7 CM
ECHO LV IVSD: 1.3 CM (ref 0.6–0.9)
ECHO LV MASS 2D: 134.7 G (ref 67–162)
ECHO LV MASS INDEX 2D: 79.2 G/M2 (ref 43–95)
ECHO LV POSTERIOR WALL DIASTOLIC: 0.9 CM (ref 0.6–0.9)
ECHO LV RELATIVE WALL THICKNESS RATIO: 0.47
ECHO LVOT AREA: 3.1 CM2
ECHO LVOT AV VTI INDEX: 0.74
ECHO LVOT DIAM: 2 CM
ECHO LVOT MEAN GRADIENT: 3 MMHG
ECHO LVOT PEAK GRADIENT: 5 MMHG
ECHO LVOT PEAK VELOCITY: 1.1 M/S
ECHO LVOT STROKE VOLUME INDEX: 41.4 ML/M2
ECHO LVOT SV: 70.3 ML
ECHO LVOT VTI: 22.4 CM
ECHO MV A VELOCITY: 0.99 M/S
ECHO MV E DECELERATION TIME (DT): 257.7 MS
ECHO MV E VELOCITY: 0.6 M/S
ECHO MV E/A RATIO: 0.61
ECHO MV E/E' LATERAL: 12
ECHO MV E/E' RATIO (AVERAGED): 12
ECHO MV E/E' SEPTAL: 12
ECHO RIGHT VENTRICULAR SYSTOLIC PRESSURE (RVSP): 29 MMHG
ECHO RV FREE WALL PEAK S': 19 CM/S
ECHO RV TAPSE: 2.4 CM (ref 1.7–?)
ECHO TV REGURGITANT MAX VELOCITY: 2.57 M/S
ECHO TV REGURGITANT PEAK GRADIENT: 26 MMHG
GLUCOSE SERPL-MCNC: 83 MG/DL (ref 74–99)
POTASSIUM SERPL-SCNC: 3.9 MMOL/L (ref 3.5–5.5)
SODIUM SERPL-SCNC: 137 MMOL/L (ref 136–145)

## 2023-01-23 PROCEDURE — 74011000250 HC RX REV CODE- 250: Performed by: INTERNAL MEDICINE

## 2023-01-23 PROCEDURE — 74011250637 HC RX REV CODE- 250/637: Performed by: INTERNAL MEDICINE

## 2023-01-23 PROCEDURE — 93306 TTE W/DOPPLER COMPLETE: CPT

## 2023-01-23 PROCEDURE — 99232 SBSQ HOSP IP/OBS MODERATE 35: CPT | Performed by: INTERNAL MEDICINE

## 2023-01-23 PROCEDURE — 74011000636 HC RX REV CODE- 636: Performed by: INTERNAL MEDICINE

## 2023-01-23 PROCEDURE — 36415 COLL VENOUS BLD VENIPUNCTURE: CPT

## 2023-01-23 PROCEDURE — 74011250637 HC RX REV CODE- 250/637: Performed by: HOSPITALIST

## 2023-01-23 PROCEDURE — 87040 BLOOD CULTURE FOR BACTERIA: CPT

## 2023-01-23 PROCEDURE — 74011250636 HC RX REV CODE- 250/636: Performed by: INTERNAL MEDICINE

## 2023-01-23 PROCEDURE — 74011000258 HC RX REV CODE- 258: Performed by: INTERNAL MEDICINE

## 2023-01-23 PROCEDURE — 80048 BASIC METABOLIC PNL TOTAL CA: CPT

## 2023-01-23 PROCEDURE — 74177 CT ABD & PELVIS W/CONTRAST: CPT

## 2023-01-23 PROCEDURE — 65270000046 HC RM TELEMETRY

## 2023-01-23 RX ORDER — VANCOMYCIN HYDROCHLORIDE
1250
Status: DISCONTINUED | OUTPATIENT
Start: 2023-01-23 | End: 2023-01-24

## 2023-01-23 RX ADMIN — SODIUM CHLORIDE, PRESERVATIVE FREE 10 ML: 5 INJECTION INTRAVENOUS at 21:04

## 2023-01-23 RX ADMIN — OXYCODONE HYDROCHLORIDE AND ACETAMINOPHEN 1 TABLET: 5; 325 TABLET ORAL at 21:04

## 2023-01-23 RX ADMIN — SODIUM CHLORIDE, PRESERVATIVE FREE 5 ML: 5 INJECTION INTRAVENOUS at 06:00

## 2023-01-23 RX ADMIN — IOPAMIDOL 100 ML: 755 INJECTION, SOLUTION INTRAVENOUS at 22:05

## 2023-01-23 RX ADMIN — PIPERACILLIN AND TAZOBACTAM 3.38 G: 3; .375 INJECTION, POWDER, FOR SOLUTION INTRAVENOUS at 12:32

## 2023-01-23 RX ADMIN — HEPARIN SODIUM 5000 UNITS: 5000 INJECTION INTRAVENOUS; SUBCUTANEOUS at 01:06

## 2023-01-23 RX ADMIN — PIPERACILLIN AND TAZOBACTAM 3.38 G: 3; .375 INJECTION, POWDER, FOR SOLUTION INTRAVENOUS at 05:33

## 2023-01-23 RX ADMIN — GUAIFENESIN 600 MG: 600 TABLET, EXTENDED RELEASE ORAL at 09:24

## 2023-01-23 RX ADMIN — VANCOMYCIN HYDROCHLORIDE 1250 MG: 10 INJECTION, POWDER, LYOPHILIZED, FOR SOLUTION INTRAVENOUS at 12:32

## 2023-01-23 RX ADMIN — HEPARIN SODIUM 5000 UNITS: 5000 INJECTION INTRAVENOUS; SUBCUTANEOUS at 09:24

## 2023-01-23 RX ADMIN — OXYCODONE HYDROCHLORIDE AND ACETAMINOPHEN 1 TABLET: 5; 325 TABLET ORAL at 10:44

## 2023-01-23 RX ADMIN — GUAIFENESIN 600 MG: 600 TABLET, EXTENDED RELEASE ORAL at 21:04

## 2023-01-23 RX ADMIN — HEPARIN SODIUM 5000 UNITS: 5000 INJECTION INTRAVENOUS; SUBCUTANEOUS at 18:00

## 2023-01-23 RX ADMIN — PIPERACILLIN AND TAZOBACTAM 3.38 G: 3; .375 INJECTION, POWDER, FOR SOLUTION INTRAVENOUS at 21:04

## 2023-01-23 RX ADMIN — SODIUM CHLORIDE, PRESERVATIVE FREE 10 ML: 5 INJECTION INTRAVENOUS at 14:00

## 2023-01-23 NOTE — PROGRESS NOTES
[facility]  (A Division of [address] [place] [address])      Consultation Note      Date of Admission: 1/21/2023    Date of Note: 1/23/2023      Reason for Referral: GPC sepsis, pneumonia  Referring Physician: Dr. Aminah Ng from this admission:   1/21 blood culture: 3 out of 4 gram-positive cocci in groups  1/21 respiratory viral panel: Negative    Current Antimicrobials:    Prior Antimicrobials:  Azithromycin 1/21 to present  Ceftriaxone 1/21 to present  Vancomycin 1/22 to present        Assessment:         Staph species sepsis suspect from a pulmonary source:   Postobstructive pneumonia versus enlarging malignancy: in the right middle and lower lobes. Right lower lobe pulmonary mass with hilar and subcarinal LAD as well as  metastasis to the ribs on the right   -1/21 CTA chest with complete consolidation of the right middle lobe and right lower lobe suspicious for tumor plus or minus pneumonia. Small right pleural effusion. 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 Advised her that she is continue to be on antibiotics for about 1 more week for her pneumonia and staph sepsis. Rei Marte DO  [facility]  [address], [address] [facility] [address]  Office: [phone], Ext [phone]  Mobile/Text: [phone]    Lines / Catheters:  Peripheral    Subjective:   Seen and examined at bedside. Nursing is also present. Patient is out of bed to chair. Very anxious to go home and tells me she has things to do and people to take care of.   I talked with her more about her positive blood cultures and how this may take another day or 2 to resolve. She still not sure whether or not she wants to have a biopsy of her lung mass. She states that she understands that it may be risky. No other new complaints. No fevers. HPI:  Silvana Monson is a 71 y.o. female with PMHx significant for RLL lung mass, tobacco use, anxiety who presented with several day history of increasing chest pain, cough and dyspnea. Denies fever or chills. Decreased appetite and weight loss noted over the last 2 weeks. Has history of RLL lung mass that has been followed for over 1 year and is progressive, and PET positive. From my understanding she has not pursued biopsy or treatment for her pulmonary mass although its been known to be present for greater than 1 year. She has been getting progressively more short of breath with an intermittent cough which is what prompted her to come to the emergency department. She has been afebrile. Initial WBCs were 13.4 and have since normalized. She was started on empiric ceftriaxone and azithromycin due to concern for right-sided pneumonia. Blood cultures from  are now positive and 3 out of 4 bottles and she has been started on vancomycin while final cultures and sensitivities are pending. Objective:      Visit Vitals  BP (!) 157/75 (BP 1 Location: Right upper arm, BP Patient Position: Sitting)   Pulse 72   Temp 98.9 °F (37.2 °C)   Resp 18   Ht 5' 1\" (1.549 m)   Wt 71.2 kg (157 lb)   SpO2 95%   BMI 29.66 kg/m²     Temp (24hrs), Av.5 °F (36.9 °C), Min:97.9 °F (36.6 °C), Max:99.3 °F (37.4 °C)        General:   awake alert and oriented, older than states age   Skin:   no rashes or skin lesions noted on limited exam   HEENT:  Normocephalic, atraumatic, PERRL, EOMI, no scleral icterus or pallor; no conjunctival hemmohage;  nasal and oral mucous are moist and without evidence of lesions. No thrush. Dentition good. Neck supple, no bruits.    Lymph Nodes:   no cervical, axillary or inguinal adenopathy   Lungs:   non-labored, coarse breath sounds, no wheezes   Heart:  RRR, s1 and s2; no murmurs rubs or gallops, no edema, + pedal pulses   Abdomen:  soft, non-distended, active bowel sounds, no hepatomegaly, no splenomegaly. Non-tender   Genitourinary:  deferred   Extremities:   no clubbing, cyanosis; no joint effusions or swelling; Full ROM of all large joints to the upper and lower extremities; muscle mass appropriate for age   Neurologic:  No gross focal sensory abnormalities; 5/5 muscle strength to upper and lower extremities. Speech appropirate. Cranial nerves intact   Psychiatric:   appropriate and interactive.        Labs: Results:   Chemistry Recent Labs     01/23/23  0242 01/22/23  0502 01/21/23 0239   GLU 83 80 112*    138 136   K 3.9 4.1 4.1    107 105   CO2 27 28 28   BUN 9 15 16   CREA 0.71 0.73 0.77   CA 8.8 9.2 9.7   AGAP 3 3 3   BUCR 13 21* 21*   AP  --  91 91   TP  --  7.1 7.2   ALB  --  2.8* 3.2*   GLOB  --  4.3* 4.0   AGRAT  --  0.7* 0.8      CBC w/Diff Recent Labs     01/22/23  0502 01/21/23 0239   WBC 9.7 13.4*   RBC 4.31 3.94*   HGB 12.8 11.7*   HCT 35.5 32.4*   * 473*   GRANS 69 83*   LYMPH 18* 10*   EOS 2 0            No results found for: [facility] Lab Results   Component Value Date/Time    Culture result: (A) 01/21/2023 06:55 AM     Gram positive cocci IN CLUSTERS GROWING IN 1 OF 2 BOTTLES DRAWN NO SITE    Culture result: (A) 01/21/2023 06:45 AM     PROBABLE STAPHYLOCOCCUS SPECIES, COAGULASE NEGATIVE GROWING IN BOTH BOTTLES DRAWN NO SITE    Culture result: MIXED UROGENITAL DOUGIE ISOLATED 11/06/2021 04:22 AM    Culture result: NO GROWTH 6 DAYS 11/06/2021 01:21 AM    Culture result: NO GROWTH 6 DAYS 11/06/2021 01:21 AM        Results       Procedure Component Value Units Date/Time    CULTURE, BLOOD [[id number]]     Order Status: Sent Specimen: Blood     CULTURE, BLOOD [[id number]]     Order Status: Sent Specimen: Blood     CULTURE, RESPIRATORY/SPUTUM/BRONCH W GRAM STAIN [[id number]]     Order Status: Sent Specimen: Sputum     RESPIRATORY VIRUS PANEL W/COVID-19, PCR [[id number]] Collected: 01/21/23 0700    Order Status: Completed Specimen: Nasopharyngeal Updated: 01/21/23 0817     Adenovirus Not detected        Coronavirus 229E Not detected        Coronavirus HKU1 Not detected        Coronavirus CVNL63 Not detected        Coronavirus OC43 Not detected        SARS-CoV-2, PCR Not detected        Metapneumovirus Not detected        Rhinovirus and Enterovirus Not detected        Influenza A Not detected        Influenza B Not detected        Parainfluenza 1 Not detected        Parainfluenza 2 Not detected        Parainfluenza 3 Not detected        Parainfluenza virus 4 Not detected        RSV by PCR Not detected        B. parapertussis, PCR Not detected        Bordetella pertussis - PCR Not detected        Chlamydophila pneumoniae DNA, QL, PCR Not detected        Mycoplasma pneumoniae DNA, QL, PCR Not detected       CULTURE, BLOOD [[id number]]  (Abnormal) Collected: 01/21/23 0655    Order Status: Completed Specimen: Blood Updated: 01/22/23 1547     Special Requests: NO SPECIAL REQUESTS        GRAM STAIN       ANAEROBIC BOTTLE Gram positive cocci IN GROUPS                  SMEAR CALLED TO AND CORRECTLY REPEATED BY: YOLANDA HOANG [facility] RN 3N [id number] 1/22/23 BY VMB           Culture result:       Gram positive cocci IN CLUSTERS GROWING IN 1 OF 2 BOTTLES DRAWN NO SITE          CULTURE, RESPIRATORY/SPUTUM/BRONCH Avelar Must [[id number]]     Order Status: Sent Specimen: Sputum     CULTURE, BLOOD [[id number]]  (Abnormal) Collected: 01/21/23 0645    Order Status: Completed Specimen: Blood Updated: 01/22/23 1912     Special Requests: NO SPECIAL REQUESTS        GRAM STAIN       AEROBIC AND ANAEROBIC BOTTLES Gram positive cocci IN GROUPS                  SMEAR CALLED TO AND CORRECTLY REPEATED BY: YOLANDA HOANG [facility] RN [id number] 1/22/23 BY VMB           Culture result:       PROBABLE STAPHYLOCOCCUS SPECIES, COAGULASE NEGATIVE GROWING IN BOTH BOTTLES DRAWN NO SITE          BLOOD CULTURE ID PANEL [[id number]]  (Abnormal) Collected: 01/21/23 0645    Order Status: Completed Specimen: Blood Updated: 01/22/23 2006     Acc. no. from [id number]     Enterococcus faecalis Not detected        Enterococcus faecium Not detected        Listeria monocytogenes Not detected        Staphylococcus Detected        Staphylococcus aureus Not detected        Staph epidermidis Detected        Staph lugdunensis Not detected        Streptococcus Not detected        Streptococcus agalactiae (Group B) Not detected        Streptococcus pneumoniae Not detected        Streptococcus pyogenes (Group A) Not detected        Acinetobacter calcoaceticus-baumanii complex Not detected        Bacteroides fragilis Not detected        Enterobacterales species Not detected        Enterobacter cloacae complex Not detected        Escherichia coli Not detected        Klebsiella aerogenes Not detected        Klebsiella oxytoca Not detected        Klebsiella pneumoniae group Not detected        Proteus Not detected        Salmonella Not detected        Serratia marcescens Not detected        Haemophilus influenzae Not detected        Neisseria meningitidis Not detected        Pseudomonas aeruginosa Not detected        Steno maltophilia Not detected        Candida albicans Not detected        Candida auris Not detected        Candida glabrata Not detected        Candida krusei Not detected        Candida parapsilosis Not detected        Candida tropicalis Not detected        Crypto neoformans/gattii Not detected        RESISTANT GENES:            MECA/C (Methicillin resistant gene) Not detected        Comment       False positive results may rarely occur. Correlate with clinical,epidemiologic, and other laboratory findings           Comment: Please see BCID Interpretation Guide in EPIC Links                 Imaging:      All imaging reviewed from Admission to present as per radiology interpretation in [address] [facility]

## 2023-01-23 NOTE — PROGRESS NOTES
Comprehensive Nutrition Assessment    Type and Reason for Visit: Initial, Positive nutrition screen    Nutrition Recommendations/Plan:   Continue  Ensure Enlive (each provides 350 kcal, 20g protein) TID  Continue current diet as tolerated. Monitor PO intake, compliance with oral supplement, weight, and POC while admitted. Will place for current wt to be measured. Malnutrition Assessment:  Malnutrition Status: At risk for malnutrition (specify) (r/t suspected wt loss PTA; poor appetite PTA (recently improving); suspected lung cancer dx.) (01/23/23 1422)      Nutrition History and Allergies:   PMHx of right lower lobe mass (with need for biopsy), chronic smoker (quit x2 weeks), osteopenia, osteoarthritis. Pt came in c/o chest pain. CT findings consisted with tumor worsening including increasing metastatic adenopathy. Admitted for suspected right primary lung cancer with metastases to bone and possibly post-obstructive (community-acquired) PNA. Nutrition Assessment:    Received MST for pt- unsure of amount lost, but denied decreased appetite. Per H&P, pt states that she has been losing wt for the past 2 weeks (was informed by friends) and reported decreased appetite. Differing answers in H&P and MST. Wt hx review: c wt- 127 lb (bed scale); 7/27/22- 130 lb (-2.3%); 12/22/21- 141 lb (-9.9%). Insignificant wt loss documented in chart, though unable to assess more recent hx. PO documentation shows meal intake >76% since admission. Visited pt- asleep in room, but easily awoken by verbal stimuli. Pt endorsed decreased appetite PTA, though unable to give time frame. Combination of skipping meals/not finishing meals. Endorsed improvement in appetite since admission, ate all of lunch- noted jello saved for snack and unopened Ensure. Pt willing to try Ensure. Obtained bedscale wt for pt- 127 lb, pt seemed surprised. Claimed usual wt 135-140 lb. Will place order for standing scale wt.     Nutrition Related Findings: Last BM 1/23. Labs reviewed- WNL. Pertinent meds reviewed. Wound Type: None     Current Nutrition Intake & Therapies:  Average Meal Intake: % (since admission)  Average Supplement Intake: Unable to assess (not yet tried)  ADULT DIET Regular  ADULT ORAL NUTRITION SUPPLEMENT Breakfast, Lunch, Dinner; Standard High Calorie/High Protein    Anthropometric Measures:  Height: 5' 1\" (154.9 cm)  Ideal Body Weight (IBW): 105 lbs (48 kg)  Admission Body Weight: 157 lb (no source specified)  Current Body Wt:  57.6 kg (127 lb), 121 % IBW. Bed scale  Current BMI (kg/m2): 24  Usual Body Weight: 59 kg (130 lb) (7/27/22)  % Weight Change (Calculated): -2.3  Weight Adjustment: No adjustment    BMI Category: Normal weight (BMI 22.0-24.9) age over 72    Estimated Daily Nutrient Needs:  Energy Requirements Based On: Formula (MSJ x 1.1-1.3)  Weight Used for Energy Requirements: Current  Energy (kcal/day): 1132 - 1338  Weight Used for Protein Requirements: Current (1-1.2)  Protein (g/day): 58 - 69  Method Used for Fluid Requirements: 1 ml/kcal  Fluid (ml/day): 6650 - 8539    Nutrition Diagnosis:   Predicted inadequate energy intake related to  (decreased appetite.) as evidenced by poor intake prior to admission (chest pain PTA. Suspected metastatic lung cancer dx.)     Nutrition Interventions:   Food and/or Nutrient Delivery: Continue current diet, Continue oral nutrition supplement  Nutrition Education/Counseling: No recommendations at this time, Education not indicated  Coordination of Nutrition Care: Continue to monitor while inpatient  Plan of Care discussed with: Pt    Goals:     Goals: Meet at least 75% of estimated needs, by next RD assessment       Nutrition Monitoring and Evaluation:      Food/Nutrient Intake Outcomes: Food and nutrient intake, Supplement intake  Physical Signs/Symptoms Outcomes: GI status, Meal time behavior, Weight    Discharge Planning:     Too soon to determine    Lauri Nicole RD  Contact: [phone]

## 2023-01-23 NOTE — PROGRESS NOTES
0710: Bedside shift change report given to Adrien Sinclair RN (oncoming nurse) by [employer] (offgoing nurse). Report included the following information SBAR, Kardex, Intake/Output, MAR, and Cardiac Rhythm SR .      1948: Bedside shift change report given to Tiburcio Nation RN (oncoming nurse) by Adrien Sinclair RN (offgoing nurse).  Report included the following information SBAR, Kardex, Intake/Output, MAR, and Cardiac Rhythm SR .

## 2023-01-23 NOTE — PROGRESS NOTES
[address] [facility] Pharmacokinetic Monitoring Service - Vancomycin    Indication: bacteremia  Goal AUC/ADRIEN: 400-600 mg*hr/L  Day of Therapy: 2  Additional Antimicrobials: pip-tazo    Pertinent Laboratory Values: Wt Readings from Last 1 Encounters:   01/23/23 71.2 kg (157 lb)     Temp Readings from Last 1 Encounters:   01/23/23 97.9 °F (36.6 °C)     No components found for: PROCAL  Estimated Creatinine Clearance: 65.6 mL/min (based on SCr of 0.71 mg/dL). Recent Labs     01/22/23  0502 01/21/23  0239   WBC 9.7 13.4*     Pertinent Cultures:  Date Source Results   1/21 blood GPC in 2/2   MRSA Nasal Swab: N/A. Non-respiratory infection.     Assessment:  Date Current Dose Concentration (mg/L) Timing of Concentration (h) AUC   1/22 1,500 mg x1  750 mg q12h - - -   1/23 750 mg q12h  1,250 mg q18h - - -   Note: Serum concentrations collected for AUC dosing may appear elevated if collected in close proximity to the dose administered, this is not necessarily an indication of toxicity    Plan:  Increase dose from 750 mg q12h to 1,250 mg q18h  Ordered a level for 1/24 with AM labs  Pharmacy will continue to monitor patient and adjust therapy as indicated    Thank you for the consult,  EMMA Arana  1/23/2023

## 2023-01-23 NOTE — PROGRESS NOTES
Patient removed telemetry monitor herself to get washed up this morning. Re educated patient that heart monitor is ordered and we must keep it on or notify RN if removing it.

## 2023-01-23 NOTE — PROGRESS NOTES
[facility]  Pulmonary, Critical Care, and Sleep Medicine    Name: Albert Marshall MRN: [id number]   : 1950 Hospital: [facility]   Date: 2023        Pulmonary Consult Progress Note                                              Consult requesting physician: Dr. Lori Galindo  Reason for Consult: Lung mass    IMPRESSION:   Large RLL pulmonary mass with right hilar and subcarinal LAD now with lytic lesions in right lateral 6th rib, L posterior lateral 6th rib and T4 left transverse process. The pulmonary mass and subcarinal LN were both PET avid a year ago and have progressed all pointing to a malignancy likely primary lung. Post-obstructive pneumonia versus extension of malignancy RML/RLL. WBC elevated pointing to acute infectious process. No O2 requirement. Tobacco use  Anxiety disorder  Osteoarthritis  Gram + cocci in BC x 2- ID consulted, Probable Coag negative staph, does not appear toxic, afebrile        RECOMMENDATIONS:   IR Consult place today to see if lytic rib lesions can be biopsied as that would be safer than EBUS in the context of active pneumonia and preferred for staging. Focus on pulmonary hygiene  Maintain aspiration precautions  SpO2 goals 92-95%- AVOID OVER OXYGENATION    EBUS can also be considered as an option if not able to obtain biopsy of rib lesions, prefer to wait until pneumonia has at least improved and bacteremia resolved. Defer anti-infective therapy to ID  Nutrition: per primary team  Replace electrolytes as needed     GI Prophylaxis: per primary team    DVT Prophylaxis: not currently on DVT prophylaxis - recommend starting subq heparin as high risk given likely underlying active malignancy and now infection. Smoking cessation recommended.   Palliative Care Evaluation and discussion of goals or care   Will follow       Subjective/History:     Albert Marshall is a 71 y.o. female with PMHx significant for RLL lung mass, tobacco use, anxiety who presented with several day history of increasing chest pain, cough and dyspnea. Denies fever or chills. Decreased appetite and weight loss noted over the last 2 weeks. Has history of RLL lung mass that has been followed for over 1 year and is progressive, and PET positive. Offered biopsy by her oncologist in April 2022 but did not pursue. CTA today showed extension of pulmonary mass, and hilar subcarinal LAD with likely post-obstructive PNA versus extension of likely maligancy. Also note lytic lesions on ribs and T4 transverse process. Pulmonary consulted for recommendations regarding biopsy. Upon extensive discussion with patient she does seem willing to pursue biopsy at this time but would like to go home and follow-up as an outpatient if possible. Patient does smoke daily about a 1/2 pack per day, has been smoking for the last 20 years. Patient not requiring any supplemental O2.     Interval Evaluation  1/22/2023  LOS: 2    Patient resting in bed- no distress  She was able to tell me that she has a mass in her chest and that she was being treated for pneumonia  No Hemoptysis  Intermittent back discomfort- no chest pain  Denies any difficulties with eating- No nausea or emesis  She is limited in her walkign  Afebrile  ROS per HPI- other systems negative    Patient Vitals for the past 24 hrs:   Temp Pulse Resp BP SpO2   01/23/23 1212 98.9 °F (37.2 °C) 72 18 (!) 157/75 95 %   01/23/23 0918 98.1 °F (36.7 °C) 76 18 128/80 91 %   01/23/23 0750 -- -- -- (!) 152/94 --   01/23/23 0400 -- 78 -- -- --   01/23/23 0328 97.9 °F (36.6 °C) 72 18 (!) 152/94 95 %   01/23/23 0037 98.5 °F (36.9 °C) -- 18 129/79 100 %   01/23/23 0000 -- 71 -- -- --   01/22/23 2358 -- 68 17 121/75 93 %   01/22/23 2000 -- 74 -- -- --   01/22/23 1952 99.3 °F (37.4 °C) 87 18 124/84 96 %   01/22/23 1524 98.4 °F (36.9 °C) 75 18 130/75 95 %     Inpat Anti-Infectives (From admission, onward)       Start     Ordered Stop    01/23/23 1200 vancomycin (VANCOCIN) 1250 mg in  ml infusion  1,250 mg,   IntraVENous,   EVERY 18 HOURS        See Hyperspace for full Linked Orders Report. 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 ptosis    Dental caries     Osteoarthritis     Osteopenia     Right lower lobe lung mass 04/15/2022    Offered Biopsy and couldn't decide between PAlliative Care and Biopsy        Past Surgical History:   Procedure Laterality Date    HX WISDOM TEETH EXTRACTION          Family History   Problem Relation Age of Onset    Cancer Daughter         Unspecified Cancer    Cancer Cousin         Unspecified Cancer x2        Social History     Tobacco Use    Smoking status: Some Days     Packs/day: 0.50     Years: 15.00     Pack years: 7.50     Types: Cigarettes    Smokeless tobacco: Never    Tobacco comments:     Patient reports that she is a Some Day Smoker x20 years (as of 1/21/2023). Substance Use Topics    Alcohol use: Yes     Comment: 8 drinks per week          Prior to Admission medications    Medication Sig Start Date End Date Taking? Authorizing Provider   ibuprofen (MOTRIN) 400 mg tablet Take 600 mg by mouth every six (6) hours as needed for Pain. Provider, Historical   acetaminophen (TYLENOL) 325 mg tablet Take 2 Tablets by mouth every six (6) hours as needed for Pain.  9/7/22   SKYE Hendrix       Current Facility-Administered Medications   Medication Dose Route Frequency    vancomycin (VANCOCIN) 1250 mg in  ml infusion  1,250 mg IntraVENous Q18H    heparin (porcine) injection 5,000 Units  5,000 Units SubCUTAneous Q8H    piperacillin-tazobactam (ZOSYN) 3.375 g in 0.9% sodium chloride (MBP/ADV) 100 mL MBP  3.375 g IntraVENous Q8H    guaiFENesin ER (MUCINEX) tablet 600 mg  600 mg Oral Q12H    sodium chloride (NS) flush 5-40 mL  5-40 mL IntraVENous Q8H    sodium chloride (NS) flush 5-40 mL  5-40 mL IntraVENous PRN    acetaminophen (TYLENOL) tablet 650 mg  650 mg Oral Q6H PRN    Or    acetaminophen (TYLENOL) suppository 650 mg  650 mg Rectal Q6H PRN    polyethylene glycol (MIRALAX) packet 17 g  17 g Oral DAILY PRN    ondansetron (ZOFRAN ODT) tablet 4 mg  4 mg Oral Q8H PRN    Or    ondansetron (ZOFRAN) injection 4 mg  4 mg IntraVENous Q6H PRN    melatonin tablet 5 mg  5 mg Oral QHS PRN    naloxone (NARCAN) injection 0.4 mg  0.4 mg IntraVENous EVERY 2 MINUTES AS NEEDED    oxyCODONE-acetaminophen (PERCOCET) 5-325 mg per tablet 1 Tablet  1 Tablet Oral Q6H PRN    albuterol/ipratropium (DUONEB) neb solution  1 Dose Nebulization Q6H PRN         Objective:   Vital Signs:    Visit Vitals  BP (!) 157/75 (BP 1 Location: Right upper arm, BP Patient Position: Sitting)   Pulse 72   Temp 98.9 °F (37.2 °C)   Resp 18   Ht 5' 1\" (1.549 m)   Wt 71.2 kg (157 lb)   SpO2 95%   BMI 29.66 kg/m²       O2 Device: None (Room air)       Temp (24hrs), Av.5 °F (36.9 °C), Min:97.9 °F (36.6 °C), Max:99.3 °F (37.4 °C)       Intake/Output:   Last shift:      No intake/output data recorded. Last 3 shifts:  1901 -  0700  In: 840 [P.O.:840]  Out: -     Intake/Output Summary (Last 24 hours) at 2023 1256  Last data filed at 2023 1300  Gross per 24 hour   Intake 240 ml   Output --   Net 240 ml         Physical Exam:     General:  Appeared underweight, +chronicly ill, no distress, alert and oriented  Head:   Normocephalic, without obvious abnormality, atraumatic. Eye:   Conjunctivae/corneas clear. PERRLA, no scleral icterus, no pallor, no cyanosis  Nose:   Nares normal. Septum midline. Mucosa normal without erythema/exudate. No drainage/discharge. No sinus tenderness.   Throat:  Lips, mucosa, and tongue normal. Teeth and gums normal. No tonsillar enlargement, no erythema, no exudates, no oral thrush  Neck:   Supple, symmetric, thyroid: no enlargement/tenderness/nodule, no JVD,  no lymphadenopathy. Trachea midline  Back & spine: Symmetric, no curvature. Chest wall: Mild tenderness to lateral chest wall bilaterally  Lung:   Decreased breath sounds on right with some faint end-exp wheezing and rhonchi, left lung field normal.+ some wet and coarse breath sounds also noted  Heart:   Regular rate & rhythm. S1 S2 present, no murmur, no gallop, no click, no rub  Abdomen:  Soft, NT, ND, +BS, no masses, no organomegaly  Extremities:  No pedal edema, no cyanosis, no clubbing  Pulses: 2+ and symmetric in DP  Lymphatic:  No cervical, supraclavicular nodes palpated   Musculoskeletal: No joint swelling or tenderness. Neurologic:  Grossly non focal.+ generalized deconditioning        Data:         Chemistry Recent Labs     01/23/23  0242 01/22/23  0502 01/21/23  0239   GLU 83 80 112*    138 136   K 3.9 4.1 4.1    107 105   CO2 27 28 28   BUN 9 15 16   CREA 0.71 0.73 0.77   CA 8.8 9.2 9.7   MG  --   --  2.3   AGAP 3 3 3   BUCR 13 21* 21*   AP  --  91 91   TP  --  7.1 7.2   ALB  --  2.8* 3.2*   GLOB  --  4.3* 4.0   AGRAT  --  0.7* 0.8          Lactic Acid Lactic acid   Date Value Ref Range Status   11/07/2021 1.5 0.4 - 2.0 MMOL/L Final     No results for input(s): LAC in the last 72 hours. Liver Enzymes Protein, total   Date Value Ref Range Status   01/22/2023 7.1 6.4 - 8.2 g/dL Final     Albumin   Date Value Ref Range Status   01/22/2023 2.8 (L) 3.4 - 5.0 g/dL Final     Globulin   Date Value Ref Range Status   01/22/2023 4.3 (H) 2.0 - 4.0 g/dL Final     A-G Ratio   Date Value Ref Range Status   01/22/2023 0.7 (L) 0.8 - 1.7   Final     Alk.  phosphatase   Date Value Ref Range Status   01/22/2023 91 45 - 117 U/L Final     Recent Labs     01/22/23  0502 01/21/23  0239   TP 7.1 7.2   ALB 2.8* 3.2*   GLOB 4. 3* 4.0   AGRAT 0.7* 0.8   AP 91 91          CBC w/Diff Recent Labs     01/22/23  0502 01/21/23  0239   WBC 9.7 13.4*   RBC 4.31 3.94*   HGB 12.8 11.7*   HCT 35.5 32.4*   * 473*   GRANS 69 83*   LYMPH 18* 10*   EOS 2 0          Cardiac Enzymes No results found for: CPK, CK, CKMMB, CKMB, RCK3, CKMBT, CKNDX, CKND1, SHAHLA, TROPT, TROIQ, SERGEY, TROPT, TNIPOC, BNP, BNPP     BNP No results found for: BNP, BNPP, XBNPT     Coagulation Recent Labs     01/22/23  0502   PTP 13.5   INR 1.0           Thyroid  Lab Results   Component Value Date/Time    TSH 1.45 12/22/2021 12:47 PM          Lipid Panel No results found for: CHOL, CHOLPOCT, CHOLX, CHLST, CHOLV, 418555, HDL, HDLP, LDL, LDLC, DLDLP, 393338, VLDLC, VLDL, TGLX, TRIGL, TRIGP, TGLPOCT, CHHD, CHHDX     ABG No results for input(s): PHI, PHI, POC2, PCO2I, PO2, PO2I, HCO3, HCO3I, FIO2, FIO2I in the last 72 hours.      Urinalysis Lab Results   Component Value Date/Time    Color DARK YELLOW 11/06/2021 05:22 AM    Appearance CLOUDY 11/06/2021 05:22 AM    Specific gravity >1.030 (H) 11/06/2021 05:22 AM    pH (UA) 5.5 11/06/2021 05:22 AM    Protein 100 (A) 11/06/2021 05:22 AM    Glucose Negative 11/06/2021 05:22 AM    Ketone TRACE (A) 11/06/2021 05:22 AM    Bilirubin Negative 11/06/2021 05:22 AM    Urobilinogen 1.0 11/06/2021 05:22 AM    Nitrites Negative 11/06/2021 05:22 AM    Leukocyte Esterase LARGE (A) 11/06/2021 05:22 AM    Epithelial cells FEW 11/06/2021 05:22 AM    Bacteria 2+ (A) 11/06/2021 05:22 AM    WBC 25 to 30 11/06/2021 05:22 AM    RBC Negative 11/06/2021 05:22 AM        Micro  Results       Procedure Component Value Units Date/Time    CULTURE, BLOOD [[id number]]     Order Status: Sent Specimen: Blood     CULTURE, BLOOD [[id number]]     Order Status: Sent Specimen: Blood     CULTURE, RESPIRATORY/SPUTUM/BRONCH Raford Payer STAIN [[id number]]     Order Status: Sent Specimen: Sputum     RESPIRATORY VIRUS PANEL W/COVID-19, PCR [[id number]] Collected: 01/21/23 0700    Order Status: Completed Specimen: Nasopharyngeal Updated: 01/21/23 0817     Adenovirus Not detected        Coronavirus 229E Not detected        Coronavirus HKU1 Not detected        Coronavirus CVNL63 Not detected        Coronavirus OC43 Not detected        SARS-CoV-2, PCR Not detected        Metapneumovirus Not detected        Rhinovirus and Enterovirus Not detected        Influenza A Not detected        Influenza B Not detected        Parainfluenza 1 Not detected        Parainfluenza 2 Not detected        Parainfluenza 3 Not detected        Parainfluenza virus 4 Not detected        RSV by PCR Not detected        B. parapertussis, PCR Not detected        Bordetella pertussis - PCR Not detected        Chlamydophila pneumoniae DNA, QL, PCR Not detected        Mycoplasma pneumoniae DNA, QL, PCR Not detected       CULTURE, BLOOD [[id number]]  (Abnormal) Collected: 01/21/23 0655    Order Status: Completed Specimen: Blood Updated: 01/22/23 1547     Special Requests: NO SPECIAL REQUESTS        GRAM STAIN       ANAEROBIC BOTTLE Gram positive cocci IN GROUPS                  SMEAR CALLED TO AND CORRECTLY REPEATED BY: YOLANDA HOANG [facility] RN 3N [id number] 1/22/23 BY VMB           Culture result:       Gram positive cocci IN CLUSTERS GROWING IN 1 OF 2 BOTTLES DRAWN NO SITE          CULTURE, RESPIRATORY/SPUTUM/BRONCH Benetta Balling [[id number]]     Order Status: Sent Specimen: Sputum     CULTURE, BLOOD [[id number]]  (Abnormal) Collected: 01/21/23 0645    Order Status: Completed Specimen: Blood Updated: 01/22/23 1912     Special Requests: NO SPECIAL REQUESTS        GRAM STAIN       AEROBIC AND ANAEROBIC BOTTLES Gram positive cocci IN GROUPS                  SMEAR CALLED TO AND CORRECTLY REPEATED BY: YOLANDA HOANG [facility] RN [id number] 1/22/23 BY VMB           Culture result:       PROBABLE STAPHYLOCOCCUS SPECIES, COAGULASE NEGATIVE GROWING IN BOTH BOTTLES DRAWN NO SITE          BLOOD CULTURE ID PANEL [[id number]]  (Abnormal) Collected: 01/21/23 0645    Order Status: Completed Specimen: Blood Updated: 01/22/23 2006     Acc. no. from [address] [facility] [id number]     Enterococcus faecalis Not detected        Enterococcus faecium Not detected        Listeria monocytogenes Not detected        Staphylococcus Detected        Staphylococcus aureus Not detected        Staph epidermidis Detected        Staph lugdunensis Not detected        Streptococcus Not detected        Streptococcus agalactiae (Group B) Not detected        Streptococcus pneumoniae Not detected        Streptococcus pyogenes (Group A) Not detected        Acinetobacter calcoaceticus-baumanii complex Not detected        Bacteroides fragilis Not detected        Enterobacterales species Not detected        Enterobacter cloacae complex Not detected        Escherichia coli Not detected        Klebsiella aerogenes Not detected        Klebsiella oxytoca Not detected        Klebsiella pneumoniae group Not detected        Proteus Not detected        Salmonella Not detected        Serratia marcescens Not detected        Haemophilus influenzae Not detected        Neisseria meningitidis Not detected        Pseudomonas aeruginosa Not detected        Steno maltophilia Not detected        Candida albicans Not detected        Candida auris Not detected        Candida glabrata Not detected        Candida krusei Not detected        Candida parapsilosis Not detected        Candida tropicalis Not detected        Crypto neoformans/gattii Not detected        RESISTANT GENES:            MECA/C (Methicillin resistant gene) Not detected        Comment       False positive results may rarely occur. Correlate with clinical,epidemiologic, and other laboratory findings           Comment: Please see BC[facility] Interpretation Guide in EPIC Links                  XR (Most Recent).  CXR reviewed by me and compared with previous CXR Results from Hospital Encounter encounter on 01/21/23    XR CHEST PORT    Narrative  EXAM: XR CHEST PORT    CLINICAL INDICATION/HISTORY : Chest pain. COMPARISON: September 7, 2022    TECHNIQUE: 1 VIEWS    FINDINGS:  EKG leads and wires overlie the chest.  Right lower lung consolidation and small right pleural effusion. [place] Reas Heart is normal in size. Lytic lesion and suspected pathological fracture lateral right rib 6..    Impression  1. Right lower lung consolidation. Small right pleural effusion. 2.  Lytic lesion with pathological fracture lateral right rib 6.  3.  CT has been performed at the time of interpretation-please refer to that  report. CT (Most Recent) Results from Hospital Encounter encounter on 01/21/23    CTA CHEST W OR W WO CONT    Narrative  EXAMINATION: CTA chest pulmonary    INDICATION: Chest pain    COMPARISON: CT 9/7/2022    TECHNIQUE: CT angiography of the pulmonary arteries with IV contrast and 3-D MIP  reformations. All CT scans at this facility are performed using dose  optimization technique as appropriate to a performed exam, to include automated  exposure control, adjustment of the mA and/or kV according to patient size  (including appropriate matching first site specific examinations), or use of  iterative reconstruction technique. FINDINGS:    Pulmonary arteries: No pulmonary artery filling defect to suggest pulmonary  embolus. No specific evidence of right heart strain. Cardiovascular: Pulmonary arteries as above. Minimal burden of atherosclerotic  calcifications. Thoracic aorta normal in course and caliber. Heart size normal.    Mediastinum: Imaged thyroid unremarkable. Conglomerate appearing right hilar and  subcarinal suspected jamar mass, difficult to measure due to plaque-like  amorphous shape. Esophagus nondistended, and distal segment inseparable from  suspected jamar mass described above. Pleura: Small right pleural effusion. No pneumothorax. Lungs/airways: Essentially complete consolidation of the right middle lobe, and  extensive confluent consolidation in the right lower lobe.  Additional patchy  peripheral consolidation in the right lower lobe, and peripheral right upper  lobe nodule measuring 0.9 x 0.6 cm. Biapical subpleural nodularity may represent  scarring. Upper abdomen: No acute findings. Miscellaneous: Superficial soft tissues unremarkable. Bones: Lytic foci with associated pathologic fractures at right lateral sixth  rib and left posterior lateral sixth rib. Left T4 transverse process lytic  lesion    Impression  Essentially complete consolidation of the right middle lobe, and extensive  consolidation in the right lower lobe, may represent a combination of tumor  involvement or postobstructive atelectasis versus pneumonia. Additional patchy  areas of consolidation in the right lung as above may be  infectious/inflammatory.  -Small right pleural effusion, possibly parapneumonic or malignant.  -Suspected conglomerate right hilar and subcarinal adenopathy, grossly increased  since prior.  -Findings consistent with tumor worsening including increasing metastatic  adenopathy. No evidence of pulmonary embolus or right heart strain. Rib lytic metastases with pathologic fractures, and T4 left transverse process  lytic metastasis, new since prior. See additional details above. ECHO  01/21/23    ECHO ADULT COMPLETE 01/23/2023 1/23/2023    Interpretation Summary    No obvious valvular vegetations visualized. Left Ventricle: Normal left ventricular systolic function with a visually estimated EF of 65 - 70%. Left ventricle size is normal. LVIDd is 3.8 cm. Moderate septal thickening. Normal wall motion. Diastolic dysfunction present (grade I) with normal LV EF. Tricuspid Valve: Normal RVSP. The estimated RVSP is 29 mmHg. Aorta: Normal sized ascending aorta. Mildly dilated aortic root. Ao Root diameter is 3.3 cm. Ao Ascending diameter is 3.6 cm. Descending aorta is not well-visualized. Pericardium: Ascites present.     Signed by: Janice Stein MD on 1/23/2023 12:06 PM         PFT No flowsheet data found.      Other      Recent Results (from the past 24 hour(s))   METABOLIC PANEL, BASIC    Collection Time: 01/23/23  2:42 AM   Result Value Ref Range    Sodium 137 136 - 145 mmol/L    Potassium 3.9 3.5 - 5.5 mmol/L    Chloride 107 100 - 111 mmol/L    CO2 27 21 - 32 mmol/L    Anion gap 3 3.0 - 18 mmol/L    Glucose 83 74 - 99 mg/dL    BUN 9 7.0 - 18 MG/DL    Creatinine 0.71 0.6 - 1.3 MG/DL    BUN/Creatinine ratio 13 12 - 20      eGFR >60 >60 ml/min/1.73m2    Calcium 8.8 8.5 - 10.1 MG/DL   ECHO ADULT COMPLETE    Collection Time: 01/23/23 10:40 AM   Result Value Ref Range    IVSd 1.3 (A) 0.6 - 0.9 cm    LVIDd 3.8 (A) 3.9 - 5.3 cm    LVIDs 2.7 cm    LVOT Diameter 2.0 cm    LVPWd 0.9 0.6 - 0.9 cm    LVOT Peak Gradient 5 mmHg    LVOT Mean Gradient 3 mmHg    LVOT SV 70.3 ml    LVOT Peak Velocity 1.1 m/s    LVOT VTI 22.4 cm    RV Free Wall Peak S' 19 cm/s    LA Volume A/L 34 mL    LA Volume 2C 25 22 - 52 mL    LA Volume 4C 38 22 - 52 mL    AV Area by Peak Velocity 2.0 cm2    AV Area by VTI 2.3 cm2    AV Peak Gradient 12 mmHg    AV Mean Gradient 6 mmHg    AV Peak Velocity 1.7 m/s    AV Mean Velocity 1.2 m/s    AV VTI 30.1 cm    MV A Velocity 0.99 m/s    MV E Wave Deceleration Time 257.7 ms    MV E Velocity 0.60 m/s    LV E' Lateral Velocity 5 cm/s    LV E' Septal Velocity 5 cm/s    TAPSE 2.4 1.7 cm    TR Peak Gradient 26 mmHg    TR Max Velocity 2.57 m/s    Ascending Aorta 3.6 cm    Aortic Root 3.3 cm    Fractional Shortening 2D 29 28 - 44 %    LVIDd Index 2.24 cm/m2    LVIDs Index 1.59 cm/m2    LV RWT Ratio 0.47     LV Mass 2D 134.7 67 - 162 g    LV Mass 2D Index 79.2 43 - 95 g/m2    MV E/A 0.61     E/E' Ratio (Averaged) 12.00     E/E' Lateral 12.00     E/E' Septal 12.00     LA Volume Index A/L 20 16 - 34 mL/m2    LVOT Stroke Volume Index 41.4 mL/m2    LVOT Area 3.1 cm2    LA Volume Index 2C 15 (A) 16 - 34 mL/m2    LA Volume Index 4C 22 16 - 34 mL/m2    Ao Root Index 1.94 cm/m2    Ascending Aorta Index 2.12 cm/m2    AV Velocity Ratio 0.65     LVOT:AV VTI Index 0.74     COSME/BSA VTI 1.4 cm2/m2    COSME/BSA Peak Velocity 1.2 cm2/m2    Est. RA Pressure 3 mmHg    RVSP 29 mmHg           Complex decision making was made in the evaluation and management plans during this consultation. More than 50% of time was spent in counseling and coordination of care including review of data and discussion with other team members.        Olga Ojeda DO, [facility]    [facility]  Pulmonary, Critical Care, and Sleep Medicine

## 2023-01-24 PROBLEM — R53.81 DEBILITY: Status: ACTIVE | Noted: 2023-01-24

## 2023-01-24 PROBLEM — Z51.5 ENCOUNTER FOR PALLIATIVE CARE: Status: ACTIVE | Noted: 2023-01-24

## 2023-01-24 LAB
ANION GAP SERPL CALC-SCNC: 6 MMOL/L (ref 3–18)
BACTERIA SPEC CULT: ABNORMAL
BACTERIA SPEC CULT: ABNORMAL
BUN SERPL-MCNC: 8 MG/DL (ref 7–18)
BUN/CREAT SERPL: 11 (ref 12–20)
CALCIUM SERPL-MCNC: 8.8 MG/DL (ref 8.5–10.1)
CHLORIDE SERPL-SCNC: 106 MMOL/L (ref 100–111)
CO2 SERPL-SCNC: 26 MMOL/L (ref 21–32)
CREAT SERPL-MCNC: 0.74 MG/DL (ref 0.6–1.3)
GLUCOSE SERPL-MCNC: 120 MG/DL (ref 74–99)
GRAM STN SPEC: ABNORMAL
POTASSIUM SERPL-SCNC: 3.8 MMOL/L (ref 3.5–5.5)
SERVICE CMNT-IMP: ABNORMAL
SERVICE CMNT-IMP: ABNORMAL
SODIUM SERPL-SCNC: 138 MMOL/L (ref 136–145)
VANCOMYCIN SERPL-MCNC: 10.9 UG/ML (ref 5–40)

## 2023-01-24 PROCEDURE — 74011250636 HC RX REV CODE- 250/636: Performed by: INTERNAL MEDICINE

## 2023-01-24 PROCEDURE — 74011000258 HC RX REV CODE- 258: Performed by: INTERNAL MEDICINE

## 2023-01-24 PROCEDURE — 99232 SBSQ HOSP IP/OBS MODERATE 35: CPT | Performed by: INTERNAL MEDICINE

## 2023-01-24 PROCEDURE — 36415 COLL VENOUS BLD VENIPUNCTURE: CPT

## 2023-01-24 PROCEDURE — 80202 ASSAY OF VANCOMYCIN: CPT

## 2023-01-24 PROCEDURE — 74011000250 HC RX REV CODE- 250: Performed by: INTERNAL MEDICINE

## 2023-01-24 PROCEDURE — 99222 1ST HOSP IP/OBS MODERATE 55: CPT | Performed by: NURSE PRACTITIONER

## 2023-01-24 PROCEDURE — 74011250637 HC RX REV CODE- 250/637: Performed by: INTERNAL MEDICINE

## 2023-01-24 PROCEDURE — 80048 BASIC METABOLIC PNL TOTAL CA: CPT

## 2023-01-24 PROCEDURE — 74011250637 HC RX REV CODE- 250/637: Performed by: HOSPITALIST

## 2023-01-24 PROCEDURE — 65270000046 HC RM TELEMETRY

## 2023-01-24 RX ADMIN — VANCOMYCIN HYDROCHLORIDE 1000 MG: 1 INJECTION, POWDER, LYOPHILIZED, FOR SOLUTION INTRAVENOUS at 09:29

## 2023-01-24 RX ADMIN — HEPARIN SODIUM 5000 UNITS: 5000 INJECTION INTRAVENOUS; SUBCUTANEOUS at 02:50

## 2023-01-24 RX ADMIN — GUAIFENESIN 600 MG: 600 TABLET, EXTENDED RELEASE ORAL at 22:13

## 2023-01-24 RX ADMIN — GUAIFENESIN 600 MG: 600 TABLET, EXTENDED RELEASE ORAL at 09:29

## 2023-01-24 RX ADMIN — PIPERACILLIN AND TAZOBACTAM 3.38 G: 3; .375 INJECTION, POWDER, FOR SOLUTION INTRAVENOUS at 05:04

## 2023-01-24 RX ADMIN — OXYCODONE HYDROCHLORIDE AND ACETAMINOPHEN 1 TABLET: 5; 325 TABLET ORAL at 17:52

## 2023-01-24 RX ADMIN — SODIUM CHLORIDE, PRESERVATIVE FREE 10 ML: 5 INJECTION INTRAVENOUS at 14:50

## 2023-01-24 RX ADMIN — PIPERACILLIN AND TAZOBACTAM 3.38 G: 3; .375 INJECTION, POWDER, FOR SOLUTION INTRAVENOUS at 12:09

## 2023-01-24 RX ADMIN — HEPARIN SODIUM 5000 UNITS: 5000 INJECTION INTRAVENOUS; SUBCUTANEOUS at 17:52

## 2023-01-24 RX ADMIN — SODIUM CHLORIDE, PRESERVATIVE FREE 10 ML: 5 INJECTION INTRAVENOUS at 22:13

## 2023-01-24 RX ADMIN — OXYCODONE HYDROCHLORIDE AND ACETAMINOPHEN 1 TABLET: 5; 325 TABLET ORAL at 23:52

## 2023-01-24 RX ADMIN — SODIUM CHLORIDE, PRESERVATIVE FREE 10 ML: 5 INJECTION INTRAVENOUS at 05:04

## 2023-01-24 RX ADMIN — HEPARIN SODIUM 5000 UNITS: 5000 INJECTION INTRAVENOUS; SUBCUTANEOUS at 09:29

## 2023-01-24 RX ADMIN — PIPERACILLIN AND TAZOBACTAM 3.38 G: 3; .375 INJECTION, POWDER, FOR SOLUTION INTRAVENOUS at 22:13

## 2023-01-24 RX ADMIN — VANCOMYCIN HYDROCHLORIDE 1000 MG: 1 INJECTION, POWDER, LYOPHILIZED, FOR SOLUTION INTRAVENOUS at 22:12

## 2023-01-24 NOTE — PROGRESS NOTES
[facility] Hospitalist Group  Progress Note    Patient: Albin Reyes Age: 71 y.o. : 1950 MR#: [id number] SSN: [id number]  Date/Time: 2023     Subjective:     Pt w/o specific complaints> has a lot of questions regarding biopsy procedure. Assessment/Plan:   80-year-old female w/ h/o tobacco abuse and known right lower lobe lung mass not previously worked up due to pt being lost to follow up admitted after she presented to the emergency room with complaints of chest pain, shortness of breath. ER evaluation-CTA chest showed complete consolidation of the right middle lobe and extensive consolidation in the right lower lobe, likely representing a combination of tumor associated postobstructive atelectasis versus pneumonia. Findings are consistent with tumor worsening including increasing metastatic adenopathy, also noted to have rib lytic metastases with pathologic fractures and T4 left transverse process lytic metastases. Pneumonia likely postobstructive with bacteremia and underlying cancer-continue broad-spectrum IV antibiotics, ID consulted  Coag negative Staph bacteremia, / bottles, repeat blood cx on -->NGTD. TTE done -->no obvious valvular vegetations,   Lung mass suggestive of malignancy-pulmonology on board, will defer further management to them. IR consulted for bx of lytic rib lesions, EBUS under consideration once bacteremia clears and pneumonia improved, if not able to bx rib lesions. Pt w/ a lot of questions regarding bx, treatment and appears to be anxious about moving forward. Agrees to talk to palliative care regarding goal of care. Palliative care consulted. Chronic smoker-patient has been counseled to stop, mentions that she recently quit smoking about 2 weeks ago  DVT prophylaxis-Heparin  Full code        Dispo: likely home.  S/p PT miles, pt reported that she was ambulating independently w/ no concerns        Chavo Lloyd MD  23      Case discussed with:  [x]Patient  []Family  [x]Nursing  []Case Management  DVT Prophylaxis:  []Lovenox  [x]Hep SQ  []SCDs  []Coumadin   []On Heparin gtt    Objective:   VS: Visit Vitals  /63 (BP 1 Location: Left lower arm, BP Patient Position: Sitting)   Pulse 81   Temp 98.3 °F (36.8 °C)   Resp 18   Ht 5' 1\" (1.549 m)   Wt 71.2 kg (157 lb)   SpO2 95%   BMI 29.66 kg/m²      Tmax/24hrs: Temp (24hrs), Av.4 °F (36.9 °C), Min:98.2 °F (36.8 °C), Max:98.6 °F (37 °C)  IOBRIEFNo intake or output data in the 24 hours ending 23 1419      General:  Alert, cooperative, no acute distress    Pulmonary:  Decreased BS in bases   Cardiovascular: Regular rate and Rhythm. GI:  Soft, Non distended, Non tender. + Bowel sounds. Extremities:  No edema, cyanosis, clubbing. No calf tenderness. Neurologic: Alert and oriented X 3. No acute neuro deficits.   Additional:    Medications:   Current Facility-Administered Medications   Medication Dose Route Frequency    vancomycin (VANCOCIN) 1,000 mg in 0.9% sodium chloride 250 mL (VIAL-MATE)  1,000 mg IntraVENous Q12H    heparin (porcine) injection 5,000 Units  5,000 Units SubCUTAneous Q8H    piperacillin-tazobactam (ZOSYN) 3.375 g in 0.9% sodium chloride (MBP/ADV) 100 mL MBP  3.375 g IntraVENous Q8H    guaiFENesin ER (MUCINEX) tablet 600 mg  600 mg Oral Q12H    sodium chloride (NS) flush 5-40 mL  5-40 mL IntraVENous Q8H    sodium chloride (NS) flush 5-40 mL  5-40 mL IntraVENous PRN    acetaminophen (TYLENOL) tablet 650 mg  650 mg Oral Q6H PRN    Or    acetaminophen (TYLENOL) suppository 650 mg  650 mg Rectal Q6H PRN    polyethylene glycol (MIRALAX) packet 17 g  17 g Oral DAILY PRN    ondansetron (ZOFRAN ODT) tablet 4 mg  4 mg Oral Q8H PRN    Or    ondansetron (ZOFRAN) injection 4 mg  4 mg IntraVENous Q6H PRN    melatonin tablet 5 mg  5 mg Oral QHS PRN    naloxone (NARCAN) injection 0.4 mg  0.4 mg IntraVENous EVERY 2 MINUTES AS NEEDED    oxyCODONE-acetaminophen (PERCOCET) 5-325 mg per tablet 1 Tablet  1 Tablet Oral Q6H PRN    albuterol/ipratropium (DUONEB) neb solution  1 Dose Nebulization Q6H PRN       Imaging:   XR Results (most recent):  Results from Hospital Encounter encounter on 01/21/23    XR CHEST PORT    Narrative  EXAM: XR CHEST PORT    CLINICAL INDICATION/HISTORY : Chest pain. COMPARISON: September 7, 2022    TECHNIQUE: 1 VIEWS    FINDINGS:  EKG leads and wires overlie the chest.  Right lower lung consolidation and small right pleural effusion. Shaaron [place] Heart is normal in size. Lytic lesion and suspected pathological fracture lateral right rib 6..    Impression  1. Right lower lung consolidation. Small right pleural effusion. 2.  Lytic lesion with pathological fracture lateral right rib 6.  3.  CT has been performed at the time of interpretation-please refer to that  report. CT Results (most recent):  Results from Hospital Encounter encounter on 01/21/23    CT ABD PELV W CONT    Narrative  CT ABDOMEN AND PELVIS WITH ENHANCEMENT    INDICATION: Smoking history with hypermetabolic right lower lobe lung mass,  metastatic subcarinal adenopathy, hypermetabolic right middle lobe nodule with  subsequent CT demonstrating progression of metastatic disease. TECHNIQUE: Axial images obtained of the abdomen and pelvis following the  uneventful administration nonionic intravenous contrast.  Coronal and sagittal  reformatted images of the abdomen and pelvis were obtained. All CT scans at this facility are performed using dose optimization technique as  appropriate to a performed exam, to include automated exposure control,  adjustment of the mA and/or kV according to patient size (including appropriate  matching first site-specific examinations), or use of iterative reconstruction  technique. COMPARISON: 1/21/2023; 10/14/2022; 1/28/2022    ABDOMEN FINDINGS:    Liver: Unremarkable. Spleen: Unremarkable. Pancreas: Unremarkable. Biliary: Unremarkable.   Bowel: Moderate colonic stool burden. Bowel is normal in caliber. Peritoneum/ Retroperitoneum: Unremarkable. Lymph Nodes: There is a new 1 cm right retrocrural lymph node (series 2, image  26). New centrally necrotic 1.9 x 1.3 cm gastrohepatic node (34). Adrenal Glands: Similar bilateral nodular thickening. Kidneys: A few too small to characterize lesions, statistically most likely  benign. Vessels: Moderate atherosclerosis. Coarse calcification at the renal ostia. PELVIS FINDINGS:    Bladder/ Pelvic Organs: Unremarkable. Lung Base: The volume of the consolidation in the right lower lobe has worsened  since 1/21/2023. The small right pleural effusion is similar. Similar confluent  consolidation between the right lower and right middle lobes. Otherwise similar  chest findings. Bones: Likely injection granulomas anterior pelvic wall. Lumbar facet  hypertrophy and arthropathy. Mild increased ill-defined sclerosis left iliac. Similar right lateral sixth rib bone lesion with soft tissue component and left  posterolateral sixth rib lytic bone lesion with pathologic fracture and soft  tissue component. Impression  1. New metastatic right retrocrural and gastrohepatic lymph nodes since  1/28/2022 compatible with progression. 2.  Worsened volume of consolidation right lower lobe, likely postobstructive  pneumonitis. Similar small right pleural effusion and confluent consolidation  otherwise between the right lower and right middle lobes. Similar chest findings  otherwise. 3.  Mild increased nonspecific osseous sclerosis left iliac, possibly additional  site of metastatic disease but not definitive. 4.  Similar nodular adrenal thickening. 5.  Moderate atherosclerosis with coarse calcification renal ostia. 6.  Moderate colonic stool burden. MRI Results (most recent):  No results found for this or any previous visit.         Labs:    Recent Results (from the past 48 hour(s))   METABOLIC PANEL, BASIC    Collection Time: 01/23/23  2:42 AM   Result Value Ref Range    Sodium 137 136 - 145 mmol/L    Potassium 3.9 3.5 - 5.5 mmol/L    Chloride 107 100 - 111 mmol/L    CO2 27 21 - 32 mmol/L    Anion gap 3 3.0 - 18 mmol/L    Glucose 83 74 - 99 mg/dL    BUN 9 7.0 - 18 MG/DL    Creatinine 0.71 0.6 - 1.3 MG/DL    BUN/Creatinine ratio 13 12 - 20      eGFR >60 >60 ml/min/1.73m2    Calcium 8.8 8.5 - 10.1 MG/DL   ECHO ADULT COMPLETE    Collection Time: 01/23/23 10:40 AM   Result Value Ref Range    IVSd 1.3 (A) 0.6 - 0.9 cm    LVIDd 3.8 (A) 3.9 - 5.3 cm    LVIDs 2.7 cm    LVOT Diameter 2.0 cm    LVPWd 0.9 0.6 - 0.9 cm    LVOT Peak Gradient 5 mmHg    LVOT Mean Gradient 3 mmHg    LVOT SV 70.3 ml    LVOT Peak Velocity 1.1 m/s    LVOT VTI 22.4 cm    RV Free Wall Peak S' 19 cm/s    LA Volume A/L 34 mL    LA Volume 2C 25 22 - 52 mL    LA Volume 4C 38 22 - 52 mL    AV Area by Peak Velocity 2.0 cm2    AV Area by VTI 2.3 cm2    AV Peak Gradient 12 mmHg    AV Mean Gradient 6 mmHg    AV Peak Velocity 1.7 m/s    AV Mean Velocity 1.2 m/s    AV VTI 30.1 cm    MV A Velocity 0.99 m/s    MV E Wave Deceleration Time 257.7 ms    MV E Velocity 0.60 m/s    LV E' Lateral Velocity 5 cm/s    LV E' Septal Velocity 5 cm/s    TAPSE 2.4 1.7 cm    TR Peak Gradient 26 mmHg    TR Max Velocity 2.57 m/s    Ascending Aorta 3.6 cm    Aortic Root 3.3 cm    Fractional Shortening 2D 29 28 - 44 %    LVIDd Index 2.24 cm/m2    LVIDs Index 1.59 cm/m2    LV RWT Ratio 0.47     LV Mass 2D 134.7 67 - 162 g    LV Mass 2D Index 79.2 43 - 95 g/m2    MV E/A 0.61     E/E' Ratio (Averaged) 12.00     E/E' Lateral 12.00     E/E' Septal 12.00     LA Volume Index A/L 20 16 - 34 mL/m2    LVOT Stroke Volume Index 41.4 mL/m2    LVOT Area 3.1 cm2    LA Volume Index 2C 15 (A) 16 - 34 mL/m2    LA Volume Index 4C 22 16 - 34 mL/m2    Ao Root Index 1.94 cm/m2    Ascending Aorta Index 2.12 cm/m2    AV Velocity Ratio 0.65     LVOT:AV VTI Index 0.74     COSME/BSA VTI 1.4 cm2/m2    COSME/BSA Peak Velocity 1.2 cm2/m2    Est. RA Pressure 3 mmHg    RVSP 29 mmHg   CULTURE, BLOOD    Collection Time: 01/23/23  2:49 PM    Specimen: Blood   Result Value Ref Range    Special Requests: NO SPECIAL REQUESTS      Culture result: NO GROWTH AFTER 15 HOURS     CULTURE, BLOOD    Collection Time: 01/23/23  2:49 PM    Specimen: Blood   Result Value Ref Range    Special Requests: NO SPECIAL REQUESTS      Culture result: NO GROWTH AFTER 15 HOURS     METABOLIC PANEL, BASIC    Collection Time: 01/24/23  2:21 AM   Result Value Ref Range    Sodium 138 136 - 145 mmol/L    Potassium 3.8 3.5 - 5.5 mmol/L    Chloride 106 100 - 111 mmol/L    CO2 26 21 - 32 mmol/L    Anion gap 6 3.0 - 18 mmol/L    Glucose 120 (H) 74 - 99 mg/dL    BUN 8 7.0 - 18 MG/DL    Creatinine 0.74 0.6 - 1.3 MG/DL    BUN/Creatinine ratio 11 (L) 12 - 20      eGFR >60 >60 ml/min/1.73m2    Calcium 8.8 8.5 - 10.1 MG/DL   VANCOMYCIN, RANDOM    Collection Time: 01/24/23  2:21 AM   Result Value Ref Range    Vancomycin, random 10.9 5.0 - 40.0 UG/ML       Signed By: Werner Andino MD     January 24, 2023      I spent 35 minutes with the patient in face-to-face consultation, of which greater than 50% was spent in counseling and coordination of care as described above    Disclaimer: Sections of this note are dictated using utilizing voice recognition software. Minor typographical errors may be present. If questions arise, please do not hesitate to contact me or call our department.

## 2023-01-24 NOTE — PROGRESS NOTES
[facility]  (A Division of [address] [place] [address])      Consultation Note      Date of Admission: 1/21/2023    Date of Note: 1/24/2023      Reason for Referral: GPC sepsis, pneumonia  Referring Physician: Dr. Jose Ribeiro from this admission:   1/21 blood culture: 3 out of 4 gram-positive cocci in groups  1/21 respiratory viral panel: Negative    Current Antimicrobials:    Prior Antimicrobials:  Azithromycin 1/21 to present  Ceftriaxone 1/21 to present  Vancomycin 1/22 to present        Assessment:         Staph species sepsis suspect from a pulmonary source:   Postobstructive pneumonia versus enlarging malignancy: in the right middle and lower lobes. Right lower lobe pulmonary mass with hilar and subcarinal LAD as well as  metastasis to the ribs on the right   -1/21 CTA chest with complete consolidation of the right middle lobe and right lower lobe suspicious for tumor plus or minus pneumonia. Small right pleural effusion. 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  Anticipate that we can transition to p.o. upon discharge. -Repeat cultures are negative at 24 hours. -TTE is negative for endocarditis.    -She is not interested in having a PICC line placed for IV antibiotics so we will treat with Zyvox. She is aware that this is not standard of care. Continue with zosyn-concern addition to p.o. in the next 24 hours    Repeat blood cx from 1/23 remain negative  TTE to r/o endocarditis-ovular vegetations noted  CBC, BMP in am    Encouraged patient to finish the work-up for her lung mass and get a biopsy. Advised her that she is continue to be on antibiotics for about 1 more week for her pneumonia and staph sepsis.   Also educated patient that with a large termor burden such as this she may have recurrent pneumonias    Discussed with Dr. Mayelin Rubalcava, [facility]  [address], [address] [facility] [place], [place] [address]  Office: [phone], Ext [phone]  Mobile/Text: [phone]    Lines / Catheters:  Peripheral    Subjective:   Seen and examined at bedside. Patient is out of bed to chair. She still very hesitant about biopsy and is very interested in going home as soon as she can. Overall though she feels a lot better than what she had initially when she first came in. HPI:  Everett Rai is a 71 y.o. female with PMHx significant for RLL lung mass, tobacco use, anxiety who presented with several day history of increasing chest pain, cough and dyspnea. Denies fever or chills. Decreased appetite and weight loss noted over the last 2 weeks. Has history of RLL lung mass that has been followed for over 1 year and is progressive, and PET positive. From my understanding she has not pursued biopsy or treatment for her pulmonary mass although its been known to be present for greater than 1 year. She has been getting progressively more short of breath with an intermittent cough which is what prompted her to come to the emergency department. She has been afebrile. Initial WBCs were 13.4 and have since normalized. She was started on empiric ceftriaxone and azithromycin due to concern for right-sided pneumonia. Blood cultures from  are now positive and 3 out of 4 bottles and she has been started on vancomycin while final cultures and sensitivities are pending.            Objective:      Visit Vitals  /63 (BP 1 Location: Left lower arm, BP Patient Position: Sitting)   Pulse 81   Temp 98.3 °F (36.8 °C)   Resp 18   Ht 5' 1\" (1.549 m)   Wt 71.2 kg (157 lb)   SpO2 95%   BMI 29.66 kg/m²     Temp (24hrs), Av.4 °F (36.9 °C), Min:98.2 °F (36.8 °C), Max:98.6 °F (37 °C)        General:   awake alert and oriented, older than states age   Skin:   no rashes or skin lesions noted on limited exam   HEENT:  Normocephalic, atraumatic, PERRL, EOMI, no scleral icterus or pallor; no conjunctival hemmohage;  nasal and oral mucous are moist and without evidence of lesions. No thrush. Dentition good. Neck supple, no bruits. Lymph Nodes:   no cervical, axillary or inguinal adenopathy   Lungs:   non-labored, coarse breath sounds, no wheezes   Heart:  RRR, s1 and s2; no murmurs rubs or gallops, no edema, + pedal pulses   Abdomen:  soft, non-distended, active bowel sounds, no hepatomegaly, no splenomegaly. Non-tender   Genitourinary:  deferred   Extremities:   no clubbing, cyanosis; no joint effusions or swelling; Full ROM of all large joints to the upper and lower extremities; muscle mass appropriate for age   Neurologic:  No gross focal sensory abnormalities; 5/5 muscle strength to upper and lower extremities. Speech appropirate. Cranial nerves intact   Psychiatric:   appropriate and interactive.        Labs: Results:   Chemistry Recent Labs     01/24/23  0221 01/23/23  0242 01/22/23  0502   * 83 80    137 138   K 3.8 3.9 4.1    107 107   CO2 26 27 28   BUN 8 9 15   CREA 0.74 0.71 0.73   CA 8.8 8.8 9.2   AGAP 6 3 3   BUCR 11* 13 21*   AP  --   --  91   TP  --   --  7.1   ALB  --   --  2.8*   GLOB  --   --  4.3*   AGRAT  --   --  0.7*      CBC w/Diff Recent Labs     01/22/23  0502   WBC 9.7   RBC 4.31   HGB 12.8   HCT 35.5   *   GRANS 69   LYMPH 18*   EOS 2            No results found for: SDES Lab Results   Component Value Date/Time    Culture result: NO GROWTH AFTER 15 HOURS 01/23/2023 02:49 PM    Culture result: NO GROWTH AFTER 15 HOURS 01/23/2023 02:49 PM    Culture result: (A) 01/21/2023 06:55 AM     Gram positive cocci IN CLUSTERS GROWING IN 1 OF 2 BOTTLES DRAWN NO SITE REFER TO [id number] FOR ID AND SENSITIVITIES    Culture result: (A) 01/21/2023 06:45 AM     PROBABLE STAPHYLOCOCCUS SPECIES, COAGULASE NEGATIVE GROWING IN BOTH BOTTLES DRAWN NO SITE    Culture result: MIXED UROGENITAL DOUGIE ISOLATED 11/06/2021 04:22 AM        Results       Procedure Component Value Units Date/Time    CULTURE, BLOOD [[id number]] Collected: 01/23/23 1449    Order Status: Completed Specimen: Blood Updated: 01/24/23 0644     Special Requests: NO SPECIAL REQUESTS        Culture result: NO GROWTH AFTER 15 HOURS       CULTURE, BLOOD [[id number]] Collected: 01/23/23 1449    Order Status: Completed Specimen: Blood Updated: 01/24/23 0644     Special Requests: NO SPECIAL REQUESTS        Culture result: NO GROWTH AFTER 15 HOURS       CULTURE, RESPIRATORY/SPUTUM/BRONCH [place] STAIN [[id number]]     Order Status: Sent Specimen: Sputum     RESPIRATORY VIRUS PANEL W/COVID-19, PCR [[id number]] Collected: 01/21/23 0700    Order Status: Completed Specimen: Nasopharyngeal Updated: 01/21/23 0817     Adenovirus Not detected        Coronavirus 229E Not detected        Coronavirus HKU1 Not detected        Coronavirus CVNL63 Not detected        Coronavirus OC43 Not detected        SARS-CoV-2, PCR Not detected        Metapneumovirus Not detected        Rhinovirus and Enterovirus Not detected        Influenza A Not detected        Influenza B Not detected        Parainfluenza 1 Not detected        Parainfluenza 2 Not detected        Parainfluenza 3 Not detected        Parainfluenza virus 4 Not detected        RSV by PCR Not detected        B. parapertussis, PCR Not detected        Bordetella pertussis - PCR Not detected        Chlamydophila pneumoniae DNA, QL, PCR Not detected        Mycoplasma pneumoniae DNA, QL, PCR Not detected       CULTURE, BLOOD [[id number]]  (Abnormal) Collected: 01/21/23 0655    Order Status: Completed Specimen: Blood Updated: 01/23/23 1323     Special Requests: NO SPECIAL REQUESTS        GRAM STAIN       ANAEROBIC BOTTLE Gram positive cocci IN GROUPS                  SMEAR CALLED TO AND CORRECTLY REPEATED BY: YOLANDA HOANG [facility] RN 3N [id number] 1/22/23 BY [facility]           Culture result:       Gram positive cocci IN CLUSTERS GROWING IN 1 OF 2 BOTTLES DRAWN NO SITE REFER TO [id number] FOR ID AND SENSITIVITIES          CULTURE, BLOOD [[id number]]  (Abnormal) Collected: 01/21/23 0645    Order Status: Completed Specimen: Blood Updated: 01/22/23 1912     Special Requests: NO SPECIAL REQUESTS        GRAM STAIN       AEROBIC AND ANAEROBIC BOTTLES Gram positive cocci IN GROUPS                  SMEAR CALLED TO AND CORRECTLY REPEATED BY: YOLANDA HOANG [facility] RN [id number] 1/22/23 BY [facility]           Culture result:       PROBABLE STAPHYLOCOCCUS SPECIES, COAGULASE NEGATIVE GROWING IN BOTH BOTTLES DRAWN NO SITE          CULTURE, RESPIRATORY/SPUTUM/BRONCH [place] [[id number]] Collected: 01/21/23 0645    Order Status: Canceled Specimen: Sputum     BLOOD CULTURE ID PANEL [[id number]]  (Abnormal) Collected: 01/21/23 0645    Order Status: Completed Specimen: Blood Updated: 01/22/23 2006     Acc. no. from [address] [place] [address] [id number]     Enterococcus faecalis Not detected        Enterococcus faecium Not detected        Listeria monocytogenes Not detected        Staphylococcus Detected        Staphylococcus aureus Not detected        Staph epidermidis Detected        Staph lugdunensis Not detected        Streptococcus Not detected        Streptococcus agalactiae (Group B) Not detected        Streptococcus pneumoniae Not detected        Streptococcus pyogenes (Group A) Not detected        Acinetobacter calcoaceticus-baumanii complex Not detected        Bacteroides fragilis Not detected        Enterobacterales species Not detected        Enterobacter cloacae complex Not detected        Escherichia coli Not detected        Klebsiella aerogenes Not detected        Klebsiella oxytoca Not detected        Klebsiella pneumoniae group Not detected        Proteus Not detected        Salmonella Not detected        Serratia marcescens Not detected        Haemophilus influenzae Not detected        Neisseria meningitidis Not detected        Pseudomonas aeruginosa Not detected Steno maltophilia Not detected        Candida albicans Not detected        Candida auris Not detected        Candida glabrata Not detected        Candida krusei Not detected        Candida parapsilosis Not detected        Candida tropicalis Not detected        Crypto neoformans/gattii Not detected        RESISTANT GENES:            MECA/C (Methicillin resistant gene) Not detected        Comment       False positive results may rarely occur. Correlate with clinical,epidemiologic, and other laboratory findings           Comment: Please see [facility] Interpretation Guide in EPIC Links                 Imaging:      All imaging reviewed from Admission to present as per radiology interpretation in [address] [place] [address]

## 2023-01-24 NOTE — CONSULTS
[facility]: [phone]  [facility]: [phone]     Patient Name: Nicanor Kang  YOB: 1950    Date of Initial Consult : 1/24/2023  Reason for Consult: Care decisions  Requesting Provider: Dr. Israel Crawley   Primary Care Physician: None      SUMMARY:   Nicanor Kang is a 71 y.o. female with a past history of right lower lobe mass which has not been biopsied, anxiety, osteoarthritis and angina at rest, who was admitted on 1/21/2023 from home with a diagnosis of suspected right primary lung cancer with suspected metastases to bone and possibly postobstructive pneumonia. Current medical issues leading to Palliative Medicine involvement include: 71-year-old female with a history of right lower lobe lung mass who was lost to follow-up presented to the ED after having an argument with a friend and developing chest pain. Palliative medicine is consulted for care decisions. CHIEF COMPLAINT: chest pain    HPI/SUBJECTIVE:    Past history as above. Ms. Kai Miller is a 71year old female who presented to the ED after having an argument with a friend and developing chest pain. Patient reportedly was found to have a lung mass approximately 1 year ago and declined biopsy and was lost to follow-up. Patient now appears to have possible metastatic lesions to her ribs as well as pneumonia and is admitted for further medical management. The patient is:   [x] Verbal and participatory  [] Non-participatory due to:     GOALS OF CARE:  Full code, full interventions  Patient/Health Care Proxy Stated Goals: Prolong life      TREATMENT PREFERENCES:   Code Status: Full Code         PALLIATIVE DIAGNOSES:   Encounter for palliative care/goals of care  CAP  Suspected lung cancer  Debility       PLAN:   Encounter for palliative care/goals of care - seen at bedside along with Ms. Unruly Kidd RN. Patient is sitting up in recliner, awake, alert and oriented x4. Patient is aware that she likely has lung cancer and acknowledges being told this approximately 1 year ago. Patient has not undergone any further work-up and has been lost to follow-up. Patient appears to have low health literacy and had many questions about the option of biopsy here in the hospital.  Education, active listening and emotional support offered to patient who was tearful at times and admitted to being scared. Patient also shared that it is difficult for her to make a decision because she hears from so many different people and she is trying to understand everything that is being shared with her. Suggested to patient about having a support person/family member with her when discussions are happening to help with information gathering however patient declined stating that she is able to take in the information herself. We spent a lot of time establishing rapport and trust with patient and answering her questions to the best of our ability. We spoke openly about the importance of biopsy to establish an official diagnosis so that she could then learn what treatment options may be available to her. Patient clearly spoke about wanting to keep living for her children and her grandchildren. Allow patient to share her fears and explained that she would have the opportunity to ask further questions before consenting to any procedure. By the end of our visit patient stated that she wished to proceed with biopsy and asked us to contact her attending physician to let her know that. Attending hospitalist notified of patient's decision to proceed with biopsy. Discussed advance care planning with patient and asked if she had ever completed an AMD or consider who she would want to be her medical decision-maker in the event she cannot speak for herself. Patient stated that it would be all of her adult children to make decisions for her and that she trusted that they would make the right decision.   Patient understands at this time that she is capable of making her own decisions. Patient declines completion of AMD.  Did not address goals of care today as patient was very focused on biopsy and making decision about how to move forward. At this time goals of care remain full code with full interventions. Palliative medicine will continue to follow closely with you. CAP - primary team managing, IVFs, IV antibiotics, mucinex, duonebs PRN  Suspected lung cancer - primary team managing, dx approx 1 year ago - declined biopsy at that time and was lost to follow-up, now wishes to proceed with biopsy  Debility - PPS 48, lives alone, states that she helps a homeless person and that \"I will soon be homeless myself\", reportedly independent with ADLs, does her own cooking, does not drive (never has), BMI 29.66  Initial consult note routed to primary continuity provider  Communicated plan of care with: Palliative IDT      Advance Care Planning:  [] [facility] Interdisciplinary Team has updated the ACP Navigator with [address] and Patient Capacity    Primary Decision Maker ([address]):   *There is no AMD.  Juan Alberto Solisovanni is a majority of patient's 4 adult children  Medical Interventions: Full interventions         As far as possible, the palliative care team has discussed with patient / health care proxy about goals of care / treatment preferences for patient.          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)      Anxiety (1/21/2023)      Tobacco abuse (1/21/2023)      Osteopenia (1/21/2023)      Osteoarthritis (1/21/2023)    Past Medical History:   Diagnosis Date    Angina at rest [facility])     Anxiety     CVA, old, facial weakness 2012    left ptosis    Dental caries     Osteoarthritis     Osteopenia     Right lower lobe lung mass 04/15/2022    Offered Biopsy and couldn't decide between PAlliative Care and Biopsy      Past Surgical History:   Procedure Laterality Date    HX WISDOM TEETH EXTRACTION        Family History   Problem Relation Age of Onset    Cancer Daughter         Unspecified Cancer    Cancer Cousin         Unspecified Cancer x2     History reviewed, no pertinent family history. Social History     Tobacco Use    Smoking status: Some Days     Packs/day: 0.50     Years: 15.00     Pack years: 7.50     Types: Cigarettes    Smokeless tobacco: Never    Tobacco comments:     Patient reports that she is a Some Day Smoker x20 years (as of 1/21/2023).    Substance Use Topics    Alcohol use: Yes     Comment: 8 drinks per week     Allergies   Allergen Reactions    Aspirin Nausea Only      Current Facility-Administered Medications   Medication Dose Route Frequency    vancomycin (VANCOCIN) 1,000 mg in 0.9% sodium chloride 250 mL (VIAL-MATE)  1,000 mg IntraVENous Q12H    heparin (porcine) injection 5,000 Units  5,000 Units SubCUTAneous Q8H    piperacillin-tazobactam (ZOSYN) 3.375 g in 0.9% sodium chloride (MBP/ADV) 100 mL MBP  3.375 g IntraVENous Q8H    guaiFENesin ER (MUCINEX) tablet 600 mg  600 mg Oral Q12H    sodium chloride (NS) flush 5-40 mL  5-40 mL IntraVENous Q8H    sodium chloride (NS) flush 5-40 mL  5-40 mL IntraVENous PRN    acetaminophen (TYLENOL) tablet 650 mg  650 mg Oral Q6H PRN    Or    acetaminophen (TYLENOL) suppository 650 mg  650 mg Rectal Q6H PRN    polyethylene glycol (MIRALAX) packet 17 g  17 g Oral DAILY PRN    ondansetron (ZOFRAN ODT) tablet 4 mg  4 mg Oral Q8H PRN    Or    ondansetron (ZOFRAN) injection 4 mg  4 mg IntraVENous Q6H PRN    melatonin tablet 5 mg  5 mg Oral QHS PRN    naloxone (NARCAN) injection 0.4 mg  0.4 mg IntraVENous EVERY 2 MINUTES AS NEEDED    oxyCODONE-acetaminophen (PERCOCET) 5-325 mg per tablet 1 Tablet  1 Tablet Oral Q6H PRN    albuterol/ipratropium (DUONEB) neb solution  1 Dose Nebulization Q6H PRN          Clinical Pain Assessment (nonverbal scale for nonverbal patients): Clinical Pain Assessment  Severity: 0          Duration: for how long has pt been experiencing pain (e.g., 2 days, 1 month, years)  Frequency: how often pain is an issue (e.g., several times per day, once every few days, constant)     FUNCTIONAL ASSESSMENT:     Palliative Performance Scale (PPS):  PPS: 50    ECOG  ECOG Status : Restricted     PSYCHOSOCIAL/SPIRITUAL SCREENING:      Any spiritual / [employer] concerns:  [] Yes /  [x] No    Caregiver Burnout:  [] Yes /  [] No /  [x] No Caregiver Present      Anticipatory grief assessment:   [x] Normal  / [] Maladaptive        REVIEW OF SYSTEMS:     Systems: constitutional, ears/nose/mouth/throat, respiratory, gastrointestinal, genitourinary, musculoskeletal, integumentary, neurologic, psychiatric, endocrine. Positive findings noted below. Modified ESAS Completed by: provider   Fatigue: 0 Drowsiness: 0     Pain: 0   Anxiety: 2       Dyspnea: 0           Stool Occurrence(s): 1   Positive and pertinent negative findings in ROS are noted above in HPI. The following systems were [x] reviewed / [] unable to be reviewed as noted in HPI  Other findings are noted below. PHYSICAL EXAM:     Constitutional: sitting up in recliner, awake, alert and oriented x4, tearful at times  Eyes: pupils equal, anicteric  ENMT: no nasal discharge, moist mucous membranes  Cardiovascular: regular rhythm, distal pulses intact  Respiratory: breathing not labored, symmetric, on room air  Gastrointestinal: soft   Last bowel movement: 1/23/2023  Musculoskeletal: no deformity, no tenderness to palpation  Skin: warm, dry  Neurologic: following commands, moving all extremities  Psychiatric: full affect, no hallucinations, admits to feeling scared    Other:   Wt Readings from Last 3 Encounters:   01/23/23 71.2 kg (157 lb)   01/20/23 71.2 kg (157 lb)   01/13/23 71.2 kg (157 lb)     Blood pressure 110/63, pulse 81, temperature 98.3 °F (36.8 °C), resp. rate 18, height 5' 1\" (1.549 m), weight 71.2 kg (157 lb), SpO2 95 %. Pain:  Pain Scale 1: Numeric (0 - 10)  Pain Intensity 1: 0     Pain Location 1: Back  Pain Orientation 1: Lower  Pain Description 1: Aching  Pain Intervention(s) 1: Medication (see MAR)       LAB AND IMAGING FINDINGS:     Lab Results   Component Value Date/Time    WBC 9.7 01/22/2023 05:02 AM    HGB 12.8 01/22/2023 05:02 AM    PLATELET 884 (H) 20/80/4699 05:02 AM     Lab Results   Component Value Date/Time    Sodium 138 01/24/2023 02:21 AM    Potassium 3.8 01/24/2023 02:21 AM    Chloride 106 01/24/2023 02:21 AM    CO2 26 01/24/2023 02:21 AM    BUN 8 01/24/2023 02:21 AM    Creatinine 0.74 01/24/2023 02:21 AM    Calcium 8.8 01/24/2023 02:21 AM    Magnesium 2.3 01/21/2023 02:39 AM      Lab Results   Component Value Date/Time    Alk. phosphatase 91 01/22/2023 05:02 AM    Protein, total 7.1 01/22/2023 05:02 AM    Albumin 2.8 (L) 01/22/2023 05:02 AM    Globulin 4.3 (H) 01/22/2023 05:02 AM     Lab Results   Component Value Date/Time    INR 1.0 01/22/2023 05:02 AM    Prothrombin time 13.5 01/22/2023 05:02 AM    aPTT 31.4 12/22/2021 12:47 PM      Lab Results   Component Value Date/Time    Iron 87 12/22/2021 12:47 PM    TIBC 363 12/22/2021 12:47 PM    Iron % saturation 24 12/22/2021 12:47 PM    Ferritin 253 12/22/2021 12:47 PM      No results found for: PH, PCO2, PO2  No components found for: [place]   Lab Results   Component Value Date/Time     (H) 11/06/2021 02:21 AM    CK - MB 1.3 11/06/2021 02:21 AM              Total time: 55 minutes    > 50% counseling / coordination:  Time spent in direct consultation with the patient, medical team, and family     Prolonged service was provided for  []30 min   []75 min in face to face time in the presence of the patient, spent as noted above.   Time Start:   Time End:     Disclaimer: Sections of this note are dictated using utilizing voice recognition software, which may have resulted in some phonetic based errors in grammar and contents. Even though attempts were made to correct all the mistakes, some may have been missed, and remained in the body of the document. If questions arise, please contact our department.

## 2023-01-24 NOTE — PROGRESS NOTES
Interventional Radiology Progress Note    CT A/P reviewed with Dr. Christina Neal. Rib lesion remains best target for biopsy from an IR standpoint. However, per Dr. Christina Neal, bronchoscopy would appear to be superior modality for tissue diagnosis once patient's acute infectious process is resolved. If image-guided biopsy of lytic rib lesion is still desired, will require general anesthesia to proceed (significant pain associated with rib biopsy).     Will discuss with PCCM team.    Thank you,  SKYE Acuña-C  [phone]

## 2023-01-24 NOTE — PROGRESS NOTES
Called hospitalist Dr. Mary Beth Quinteros concerning patients order for CT. Physician gave order that patient can go off Telemetry monitoring for CT.

## 2023-01-24 NOTE — PROGRESS NOTES
[facility] Pharmacokinetic Monitoring Service - Vancomycin    Consulting Provider: Ellena Paget, MD  Indication: Bloodstream Infection  Target Concentration: Goal AUC/ADRIEN 400-600 mg*hr/L  Day of Therapy: 3  Additional Antimicrobials: Zosyn    Pertinent Laboratory Values: Wt Readings from Last 1 Encounters:   01/23/23 71.2 kg (157 lb)     Temp Readings from Last 1 Encounters:   01/24/23 98.5 °F (36.9 °C)     Recent Labs     01/24/23  0221 01/23/23  0242 01/22/23  0502   CREA 0.74 0.71 0.73   BUN 8 9 15       Estimated Creatinine Clearance: 63 mL/min (based on SCr of 0.74 mg/dL). Recent Labs     01/22/23  0502   WBC 9.7       Pertinent Cultures:  Culture Date Source Results   01/23 Blood Pending   01/22 Sputum Pending   01/21 Blood Gram positive cocci IN CLUSTERS GROWING IN 1 OF 2 BOTTLES   01/21 Blood PROBABLE STAPHYLOCOCCUS SPECIES, COAGULASE NEGATIVE GROWING IN BOTH BOTTLES DRAWN NO SITE   MRSA Nasal Swab: N/A. Non-respiratory infection. .        Assessment:  Date/Time Current Dose Concentration Timing of Concentration (h) AUC   01/22 1113 1500 mg x 1 - - -   01/22 2349 750 mg q12h - - -   01/23 1232 Change to 1250 mg q18h - - -   01/24 0221 - 10.9 14 443   Note: Serum concentrations collected for AUC dosing may appear elevated if collected in close proximity to the dose administered, this is not necessarily an indication of toxicity    Plan:  Current dosing regimen is therapeutic  Pharmacy will continue to monitor patient and adjust therapy as indicated

## 2023-01-24 NOTE — ACP (ADVANCE CARE PLANNING)
Advance Care Planning     General Advance Care Planning (ACP) Conversation      Date of Conversation: 1/23/2923  Conducted with: Patient with Decision Making Capacity    Healthcare Decision Maker:   No healthcare decision makers have been documented. Click here to complete [address] including selection of the Healthcare Decision Maker Relationship (ie \"Primary\")  Today we documented Decision Maker(s) consistent with Legal Next of Kin hierarchy. Patient declined to complete a [place]. She wants all of her 5 children to make healthcare decisions Vanna Galarza will do the right thing\". Patient is  with 5 adult children. She is very private and wants to continue to make her own health care decisions until she can't. Content/Action Overview:   Has NO ACP documents/care preferences - information provided, considering goals and options  Reviewed DNR/DNI and patient- CODE STATUS FULL CODE   Topics discussed: treatment goals and benefit/burden of treatment options    Palliative Team members Efren Finney NP and this writer met with Ms. Hillary Lucio in room. She was alert and aware x4 sitting up in lounge chair. The role of Palliative Medicine was introduced as extra layer of support. She expressed to our team her indecision concerning the biopsy \"each person give me information, then I have more questions\". Palliative team addressed her questions and concerns to the best of our ability. She added she loves her children and grandchildren and wants to be here for them \"I love them and they love me\". She admitted to being \"scared\" and was teary. Team provide compassionate listening and words of support. Team provided education on measures to provide support during MD discussion. She declined these measures stating she is a private person and did not want to share her private business.   Team spent time establishing rapport and trust.  Team spoke openly about the importance of biopsy to establish an official diagnosis and to determine treatment options that may be available to her. At the completion of our discussion, patient was OK with moving forward with the biopsy. Attending hospitalist notified of patient's decision to proceed with biopsy. Palliative team will continue to follow as care decision need to be made. Thank you for this consult.      Haleigh Morton BSN, RN, [facility]  Palliative Medicine Inpatient RN  Palliative COPE Line: [phone]

## 2023-01-24 NOTE — CONSULTS
Interventional Radiology     Consult received from Dr. Lorenzo Ray for evaluation of suspected metastatic disease. Aquiles Washburn is a 71 y.o. female smoker with known hypermetabolic RLL mass, metastatic subcarinal adenopathy, and hypermetabolic RML nodule (PET 8/93/58), consistent pulmonary primary. Per chart review, patient was planned for OP biopsy with IR after 12/2021, but declined biopsy at that time. CT chest 1/21/22 consistent with progression of metastatic disease. Case and images reviewed by Dr. Yeni Flowers. CT abdomen/pelvis with contrast ordered to evaluate for additional evidence of metastatic disease. Given history, will discuss with patient whether she wishes to pursue biopsy at this time. Full consult note to follow.       Thank you,  Faviola Wylie, [facility] [address]

## 2023-01-24 NOTE — PROGRESS NOTES
[facility]  Pulmonary, Critical Care, and Sleep Medicine    Name: Saniya Howell MRN: [id number]   : 1950 Hospital: [facility]   Date: 2023        Pulmonary Consult Progress Note                                              Consult requesting physician: Dr. Carmen Michaels  Reason for Consult: Lung mass    IMPRESSION:   Large RLL pulmonary mass with right hilar and subcarinal LAD now with lytic lesions in right lateral 6th rib, L posterior lateral 6th rib and T4 left transverse process. The pulmonary mass and subcarinal LN were both PET avid a year ago and have progressed all pointing to a malignancy likely primary lung. Post-obstructive pneumonia versus extension of malignancy RML/RLL. WBC elevated pointing to acute infectious process. No O2 requirement. Tobacco use  Anxiety disorder  Osteoarthritis  Gram + cocci in BC x 2- ID consulted, Probable Coag negative staph, does not appear toxic, afebrile        RECOMMENDATIONS:   IR Consult place today to see if lytic rib lesions can be biopsied as that would be safer than EBUS in the context of active pneumonia and preferred for staging. Patient decline IR bx a year ago. IR team evaluating and will discuss with patient    Focus on pulmonary hygiene  Maintain aspiration precautions  SpO2 goals 92-95%- AVOID OVER OXYGENATION    EBUS can also be considered as an option if not able to obtain biopsy of rib lesions, prefer to wait until pneumonia has at least improved and bacteremia resolved. Defer anti-infective therapy to ID  Nutrition: per primary team  Replace electrolytes as needed     GI Prophylaxis: per primary team    DVT Prophylaxis: not currently on DVT prophylaxis - recommend starting subq heparin as high risk given likely underlying active malignancy and now infection. Smoking cessation recommended.   Palliative Care Evaluation and discussion of goals or care   Will follow       Subjective/History:     Will Mina Demarcus Acuña is a 71 y.o. female with PMHx significant for RLL lung mass, tobacco use, anxiety who presented with several day history of increasing chest pain, cough and dyspnea. Denies fever or chills. Decreased appetite and weight loss noted over the last 2 weeks. Has history of RLL lung mass that has been followed for over 1 year and is progressive, and PET positive. Offered biopsy by her oncologist in April 2022 but did not pursue. CTA today showed extension of pulmonary mass, and hilar subcarinal LAD with likely post-obstructive PNA versus extension of likely maligancy. Also note lytic lesions on ribs and T4 transverse process. Pulmonary consulted for recommendations regarding biopsy. Upon extensive discussion with patient she does seem willing to pursue biopsy at this time but would like to go home and follow-up as an outpatient if possible. Patient does smoke daily about a 1/2 pack per day, has been smoking for the last 20 years. Patient not requiring any supplemental O2. Interval Evaluation  1/22/2023  LOS: 3    Patient resting in bed. We had a more in depth conversation today, about the patient's lung mass and that a diagnosis needs to be obtained, in order to properly address these findings. I requested that IR evaluate the patient for a possible rib biopsy. I discussed that we may be able to establish a diagnosis from that. If that were the case, then we may not need to pursue a bronchoscopy. I discussed, that depending on the results we get from the rib biopsy, it is still possible that she may need a bronchoscopy. I favor the rib biopsy first due to the inflammation of lung tissue from the patient's pneumonia. The  patient also reports that she was evaluated for this condition at [facility] about a year ago. She states she did not pursue a diagnostic procedure at that time, because she did not fully understand the seriousness of her condition.   She plans to discuss above with her daughter today, but that she is leaning towards obtaining the biopsy. She has had many visitors/family come to see her. She reports that she only wants her daughter involved in her medical issues. She also expressed financial concerns. She is tolerating PO- No nausea or emesis  No Hemoptysis  Intermittent back discomfort with deep inspiration and coughing. No anterior chest pain  She is limited in her walking  Afebrile  ROS per HPI- other systems negative    Patient Vitals for the past 24 hrs:   Temp Pulse Resp BP SpO2   01/24/23 1103 98.3 °F (36.8 °C) 81 18 110/63 95 %   01/24/23 0411 98.5 °F (36.9 °C) 79 18 107/71 92 %   01/24/23 0000 98.2 °F (36.8 °C) 80 18 112/73 95 %   01/23/23 1938 98.5 °F (36.9 °C) 86 17 (!) 147/83 95 %   01/23/23 1550 98.6 °F (37 °C) 74 17 (!) 146/86 95 %   01/23/23 1212 98.9 °F (37.2 °C) 72 18 (!) 157/75 95 %     Inpat Anti-Infectives (From admission, onward)       Start     Ordered Stop    01/23/23 1200  vancomycin (VANCOCIN) 1250 mg in  ml infusion  1,250 mg,   IntraVENous,   EVERY 18 HOURS        See Hyperspace for full Linked Orders Report.     01/23/23 0850 --    01/22/23 1300  piperacillin-tazobactam (ZOSYN) 3.375 g in 0.9% sodium chloride (MBP/ADV) 100 mL MBP  3.375 g,   IntraVENous,   EVERY 8 HOURS         01/22/23 1234 01/29/23 1259                    Allergies   Allergen Reactions    Aspirin Nausea Only        Past Medical History:   Diagnosis Date    Angina at rest [facility])     Anxiety     CVA, old, facial weakness 2012    left ptosis    Dental caries     Osteoarthritis     Osteopenia     Right lower lobe lung mass 04/15/2022    Offered Biopsy and couldn't decide between PAlliative Care and Biopsy        Past Surgical History:   Procedure Laterality Date    HX WISDOM TEETH EXTRACTION          Family History   Problem Relation Age of Onset    Cancer Daughter         Unspecified Cancer    Cancer Cousin         Unspecified Cancer x2        Social History     Tobacco Use    Smoking status: Some Days     Packs/day: 0.50     Years: 15.00     Pack years: 7.50     Types: Cigarettes    Smokeless tobacco: Never    Tobacco comments:     Patient reports that she is a Some Day Smoker x20 years (as of 1/21/2023). Substance Use Topics    Alcohol use: Yes     Comment: 8 drinks per week          Prior to Admission medications    Medication Sig Start Date End Date Taking? Authorizing Provider   ibuprofen (MOTRIN) 400 mg tablet Take 600 mg by mouth every six (6) hours as needed for Pain. Provider, Historical   acetaminophen (TYLENOL) 325 mg tablet Take 2 Tablets by mouth every six (6) hours as needed for Pain.  9/7/22   SKYE Hammer       Current Facility-Administered Medications   Medication Dose Route Frequency    vancomycin (VANCOCIN) 1,000 mg in 0.9% sodium chloride 250 mL (VIAL-MATE)  1,000 mg IntraVENous Q12H    heparin (porcine) injection 5,000 Units  5,000 Units SubCUTAneous Q8H    piperacillin-tazobactam (ZOSYN) 3.375 g in 0.9% sodium chloride (MBP/ADV) 100 mL MBP  3.375 g IntraVENous Q8H    guaiFENesin ER (MUCINEX) tablet 600 mg  600 mg Oral Q12H    sodium chloride (NS) flush 5-40 mL  5-40 mL IntraVENous Q8H    sodium chloride (NS) flush 5-40 mL  5-40 mL IntraVENous PRN    acetaminophen (TYLENOL) tablet 650 mg  650 mg Oral Q6H PRN    Or    acetaminophen (TYLENOL) suppository 650 mg  650 mg Rectal Q6H PRN    polyethylene glycol (MIRALAX) packet 17 g  17 g Oral DAILY PRN    ondansetron (ZOFRAN ODT) tablet 4 mg  4 mg Oral Q8H PRN    Or    ondansetron (ZOFRAN) injection 4 mg  4 mg IntraVENous Q6H PRN    melatonin tablet 5 mg  5 mg Oral QHS PRN    naloxone (NARCAN) injection 0.4 mg  0.4 mg IntraVENous EVERY 2 MINUTES AS NEEDED    oxyCODONE-acetaminophen (PERCOCET) 5-325 mg per tablet 1 Tablet  1 Tablet Oral Q6H PRN    albuterol/ipratropium (DUONEB) neb solution  1 Dose Nebulization Q6H PRN         Objective:   Vital Signs:    Visit Vitals  /63 (BP 1 Location: Left lower arm, BP Patient Position: Sitting)   Pulse 81   Temp 98.3 °F (36.8 °C)   Resp 18   Ht 5' 1\" (1.549 m)   Wt 71.2 kg (157 lb)   SpO2 95%   BMI 29.66 kg/m²       O2 Device: None (Room air)       Temp (24hrs), Av.5 °F (36.9 °C), Min:98.2 °F (36.8 °C), Max:98.9 °F (37.2 °C)       Intake/Output:   Last shift:      No intake/output data recorded. Last 3 shifts: No intake/output data recorded. No intake or output data in the 24 hours ending 23 1126      Physical Exam:     General:  Appeared underweight, +chronicly ill, no distress, alert and oriented  Head:   Normocephalic, without obvious abnormality, atraumatic. Eye:   Conjunctivae/corneas clear. PERRLA, no scleral icterus, no pallor, no cyanosis  Nose:   Nares normal. Septum midline. Mucosa normal without erythema/exudate. No drainage/discharge. No sinus tenderness. Throat:  Lips, mucosa, and tongue normal. Teeth and gums normal. No tonsillar enlargement, no erythema, no exudates, no oral thrush  Neck:   Supple, symmetric, thyroid: no enlargement/tenderness/nodule, no JVD,  no lymphadenopathy. Trachea midline  Back & spine: Symmetric,   Chest wall: Mild tenderness to lateral chest wall bilaterally  Lung:   Decreased breath sounds on right without wheezing, she does have some scattered rhonchi, left lung field normal.+ some wet and coarse breath sounds again noted  Heart:   Regular rate & rhythm. S1 S2 present, no murmur, no gallop, no click, no rub  Abdomen:  Soft, NT, ND, +BS  Extremities:  No pedal edema, no cyanosis, no clubbing  Pulses: 2+ and symmetric in DP  Lymphatic:  No cervical, supraclavicular nodes palpated   Musculoskeletal: No joint swelling or tenderness.   Neurologic:  Grossly non focal.+ generalized deconditioning        Data:         Chemistry Recent Labs     23  0221 23  0242 23  0502   * 83 80    137 138   K 3.8 3.9 4.1    107 107   CO2 26 27 28   BUN 8 9 15   CREA 0.74 0.71 0.73   CA 8.8 8.8 9.2   AGAP 6 3 3   BUCR 11* 13 21*   AP  --   --  91   TP  --   --  7.1   ALB  --   --  2.8*   GLOB  --   --  4.3*   AGRAT  --   --  0.7*        Lactic Acid Lactic acid   Date Value Ref Range Status   11/07/2021 1.5 0.4 - 2.0 MMOL/L Final     No results for input(s): LAC in the last 72 hours. Liver Enzymes Protein, total   Date Value Ref Range Status   01/22/2023 7.1 6.4 - 8.2 g/dL Final     Albumin   Date Value Ref Range Status   01/22/2023 2.8 (L) 3.4 - 5.0 g/dL Final     Globulin   Date Value Ref Range Status   01/22/2023 4.3 (H) 2.0 - 4.0 g/dL Final     A-G Ratio   Date Value Ref Range Status   01/22/2023 0.7 (L) 0.8 - 1.7   Final     Alk. phosphatase   Date Value Ref Range Status   01/22/2023 91 45 - 117 U/L Final     Recent Labs     01/22/23  0502   TP 7.1   ALB 2.8*   GLOB 4.3*   AGRAT 0.7*   AP 91        CBC w/Diff Recent Labs     01/22/23  0502   WBC 9.7   RBC 4.31   HGB 12.8   HCT 35.5   *   GRANS 69   LYMPH 18*   EOS 2        Cardiac Enzymes No results found for: CPK, CK, CKMMB, CKMB, RCK3, CKMBT, CKNDX, CKND1, SHAHLA, TROPT, TROIQ, SERGEY, TROPT, TNIPOC, BNP, BNPP     BNP No results found for: BNP, BNPP, XBNPT     Coagulation Recent Labs     01/22/23  0502   PTP 13.5   INR 1.0         Thyroid  Lab Results   Component Value Date/Time    TSH 1.45 12/22/2021 12:47 PM          Lipid Panel No results found for: CHOL, CHOLPOCT, CHOLX, CHLST, CHOLV, 424232, HDL, HDLP, LDL, LDLC, DLDLP, 623672, VLDLC, VLDL, TGLX, TRIGL, TRIGP, TGLPOCT, CHHD, CHHDX     ABG No results for input(s): PHI, PHI, POC2, PCO2I, PO2, PO2I, HCO3, HCO3I, FIO2, FIO2I in the last 72 hours.      Urinalysis Lab Results   Component Value Date/Time    Color DARK YELLOW 11/06/2021 05:22 AM    Appearance CLOUDY 11/06/2021 05:22 AM    Specific gravity >1.030 (H) 11/06/2021 05:22 AM    pH (UA) 5.5 11/06/2021 05:22 AM    Protein 100 (A) 11/06/2021 05:22 AM    Glucose Negative 11/06/2021 05:22 AM    Ketone TRACE (A) 11/06/2021 05:22 AM    Bilirubin Negative 11/06/2021 05:22 AM    Urobilinogen 1.0 11/06/2021 05:22 AM    Nitrites Negative 11/06/2021 05:22 AM    Leukocyte Esterase LARGE (A) 11/06/2021 05:22 AM    Epithelial cells FEW 11/06/2021 05:22 AM    Bacteria 2+ (A) 11/06/2021 05:22 AM    WBC 25 to 30 11/06/2021 05:22 AM    RBC Negative 11/06/2021 05:22 AM        Micro  Results       Procedure Component Value Units Date/Time    CULTURE, BLOOD [[id number]] Collected: 01/23/23 1449    Order Status: Completed Specimen: Blood Updated: 01/24/23 0644     Special Requests: NO SPECIAL REQUESTS        Culture result: NO GROWTH AFTER 15 HOURS       CULTURE, BLOOD [[id number]] Collected: 01/23/23 1449    Order Status: Completed Specimen: Blood Updated: 01/24/23 0644     Special Requests: NO SPECIAL REQUESTS        Culture result: NO GROWTH AFTER 15 HOURS       CULTURE, RESPIRATORY/SPUTUM/BRONCH Merle Pucker STAIN [[id number]]     Order Status: Sent Specimen: Sputum     RESPIRATORY VIRUS PANEL W/COVID-19, PCR [[id number]] Collected: 01/21/23 0700    Order Status: Completed Specimen: Nasopharyngeal Updated: 01/21/23 0817     Adenovirus Not detected        Coronavirus 229E Not detected        Coronavirus HKU1 Not detected        Coronavirus CVNL63 Not detected        Coronavirus OC43 Not detected        SARS-CoV-2, PCR Not detected        Metapneumovirus Not detected        Rhinovirus and Enterovirus Not detected        Influenza A Not detected        Influenza B Not detected        Parainfluenza 1 Not detected        Parainfluenza 2 Not detected        Parainfluenza 3 Not detected        Parainfluenza virus 4 Not detected        RSV by PCR Not detected        B. parapertussis, PCR Not detected        Bordetella pertussis - PCR Not detected        Chlamydophila pneumoniae DNA, QL, PCR Not detected        Mycoplasma pneumoniae DNA, QL, PCR Not detected       CULTURE, BLOOD [[id number]]  (Abnormal) Collected: 01/21/23 0655    Order Status: Completed Specimen: Blood Updated: 01/23/23 1323     Special Requests: NO SPECIAL REQUESTS        GRAM STAIN       ANAEROBIC BOTTLE Gram positive cocci IN GROUPS                  SMEAR CALLED TO AND CORRECTLY REPEATED BY: YOLANDA HOANG [facility] RN 3N [id number] 1/22/23 BY VMB           Culture result:       Gram positive cocci IN CLUSTERS GROWING IN 1 OF 2 BOTTLES DRAWN NO SITE REFER TO [id number] FOR ID AND SENSITIVITIES          CULTURE, BLOOD [[id number]]  (Abnormal) Collected: 01/21/23 0645    Order Status: Completed Specimen: Blood Updated: 01/22/23 1912     Special Requests: NO SPECIAL REQUESTS        GRAM STAIN       AEROBIC AND ANAEROBIC BOTTLES Gram positive cocci IN GROUPS                  SMEAR CALLED TO AND CORRECTLY REPEATED BY: YOLANDA [facility] RN [id number] 1/22/23 BY VMB           Culture result:       PROBABLE STAPHYLOCOCCUS SPECIES, COAGULASE NEGATIVE GROWING IN BOTH BOTTLES DRAWN NO SITE          CULTURE, RESPIRATORY/SPUTUM/BRONCH Gabriel Grand [[id number]] Collected: 01/21/23 0645    Order Status: Canceled Specimen: Sputum     BLOOD CULTURE ID PANEL [[id number]]  (Abnormal) Collected: 01/21/23 0645    Order Status: Completed Specimen: Blood Updated: 01/22/23 2006     Acc. no. from [address] [place] [address] [id number]     Enterococcus faecalis Not detected        Enterococcus faecium Not detected        Listeria monocytogenes Not detected        Staphylococcus Detected        Staphylococcus aureus Not detected        Staph epidermidis Detected        Staph lugdunensis Not detected        Streptococcus Not detected        Streptococcus agalactiae (Group B) Not detected        Streptococcus pneumoniae Not detected        Streptococcus pyogenes (Group A) Not detected        Acinetobacter calcoaceticus-baumanii complex Not detected        Bacteroides fragilis Not detected        Enterobacterales species Not detected        Enterobacter cloacae complex Not detected        Escherichia coli Not detected        Klebsiella aerogenes Not detected        Klebsiella oxytoca Not detected        Klebsiella pneumoniae group Not detected        Proteus Not detected        Salmonella Not detected        Serratia marcescens Not detected        Haemophilus influenzae Not detected        Neisseria meningitidis Not detected        Pseudomonas aeruginosa Not detected        Steno maltophilia Not detected        Candida albicans Not detected        Candida auris Not detected        Candida glabrata Not detected        Candida krusei Not detected        Candida parapsilosis Not detected        Candida tropicalis Not detected        Crypto neoformans/gattii Not detected        RESISTANT GENES:            MECA/C (Methicillin resistant gene) Not detected        Comment       False positive results may rarely occur. Correlate with clinical,epidemiologic, and other laboratory findings           Comment: Please see [facility] Interpretation Guide in EPIC Links                  XR (Most Recent). CXR reviewed by me and compared with previous CXR Results from Hospital Encounter encounter on 01/21/23    XR CHEST PORT    Narrative  EXAM: XR CHEST PORT    CLINICAL INDICATION/HISTORY : Chest pain. COMPARISON: September 7, 2022    TECHNIQUE: 1 VIEWS    FINDINGS:  EKG leads and wires overlie the chest.  Right lower lung consolidation and small right pleural effusion. Yoel Majano Heart is normal in size. Lytic lesion and suspected pathological fracture lateral right rib 6..    Impression  1. Right lower lung consolidation. Small right pleural effusion. 2.  Lytic lesion with pathological fracture lateral right rib 6.  3.  CT has been performed at the time of interpretation-please refer to that  report.        CT (Most Recent) Results from Hospital Encounter encounter on 01/21/23    CT ABD PELV W CONT    Narrative  CT ABDOMEN AND PELVIS WITH ENHANCEMENT    INDICATION: Smoking history with hypermetabolic right lower lobe lung mass,  metastatic subcarinal adenopathy, hypermetabolic right middle lobe nodule with  subsequent CT demonstrating progression of metastatic disease. TECHNIQUE: Axial images obtained of the abdomen and pelvis following the  uneventful administration nonionic intravenous contrast.  Coronal and sagittal  reformatted images of the abdomen and pelvis were obtained. All CT scans at this facility are performed using dose optimization technique as  appropriate to a performed exam, to include automated exposure control,  adjustment of the mA and/or kV according to patient size (including appropriate  matching first site-specific examinations), or use of iterative reconstruction  technique. COMPARISON: 1/21/2023; 10/14/2022; 1/28/2022    ABDOMEN FINDINGS:    Liver: Unremarkable. Spleen: Unremarkable. Pancreas: Unremarkable. Biliary: Unremarkable. Bowel: Moderate colonic stool burden. Bowel is normal in caliber. Peritoneum/ Retroperitoneum: Unremarkable. Lymph Nodes: There is a new 1 cm right retrocrural lymph node (series 2, image  26). New centrally necrotic 1.9 x 1.3 cm gastrohepatic node (34). Adrenal Glands: Similar bilateral nodular thickening. Kidneys: A few too small to characterize lesions, statistically most likely  benign. Vessels: Moderate atherosclerosis. Coarse calcification at the renal ostia. PELVIS FINDINGS:    Bladder/ Pelvic Organs: Unremarkable. Lung Base: The volume of the consolidation in the right lower lobe has worsened  since 1/21/2023. The small right pleural effusion is similar. Similar confluent  consolidation between the right lower and right middle lobes. Otherwise similar  chest findings. Bones: Likely injection granulomas anterior pelvic wall. Lumbar facet  hypertrophy and arthropathy. Mild increased ill-defined sclerosis left iliac. Similar right lateral sixth rib bone lesion with soft tissue component and left  posterolateral sixth rib lytic bone lesion with pathologic fracture and soft  tissue component. Impression  1.   New metastatic right retrocrural and gastrohepatic lymph nodes since  1/28/2022 compatible with progression. 2.  Worsened volume of consolidation right lower lobe, likely postobstructive  pneumonitis. Similar small right pleural effusion and confluent consolidation  otherwise between the right lower and right middle lobes. Similar chest findings  otherwise. 3.  Mild increased nonspecific osseous sclerosis left iliac, possibly additional  site of metastatic disease but not definitive. 4.  Similar nodular adrenal thickening. 5.  Moderate atherosclerosis with coarse calcification renal ostia. 6.  Moderate colonic stool burden. ECHO  01/21/23    ECHO ADULT COMPLETE 01/23/2023 1/23/2023    Interpretation Summary    No obvious valvular vegetations visualized. Left Ventricle: Normal left ventricular systolic function with a visually estimated EF of 65 - 70%. Left ventricle size is normal. LVIDd is 3.8 cm. Moderate septal thickening. Normal wall motion. Diastolic dysfunction present (grade I) with normal LV EF. Tricuspid Valve: Normal RVSP. The estimated RVSP is 29 mmHg. Aorta: Normal sized ascending aorta. Mildly dilated aortic root. Ao Root diameter is 3.3 cm. Ao Ascending diameter is 3.6 cm. Descending aorta is not well-visualized. Pericardium: Ascites present. Signed by: Wale Ahmadi MD on 1/23/2023 12:06 PM         PFT No flowsheet data found.      Other      Recent Results (from the past 24 hour(s))   CULTURE, BLOOD    Collection Time: 01/23/23  2:49 PM    Specimen: Blood   Result Value Ref Range    Special Requests: NO SPECIAL REQUESTS      Culture result: NO GROWTH AFTER 15 HOURS     CULTURE, BLOOD    Collection Time: 01/23/23  2:49 PM    Specimen: Blood   Result Value Ref Range    Special Requests: NO SPECIAL REQUESTS      Culture result: NO GROWTH AFTER 15 HOURS     METABOLIC PANEL, BASIC    Collection Time: 01/24/23  2:21 AM   Result Value Ref Range    Sodium 138 136 - 145 mmol/L    Potassium 3.8 3.5 - 5.5 mmol/L    Chloride 106 100 - 111 mmol/L CO2 26 21 - 32 mmol/L    Anion gap 6 3.0 - 18 mmol/L    Glucose 120 (H) 74 - 99 mg/dL    BUN 8 7.0 - 18 MG/DL    Creatinine 0.74 0.6 - 1.3 MG/DL    BUN/Creatinine ratio 11 (L) 12 - 20      eGFR >60 >60 ml/min/1.73m2    Calcium 8.8 8.5 - 10.1 MG/DL   VANCOMYCIN, RANDOM    Collection Time: 01/24/23  2:21 AM   Result Value Ref Range    Vancomycin, random 10.9 5.0 - 40.0 UG/ML           Complex decision making was made in the evaluation and management plans during this consultation. More than 50% of time was spent in counseling and coordination of care including review of data and discussion with other team members.        Alex Bauer DO, [facility]    [facility]  Pulmonary, Critical Care, and Sleep Medicine

## 2023-01-24 NOTE — PROGRESS NOTES
0510: Bedside shift change report given to Kaylie Long (oncoming nurse) by Deja Rod RN (offgoing nurse). Report included the following information SBAR, Kardex, Intake/Output, MAR, and Cardiac Rhythm NSR .     0705: Wound Prevention Checklist    Patient: Israel Rogers (29 y.o. female)  Date: 1/24/2023  Diagnosis: Chest pain [R07.9]  Lung cancer metastatic to bone ([facility] [address] [C34.90, C79.51]  Community acquired pneumonia [J18.9] Community acquired pneumonia    Precautions:         []  Heel prevention boots placed on patient    []  Patient turned q2h during shift    []  Lift team ordered    []  Patient on Kerwin bed/Specialty bed    []  Each Wound is documented during shift (Stage, Color, drainage, odor, measurements, and dressings)    []  Dual skin check done with Deja Rod, RN    Silvana Rubin RN     1440: Bedside shift change report given to Moreno Rivas RN (oncoming nurse) by Ramesh Escobedo RN (offgoing nurse). Report included the following information SBAR, Kardex, Intake/Output, MAR, and Cardiac Rhythm NSR .

## 2023-01-24 NOTE — PROGRESS NOTES
[facility]  Progress Note    Patient: Silvana Monson Age: 71 y.o. : 1950 MR#: [id number] SSN: [id number]  Date/Time: 2023     Subjective:     Pt states she feels much better compared to when she came in. Assessment/Plan:   81-year-old female w/ h/o tobacco abuse and known right lower lobe lung mass not previously worked up due to pt being lost to follow up admitted after she presented to the emergency room with complaints of chest pain, shortness of breath. ER evaluation-CTA chest showed complete consolidation of the right middle lobe and extensive consolidation in the right lower lobe, likely representing a combination of tumor associated postobstructive atelectasis versus pneumonia. Findings are consistent with tumor worsening including increasing metastatic adenopathy, also noted to have rib lytic metastases with pathologic fractures and T4 left transverse process lytic metastases. Pneumonia likely postobstructive with bacteremia and underlying cancer-continue broad-spectrum IV antibiotics, ID consulted  Coag negative Staph bacteremia, / bottles, repeat blood cx on  pending. TTE done -->no obvious valvular vegetations,   Lung mass suggestive of malignancy-pulmonology on board, will defer further management to them. IR consulted for bx of lytic rib lesions, EBUS under consideration once bacteremia clears and pneumonia improved, if not able to bx rib lesions.   Chronic smoker-patient has been counseled to stop, mentions that she recently quit smoking about 2 weeks ago  DVT prophylaxis-Heparin  Full code                Sabine Demarco MD  23      Case discussed with:  [x]Patient  []Family  [x]Nursing  []Case Management  DVT Prophylaxis:  []Lovenox  [x]Hep SQ  []SCDs  []Coumadin   []On Heparin gtt    Objective:   VS: Visit Vitals  BP (!) 147/83 (BP 1 Location: Right upper arm, BP Patient Position: Lying;Sitting)   Pulse 86   Temp 98.5 °F (36.9 °C)   Resp 17   Ht 5' 1\" (1.549 m)   Wt 71.2 kg (157 lb)   SpO2 95%   BMI 29.66 kg/m²      Tmax/24hrs: Temp (24hrs), Av.4 °F (36.9 °C), Min:97.9 °F (36.6 °C), Max:98.9 °F (37.2 °C)  IOBRIEFNo intake or output data in the 24 hours ending 23      General:  Alert, cooperative, no acute distress    Pulmonary:  Decreased BS in bases   Cardiovascular: Regular rate and Rhythm. GI:  Soft, Non distended, Non tender. + Bowel sounds. Extremities:  No edema, cyanosis, clubbing. No calf tenderness. Neurologic: Alert and oriented X 3. No acute neuro deficits.   Additional:    Medications:   Current Facility-Administered Medications   Medication Dose Route Frequency    vancomycin (VANCOCIN) 1250 mg in  ml infusion  1,250 mg IntraVENous Q18H    heparin (porcine) injection 5,000 Units  5,000 Units SubCUTAneous Q8H    piperacillin-tazobactam (ZOSYN) 3.375 g in 0.9% sodium chloride (MBP/ADV) 100 mL MBP  3.375 g IntraVENous Q8H    guaiFENesin ER (MUCINEX) tablet 600 mg  600 mg Oral Q12H    sodium chloride (NS) flush 5-40 mL  5-40 mL IntraVENous Q8H    sodium chloride (NS) flush 5-40 mL  5-40 mL IntraVENous PRN    acetaminophen (TYLENOL) tablet 650 mg  650 mg Oral Q6H PRN    Or    acetaminophen (TYLENOL) suppository 650 mg  650 mg Rectal Q6H PRN    polyethylene glycol (MIRALAX) packet 17 g  17 g Oral DAILY PRN    ondansetron (ZOFRAN ODT) tablet 4 mg  4 mg Oral Q8H PRN    Or    ondansetron (ZOFRAN) injection 4 mg  4 mg IntraVENous Q6H PRN    melatonin tablet 5 mg  5 mg Oral QHS PRN    naloxone (NARCAN) injection 0.4 mg  0.4 mg IntraVENous EVERY 2 MINUTES AS NEEDED    oxyCODONE-acetaminophen (PERCOCET) 5-325 mg per tablet 1 Tablet  1 Tablet Oral Q6H PRN    albuterol/ipratropium (DUONEB) neb solution  1 Dose Nebulization Q6H PRN       Imaging:   XR Results (most recent):  Results from Hospital Encounter encounter on 23    XR CHEST PORT    Narrative  EXAM: XR CHEST PORT    CLINICAL INDICATION/HISTORY : Chest pain. COMPARISON: September 7, 2022    TECHNIQUE: 1 VIEWS    FINDINGS:  EKG leads and wires overlie the chest.  Right lower lung consolidation and small right pleural effusion. Heddy  Heart is normal in size. Lytic lesion and suspected pathological fracture lateral right rib 6..    Impression  1. Right lower lung consolidation. Small right pleural effusion. 2.  Lytic lesion with pathological fracture lateral right rib 6.  3.  CT has been performed at the time of interpretation-please refer to that  report. CT Results (most recent):  Results from Hospital Encounter encounter on 01/21/23    CTA CHEST W OR W WO CONT    Narrative  EXAMINATION: CTA chest pulmonary    INDICATION: Chest pain    COMPARISON: CT 9/7/2022    TECHNIQUE: CT angiography of the pulmonary arteries with IV contrast and 3-D MIP  reformations. All CT scans at this facility are performed using dose  optimization technique as appropriate to a performed exam, to include automated  exposure control, adjustment of the mA and/or kV according to patient size  (including appropriate matching first site specific examinations), or use of  iterative reconstruction technique. FINDINGS:    Pulmonary arteries: No pulmonary artery filling defect to suggest pulmonary  embolus. No specific evidence of right heart strain. Cardiovascular: Pulmonary arteries as above. Minimal burden of atherosclerotic  calcifications. Thoracic aorta normal in course and caliber. Heart size normal.    Mediastinum: Imaged thyroid unremarkable. Conglomerate appearing right hilar and  subcarinal suspected jamar mass, difficult to measure due to plaque-like  amorphous shape. Esophagus nondistended, and distal segment inseparable from  suspected jamar mass described above. Pleura: Small right pleural effusion. No pneumothorax.     Lungs/airways: Essentially complete consolidation of the right middle lobe, and  extensive confluent consolidation in the right lower lobe. Additional patchy  peripheral consolidation in the right lower lobe, and peripheral right upper  lobe nodule measuring 0.9 x 0.6 cm. Biapical subpleural nodularity may represent  scarring. Upper abdomen: No acute findings. Miscellaneous: Superficial soft tissues unremarkable. Bones: Lytic foci with associated pathologic fractures at right lateral sixth  rib and left posterior lateral sixth rib. Left T4 transverse process lytic  lesion    Impression  Essentially complete consolidation of the right middle lobe, and extensive  consolidation in the right lower lobe, may represent a combination of tumor  involvement or postobstructive atelectasis versus pneumonia. Additional patchy  areas of consolidation in the right lung as above may be  infectious/inflammatory.  -Small right pleural effusion, possibly parapneumonic or malignant.  -Suspected conglomerate right hilar and subcarinal adenopathy, grossly increased  since prior.  -Findings consistent with tumor worsening including increasing metastatic  adenopathy. No evidence of pulmonary embolus or right heart strain. Rib lytic metastases with pathologic fractures, and T4 left transverse process  lytic metastasis, new since prior. See additional details above. MRI Results (most recent):  No results found for this or any previous visit.         Labs:    Recent Results (from the past 48 hour(s))   METABOLIC PANEL, COMPREHENSIVE    Collection Time: 01/22/23  5:02 AM   Result Value Ref Range    Sodium 138 136 - 145 mmol/L    Potassium 4.1 3.5 - 5.5 mmol/L    Chloride 107 100 - 111 mmol/L    CO2 28 21 - 32 mmol/L    Anion gap 3 3.0 - 18 mmol/L    Glucose 80 74 - 99 mg/dL    BUN 15 7.0 - 18 MG/DL    Creatinine 0.73 0.6 - 1.3 MG/DL    BUN/Creatinine ratio 21 (H) 12 - 20      eGFR >60 >60 ml/min/1.73m2    Calcium 9.2 8.5 - 10.1 MG/DL    Bilirubin, total 0.9 0.2 - 1.0 MG/DL    ALT (SGPT) 37 13 - 56 U/L    AST (SGOT) 23 10 - 38 U/L Alk. phosphatase 91 45 - 117 U/L    Protein, total 7.1 6.4 - 8.2 g/dL    Albumin 2.8 (L) 3.4 - 5.0 g/dL    Globulin 4.3 (H) 2.0 - 4.0 g/dL    A-G Ratio 0.7 (L) 0.8 - 1.7     CBC WITH AUTOMATED DIFF    Collection Time: 01/22/23  5:02 AM   Result Value Ref Range    WBC 9.7 4.6 - 13.2 K/uL    RBC 4.31 4.20 - 5.30 M/uL    HGB 12.8 12.0 - 16.0 g/dL    HCT 35.5 35.0 - 45.0 %    MCV 82.4 78.0 - 100.0 FL    MCH 29.7 24.0 - 34.0 PG    MCHC 36.1 31.0 - 37.0 g/dL    RDW 13.4 11.6 - 14.5 %    PLATELET 509 (H) 793 - 420 K/uL    MPV 9.7 9.2 - 11.8 FL    NRBC 0.0 0  WBC    ABSOLUTE NRBC 0.00 0.00 - 0.01 K/uL    NEUTROPHILS 69 40 - 73 %    LYMPHOCYTES 18 (L) 21 - 52 %    MONOCYTES 9 3 - 10 %    EOSINOPHILS 2 0 - 5 %    BASOPHILS 1 0 - 2 %    IMMATURE GRANULOCYTES 0 0.0 - 0.5 %    ABS. NEUTROPHILS 6.7 1.8 - 8.0 K/UL    ABS. LYMPHOCYTES 1.8 0.9 - 3.6 K/UL    ABS. MONOCYTES 0.9 0.05 - 1.2 K/UL    ABS. EOSINOPHILS 0.2 0.0 - 0.4 K/UL    ABS. BASOPHILS 0.1 0.0 - 0.1 K/UL    ABS. IMM.  GRANS. 0.0 0.00 - 0.04 K/UL    DF AUTOMATED     PROTHROMBIN TIME + INR    Collection Time: 01/22/23  5:02 AM   Result Value Ref Range    Prothrombin time 13.5 11.5 - 15.2 sec    INR 1.0 0.8 - 1.2     METABOLIC PANEL, BASIC    Collection Time: 01/23/23  2:42 AM   Result Value Ref Range    Sodium 137 136 - 145 mmol/L    Potassium 3.9 3.5 - 5.5 mmol/L    Chloride 107 100 - 111 mmol/L    CO2 27 21 - 32 mmol/L    Anion gap 3 3.0 - 18 mmol/L    Glucose 83 74 - 99 mg/dL    BUN 9 7.0 - 18 MG/DL    Creatinine 0.71 0.6 - 1.3 MG/DL    BUN/Creatinine ratio 13 12 - 20      eGFR >60 >60 ml/min/1.73m2    Calcium 8.8 8.5 - 10.1 MG/DL   ECHO ADULT COMPLETE    Collection Time: 01/23/23 10:40 AM   Result Value Ref Range    IVSd 1.3 (A) 0.6 - 0.9 cm    LVIDd 3.8 (A) 3.9 - 5.3 cm    LVIDs 2.7 cm    LVOT Diameter 2.0 cm    LVPWd 0.9 0.6 - 0.9 cm    LVOT Peak Gradient 5 mmHg    LVOT Mean Gradient 3 mmHg    LVOT SV 70.3 ml    LVOT Peak Velocity 1.1 m/s    LVOT VTI 22.4 cm RV Free Wall Peak S' 19 cm/s    LA Volume A/L 34 mL    LA Volume 2C 25 22 - 52 mL    LA Volume 4C 38 22 - 52 mL    AV Area by Peak Velocity 2.0 cm2    AV Area by VTI 2.3 cm2    AV Peak Gradient 12 mmHg    AV Mean Gradient 6 mmHg    AV Peak Velocity 1.7 m/s    AV Mean Velocity 1.2 m/s    AV VTI 30.1 cm    MV A Velocity 0.99 m/s    MV E Wave Deceleration Time 257.7 ms    MV E Velocity 0.60 m/s    LV E' Lateral Velocity 5 cm/s    LV E' Septal Velocity 5 cm/s    TAPSE 2.4 1.7 cm    TR Peak Gradient 26 mmHg    TR Max Velocity 2.57 m/s    Ascending Aorta 3.6 cm    Aortic Root 3.3 cm    Fractional Shortening 2D 29 28 - 44 %    LVIDd Index 2.24 cm/m2    LVIDs Index 1.59 cm/m2    LV RWT Ratio 0.47     LV Mass 2D 134.7 67 - 162 g    LV Mass 2D Index 79.2 43 - 95 g/m2    MV E/A 0.61     E/E' Ratio (Averaged) 12.00     E/E' Lateral 12.00     E/E' Septal 12.00     LA Volume Index A/L 20 16 - 34 mL/m2    LVOT Stroke Volume Index 41.4 mL/m2    LVOT Area 3.1 cm2    LA Volume Index 2C 15 (A) 16 - 34 mL/m2    LA Volume Index 4C 22 16 - 34 mL/m2    Ao Root Index 1.94 cm/m2    Ascending Aorta Index 2.12 cm/m2    AV Velocity Ratio 0.65     LVOT:AV VTI Index 0.74     COSME/BSA VTI 1.4 cm2/m2    COSME/BSA Peak Velocity 1.2 cm2/m2    Est. RA Pressure 3 mmHg    RVSP 29 mmHg       Signed By: Didier Heart MD     January 23, 2023      I spent 35 minutes with the patient in face-to-face consultation, of which greater than 50% was spent in counseling and coordination of care as described above    Disclaimer: Sections of this note are dictated using utilizing voice recognition software. Minor typographical errors may be present. If questions arise, please do not hesitate to contact me or call our department.

## 2023-01-25 ENCOUNTER — APPOINTMENT (OUTPATIENT)
Dept: PHYSICAL THERAPY | Age: 73
End: 2023-01-25

## 2023-01-25 LAB
ANION GAP SERPL CALC-SCNC: 4 MMOL/L (ref 3–18)
BUN SERPL-MCNC: 11 MG/DL (ref 7–18)
BUN/CREAT SERPL: 13 (ref 12–20)
CALCIUM SERPL-MCNC: 9.2 MG/DL (ref 8.5–10.1)
CHLORIDE SERPL-SCNC: 106 MMOL/L (ref 100–111)
CO2 SERPL-SCNC: 28 MMOL/L (ref 21–32)
CREAT SERPL-MCNC: 0.88 MG/DL (ref 0.6–1.3)
GLUCOSE SERPL-MCNC: 138 MG/DL (ref 74–99)
POTASSIUM SERPL-SCNC: 3.6 MMOL/L (ref 3.5–5.5)
SODIUM SERPL-SCNC: 138 MMOL/L (ref 136–145)

## 2023-01-25 PROCEDURE — 74011250636 HC RX REV CODE- 250/636: Performed by: INTERNAL MEDICINE

## 2023-01-25 PROCEDURE — 74011250637 HC RX REV CODE- 250/637: Performed by: INTERNAL MEDICINE

## 2023-01-25 PROCEDURE — 74011000258 HC RX REV CODE- 258: Performed by: INTERNAL MEDICINE

## 2023-01-25 PROCEDURE — 74011000250 HC RX REV CODE- 250: Performed by: INTERNAL MEDICINE

## 2023-01-25 PROCEDURE — 94761 N-INVAS EAR/PLS OXIMETRY MLT: CPT

## 2023-01-25 PROCEDURE — 80048 BASIC METABOLIC PNL TOTAL CA: CPT

## 2023-01-25 PROCEDURE — 99232 SBSQ HOSP IP/OBS MODERATE 35: CPT | Performed by: INTERNAL MEDICINE

## 2023-01-25 PROCEDURE — 74011250637 HC RX REV CODE- 250/637: Performed by: HOSPITALIST

## 2023-01-25 PROCEDURE — 36415 COLL VENOUS BLD VENIPUNCTURE: CPT

## 2023-01-25 PROCEDURE — 65270000046 HC RM TELEMETRY

## 2023-01-25 RX ORDER — LEVOFLOXACIN 750 MG/1
750 TABLET ORAL EVERY 24 HOURS
Status: DISCONTINUED | OUTPATIENT
Start: 2023-01-25 | End: 2023-01-27 | Stop reason: HOSPADM

## 2023-01-25 RX ADMIN — OXYCODONE HYDROCHLORIDE AND ACETAMINOPHEN 1 TABLET: 5; 325 TABLET ORAL at 10:27

## 2023-01-25 RX ADMIN — HEPARIN SODIUM 5000 UNITS: 5000 INJECTION INTRAVENOUS; SUBCUTANEOUS at 10:27

## 2023-01-25 RX ADMIN — SODIUM CHLORIDE, PRESERVATIVE FREE 10 ML: 5 INJECTION INTRAVENOUS at 22:10

## 2023-01-25 RX ADMIN — GUAIFENESIN 600 MG: 600 TABLET, EXTENDED RELEASE ORAL at 10:27

## 2023-01-25 RX ADMIN — VANCOMYCIN HYDROCHLORIDE 1000 MG: 1 INJECTION, POWDER, LYOPHILIZED, FOR SOLUTION INTRAVENOUS at 20:39

## 2023-01-25 RX ADMIN — OXYCODONE HYDROCHLORIDE AND ACETAMINOPHEN 1 TABLET: 5; 325 TABLET ORAL at 20:39

## 2023-01-25 RX ADMIN — SODIUM CHLORIDE, PRESERVATIVE FREE 10 ML: 5 INJECTION INTRAVENOUS at 10:28

## 2023-01-25 RX ADMIN — LEVOFLOXACIN 750 MG: 750 TABLET, FILM COATED ORAL at 16:01

## 2023-01-25 RX ADMIN — OXYCODONE HYDROCHLORIDE AND ACETAMINOPHEN 1 TABLET: 5; 325 TABLET ORAL at 05:07

## 2023-01-25 RX ADMIN — GUAIFENESIN 600 MG: 600 TABLET, EXTENDED RELEASE ORAL at 20:39

## 2023-01-25 RX ADMIN — SODIUM CHLORIDE, PRESERVATIVE FREE 10 ML: 5 INJECTION INTRAVENOUS at 06:06

## 2023-01-25 RX ADMIN — HEPARIN SODIUM 5000 UNITS: 5000 INJECTION INTRAVENOUS; SUBCUTANEOUS at 02:13

## 2023-01-25 RX ADMIN — PIPERACILLIN AND TAZOBACTAM 3.38 G: 3; .375 INJECTION, POWDER, FOR SOLUTION INTRAVENOUS at 04:46

## 2023-01-25 RX ADMIN — VANCOMYCIN HYDROCHLORIDE 1000 MG: 1 INJECTION, POWDER, LYOPHILIZED, FOR SOLUTION INTRAVENOUS at 10:27

## 2023-01-25 NOTE — PROGRESS NOTES
[facility]  (A Division of [address] [place] [address])      Consultation Note      Date of Admission: 1/21/2023    Date of Note: 1/25/2023      Reason for Referral: GPC sepsis, pneumonia  Referring Physician: Dr. Luann Chong from this admission:   1/21 blood culture: 3 out of 4 gram-positive cocci in groups  1/21 respiratory viral panel: Negative    Current Antimicrobials:    Prior Antimicrobials:  Azithromycin 1/21 to present  Ceftriaxone 1/21 to present  Vancomycin 1/22 to present        Assessment:         Staph species sepsis suspect from a pulmonary source:   Postobstructive pneumonia versus enlarging malignancy: in the right middle and lower lobes. Right lower lobe pulmonary mass with hilar and subcarinal LAD as well as  metastasis to the ribs on the right   -1/21 CTA chest with complete consolidation of the right middle lobe and right lower lobe suspicious for tumor plus or minus pneumonia. Small right pleural effusion. 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  Anticipate that we can transition to p.o. upon discharge. -Repeat cultures are negative at 24 hours. -TTE is negative for endocarditis.    -She is not interested in having a PICC line placed for IV antibiotics so we will treat with Zyvox. She is aware that this is not standard of care. Change zosyn to levofloxacin     Repeat blood cx from 1/23 remain negative    CBC, BMP in am    Encouraged patient to finish the work-up for her lung mass and get a biopsy. Advised her that she is continue to be on antibiotics for about 1 more week for her pneumonia and staph sepsis.   Also educated patient that with a large termor burden such as this she may have recurrent pneumonias    Discussed with Dr. Jorge Stewart,   [facility]  [address], [address] [facility], [place] [address]  Office: [phone], Ext [phone]  Mobile/Text: [phone]    Lines / Catheters:  Peripheral    Subjective:   Seen and examined at bedside. Family at bedside this am.  No new events overnight. No pain. No SOB. No fever or chills. HPI:  Juju Lacey is a 71 y.o. female with PMHx significant for RLL lung mass, tobacco use, anxiety who presented with several day history of increasing chest pain, cough and dyspnea. Denies fever or chills. Decreased appetite and weight loss noted over the last 2 weeks. Has history of RLL lung mass that has been followed for over 1 year and is progressive, and PET positive. From my understanding she has not pursued biopsy or treatment for her pulmonary mass although its been known to be present for greater than 1 year. She has been getting progressively more short of breath with an intermittent cough which is what prompted her to come to the emergency department. She has been afebrile. Initial WBCs were 13.4 and have since normalized. She was started on empiric ceftriaxone and azithromycin due to concern for right-sided pneumonia. Blood cultures from  are now positive and 3 out of 4 bottles and she has been started on vancomycin while final cultures and sensitivities are pending. Objective:      Visit Vitals  /84   Pulse 67   Temp 98.1 °F (36.7 °C)   Resp 18   Ht 5' 1\" (1.549 m)   Wt 71.2 kg (157 lb)   SpO2 95%   BMI 29.66 kg/m²     Temp (24hrs), Av.4 °F (36.9 °C), Min:97.9 °F (36.6 °C), Max:98.6 °F (37 °C)        General:   awake alert and oriented, older than states age   Skin:   no rashes or skin lesions noted on limited exam   HEENT:  Normocephalic, atraumatic, PERRL, EOMI, no scleral icterus or pallor; no conjunctival hemmohage;  nasal and oral mucous are moist and without evidence of lesions. No thrush. Dentition good. Neck supple, no bruits.    Lymph Nodes:   no cervical, axillary or inguinal adenopathy   Lungs:   non-labored, coarse breath sounds, no wheezes   Heart:  RRR, s1 and s2; no murmurs rubs or gallops, no edema, + pedal pulses   Abdomen:  soft, non-distended, active bowel sounds, no hepatomegaly, no splenomegaly. Non-tender   Genitourinary:  deferred   Extremities:   no clubbing, cyanosis; no joint effusions or swelling; Full ROM of all large joints to the upper and lower extremities; muscle mass appropriate for age   Neurologic:  No gross focal sensory abnormalities; 5/5 muscle strength to upper and lower extremities. Speech appropirate. Cranial nerves intact   Psychiatric:   appropriate and interactive. Labs: Results:   Chemistry Recent Labs     01/25/23  0148 01/24/23  0221 01/23/23  0242   * 120* 83    138 137   K 3.6 3.8 3.9    106 107   CO2 28 26 27   BUN 11 8 9   CREA 0.88 0.74 0.71   CA 9.2 8.8 8.8   AGAP 4 6 3   BUCR 13 11* 13      CBC w/Diff No results for input(s): WBC, RBC, HGB, HCT, PLT, GRANS, LYMPH, EOS, HGBEXT, HCTEXT, PLTEXT, HGBEXT, HCTEXT, PLTEXT in the last 72 hours.            No results found for: SDES Lab Results   Component Value Date/Time    Culture result: NO GROWTH 2 DAYS 01/23/2023 02:49 PM    Culture result: NO GROWTH 2 DAYS 01/23/2023 02:49 PM    Culture result: (A) 01/21/2023 06:55 AM     STAPHYLOCOCCUS EPIDERMIDIS GROWING IN 1 OF 2 BOTTLES DRAWN NO SITE REFER TO [id number] FOR ID AND SENSITIVITIES    Culture result: (A) 01/21/2023 06:45 AM     STAPHYLOCOCCUS EPIDERMIDIS GROWING IN BOTH BOTTLES DRAWN No Site Indicated    Culture result: MIXED UROGENITAL DOUGIE ISOLATED 11/06/2021 04:22 AM        Results       Procedure Component Value Units Date/Time    CULTURE, BLOOD [[id number]] Collected: 01/23/23 1449    Order Status: Completed Specimen: Blood Updated: 01/25/23 0724     Special Requests: NO SPECIAL REQUESTS        Culture result: NO GROWTH 2 DAYS       CULTURE, BLOOD [[id number]] Collected: 01/23/23 1449    Order Status: Completed Specimen: Blood Updated: 01/25/23 0724     Special Requests: NO SPECIAL REQUESTS        Culture result: NO GROWTH 2 DAYS       CULTURE, RESPIRATORY/SPUTUM/BRONCH Hilaria Ogles [[id number]] Collected: 01/22/23 0900    Order Status: Canceled Specimen: Sputum     RESPIRATORY VIRUS PANEL W/COVID-19, PCR [[id number]] Collected: 01/21/23 0700    Order Status: Completed Specimen: Nasopharyngeal Updated: 01/21/23 0817     Adenovirus Not detected        Coronavirus 229E Not detected        Coronavirus HKU1 Not detected        Coronavirus CVNL63 Not detected        Coronavirus OC43 Not detected        SARS-CoV-2, PCR Not detected        Metapneumovirus Not detected        Rhinovirus and Enterovirus Not detected        Influenza A Not detected        Influenza B Not detected        Parainfluenza 1 Not detected        Parainfluenza 2 Not detected        Parainfluenza 3 Not detected        Parainfluenza virus 4 Not detected        RSV by PCR Not detected        B. parapertussis, PCR Not detected        Bordetella pertussis - PCR Not detected        Chlamydophila pneumoniae DNA, QL, PCR Not detected        Mycoplasma pneumoniae DNA, QL, PCR Not detected       CULTURE, BLOOD [[id number]]  (Abnormal) Collected: 01/21/23 0655    Order Status: Completed Specimen: Blood Updated: 01/24/23 1547     Special Requests: NO SPECIAL REQUESTS        GRAM STAIN       ANAEROBIC BOTTLE Gram positive cocci IN GROUPS                  SMEAR CALLED TO AND CORRECTLY REPEATED BY: YOLANDA HOANG [facility] RN 3N [id number] 1/22/23 BY [facility]           Culture result:       STAPHYLOCOCCUS EPIDERMIDIS GROWING IN 1 OF 2 BOTTLES DRAWN NO SITE REFER TO [id number] FOR ID AND SENSITIVITIES          CULTURE, BLOOD [[id number]]  (Abnormal)  (Susceptibility) Collected: 01/21/23 0645    Order Status: Completed Specimen: Blood Updated: 01/24/23 1544     Special Requests: NO SPECIAL REQUESTS        GRAM STAIN       AEROBIC AND ANAEROBIC BOTTLES Gram positive cocci IN GROUPS SMEAR CALLED TO AND CORRECTLY REPEATED BY: YOLANDA HOANG [facility] RN [id number] 1/22/23 BY [facility]           Culture result:       STAPHYLOCOCCUS EPIDERMIDIS GROWING IN BOTH BOTTLES DRAWN No Site Indicated          Susceptibility        Staphylococcus epidermidis      ADRIEN      Ciprofloxacin ($) Susceptible      Clindamycin ($) Susceptible      Daptomycin ($$$$$) Susceptible      Erythromycin ($$$$) Resistant      Gentamicin ($) Susceptible      Inducible Clindamycin Susceptible      Levofloxacin ($) Susceptible      Linezolid ($$$$$) Susceptible      Oxacillin Susceptible      Tetracycline Susceptible      Trimeth/Sulfa Resistant      Vancomycin ($) Susceptible                           CULTURE, RESPIRATORY/SPUTUM/BRONCH Josh Monk STAIN [[id number]] Collected: 01/21/23 0645    Order Status: Canceled Specimen: Sputum     BLOOD CULTURE ID PANEL [[id number]]  (Abnormal) Collected: 01/21/23 0645    Order Status: Completed Specimen: Blood Updated: 01/22/23 2006     Acc. no. from [address] [place] [address] [id number]     Enterococcus faecalis Not detected        Enterococcus faecium Not detected        Listeria monocytogenes Not detected        Staphylococcus Detected        Staphylococcus aureus Not detected        Staph epidermidis Detected        Staph lugdunensis Not detected        Streptococcus Not detected        Streptococcus agalactiae (Group B) Not detected        Streptococcus pneumoniae Not detected        Streptococcus pyogenes (Group A) Not detected        Acinetobacter calcoaceticus-baumanii complex Not detected        Bacteroides fragilis Not detected        Enterobacterales species Not detected        Enterobacter cloacae complex Not detected        Escherichia coli Not detected        Klebsiella aerogenes Not detected        Klebsiella oxytoca Not detected        Klebsiella pneumoniae group Not detected        Proteus Not detected        Salmonella Not detected        Serratia marcescens Not detected        Haemophilus influenzae Not detected Neisseria meningitidis Not detected        Pseudomonas aeruginosa Not detected        Steno maltophilia Not detected        Candida albicans Not detected        Candida auris Not detected        Candida glabrata Not detected        Candida krusei Not detected        Candida parapsilosis Not detected        Candida tropicalis Not detected        Crypto neoformans/gattii Not detected        RESISTANT GENES:            MECA/C (Methicillin resistant gene) Not detected        Comment       False positive results may rarely occur. Correlate with clinical,epidemiologic, and other laboratory findings           Comment: Please see BCID Interpretation Guide in EPIC Links                 Imaging:      All imaging reviewed from Admission to present as per radiology interpretation in [address] [place] [address]

## 2023-01-25 NOTE — PROGRESS NOTES
[address] [facility] Pharmacokinetic Monitoring Service - Vancomycin    Indication: bacteremia  Goal AUC/ADRIEN: 400-600 mg*hr/L  Day of Therapy: 4  Additional Antimicrobials: pip-tazo    Pertinent Laboratory Values: Wt Readings from Last 1 Encounters:   01/23/23 71.2 kg (157 lb)     Temp Readings from Last 1 Encounters:   01/25/23 97.9 °F (36.6 °C)     No components found for: PROCAL  Estimated Creatinine Clearance: 52.9 mL/min (based on SCr of 0.88 mg/dL). No results for input(s): WBC in the last 72 hours. No lab exists for component: CREATININE,  BUN    Pertinent Cultures:  Date Source Results   1/21 blood GPC in 2/2   MRSA Nasal Swab: N/A. Non-respiratory infection.     Assessment:  Date Current Dose Concentration (mg/L) Timing of Concentration (h) AUC   1/22 1,500 mg x1  750 mg q12h - - -   1/23 750 mg q12h  1,250 mg q18h - - -   1/24 1,250 mg q18h  1,000 mg q12h 10.9 14 442  527   1/25 1,000 mg q12h - - -   Note: Serum concentrations collected for AUC dosing may appear elevated if collected in close proximity to the dose administered, this is not necessarily an indication of toxicity    Plan:  Continue current dose of 1,000 mg q12h  Ordered a level for 1/26 with AM labs  Pharmacy will continue to monitor patient and adjust therapy as indicated    Thank you for the consult,  EMMA Quick  1/25/2023

## 2023-01-25 NOTE — PROGRESS NOTES
[address] [facility]  Pulmonary, Critical Care, and Sleep Medicine    Name: Chante Mars MRN: [id number]   : 1950 Hospital: [address] [place] [address]   Date: 2023        Pulmonary Consult Progress Note                                              Consult requesting physician: Dr. Jacinda Cao  Reason for Consult: Lung mass    IMPRESSION:   Large RLL pulmonary mass with right hilar and subcarinal LAD now with lytic lesions in right lateral 6th rib, L posterior lateral 6th rib and T4 left transverse process. The pulmonary mass and subcarinal LN were both PET avid a year ago and have progressed all pointing to a malignancy likely primary lung. Post-obstructive pneumonia versus extension of malignancy RML/RLL. WBC elevated pointing to acute infectious process. No O2 requirement. Tobacco use  Anxiety disorder  Osteoarthritis  Gram + cocci in BC x 2- ID consulted, Probable Coag negative staph, does not appear toxic, afebrile,- Follow up BC NGTD,         RECOMMENDATIONS:   IR Consult place today to see if lytic rib lesions can be biopsied as that would be safer than EBUS in the context of active pneumonia and preferred for staging. Patient decline IR bx a year ago. IR team evaluating and will discuss with patient    Focus on pulmonary hygiene  Maintain aspiration precautions  SpO2 goals 92-95%- AVOID OVER OXYGENATION    EBUS can also be considered as an option if not able to obtain biopsy of rib lesions, prefer to wait until pneumonia has at least improved and bacteremia resolved. Defer anti-infective therapy to ID  Nutrition: per primary team  Replace electrolytes as needed     GI Prophylaxis: per primary team    DVT Prophylaxis: not currently on DVT prophylaxis - recommend starting subq heparin as high risk given likely underlying active malignancy and now infection. Smoking cessation recommended.   Palliative Care Evaluation and discussion of goals or care   Will follow Subjective/History:     Raisa Meade is a 71 y.o. female with PMHx significant for RLL lung mass, tobacco use, anxiety who presented with several day history of increasing chest pain, cough and dyspnea. Denies fever or chills. Decreased appetite and weight loss noted over the last 2 weeks. Has history of RLL lung mass that has been followed for over 1 year and is progressive, and PET positive. Offered biopsy by her oncologist in April 2022 but did not pursue. CTA today showed extension of pulmonary mass, and hilar subcarinal LAD with likely post-obstructive PNA versus extension of likely maligancy. Also note lytic lesions on ribs and T4 transverse process. Pulmonary consulted for recommendations regarding biopsy. Upon extensive discussion with patient she does seem willing to pursue biopsy at this time but would like to go home and follow-up as an outpatient if possible. Patient does smoke daily about a 1/2 pack per day, has been smoking for the last 20 years. Patient not requiring any supplemental O2. Interval Evaluation  1/22/2023  LOS: 4    Patient resting in bed. We had another in depth conversation today, about the patient's lung mass and that a diagnosis needs to be obtained, in order to properly address these findings. The patient reports that she is overwhelmed and trying to process all the information she has received. I requested that IR evaluate the patient for a possible rib biopsy. I discussed with the patient again, that we need to treat her pneumonia with antibiotics and allow for inflammatory to decrease from that before we could pursue a bronchoscopy. We could biopsy the rib now, continue with with antibiotics and then the patient could follow up with me in our office. If the rib biopsy is not diagnostic then we can discuss the next step. By the end of our conversation,  She stated that she just would like to focus on one procedure- the rib biopsy for now.     I tried to answer all questions as best possible and spent about 35 minutes with the patient discussing the above. I have reached out to IR to pursue rib biopsy. She is tolerating PO- No nausea or emesis  No Hemoptysis  Intermittent back discomfort with deep inspiration and coughing. No anterior chest pain  She is limited in her walking  Afebrile  Blood cultures from 1/23 NGTD  ROS per HPI- other systems negative    Patient Vitals for the past 24 hrs:   Temp Pulse Resp BP SpO2   01/25/23 1110 98.1 °F (36.7 °C) 67 18 128/84 95 %   01/25/23 0813 97.9 °F (36.6 °C) 67 18 124/78 95 %   01/25/23 0400 98.5 °F (36.9 °C) 74 18 125/81 --   01/25/23 0000 98.6 °F (37 °C) 75 18 110/72 96 %   01/24/23 2000 98.6 °F (37 °C) 89 18 107/75 95 %   01/24/23 1714 98.4 °F (36.9 °C) 78 18 106/75 98 %     Inpat Anti-Infectives (From admission, onward)       Start     Ordered Stop    01/25/23 1300  levoFLOXacin (LEVAQUIN) tablet 750 mg  750 mg,   Oral,   EVERY 24 HOURS         01/25/23 1224 01/30/23 1259    01/24/23 0900  vancomycin (VANCOCIN) 1,000 mg in 0.9% sodium chloride 250 mL (VIAL-MATE)  1,000 mg,   IntraVENous,   EVERY 12 HOURS        See Hyperspace for full Linked Orders Report.     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 ptosis    Dental caries     Osteoarthritis     Osteopenia     Right lower lobe lung mass 04/15/2022    Offered Biopsy and couldn't decide between PAlliative Care and Biopsy        Past Surgical History:   Procedure Laterality Date    HX WISDOM TEETH EXTRACTION          Family History   Problem Relation Age of Onset    Cancer Daughter         Unspecified Cancer    Cancer Cousin         Unspecified Cancer x2        Social History     Tobacco Use    Smoking status: Some Days     Packs/day: 0.50     Years: 15.00     Pack years: 7.50     Types: Cigarettes    Smokeless tobacco: Never    Tobacco comments:     Patient reports that she is a Some Day Smoker x20 years (as of 2023). Substance Use Topics    Alcohol use: Yes     Comment: 8 drinks per week          Prior to Admission medications    Medication Sig Start Date End Date Taking? Authorizing Provider   ibuprofen (MOTRIN) 400 mg tablet Take 600 mg by mouth every six (6) hours as needed for Pain. Provider, Historical   acetaminophen (TYLENOL) 325 mg tablet Take 2 Tablets by mouth every six (6) hours as needed for Pain.  22   SKYE Matos       Current Facility-Administered Medications   Medication Dose Route Frequency    levoFLOXacin (LEVAQUIN) tablet 750 mg  750 mg Oral Q24H    vancomycin (VANCOCIN) 1,000 mg in 0.9% sodium chloride 250 mL (VIAL-MATE)  1,000 mg IntraVENous Q12H    heparin (porcine) injection 5,000 Units  5,000 Units SubCUTAneous Q8H    guaiFENesin ER (MUCINEX) tablet 600 mg  600 mg Oral Q12H    sodium chloride (NS) flush 5-40 mL  5-40 mL IntraVENous Q8H    sodium chloride (NS) flush 5-40 mL  5-40 mL IntraVENous PRN    acetaminophen (TYLENOL) tablet 650 mg  650 mg Oral Q6H PRN    Or    acetaminophen (TYLENOL) suppository 650 mg  650 mg Rectal Q6H PRN    polyethylene glycol (MIRALAX) packet 17 g  17 g Oral DAILY PRN    ondansetron (ZOFRAN ODT) tablet 4 mg  4 mg Oral Q8H PRN    Or    ondansetron (ZOFRAN) injection 4 mg  4 mg IntraVENous Q6H PRN    melatonin tablet 5 mg  5 mg Oral QHS PRN    naloxone (NARCAN) injection 0.4 mg  0.4 mg IntraVENous EVERY 2 MINUTES AS NEEDED    oxyCODONE-acetaminophen (PERCOCET) 5-325 mg per tablet 1 Tablet  1 Tablet Oral Q6H PRN    albuterol/ipratropium (DUONEB) neb solution  1 Dose Nebulization Q6H PRN         Objective:   Vital Signs:    Visit Vitals  /84   Pulse 67   Temp 98.1 °F (36.7 °C)   Resp 18   Ht 5' 1\" (1.549 m)   Wt 71.2 kg (157 lb)   SpO2 95%   BMI 29.66 kg/m²       O2 Device: None (Room air)       Temp (24hrs), Av.4 °F (36.9 °C), Min:97.9 °F (36.6 °C), Max:98.6 °F (37 °C)       Intake/Output:   Last shift:      No intake/output data recorded. Last 3 shifts: No intake/output data recorded. No intake or output data in the 24 hours ending 01/25/23 1245      Physical Exam:     General:  Appeared underweight, +chronicly ill, mild emotional distress, alert and oriented  Head:   Normocephalic, without obvious abnormality, atraumatic. Eye:   Conjunctivae/corneas clear. PERRLA, no scleral icterus, no pallor, no cyanosis  Nose:   Nares normal. Septum midline. Mucosa normal without erythema/exudate. No drainage/discharge. No sinus tenderness. Throat:  Lips, mucosa, and tongue normal. Teeth and gums normal. No tonsillar enlargement, no erythema, no exudates, no oral thrush  Neck:   Supple, symmetric, thyroid: no enlargement/tenderness/nodule, no JVD,  no lymphadenopathy. Trachea midline  Back & spine: Symmetric,   Chest wall: Mild tenderness to lateral chest wall bilaterally  Lung:   Decreased breath sounds on right without wheezing, she does have some scattered rhonchi, left lung field normal.+ some wet and coarse breath sounds again noted  Heart:   Regular rate & rhythm. S1 S2 present, no murmur, no gallop, no click, no rub  Abdomen:  Soft, NT, ND, +BS  Extremities:  No pedal edema, no cyanosis, no clubbing  Pulses: 2+ and symmetric in DP  Lymphatic:  No cervical, supraclavicular nodes palpated   Musculoskeletal: No joint swelling or tenderness. Neurologic:  Grossly non focal.+ generalized deconditioning        Data:         Chemistry Recent Labs     01/25/23  0148 01/24/23  0221 01/23/23  0242   * 120* 83    138 137   K 3.6 3.8 3.9    106 107   CO2 28 26 27   BUN 11 8 9   CREA 0.88 0.74 0.71   CA 9.2 8.8 8.8   AGAP 4 6 3   BUCR 13 11* 13          Lactic Acid Lactic acid   Date Value Ref Range Status   11/07/2021 1.5 0.4 - 2.0 MMOL/L Final     No results for input(s): LAC in the last 72 hours.      Liver Enzymes Protein, total   Date Value Ref Range Status   01/22/2023 7.1 6.4 - 8.2 g/dL Final     Albumin   Date Value Ref Range Status   01/22/2023 2.8 (L) 3.4 - 5.0 g/dL Final     Globulin   Date Value Ref Range Status   01/22/2023 4.3 (H) 2.0 - 4.0 g/dL Final     A-G Ratio   Date Value Ref Range Status   01/22/2023 0.7 (L) 0.8 - 1.7   Final     Alk. phosphatase   Date Value Ref Range Status   01/22/2023 91 45 - 117 U/L Final     No results for input(s): TP, ALB, GLOB, AGRAT, AP, TBIL in the last 72 hours. No lab exists for component: SGOT, GPT, DBIL       CBC w/Diff No results for input(s): WBC, RBC, HGB, HCT, PLT, GRANS, LYMPH, EOS, HGBEXT, HCTEXT, PLTEXT, HGBEXT, HCTEXT, PLTEXT in the last 72 hours. Cardiac Enzymes No results found for: CPK, CK, CKMMB, CKMB, RCK3, CKMBT, CKNDX, CKND1, SHAHLA, TROPT, TROIQ, SERGEY, TROPT, TNIPOC, BNP, BNPP     BNP No results found for: BNP, BNPP, XBNPT     Coagulation No results for input(s): PTP, INR, APTT, INREXT, INREXT in the last 72 hours. Thyroid  Lab Results   Component Value Date/Time    TSH 1.45 12/22/2021 12:47 PM          Lipid Panel No results found for: CHOL, CHOLPOCT, CHOLX, CHLST, CHOLV, 319730, HDL, HDLP, LDL, LDLC, DLDLP, 405776, VLDLC, VLDL, TGLX, TRIGL, TRIGP, TGLPOCT, CHHD, CHHDX     ABG No results for input(s): PHI, PHI, POC2, PCO2I, PO2, PO2I, HCO3, HCO3I, FIO2, FIO2I in the last 72 hours.      Urinalysis Lab Results   Component Value Date/Time    Color DARK YELLOW 11/06/2021 05:22 AM    Appearance CLOUDY 11/06/2021 05:22 AM    Specific gravity >1.030 (H) 11/06/2021 05:22 AM    pH (UA) 5.5 11/06/2021 05:22 AM    Protein 100 (A) 11/06/2021 05:22 AM    Glucose Negative 11/06/2021 05:22 AM    Ketone TRACE (A) 11/06/2021 05:22 AM    Bilirubin Negative 11/06/2021 05:22 AM    Urobilinogen 1.0 11/06/2021 05:22 AM    Nitrites Negative 11/06/2021 05:22 AM    Leukocyte Esterase LARGE (A) 11/06/2021 05:22 AM    Epithelial cells FEW 11/06/2021 05:22 AM    Bacteria 2+ (A) 11/06/2021 05:22 AM    WBC 25 to 30 11/06/2021 05:22 AM    RBC Negative 11/06/2021 05:22 AM        Micro  Results       Procedure Component Value Units Date/Time    CULTURE, BLOOD [[id number]] Collected: 01/23/23 1449    Order Status: Completed Specimen: Blood Updated: 01/25/23 0724     Special Requests: NO SPECIAL REQUESTS        Culture result: NO GROWTH 2 DAYS       CULTURE, BLOOD [[id number]] Collected: 01/23/23 1449    Order Status: Completed Specimen: Blood Updated: 01/25/23 0724     Special Requests: NO SPECIAL REQUESTS        Culture result: NO GROWTH 2 DAYS       CULTURE, RESPIRATORY/SPUTUM/BRONCH Daniel Loupe STAIN [[id number]] Collected: 01/22/23 0900    Order Status: Canceled Specimen: Sputum     RESPIRATORY VIRUS PANEL W/COVID-19, PCR [[id number]] Collected: 01/21/23 0700    Order Status: Completed Specimen: Nasopharyngeal Updated: 01/21/23 0817     Adenovirus Not detected        Coronavirus 229E Not detected        Coronavirus HKU1 Not detected        Coronavirus CVNL63 Not detected        Coronavirus OC43 Not detected        SARS-CoV-2, PCR Not detected        Metapneumovirus Not detected        Rhinovirus and Enterovirus Not detected        Influenza A Not detected        Influenza B Not detected        Parainfluenza 1 Not detected        Parainfluenza 2 Not detected        Parainfluenza 3 Not detected        Parainfluenza virus 4 Not detected        RSV by PCR Not detected        B. parapertussis, PCR Not detected        Bordetella pertussis - PCR Not detected        Chlamydophila pneumoniae DNA, QL, PCR Not detected        Mycoplasma pneumoniae DNA, QL, PCR Not detected       CULTURE, BLOOD [[id number]]  (Abnormal) Collected: 01/21/23 0655    Order Status: Completed Specimen: Blood Updated: 01/24/23 1547     Special Requests: NO SPECIAL REQUESTS        GRAM STAIN       ANAEROBIC BOTTLE Gram positive cocci IN GROUPS                  SMEAR CALLED TO AND CORRECTLY REPEATED BY: YOLANDA HOANG [facility] RN 3N [id number] 1/22/23 BY VMB           Culture result:       STAPHYLOCOCCUS EPIDERMIDIS GROWING IN 1 OF 2 BOTTLES DRAWN NO SITE REFER TO [id number] FOR ID AND SENSITIVITIES          CULTURE, BLOOD [[id number]]  (Abnormal)  (Susceptibility) Collected: 01/21/23 0645    Order Status: Completed Specimen: Blood Updated: 01/24/23 1544     Special Requests: NO SPECIAL REQUESTS        GRAM STAIN       AEROBIC AND ANAEROBIC BOTTLES Gram positive cocci IN GROUPS                  SMEAR CALLED TO AND CORRECTLY REPEATED BY: YOLANDA HOANG [facility] MARITA 2020 1/22/23 BY VMB           Culture result:       STAPHYLOCOCCUS EPIDERMIDIS GROWING IN BOTH BOTTLES DRAWN No Site Indicated          Susceptibility        Staphylococcus epidermidis      ADRIEN      Ciprofloxacin ($) Susceptible      Clindamycin ($) Susceptible      Daptomycin ($$$$$) Susceptible      Erythromycin ($$$$) Resistant      Gentamicin ($) Susceptible      Inducible Clindamycin Susceptible      Levofloxacin ($) Susceptible      Linezolid ($$$$$) Susceptible      Oxacillin Susceptible      Tetracycline Susceptible      Trimeth/Sulfa Resistant      Vancomycin ($) Susceptible                           CULTURE, RESPIRATORY/SPUTUM/BRONCH Benetta Balling [[id number]] Collected: 01/21/23 0645    Order Status: Canceled Specimen: Sputum     BLOOD CULTURE ID PANEL [[id number]]  (Abnormal) Collected: 01/21/23 0645    Order Status: Completed Specimen: Blood Updated: 01/22/23 2006     Acc. no. from [address] [place] [address] [id number]     Enterococcus faecalis Not detected        Enterococcus faecium Not detected        Listeria monocytogenes Not detected        Staphylococcus Detected        Staphylococcus aureus Not detected        Staph epidermidis Detected        Staph lugdunensis Not detected        Streptococcus Not detected        Streptococcus agalactiae (Group B) Not detected        Streptococcus pneumoniae Not detected        Streptococcus pyogenes (Group A) Not detected        Acinetobacter calcoaceticus-baumanii complex Not detected        Bacteroides fragilis Not detected        Enterobacterales species Not detected        Enterobacter cloacae complex Not detected        Escherichia coli Not detected        Klebsiella aerogenes Not detected        Klebsiella oxytoca Not detected        Klebsiella pneumoniae group Not detected        Proteus Not detected        Salmonella Not detected        Serratia marcescens Not detected        Haemophilus influenzae Not detected        Neisseria meningitidis Not detected        Pseudomonas aeruginosa Not detected        Steno maltophilia Not detected        Candida albicans Not detected        Candida auris Not detected        Candida glabrata Not detected        Candida krusei Not detected        Candida parapsilosis Not detected        Candida tropicalis Not detected        Crypto neoformans/gattii Not detected        RESISTANT GENES:            MECA/C (Methicillin resistant gene) Not detected        Comment       False positive results may rarely occur. Correlate with clinical,epidemiologic, and other laboratory findings           Comment: Please see BC[facility] Interpretation Guide in EPIC Links                  XR (Most Recent). CXR reviewed by me and compared with previous CXR Results from Hospital Encounter encounter on 01/21/23    XR CHEST PORT    Narrative  EXAM: XR CHEST PORT    CLINICAL INDICATION/HISTORY : Chest pain. COMPARISON: September 7, 2022    TECHNIQUE: 1 VIEWS    FINDINGS:  EKG leads and wires overlie the chest.  Right lower lung consolidation and small right pleural effusion. Latasha  Heart is normal in size. Lytic lesion and suspected pathological fracture lateral right rib 6..    Impression  1. Right lower lung consolidation. Small right pleural effusion. 2.  Lytic lesion with pathological fracture lateral right rib 6.  3.  CT has been performed at the time of interpretation-please refer to that  report.        CT (Most Recent) Results from [facility] Encounter encounter on 01/21/23    CT ABD PELV W CONT    Narrative  CT ABDOMEN AND PELVIS WITH ENHANCEMENT    INDICATION: Smoking history with hypermetabolic right lower lobe lung mass,  metastatic subcarinal adenopathy, hypermetabolic right middle lobe nodule with  subsequent CT demonstrating progression of metastatic disease. TECHNIQUE: Axial images obtained of the abdomen and pelvis following the  uneventful administration nonionic intravenous contrast.  Coronal and sagittal  reformatted images of the abdomen and pelvis were obtained. All CT scans at this facility are performed using dose optimization technique as  appropriate to a performed exam, to include automated exposure control,  adjustment of the mA and/or kV according to patient size (including appropriate  matching first site-specific examinations), or use of iterative reconstruction  technique. COMPARISON: 1/21/2023; 10/14/2022; 1/28/2022    ABDOMEN FINDINGS:    Liver: Unremarkable. Spleen: Unremarkable. Pancreas: Unremarkable. Biliary: Unremarkable. Bowel: Moderate colonic stool burden. Bowel is normal in caliber. Peritoneum/ Retroperitoneum: Unremarkable. Lymph Nodes: There is a new 1 cm right retrocrural lymph node (series 2, image  26). New centrally necrotic 1.9 x 1.3 cm gastrohepatic node (34). Adrenal Glands: Similar bilateral nodular thickening. Kidneys: A few too small to characterize lesions, statistically most likely  benign. Vessels: Moderate atherosclerosis. Coarse calcification at the renal ostia. PELVIS FINDINGS:    Bladder/ Pelvic Organs: Unremarkable. Lung Base: The volume of the consolidation in the right lower lobe has worsened  since 1/21/2023. The small right pleural effusion is similar. Similar confluent  consolidation between the right lower and right middle lobes. Otherwise similar  chest findings. Bones: Likely injection granulomas anterior pelvic wall. Lumbar facet  hypertrophy and arthropathy.  Mild increased ill-defined sclerosis left iliac. Similar right lateral sixth rib bone lesion with soft tissue component and left  posterolateral sixth rib lytic bone lesion with pathologic fracture and soft  tissue component. Impression  1. New metastatic right retrocrural and gastrohepatic lymph nodes since  1/28/2022 compatible with progression. 2.  Worsened volume of consolidation right lower lobe, likely postobstructive  pneumonitis. Similar small right pleural effusion and confluent consolidation  otherwise between the right lower and right middle lobes. Similar chest findings  otherwise. 3.  Mild increased nonspecific osseous sclerosis left iliac, possibly additional  site of metastatic disease but not definitive. 4.  Similar nodular adrenal thickening. 5.  Moderate atherosclerosis with coarse calcification renal ostia. 6.  Moderate colonic stool burden. ECHO  01/21/23    ECHO ADULT COMPLETE 01/23/2023 1/23/2023    Interpretation Summary    No obvious valvular vegetations visualized. Left Ventricle: Normal left ventricular systolic function with a visually estimated EF of 65 - 70%. Left ventricle size is normal. LVIDd is 3.8 cm. Moderate septal thickening. Normal wall motion. Diastolic dysfunction present (grade I) with normal LV EF. Tricuspid Valve: Normal RVSP. The estimated RVSP is 29 mmHg. Aorta: Normal sized ascending aorta. Mildly dilated aortic root. Ao Root diameter is 3.3 cm. Ao Ascending diameter is 3.6 cm. Descending aorta is not well-visualized. Pericardium: Ascites present. Signed by: Zafar Pierre MD on 1/23/2023 12:06 PM         PFT No flowsheet data found.      Other      Recent Results (from the past 24 hour(s))   METABOLIC PANEL, BASIC    Collection Time: 01/25/23  1:48 AM   Result Value Ref Range    Sodium 138 136 - 145 mmol/L    Potassium 3.6 3.5 - 5.5 mmol/L    Chloride 106 100 - 111 mmol/L    CO2 28 21 - 32 mmol/L    Anion gap 4 3.0 - 18 mmol/L    Glucose 138 (H) 74 - 99 mg/dL    BUN 11 7.0 - 18 MG/DL    Creatinine 0.88 0.6 - 1.3 MG/DL    BUN/Creatinine ratio 13 12 - 20      eGFR >60 >60 ml/min/1.73m2    Calcium 9.2 8.5 - 10.1 MG/DL           Complex decision making was made in the evaluation and management plans during this consultation. More than 50% of time was spent in counseling and coordination of care including review of data and discussion with other team members.        Vishal Kincaid DO, [facility]    [facility]  Pulmonary, Critical Care, and Sleep Medicine

## 2023-01-25 NOTE — PROGRESS NOTES
Interventional Radiology    Discussed patient with PCCM--given acute inflammation, bronchoscopy is not a diagnostic option at this time. Image-guided biopsy of rib lesion is indicated for diagnosis and to guide further management. Plan:  1. Image guided biopsy of rib lesion (radiologist's choice) with Anesthesia tomorrow.   2.  Will obtain consent prior to procedure.    - NPO after midnight tonight  - Hold heparin prior to procedure on 1/26    Thank you,  SKYE Florez-C  [phone]

## 2023-01-26 ENCOUNTER — APPOINTMENT (OUTPATIENT)
Dept: GENERAL RADIOLOGY | Age: 73
DRG: 343 | End: 2023-01-26
Attending: RADIOLOGY
Payer: MEDICAID

## 2023-01-26 ENCOUNTER — APPOINTMENT (OUTPATIENT)
Dept: CT IMAGING | Age: 73
DRG: 343 | End: 2023-01-26
Attending: STUDENT IN AN ORGANIZED HEALTH CARE EDUCATION/TRAINING PROGRAM
Payer: MEDICAID

## 2023-01-26 ENCOUNTER — ANESTHESIA EVENT (OUTPATIENT)
Dept: CT IMAGING | Age: 73
DRG: 343 | End: 2023-01-26
Payer: MEDICAID

## 2023-01-26 ENCOUNTER — ANESTHESIA (OUTPATIENT)
Dept: CT IMAGING | Age: 73
DRG: 343 | End: 2023-01-26
Payer: MEDICAID

## 2023-01-26 LAB
ANION GAP SERPL CALC-SCNC: 3 MMOL/L (ref 3–18)
BUN SERPL-MCNC: 7 MG/DL (ref 7–18)
BUN/CREAT SERPL: 10 (ref 12–20)
CALCIUM SERPL-MCNC: 9.5 MG/DL (ref 8.5–10.1)
CHLORIDE SERPL-SCNC: 108 MMOL/L (ref 100–111)
CO2 SERPL-SCNC: 27 MMOL/L (ref 21–32)
CREAT SERPL-MCNC: 0.67 MG/DL (ref 0.6–1.3)
GLUCOSE SERPL-MCNC: 88 MG/DL (ref 74–99)
POTASSIUM SERPL-SCNC: 3.8 MMOL/L (ref 3.5–5.5)
SODIUM SERPL-SCNC: 138 MMOL/L (ref 136–145)
VANCOMYCIN SERPL-MCNC: 19.6 UG/ML (ref 5–40)

## 2023-01-26 PROCEDURE — 74011250637 HC RX REV CODE- 250/637: Performed by: HOSPITALIST

## 2023-01-26 PROCEDURE — 74011250636 HC RX REV CODE- 250/636: Performed by: NURSE ANESTHETIST, CERTIFIED REGISTERED

## 2023-01-26 PROCEDURE — 88334 PATH CONSLTJ SURG CYTO XM EA: CPT

## 2023-01-26 PROCEDURE — 36415 COLL VENOUS BLD VENIPUNCTURE: CPT

## 2023-01-26 PROCEDURE — 76060000033 HC ANESTHESIA 1 TO 1.5 HR

## 2023-01-26 PROCEDURE — 65270000046 HC RM TELEMETRY

## 2023-01-26 PROCEDURE — 74011000250 HC RX REV CODE- 250: Performed by: NURSE ANESTHETIST, CERTIFIED REGISTERED

## 2023-01-26 PROCEDURE — 0PB13ZX EXCISION OF 1 TO 2 RIBS, PERCUTANEOUS APPROACH, DIAGNOSTIC: ICD-10-PCS | Performed by: RADIOLOGY

## 2023-01-26 PROCEDURE — 88307 TISSUE EXAM BY PATHOLOGIST: CPT

## 2023-01-26 PROCEDURE — 80202 ASSAY OF VANCOMYCIN: CPT

## 2023-01-26 PROCEDURE — 80048 BASIC METABOLIC PNL TOTAL CA: CPT

## 2023-01-26 PROCEDURE — 74011250636 HC RX REV CODE- 250/636: Performed by: INTERNAL MEDICINE

## 2023-01-26 PROCEDURE — 77030003503 CT BX BONE SUPER

## 2023-01-26 PROCEDURE — 88333 PATH CONSLTJ SURG CYTO XM 1: CPT

## 2023-01-26 PROCEDURE — 74011250637 HC RX REV CODE- 250/637: Performed by: INTERNAL MEDICINE

## 2023-01-26 PROCEDURE — 99232 SBSQ HOSP IP/OBS MODERATE 35: CPT | Performed by: INTERNAL MEDICINE

## 2023-01-26 PROCEDURE — 71045 X-RAY EXAM CHEST 1 VIEW: CPT

## 2023-01-26 PROCEDURE — 94762 N-INVAS EAR/PLS OXIMTRY CONT: CPT

## 2023-01-26 PROCEDURE — 74011000250 HC RX REV CODE- 250: Performed by: INTERNAL MEDICINE

## 2023-01-26 PROCEDURE — 88342 IMHCHEM/IMCYTCHM 1ST ANTB: CPT

## 2023-01-26 RX ORDER — PROPOFOL 10 MG/ML
VIAL (ML) INTRAVENOUS
Status: DISCONTINUED | OUTPATIENT
Start: 2023-01-26 | End: 2023-01-26 | Stop reason: HOSPADM

## 2023-01-26 RX ORDER — HYDROMORPHONE HYDROCHLORIDE 1 MG/ML
0.5 INJECTION, SOLUTION INTRAMUSCULAR; INTRAVENOUS; SUBCUTANEOUS
Status: DISCONTINUED | OUTPATIENT
Start: 2023-01-26 | End: 2023-01-26 | Stop reason: HOSPADM

## 2023-01-26 RX ORDER — EPHEDRINE SULFATE/0.9% NACL/PF 25 MG/5 ML
SYRINGE (ML) INTRAVENOUS AS NEEDED
Status: DISCONTINUED | OUTPATIENT
Start: 2023-01-26 | End: 2023-01-26 | Stop reason: HOSPADM

## 2023-01-26 RX ORDER — SODIUM CHLORIDE, SODIUM LACTATE, POTASSIUM CHLORIDE, CALCIUM CHLORIDE 600; 310; 30; 20 MG/100ML; MG/100ML; MG/100ML; MG/100ML
75 INJECTION, SOLUTION INTRAVENOUS CONTINUOUS
Status: DISCONTINUED | OUTPATIENT
Start: 2023-01-26 | End: 2023-01-26 | Stop reason: HOSPADM

## 2023-01-26 RX ORDER — SODIUM CHLORIDE 9 MG/ML
INJECTION, SOLUTION INTRAVENOUS
Status: DISCONTINUED | OUTPATIENT
Start: 2023-01-26 | End: 2023-01-26 | Stop reason: HOSPADM

## 2023-01-26 RX ORDER — OXYCODONE AND ACETAMINOPHEN 5; 325 MG/1; MG/1
1 TABLET ORAL
Qty: 12 TABLET | Refills: 0 | Status: SHIPPED | OUTPATIENT
Start: 2023-01-26 | End: 2023-01-29

## 2023-01-26 RX ORDER — LEVOFLOXACIN 750 MG/1
750 TABLET ORAL EVERY 24 HOURS
Qty: 5 TABLET | Refills: 0 | Status: SHIPPED | OUTPATIENT
Start: 2023-01-26 | End: 2023-01-31

## 2023-01-26 RX ORDER — LINEZOLID 600 MG/1
600 TABLET, FILM COATED ORAL 2 TIMES DAILY
Qty: 10 TABLET | Refills: 0 | Status: SHIPPED | OUTPATIENT
Start: 2023-01-26 | End: 2023-01-27

## 2023-01-26 RX ORDER — MIDAZOLAM HYDROCHLORIDE 1 MG/ML
1 INJECTION, SOLUTION INTRAMUSCULAR; INTRAVENOUS AS NEEDED
Status: DISCONTINUED | OUTPATIENT
Start: 2023-01-26 | End: 2023-01-26 | Stop reason: HOSPADM

## 2023-01-26 RX ORDER — ONDANSETRON 2 MG/ML
4 INJECTION INTRAMUSCULAR; INTRAVENOUS
Status: DISCONTINUED | OUTPATIENT
Start: 2023-01-26 | End: 2023-01-26 | Stop reason: HOSPADM

## 2023-01-26 RX ORDER — PROPOFOL 10 MG/ML
INJECTION, EMULSION INTRAVENOUS AS NEEDED
Status: DISCONTINUED | OUTPATIENT
Start: 2023-01-26 | End: 2023-01-26 | Stop reason: HOSPADM

## 2023-01-26 RX ORDER — LIDOCAINE HYDROCHLORIDE 20 MG/ML
INJECTION, SOLUTION EPIDURAL; INFILTRATION; INTRACAUDAL; PERINEURAL AS NEEDED
Status: DISCONTINUED | OUTPATIENT
Start: 2023-01-26 | End: 2023-01-26 | Stop reason: HOSPADM

## 2023-01-26 RX ADMIN — LIDOCAINE HYDROCHLORIDE 60 MG: 20 INJECTION, SOLUTION EPIDURAL; INFILTRATION; INTRACAUDAL; PERINEURAL at 13:25

## 2023-01-26 RX ADMIN — LEVOFLOXACIN 750 MG: 750 TABLET, FILM COATED ORAL at 21:16

## 2023-01-26 RX ADMIN — GUAIFENESIN 600 MG: 600 TABLET, EXTENDED RELEASE ORAL at 08:26

## 2023-01-26 RX ADMIN — Medication 30 MG: at 13:59

## 2023-01-26 RX ADMIN — GUAIFENESIN 600 MG: 600 TABLET, EXTENDED RELEASE ORAL at 20:20

## 2023-01-26 RX ADMIN — VANCOMYCIN HYDROCHLORIDE 1000 MG: 1 INJECTION, POWDER, LYOPHILIZED, FOR SOLUTION INTRAVENOUS at 08:26

## 2023-01-26 RX ADMIN — HEPARIN SODIUM 5000 UNITS: 5000 INJECTION INTRAVENOUS; SUBCUTANEOUS at 01:59

## 2023-01-26 RX ADMIN — SODIUM CHLORIDE, PRESERVATIVE FREE 10 ML: 5 INJECTION INTRAVENOUS at 06:32

## 2023-01-26 RX ADMIN — MIDAZOLAM HYDROCHLORIDE 1 MG: 1 INJECTION, SOLUTION INTRAMUSCULAR; INTRAVENOUS at 14:38

## 2023-01-26 RX ADMIN — OXYCODONE HYDROCHLORIDE AND ACETAMINOPHEN 1 TABLET: 5; 325 TABLET ORAL at 18:28

## 2023-01-26 RX ADMIN — VANCOMYCIN HYDROCHLORIDE 1000 MG: 1 INJECTION, POWDER, LYOPHILIZED, FOR SOLUTION INTRAVENOUS at 20:20

## 2023-01-26 RX ADMIN — PROPOFOL 75 MCG/KG/MIN: 10 INJECTION, EMULSION INTRAVENOUS at 13:25

## 2023-01-26 RX ADMIN — SODIUM CHLORIDE, PRESERVATIVE FREE 10 ML: 5 INJECTION INTRAVENOUS at 22:00

## 2023-01-26 RX ADMIN — PROPOFOL 50 MG: 10 INJECTION, EMULSION INTRAVENOUS at 13:25

## 2023-01-26 RX ADMIN — OXYCODONE HYDROCHLORIDE AND ACETAMINOPHEN 1 TABLET: 5; 325 TABLET ORAL at 10:08

## 2023-01-26 RX ADMIN — SODIUM CHLORIDE: 9 INJECTION, SOLUTION INTRAVENOUS at 13:16

## 2023-01-26 NOTE — PROGRESS NOTES
[facility] Hospitalist Group  Progress Note    Patient: Albert Marshall Age: 71 y.o. : 1950 MR#: [id number] SSN: [id number]  Date/Time: 2023     Subjective:     Pt has no complaints. Appeared comfortable. Assessment/Plan:   52-year-old female w/ h/o tobacco abuse and known right lower lobe lung mass not previously worked up due to pt being lost to follow up admitted after she presented to the emergency room with complaints of chest pain, shortness of breath. ER evaluation-CTA chest showed complete consolidation of the right middle lobe and extensive consolidation in the right lower lobe, likely representing a combination of tumor associated postobstructive atelectasis versus pneumonia. Findings are consistent with tumor worsening including increasing metastatic adenopathy, also noted to have rib lytic metastases with pathologic fractures and T4 left transverse process lytic metastases. Pneumonia likely postobstructive with bacteremia and underlying cancer-continue broad-spectrum IV antibiotics, ID consulted  Coag negative Staph bacteremia, / bottles, repeat blood cx on -->NGTD. TTE done -->no obvious valvular vegetations,   Lung mass suggestive of malignancy-pulmonology on board, will defer further management to them. IR consulted for bx of lytic rib lesions,to be done tomorrow under general anesthesia. Chronic smoker-patient has been counseled to stop, mentions that she recently quit smoking about 2 weeks ago  DVT prophylaxis-Heparin  Full code        Dispo: likely home.  S/p PT eval, pt reported that she was ambulating independently w/ no concerns        Laura Ralph MD  23      Case discussed with:  [x]Patient  []Family  [x]Nursing  []Case Management  DVT Prophylaxis:  []Lovenox  [x]Hep SQ  []SCDs  []Coumadin   []On Heparin gtt    Objective:   VS: Visit Vitals  /78 (BP 1 Location: Right upper arm, BP Patient Position: At rest)   Pulse 86   Temp 97.5 °F (36.4 °C)   Resp 18   Ht 5' 1\" (1.549 m)   Wt 71.2 kg (157 lb)   SpO2 94%   BMI 29.66 kg/m²      Tmax/24hrs: Temp (24hrs), Av.2 °F (36.8 °C), Min:97.5 °F (36.4 °C), Max:98.6 °F (37 °C)  IOBRIEFNo intake or output data in the 24 hours ending 23 2340      General:  Alert, cooperative, no acute distress    Pulmonary:  Decreased BS in bases   Cardiovascular: Regular rate and Rhythm. GI:  Soft, Non distended, Non tender. + Bowel sounds. Extremities:  No edema, cyanosis, clubbing. No calf tenderness. Neurologic: Alert and oriented X 3. No acute neuro deficits.   Additional:    Medications:   Current Facility-Administered Medications   Medication Dose Route Frequency    levoFLOXacin (LEVAQUIN) tablet 750 mg  750 mg Oral Q24H    vancomycin (VANCOCIN) 1,000 mg in 0.9% sodium chloride 250 mL (VIAL-MATE)  1,000 mg IntraVENous Q12H    heparin (porcine) injection 5,000 Units  5,000 Units SubCUTAneous Q8H    guaiFENesin ER (MUCINEX) tablet 600 mg  600 mg Oral Q12H    sodium chloride (NS) flush 5-40 mL  5-40 mL IntraVENous Q8H    sodium chloride (NS) flush 5-40 mL  5-40 mL IntraVENous PRN    acetaminophen (TYLENOL) tablet 650 mg  650 mg Oral Q6H PRN    Or    acetaminophen (TYLENOL) suppository 650 mg  650 mg Rectal Q6H PRN    polyethylene glycol (MIRALAX) packet 17 g  17 g Oral DAILY PRN    ondansetron (ZOFRAN ODT) tablet 4 mg  4 mg Oral Q8H PRN    Or    ondansetron (ZOFRAN) injection 4 mg  4 mg IntraVENous Q6H PRN    melatonin tablet 5 mg  5 mg Oral QHS PRN    naloxone (NARCAN) injection 0.4 mg  0.4 mg IntraVENous EVERY 2 MINUTES AS NEEDED    oxyCODONE-acetaminophen (PERCOCET) 5-325 mg per tablet 1 Tablet  1 Tablet Oral Q6H PRN    albuterol/ipratropium (DUONEB) neb solution  1 Dose Nebulization Q6H PRN       Imaging:   XR Results (most recent):  Results from Hospital Encounter encounter on 23    XR CHEST PORT    Narrative  EXAM: XR CHEST PORT    CLINICAL INDICATION/HISTORY : Chest pain.    COMPARISON: September 7, 2022    TECHNIQUE: 1 VIEWS    FINDINGS:  EKG leads and wires overlie the chest.  Right lower lung consolidation and small right pleural effusion. Platter Founds Heart is normal in size. Lytic lesion and suspected pathological fracture lateral right rib 6..    Impression  1. Right lower lung consolidation. Small right pleural effusion. 2.  Lytic lesion with pathological fracture lateral right rib 6.  3.  CT has been performed at the time of interpretation-please refer to that  report. CT Results (most recent):  Results from Hospital Encounter encounter on 01/21/23    CT ABD PELV W CONT    Narrative  CT ABDOMEN AND PELVIS WITH ENHANCEMENT    INDICATION: Smoking history with hypermetabolic right lower lobe lung mass,  metastatic subcarinal adenopathy, hypermetabolic right middle lobe nodule with  subsequent CT demonstrating progression of metastatic disease. TECHNIQUE: Axial images obtained of the abdomen and pelvis following the  uneventful administration nonionic intravenous contrast.  Coronal and sagittal  reformatted images of the abdomen and pelvis were obtained. All CT scans at this facility are performed using dose optimization technique as  appropriate to a performed exam, to include automated exposure control,  adjustment of the mA and/or kV according to patient size (including appropriate  matching first site-specific examinations), or use of iterative reconstruction  technique. COMPARISON: 1/21/2023; 10/14/2022; 1/28/2022    ABDOMEN FINDINGS:    Liver: Unremarkable. Spleen: Unremarkable. Pancreas: Unremarkable. Biliary: Unremarkable. Bowel: Moderate colonic stool burden. Bowel is normal in caliber. Peritoneum/ Retroperitoneum: Unremarkable. Lymph Nodes: There is a new 1 cm right retrocrural lymph node (series 2, image  26). New centrally necrotic 1.9 x 1.3 cm gastrohepatic node (34). Adrenal Glands: Similar bilateral nodular thickening.   Kidneys: A few too small to characterize lesions, statistically most likely  benign. Vessels: Moderate atherosclerosis. Coarse calcification at the renal ostia. PELVIS FINDINGS:    Bladder/ Pelvic Organs: Unremarkable. Lung Base: The volume of the consolidation in the right lower lobe has worsened  since 1/21/2023. The small right pleural effusion is similar. Similar confluent  consolidation between the right lower and right middle lobes. Otherwise similar  chest findings. Bones: Likely injection granulomas anterior pelvic wall. Lumbar facet  hypertrophy and arthropathy. Mild increased ill-defined sclerosis left iliac. Similar right lateral sixth rib bone lesion with soft tissue component and left  posterolateral sixth rib lytic bone lesion with pathologic fracture and soft  tissue component. Impression  1. New metastatic right retrocrural and gastrohepatic lymph nodes since  1/28/2022 compatible with progression. 2.  Worsened volume of consolidation right lower lobe, likely postobstructive  pneumonitis. Similar small right pleural effusion and confluent consolidation  otherwise between the right lower and right middle lobes. Similar chest findings  otherwise. 3.  Mild increased nonspecific osseous sclerosis left iliac, possibly additional  site of metastatic disease but not definitive. 4.  Similar nodular adrenal thickening. 5.  Moderate atherosclerosis with coarse calcification renal ostia. 6.  Moderate colonic stool burden. MRI Results (most recent):  No results found for this or any previous visit.         Labs:    Recent Results (from the past 48 hour(s))   METABOLIC PANEL, BASIC    Collection Time: 01/24/23  2:21 AM   Result Value Ref Range    Sodium 138 136 - 145 mmol/L    Potassium 3.8 3.5 - 5.5 mmol/L    Chloride 106 100 - 111 mmol/L    CO2 26 21 - 32 mmol/L    Anion gap 6 3.0 - 18 mmol/L    Glucose 120 (H) 74 - 99 mg/dL    BUN 8 7.0 - 18 MG/DL    Creatinine 0.74 0.6 - 1.3 MG/DL BUN/Creatinine ratio 11 (L) 12 - 20      eGFR >60 >60 ml/min/1.73m2    Calcium 8.8 8.5 - 10.1 MG/DL   VANCOMYCIN, RANDOM    Collection Time: 01/24/23  2:21 AM   Result Value Ref Range    Vancomycin, random 10.9 5.0 - 87.1 UG/ML   METABOLIC PANEL, BASIC    Collection Time: 01/25/23  1:48 AM   Result Value Ref Range    Sodium 138 136 - 145 mmol/L    Potassium 3.6 3.5 - 5.5 mmol/L    Chloride 106 100 - 111 mmol/L    CO2 28 21 - 32 mmol/L    Anion gap 4 3.0 - 18 mmol/L    Glucose 138 (H) 74 - 99 mg/dL    BUN 11 7.0 - 18 MG/DL    Creatinine 0.88 0.6 - 1.3 MG/DL    BUN/Creatinine ratio 13 12 - 20      eGFR >60 >60 ml/min/1.73m2    Calcium 9.2 8.5 - 10.1 MG/DL       Signed By: Pipe Aguilar MD     January 25, 2023      I spent 35 minutes with the patient in face-to-face consultation, of which greater than 50% was spent in counseling and coordination of care as described above    Disclaimer: Sections of this note are dictated using utilizing voice recognition software. Minor typographical errors may be present. If questions arise, please do not hesitate to contact me or call our department.

## 2023-01-26 NOTE — PROGRESS NOTES
Received discharge prescriptions for patient at outpatient pharmacy. Patient has two prescriptions at [facility].     **Linezolid needs prior authorization MD to call [phone]

## 2023-01-26 NOTE — PROGRESS NOTES
[facility]  (A Division of [address] [place] [address])      Consultation Note      Date of Admission: 1/21/2023    Date of Note: 1/26/2023      Reason for Referral: GPC sepsis, pneumonia  Referring Physician: Dr. Marvel Holden from this admission:   1/21 blood culture: 3 out of 4 gram-positive cocci in groups  1/21 respiratory viral panel: Negative    Current Antimicrobials:    Prior Antimicrobials:  Vancomycin 1/22 to present  Levofloxacin 1/25 to present Azithromycin 1/21 to 1/22  Ceftriaxone 1/21 to 1/22  Zosyn 1/22 through 1/25       Assessment:         Staph species sepsis suspect from a pulmonary source:   Postobstructive pneumonia versus enlarging malignancy: in the right middle and lower lobes. Right lower lobe pulmonary mass with hilar and subcarinal LAD as well as  metastasis to the ribs on the right   -1/21 CTA chest with complete consolidation of the right middle lobe and right lower lobe suspicious for tumor plus or minus pneumonia. Small right pleural effusion. 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  Anticipate that we can transition to p.o. upon discharge. -Repeat cultures are negative at 24 hours. -TTE is negative for endocarditis.    -She is not interested in having a PICC line placed for IV antibiotics so we will treat with Zyvox. She is aware that this is not standard of care.     Continue levofloxacin    -Stop date 1/31    Repeat blood cx from 1/23 remain negative    CBC, BMP in am  Going for rib biopsy with anesthesia today    Discussed with Dr. Monty Song,   [facility]  [address] [address]  Office: [phone], Ext [phone]  Mobile/Text: [phone]    Lines / Catheters:  Peripheral    Subjective:   Seen and examined at bedside. Doing okay. Anxious about her procedure. No new events overnight. No pain. No SOB. No fever or chills. HPI:  Shelley Michel is a 71 y.o. female with PMHx significant for RLL lung mass, tobacco use, anxiety who presented with several day history of increasing chest pain, cough and dyspnea. Denies fever or chills. Decreased appetite and weight loss noted over the last 2 weeks. Has history of RLL lung mass that has been followed for over 1 year and is progressive, and PET positive. From my understanding she has not pursued biopsy or treatment for her pulmonary mass although its been known to be present for greater than 1 year. She has been getting progressively more short of breath with an intermittent cough which is what prompted her to come to the emergency department. She has been afebrile. Initial WBCs were 13.4 and have since normalized. She was started on empiric ceftriaxone and azithromycin due to concern for right-sided pneumonia. Blood cultures from  are now positive and 3 out of 4 bottles and she has been started on vancomycin while final cultures and sensitivities are pending. Objective:      Visit Vitals  BP (!) 140/96 (BP 1 Location: Left lower arm, BP Patient Position: At rest)   Pulse 77   Temp 99.1 °F (37.3 °C)   Resp 18   Ht 5' 1\" (1.549 m)   Wt 71.2 kg (157 lb)   SpO2 94%   BMI 29.66 kg/m²     Temp (24hrs), Av.2 °F (36.8 °C), Min:97.5 °F (36.4 °C), Max:99.1 °F (37.3 °C)        General:   awake alert and oriented, older than states age   Skin:   no rashes or skin lesions noted on limited exam   HEENT:  Normocephalic, atraumatic, PERRL, EOMI, no scleral icterus or pallor; no conjunctival hemmohage;  nasal and oral mucous are moist and without evidence of lesions. No thrush. Dentition good. Neck supple, no bruits.    Lymph Nodes:   no cervical, axillary or inguinal adenopathy   Lungs:   non-labored, coarse breath sounds, no wheezes   Heart:  RRR, s1 and s2; no murmurs rubs or gallops, no edema, + pedal pulses   Abdomen:  soft, non-distended, active bowel sounds, no hepatomegaly, no splenomegaly. Non-tender   Genitourinary:  deferred   Extremities:   no clubbing, cyanosis; no joint effusions or swelling; Full ROM of all large joints to the upper and lower extremities; muscle mass appropriate for age   Neurologic:  No gross focal sensory abnormalities; 5/5 muscle strength to upper and lower extremities. Speech appropirate. Cranial nerves intact   Psychiatric:   appropriate and interactive. Labs: Results:   Chemistry Recent Labs     01/26/23  0335 01/25/23  0148 01/24/23  0221   GLU 88 138* 120*    138 138   K 3.8 3.6 3.8    106 106   CO2 27 28 26   BUN 7 11 8   CREA 0.67 0.88 0.74   CA 9.5 9.2 8.8   AGAP 3 4 6   BUCR 10* 13 11*      CBC w/Diff No results for input(s): WBC, RBC, HGB, HCT, PLT, GRANS, LYMPH, EOS, HGBEXT, HCTEXT, PLTEXT, HGBEXT, HCTEXT, PLTEXT in the last 72 hours.            No results found for: SDES Lab Results   Component Value Date/Time    Culture result: NO GROWTH 3 DAYS 01/23/2023 02:49 PM    Culture result: NO GROWTH 3 DAYS 01/23/2023 02:49 PM    Culture result: (A) 01/21/2023 06:55 AM     STAPHYLOCOCCUS EPIDERMIDIS GROWING IN 1 OF 2 BOTTLES DRAWN NO SITE REFER TO [id number] FOR ID AND SENSITIVITIES    Culture result: (A) 01/21/2023 06:45 AM     STAPHYLOCOCCUS EPIDERMIDIS GROWING IN BOTH BOTTLES DRAWN No Site Indicated    Culture result: MIXED UROGENITAL DOUGIE ISOLATED 11/06/2021 04:22 AM        Results       Procedure Component Value Units Date/Time    CULTURE, BLOOD [[id number]] Collected: 01/23/23 1449    Order Status: Completed Specimen: Blood Updated: 01/26/23 0630     Special Requests: NO SPECIAL REQUESTS        Culture result: NO GROWTH 3 DAYS       CULTURE, BLOOD [[id number]] Collected: 01/23/23 1449    Order Status: Completed Specimen: Blood Updated: 01/26/23 0630     Special Requests: NO SPECIAL REQUESTS        Culture result: NO GROWTH 3 DAYS       CULTURE, RESPIRATORY/SPUTUM/BRONCH Mary Blind [[id number]] Collected: 01/22/23 0900    Order Status: Canceled Specimen: Sputum     RESPIRATORY VIRUS PANEL W/COVID-19, PCR [[id number]] Collected: 01/21/23 0700    Order Status: Completed Specimen: Nasopharyngeal Updated: 01/21/23 0817     Adenovirus Not detected        Coronavirus 229E Not detected        Coronavirus HKU1 Not detected        Coronavirus CVNL63 Not detected        Coronavirus OC43 Not detected        SARS-CoV-2, PCR Not detected        Metapneumovirus Not detected        Rhinovirus and Enterovirus Not detected        Influenza A Not detected        Influenza B Not detected        Parainfluenza 1 Not detected        Parainfluenza 2 Not detected        Parainfluenza 3 Not detected        Parainfluenza virus 4 Not detected        RSV by PCR Not detected        B. parapertussis, PCR Not detected        Bordetella pertussis - PCR Not detected        Chlamydophila pneumoniae DNA, QL, PCR Not detected        Mycoplasma pneumoniae DNA, QL, PCR Not detected       CULTURE, BLOOD [[id number]]  (Abnormal) Collected: 01/21/23 0655    Order Status: Completed Specimen: Blood Updated: 01/24/23 1547     Special Requests: NO SPECIAL REQUESTS        GRAM STAIN       ANAEROBIC BOTTLE Gram positive cocci IN GROUPS                  SMEAR CALLED TO AND CORRECTLY REPEATED BY: YOLANDA PEARL [place] RN 3N [id number] 1/22/23 BY VMB           Culture result:       STAPHYLOCOCCUS EPIDERMIDIS GROWING IN 1 OF 2 BOTTLES DRAWN NO SITE REFER TO [id number] FOR ID AND SENSITIVITIES          CULTURE, BLOOD [[id number]]  (Abnormal)  (Susceptibility) Collected: 01/21/23 0645    Order Status: Completed Specimen: Blood Updated: 01/24/23 1544     Special Requests: NO SPECIAL REQUESTS        GRAM STAIN       AEROBIC AND ANAEROBIC BOTTLES Gram positive cocci IN GROUPS                  SMEAR CALLED TO AND CORRECTLY REPEATED BY: YOLANDA PEARL [place] RN [id number] 1/22/23 BY VMB           Culture result: STAPHYLOCOCCUS EPIDERMIDIS GROWING IN BOTH BOTTLES DRAWN No Site Indicated          Susceptibility        Staphylococcus epidermidis      ADRIEN      Ciprofloxacin ($) Susceptible      Clindamycin ($) Susceptible      Daptomycin ($$$$$) Susceptible      Erythromycin ($$$$) Resistant      Gentamicin ($) Susceptible      Inducible Clindamycin Susceptible      Levofloxacin ($) Susceptible      Linezolid ($$$$$) Susceptible      Oxacillin Susceptible      Tetracycline Susceptible      Trimeth/Sulfa Resistant      Vancomycin ($) Susceptible                           CULTURE, RESPIRATORY/SPUTUM/BRONCH Alex Prather [[id number]] Collected: 01/21/23 0645    Order Status: Canceled Specimen: Sputum     BLOOD CULTURE ID PANEL [[id number]]  (Abnormal) Collected: 01/21/23 0645    Order Status: Completed Specimen: Blood Updated: 01/22/23 2006     Acc. no. from [address] [place] [address] [id number]     Enterococcus faecalis Not detected        Enterococcus faecium Not detected        Listeria monocytogenes Not detected        Staphylococcus Detected        Staphylococcus aureus Not detected        Staph epidermidis Detected        Staph lugdunensis Not detected        Streptococcus Not detected        Streptococcus agalactiae (Group B) Not detected        Streptococcus pneumoniae Not detected        Streptococcus pyogenes (Group A) Not detected        Acinetobacter calcoaceticus-baumanii complex Not detected        Bacteroides fragilis Not detected        Enterobacterales species Not detected        Enterobacter cloacae complex Not detected        Escherichia coli Not detected        Klebsiella aerogenes Not detected        Klebsiella oxytoca Not detected        Klebsiella pneumoniae group Not detected        Proteus Not detected        Salmonella Not detected        Serratia marcescens Not detected        Haemophilus influenzae Not detected        Neisseria meningitidis Not detected        Pseudomonas aeruginosa Not detected        Steno maltophilia Not detected        Candida albicans Not detected        Candida auris Not detected        Candida glabrata Not detected        Candida krusei Not detected        Candida parapsilosis Not detected        Candida tropicalis Not detected        Crypto neoformans/gattii Not detected        RESISTANT GENES:            MECA/C (Methicillin resistant gene) Not detected        Comment       False positive results may rarely occur. Correlate with clinical,epidemiologic, and other laboratory findings           Comment: Please see BCID Interpretation Guide in EPIC Links                 Imaging:      All imaging reviewed from Admission to present as per radiology interpretation in [address] [place] [address]

## 2023-01-26 NOTE — PERIOP NOTES
1547 TRANSFER - OUT REPORT:    Verbal report given to [facility]) on Mary Zamorano  being transferred to (unit) for routine post - op       Report consisted of patients Situation, Background, Assessment and   Recommendations(SBAR). Information from the following report(s) SBAR, Procedure Summary, MAR, and Recent Results was reviewed with the receiving nurse. Lines:   Peripheral IV 01/25/23 Anterior;Right Forearm (Active)   Site Assessment Clean, dry, & intact 01/26/23 1503   Phlebitis Assessment 0 01/26/23 1503   Infiltration Assessment 0 01/26/23 1503   Dressing Status Clean, dry, & intact 01/26/23 1503   Dressing Type Tape;Transparent 01/26/23 1503   Hub Color/Line Status Blue 01/26/23 1503        Opportunity for questions and clarification was provided.       Patient transported with:   X[place]

## 2023-01-26 NOTE — PROGRESS NOTES
[address] Pharmacokinetic Monitoring Service - Vancomycin    Indication: bacteremia  Goal AUC/ADRIEN: 400-600 mg*hr/L  Day of Therapy: 5  Additional Antimicrobials: pip-tazo    Pertinent Laboratory Values: Wt Readings from Last 1 Encounters:   01/23/23 71.2 kg (157 lb)     Temp Readings from Last 1 Encounters:   01/26/23 98.2 °F (36.8 °C)     No components found for: PROCAL  Estimated Creatinine Clearance: 66.6 mL/min (by C-G formula based on SCr of 0.67 mg/dL). No results for input(s): WBC in the last 72 hours. No lab exists for component: CREATININE,  BUN    Pertinent Cultures:  Date Source Results   1/21 blood GPC in 2/2 1/23 blood NGTD   MRSA Nasal Swab: N/A. Non-respiratory infection.     Assessment:  Date Current Dose Concentration (mg/L) Timing of Concentration (h) AUC   1/22 1,500 mg x1  750 mg q12h - - -   1/23 750 mg q12h  1,250 mg q18h - - -   1/24 1,250 mg q18h  1,000 mg q12h 10.9 14 442  527   1/25 1,000 mg q12h - - -   1/26 1,000 mg q12h 19.6 7 462   Note: Serum concentrations collected for AUC dosing may appear elevated if collected in close proximity to the dose administered, this is not necessarily an indication of toxicity    Plan:  Continue current dose of 1,000 mg q12h  No level ordered at this time  Pharmacy will continue to monitor patient and adjust therapy as indicated    Thank you for the consult,  Carter Quick  1/26/2023

## 2023-01-26 NOTE — PROCEDURES
RADIOLOGY POST PROCEDURE NOTE     January 26, 2023       2:10 PM     Preoperative Diagnosis:   Lung cancer with osseous metastatic disease    Postoperative Diagnosis:  Same. :  Osmel Loza    Assistant:  None. Type of Anesthesia: MAC    Procedure/Description: CT-guided biopsy of lytic lesion right sixth rib    Findings:   Malignancy. Estimated blood Loss:  Minimal    Specimen Removed:   yes    Blood transfusions:  None. Implants:  None. Complications: None    Condition: Stable    Discharge Plan:  continue present therapy. Chest x-ray.   Resume heparin tomorrow,    Bessy Guy MD

## 2023-01-26 NOTE — PROGRESS NOTES
TRANSFER - OUT REPORT:    Verbal report given to [facility] JUAN RN (name) on [facility]  being transferred to PACU (unit) for routine post - op       Report consisted of patients Situation, Background, Assessment and   Recommendations(SBAR). Information from the following report(s) SBAR, Procedure Summary, and MAR was reviewed with the receiving nurse. Lines:   Peripheral IV 01/25/23 Anterior;Right Forearm (Active)        Opportunity for questions and clarification was provided.       Patient transported with:   O2 @ 6 liters  Registered Nurse  CRNA

## 2023-01-26 NOTE — ANESTHESIA PREPROCEDURE EVALUATION
Relevant Problems   RESPIRATORY SYSTEM   (+) Community acquired pneumonia      RENAL FAILURE   (+) SIRENA (acute kidney injury) ([facility] [address]       Anesthetic History   No history of anesthetic complications            Review of Systems / Medical History  Patient summary reviewed and pertinent labs reviewed    Pulmonary  Within defined limits                 Neuro/Psych   Within defined limits           Cardiovascular              CAD         GI/Hepatic/Renal  Within defined limits              Endo/Other        Arthritis     Other Findings              Physical Exam    Airway  Mallampati: II  TM Distance: 4 - 6 cm  Neck ROM: normal range of motion   Mouth opening: Normal     Cardiovascular  Regular rate and rhythm,  S1 and S2 normal,  no murmur, click, rub, or gallop             Dental    Dentition: Edentulous     Pulmonary  Breath sounds clear to auscultation               Abdominal  GI exam deferred       Other Findings            Anesthetic Plan    ASA: 3  Anesthesia type: general - backup and MAC          Induction: Intravenous  Anesthetic plan and risks discussed with: Patient

## 2023-01-26 NOTE — PROGRESS NOTES
Comprehensive Nutrition Assessment    Type and Reason for Visit: Reassess, Positive nutrition screen    Nutrition Recommendations/Plan:    Continue NPO. Advance diet when medically appropriate following procedure. Continue Ensure Enlive (each provides 350 kcal, 20g protein) TID  once diet advanced. Malnutrition Assessment:  Malnutrition Status: At risk for malnutrition (specify) (r/t suspected wt loss PTA; poor appetite PTA (recently improving); suspected lung cancer dx.) (01/23/23 5132)      Nutrition Assessment:    Per chart, pt agreed to have biopsy to evaluate evidence of metastatic disease. Currently NPO for planned procedure today. No entries for meal or supplement intake since previous assessment. No updated wt available in chart, pt questioned bedscale wt on previous visit and again on this visit. Visited pt- laying in bed, alert and able to communicate. Endorsed variable intake prior to NPO, though denied decreased appetite and claimed to be hungry now waiting for procedure and wishes she could eat. Denied nausea currently. Pt was drinking Ensure prior to NPO, will keep order to restart once diet advanced following procedure. Nutrition Related Findings:    Last BM 1/23. Labs reviewed-  WNL. Pertinent meds reviewed. Wound Type: None    Current Nutrition Intake & Therapies:  Average Meal Intake: NPO  Average Supplement Intake: NPO  ADULT ORAL NUTRITION SUPPLEMENT Breakfast, Lunch, Dinner; Standard High Calorie/High Protein  DIET NPO Sips of Water with Meds    Anthropometric Measures:  Height: 5' 1\" (154.9 cm)  Ideal Body Weight (IBW): 105 lbs (48 kg)  Admission Body Weight: 157 lb (no source specified)  Current Body Wt:  57.6 kg (127 lb), 121 % IBW.  Bed scale  Current BMI (kg/m2): 24  Usual Body Weight: 59 kg (130 lb) (7/27/22)  % Weight Change (Calculated): -2.3  Weight Adjustment: No adjustment  BMI Category: Normal weight (BMI 22.0-24.9) age over 72    Estimated Daily Nutrient Needs:  Energy Requirements Based On: Formula (MSJ x 1.1-1.3)  Weight Used for Energy Requirements: Current  Energy (kcal/day): 7163 - 1338  Weight Used for Protein Requirements: Current (1-1.2)  Protein (g/day): 58 - 69  Method Used for Fluid Requirements: 1 ml/kcal  Fluid (ml/day): 0214 - 8905    Nutrition Diagnosis:   Inadequate oral intake related to  (suspected lung cancer) as evidenced by NPO or clear liquid status due to medical condition    Nutrition Interventions:   Food and/or Nutrient Delivery: Continue NPO  Nutrition Education/Counseling: No recommendations at this time, Education not indicated  Coordination of Nutrition Care: Continue to monitor while inpatient  Plan of Care discussed with: Pt    Goals:  Previous Goal Met: Progressing toward goal(s) (prior to NPO)  Goals: Initiate PO diet, by next RD assessment       Nutrition Monitoring and Evaluation:      Food/Nutrient Intake Outcomes: Diet advancement/tolerance, Food and nutrient intake, Supplement intake  Physical Signs/Symptoms Outcomes: Biochemical data, GI status, Meal time behavior, Weight    Discharge Planning:     Too soon to determine    Vicenta Lacey, Jonh [address] [facility] [address]  Contact: [phone]

## 2023-01-26 NOTE — ANESTHESIA POSTPROCEDURE EVALUATION
* No procedures listed *.    general - backup, MAC    Anesthesia Post Evaluation      Multimodal analgesia: multimodal analgesia used between 6 hours prior to anesthesia start to PACU discharge  Patient location during evaluation: PACU  Patient participation: complete - patient participated  Level of consciousness: awake and alert  Pain management: adequate  Airway patency: patent  Anesthetic complications: no  Cardiovascular status: acceptable  Respiratory status: acceptable  Hydration status: acceptable  Post anesthesia nausea and vomiting:  controlled  Final Post Anesthesia Temperature Assessment:  Normothermia (36.0-37.5 degrees C)      INITIAL Post-op Vital signs:   Vitals Value Taken Time   /86 01/26/23 1533   Temp 36.6 °C (97.9 °F) 01/26/23 1424   Pulse 84 01/26/23 1541   Resp 24 01/26/23 1541   SpO2 91 % 01/26/23 1541   Vitals shown include unvalidated device data.

## 2023-01-27 VITALS
DIASTOLIC BLOOD PRESSURE: 69 MMHG | HEIGHT: 61 IN | WEIGHT: 157 LBS | OXYGEN SATURATION: 96 % | TEMPERATURE: 98.8 F | BODY MASS INDEX: 29.64 KG/M2 | SYSTOLIC BLOOD PRESSURE: 109 MMHG | RESPIRATION RATE: 16 BRPM | HEART RATE: 92 BPM

## 2023-01-27 PROCEDURE — 99231 SBSQ HOSP IP/OBS SF/LOW 25: CPT | Performed by: NURSE PRACTITIONER

## 2023-01-27 PROCEDURE — 99233 SBSQ HOSP IP/OBS HIGH 50: CPT | Performed by: INTERNAL MEDICINE

## 2023-01-27 PROCEDURE — 74011250637 HC RX REV CODE- 250/637: Performed by: HOSPITALIST

## 2023-01-27 PROCEDURE — 74011250636 HC RX REV CODE- 250/636: Performed by: INTERNAL MEDICINE

## 2023-01-27 PROCEDURE — 74011000250 HC RX REV CODE- 250: Performed by: INTERNAL MEDICINE

## 2023-01-27 PROCEDURE — 11719 TRIM NAIL(S) ANY NUMBER: CPT

## 2023-01-27 PROCEDURE — 74011250637 HC RX REV CODE- 250/637: Performed by: INTERNAL MEDICINE

## 2023-01-27 PROCEDURE — 2709999900 HC NON-CHARGEABLE SUPPLY

## 2023-01-27 PROCEDURE — 94762 N-INVAS EAR/PLS OXIMTRY CONT: CPT

## 2023-01-27 PROCEDURE — 11721 DEBRIDE NAIL 6 OR MORE: CPT

## 2023-01-27 RX ORDER — AMOXICILLIN AND CLAVULANATE POTASSIUM 875; 125 MG/1; MG/1
1 TABLET, FILM COATED ORAL 2 TIMES DAILY
Qty: 8 TABLET | Refills: 0 | Status: SHIPPED | OUTPATIENT
Start: 2023-01-27 | End: 2023-01-31

## 2023-01-27 RX ADMIN — VANCOMYCIN HYDROCHLORIDE 1000 MG: 1 INJECTION, POWDER, LYOPHILIZED, FOR SOLUTION INTRAVENOUS at 08:24

## 2023-01-27 RX ADMIN — GUAIFENESIN 600 MG: 600 TABLET, EXTENDED RELEASE ORAL at 08:24

## 2023-01-27 RX ADMIN — LEVOFLOXACIN 750 MG: 750 TABLET, FILM COATED ORAL at 12:46

## 2023-01-27 RX ADMIN — OXYCODONE HYDROCHLORIDE AND ACETAMINOPHEN 1 TABLET: 5; 325 TABLET ORAL at 10:43

## 2023-01-27 RX ADMIN — SODIUM CHLORIDE, PRESERVATIVE FREE 10 ML: 5 INJECTION INTRAVENOUS at 06:00

## 2023-01-27 RX ADMIN — HEPARIN SODIUM 5000 UNITS: 5000 INJECTION INTRAVENOUS; SUBCUTANEOUS at 10:16

## 2023-01-27 NOTE — PROGRESS NOTES
[facility] Pharmacokinetic Monitoring Service - Vancomycin    Indication: bacteremia  Goal AUC/ADRIEN: 400-600 mg*hr/L  Day of Therapy: 6  Additional Antimicrobials: pip-tazo    Pertinent Laboratory Values: Wt Readings from Last 1 Encounters:   01/23/23 71.2 kg (157 lb)     Temp Readings from Last 1 Encounters:   01/27/23 98.9 °F (37.2 °C)     No components found for: PROCAL  Estimated Creatinine Clearance: 66.6 mL/min (by C-G formula based on SCr of 0.67 mg/dL). No results for input(s): WBC in the last 72 hours. No lab exists for component: CREATININE,  BUN    Pertinent Cultures:  Date Source Results   1/21 blood GPC in 2/2 1/23 blood NGTD   MRSA Nasal Swab: N/A. Non-respiratory infection.     Assessment:  Date Current Dose Concentration (mg/L) Timing of Concentration (h) AUC   1/22 1,500 mg x1  750 mg q12h - - -   1/23 750 mg q12h  1,250 mg q18h - - -   1/24 1,250 mg q18h  1,000 mg q12h 10.9 14 442  527   1/25 1,000 mg q12h - - -   1/26 1,000 mg q12h 19.6 7 462   1/27 1,000 mg q12h - - -   Note: Serum concentrations collected for AUC dosing may appear elevated if collected in close proximity to the dose administered, this is not necessarily an indication of toxicity    Plan:  Continue current dose of 1,000 mg q12h  No level ordered at this time  Pharmacy will continue to monitor patient and adjust therapy as indicated    Thank you for the consult,  EMMA Judge  1/27/2023

## 2023-01-27 NOTE — PROGRESS NOTES
[facility]  (A Division of [address] [place] [address])      Consultation Note      Date of Admission: 1/21/2023    Date of Note: 1/27/2023      Reason for Referral: GPC sepsis, pneumonia  Referring Physician: Dr. Nehemiah Fajardo from this admission:   1/21 blood culture: 3 out of 4 gram-positive cocci in groups  1/21 respiratory viral panel: Negative    Current Antimicrobials:    Prior Antimicrobials:  Vancomycin 1/22 to present  Levofloxacin 1/25 to present Azithromycin 1/21 to 1/22  Ceftriaxone 1/21 to 1/22  Zosyn 1/22 through 1/25       Assessment:         Staph species sepsis suspect from a pulmonary source:   Postobstructive pneumonia versus enlarging malignancy: in the right middle and lower lobes. Right lower lobe pulmonary mass with hilar and subcarinal LAD as well as  metastasis to the ribs on the right   -1/21 CTA chest with complete consolidation of the right middle lobe and right lower lobe suspicious for tumor plus or minus pneumonia. Small right pleural effusion. 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  Anticipate that we can transition to p.o. upon discharge.   - TTE is negative for endocarditis. - She is not interested in having a PICC line placed for IV antibiotics    - Can transition to Augmentin though 1/31     Continue levofloxacin    -Stop date 1/31    Repeat blood cx from 1/23 remain negative  CBC, BMP in am    Pending biopsy results from 1/26    Discussed with Dr. Batista for d/c from [facility], [facility]  [address], [address] [facility] [address]  Office: [phone], Ext [phone]  Mobile/Text: [phone]    Lines / Catheters:  Peripheral    Subjective:   Seen and examined at bedside. Doing okay.   Minimal residual pain around her biopsy site. Occasional cough. No new events overnight. No pain. No SOB. No fever or chills. HPI:  Shari Wells is a 71 y.o. female with PMHx significant for RLL lung mass, tobacco use, anxiety who presented with several day history of increasing chest pain, cough and dyspnea. Denies fever or chills. Decreased appetite and weight loss noted over the last 2 weeks. Has history of RLL lung mass that has been followed for over 1 year and is progressive, and PET positive. From my understanding she has not pursued biopsy or treatment for her pulmonary mass although its been known to be present for greater than 1 year. She has been getting progressively more short of breath with an intermittent cough which is what prompted her to come to the emergency department. She has been afebrile. Initial WBCs were 13.4 and have since normalized. She was started on empiric ceftriaxone and azithromycin due to concern for right-sided pneumonia. Blood cultures from  are now positive and 3 out of 4 bottles and she has been started on vancomycin while final cultures and sensitivities are pending. Objective:      Visit Vitals  /67 (BP 1 Location: Left upper arm, BP Patient Position: At rest)   Pulse 86   Temp 99.3 °F (37.4 °C)   Resp 16   Ht 5' 1\" (1.549 m)   Wt 71.2 kg (157 lb)   SpO2 96%   BMI 29.66 kg/m²     Temp (24hrs), Av.7 °F (37.1 °C), Min:97.9 °F (36.6 °C), Max:99.3 °F (37.4 °C)        General:   awake alert and oriented, older than states age   Skin:   no rashes or skin lesions noted on limited exam   HEENT:  Normocephalic, atraumatic, PERRL, EOMI, no scleral icterus or pallor; no conjunctival hemmohage;  nasal and oral mucous are moist and without evidence of lesions. No thrush. Dentition good. Neck supple, no bruits.    Lymph Nodes:   no cervical, axillary or inguinal adenopathy   Lungs:   non-labored, coarse breath sounds, no wheezes   Heart:  RRR, s1 and s2; no murmurs rubs or gallops, no edema, + pedal pulses   Abdomen:  soft, non-distended, active bowel sounds, no hepatomegaly, no splenomegaly. Non-tender   Genitourinary:  deferred   Extremities:   no clubbing, cyanosis; no joint effusions or swelling; Full ROM of all large joints to the upper and lower extremities; muscle mass appropriate for age   Neurologic:  No gross focal sensory abnormalities; 5/5 muscle strength to upper and lower extremities. Speech appropirate. Cranial nerves intact   Psychiatric:   appropriate and interactive. Labs: Results:   Chemistry Recent Labs     01/26/23  0335 01/25/23  0148   GLU 88 138*    138   K 3.8 3.6    106   CO2 27 28   BUN 7 11   CREA 0.67 0.88   CA 9.5 9.2   AGAP 3 4   BUCR 10* 13      CBC w/Diff No results for input(s): WBC, RBC, HGB, HCT, PLT, GRANS, LYMPH, EOS, HGBEXT, HCTEXT, PLTEXT, HGBEXT, HCTEXT, PLTEXT in the last 72 hours.            No results found for: [facility] Lab Results   Component Value Date/Time    Culture result: NO GROWTH 4 DAYS 01/23/2023 02:49 PM    Culture result: NO GROWTH 4 DAYS 01/23/2023 02:49 PM    Culture result: (A) 01/21/2023 06:55 AM     STAPHYLOCOCCUS EPIDERMIDIS GROWING IN 1 OF 2 BOTTLES DRAWN NO SITE REFER TO [id number] FOR ID AND SENSITIVITIES    Culture result: (A) 01/21/2023 06:45 AM     STAPHYLOCOCCUS EPIDERMIDIS GROWING IN BOTH BOTTLES DRAWN No Site Indicated    Culture result: MIXED UROGENITAL DOUGIE ISOLATED 11/06/2021 04:22 AM        Results       Procedure Component Value Units Date/Time    CULTURE, BLOOD [[id number]] Collected: 01/23/23 1449    Order Status: Completed Specimen: Blood Updated: 01/27/23 0709     Special Requests: NO SPECIAL REQUESTS        Culture result: NO GROWTH 4 DAYS       CULTURE, BLOOD [[id number]] Collected: 01/23/23 1449    Order Status: Completed Specimen: Blood Updated: 01/27/23 0709     Special Requests: NO SPECIAL REQUESTS        Culture result: NO GROWTH 4 DAYS       CULTURE, RESPIRATORY/SPUTUM/BRONCH W GRAM STAIN [[id number]] Collected: 01/22/23 0900    Order Status: Canceled Specimen: Sputum     RESPIRATORY VIRUS PANEL W/COVID-19, PCR [[id number]] Collected: 01/21/23 0700    Order Status: Completed Specimen: Nasopharyngeal Updated: 01/21/23 0817     Adenovirus Not detected        Coronavirus 229E Not detected        Coronavirus HKU1 Not detected        Coronavirus CVNL63 Not detected        Coronavirus OC43 Not detected        SARS-CoV-2, PCR Not detected        Metapneumovirus Not detected        Rhinovirus and Enterovirus Not detected        Influenza A Not detected        Influenza B Not detected        Parainfluenza 1 Not detected        Parainfluenza 2 Not detected        Parainfluenza 3 Not detected        Parainfluenza virus 4 Not detected        RSV by PCR Not detected        B. parapertussis, PCR Not detected        Bordetella pertussis - PCR Not detected        Chlamydophila pneumoniae DNA, QL, PCR Not detected        Mycoplasma pneumoniae DNA, QL, PCR Not detected       CULTURE, BLOOD [[id number]]  (Abnormal) Collected: 01/21/23 0655    Order Status: Completed Specimen: Blood Updated: 01/24/23 1547     Special Requests: NO SPECIAL REQUESTS        GRAM STAIN       ANAEROBIC BOTTLE Gram positive cocci IN GROUPS                  SMEAR CALLED TO AND CORRECTLY REPEATED BY: YOLANDA HOANG [facility] RN 3N [id number] 1/22/23 BY VMB           Culture result:       STAPHYLOCOCCUS EPIDERMIDIS GROWING IN 1 OF 2 BOTTLES DRAWN NO SITE REFER TO [id number] FOR ID AND SENSITIVITIES          CULTURE, BLOOD [[id number]]  (Abnormal)  (Susceptibility) Collected: 01/21/23 0645    Order Status: Completed Specimen: Blood Updated: 01/24/23 1544     Special Requests: NO SPECIAL REQUESTS        GRAM STAIN       AEROBIC AND ANAEROBIC BOTTLES Gram positive cocci IN GROUPS                  SMEAR CALLED TO AND CORRECTLY REPEATED BY: YOLANDA HOANG [facility] RN [id number] 1/22/23 BY VMB           Culture result:       STAPHYLOCOCCUS EPIDERMIDIS GROWING IN BOTH BOTTLES DRAWN No Site Indicated          Susceptibility        Staphylococcus epidermidis      ADRIEN      Ciprofloxacin ($) Susceptible      Clindamycin ($) Susceptible      Daptomycin ($$$$$) Susceptible      Erythromycin ($$$$) Resistant      Gentamicin ($) Susceptible      Inducible Clindamycin Susceptible      Levofloxacin ($) Susceptible      Linezolid ($$$$$) Susceptible      Oxacillin Susceptible      Tetracycline Susceptible      Trimeth/Sulfa Resistant      Vancomycin ($) Susceptible                           CULTURE, RESPIRATORY/SPUTUM/BRONCH Orysia Devoid [[id number]] Collected: 01/21/23 0645    Order Status: Canceled Specimen: Sputum     BLOOD CULTURE ID PANEL [[id number]]  (Abnormal) Collected: 01/21/23 0645    Order Status: Completed Specimen: Blood Updated: 01/22/23 2006     Acc. no. from [address] [place] [address] [id number]     Enterococcus faecalis Not detected        Enterococcus faecium Not detected        Listeria monocytogenes Not detected        Staphylococcus Detected        Staphylococcus aureus Not detected        Staph epidermidis Detected        Staph lugdunensis Not detected        Streptococcus Not detected        Streptococcus agalactiae (Group B) Not detected        Streptococcus pneumoniae Not detected        Streptococcus pyogenes (Group A) Not detected        Acinetobacter calcoaceticus-baumanii complex Not detected        Bacteroides fragilis Not detected        Enterobacterales species Not detected        Enterobacter cloacae complex Not detected        Escherichia coli Not detected        Klebsiella aerogenes Not detected        Klebsiella oxytoca Not detected        Klebsiella pneumoniae group Not detected        Proteus Not detected        Salmonella Not detected        Serratia marcescens Not detected        Haemophilus influenzae Not detected        Neisseria meningitidis Not detected        Pseudomonas aeruginosa Not detected        Steno maltophilia Not detected        Candida albicans Not detected        Candida auris Not detected        Candida glabrata Not detected        Candida krusei Not detected        Candida parapsilosis Not detected        Candida tropicalis Not detected        Crypto neoformans/gattii Not detected        RESISTANT GENES:            MECA/C (Methicillin resistant gene) Not detected        Comment       False positive results may rarely occur. Correlate with clinical,epidemiologic, and other laboratory findings           Comment: Please see BCID Interpretation Guide in EPIC Links                 Imaging:      All imaging reviewed from Admission to present as per radiology interpretation in [address] [place] [address]

## 2023-01-27 NOTE — CONSULTS
[facility]: [phone] ([phone])  [facility]: [phone]     Patient Name: New Melchor  YOB: 1950    Date of Initial Consult : 1/24/2023  Follow up 1/27/2023   Reason for Consult: Care decisions  Requesting Provider: Dr. Haven Price   Primary Care Physician: None      SUMMARY:   New Melchor is a 71 y.o. female with a past history of right lower lobe mass which has not been biopsied, anxiety, osteoarthritis and angina at rest, who was admitted on 1/21/2023 from home with a diagnosis of suspected right primary lung cancer with suspected metastases to bone and possibly postobstructive pneumonia. Current medical issues leading to Palliative Medicine involvement include: 71-year-old female with a history of right lower lobe lung mass who was lost to follow-up presented to the ED after having an argument with a friend and developing chest pain. Palliative medicine is consulted for care decisions. CHIEF COMPLAINT: chest pain    HPI/SUBJECTIVE:    Past history as above. Ms. David Juarez is a 71year old female who presented to the ED after having an argument with a friend and developing chest pain. Patient reportedly was found to have a lung mass approximately 1 year ago and declined biopsy and was lost to follow-up. Patient now appears to have possible metastatic lesions to her ribs as well as pneumonia and is admitted for further medical management. 1/27/2023 Ms David Juarez is alert, feels well today except bx site which is sore. Noted plans for d/c later today.      The patient is:   [x] Verbal and participatory  [] Non-participatory due to:     GOALS OF CARE:  Full code, full interventions  Patient/Health Care Proxy Stated Goals: Prolong life      TREATMENT PREFERENCES:   Code Status: Full Code         PALLIATIVE DIAGNOSES:   Encounter for palliative care/goals of care  CAP  Suspected lung cancer  Debility       PLAN:   1/27/2023 Follow up along with Ms Kelli Arguello, [facility]. She is alert and oriented. Had some concerns about the care in radiology. Bx site is sore per patient. Pain medications per attending team. Discussed importance of follow up with her out patient providers. She has Medicaid transport and voices her understanding the importance of her follow up. No change in goals of care she remains full code with full interventions. Noted plans for d/c later today. ( Please see below for previous notes per palliative team)     Encounter for palliative care/goals of care - seen at bedside along with Ms. Kelli Arguello RN. Patient is sitting up in recliner, awake, alert and oriented x4. Patient is aware that she likely has lung cancer and acknowledges being told this approximately 1 year ago. Patient has not undergone any further work-up and has been lost to follow-up. Patient appears to have low health literacy and had many questions about the option of biopsy here in the hospital.  Education, active listening and emotional support offered to patient who was tearful at times and admitted to being scared. Patient also shared that it is difficult for her to make a decision because she hears from so many different people and she is trying to understand everything that is being shared with her. Suggested to patient about having a support person/family member with her when discussions are happening to help with information gathering however patient declined stating that she is able to take in the information herself. We spent a lot of time establishing rapport and trust with patient and answering her questions to the best of our ability. We spoke openly about the importance of biopsy to establish an official diagnosis so that she could then learn what treatment options may be available to her. Patient clearly spoke about wanting to keep living for her children and her grandchildren.   Allow patient to share her fears and explained that she would have the opportunity to ask further questions before consenting to any procedure. By the end of our visit patient stated that she wished to proceed with biopsy and asked us to contact her attending physician to let her know that. Attending hospitalist notified of patient's decision to proceed with biopsy. Discussed advance care planning with patient and asked if she had ever completed an AMD or consider who she would want to be her medical decision-maker in the event she cannot speak for herself. Patient stated that it would be all of her adult children to make decisions for her and that she trusted that they would make the right decision. Patient understands at this time that she is capable of making her own decisions. Patient declines completion of AMD.  Did not address goals of care today as patient was very focused on biopsy and making decision about how to move forward. At this time goals of care remain full code with full interventions. Palliative medicine will continue to follow closely with you.   CAP - primary team managing, IVFs, IV antibiotics, mucinex, duonebs PRN  Suspected lung cancer - primary team managing, dx approx 1 year ago - declined biopsy at that time and was lost to follow-up, now wishes to proceed with biopsy  Debility - PPS 48, lives alone, states that she helps a homeless person and that \"I will soon be homeless myself\", reportedly independent with ADLs, does her own cooking, does not drive (never has), BMI 29.66  Initial consult note routed to primary continuity provider  Communicated plan of care with: Palliative IDT      Advance Care Planning:  [] The [facility] Interdisciplinary Team has updated the ACP Navigator with [address] and Patient Capacity    Primary Decision Maker ([address]):   *There is no AMD.  Ana Finn is a majority of patient's 4 adult children  Medical Interventions: Full interventions         As far as possible, the palliative care team has discussed with patient / health care proxy about goals of care / treatment preferences for patient. 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)      Anxiety (1/21/2023)      Tobacco abuse (1/21/2023)      Osteopenia (1/21/2023)      Osteoarthritis (1/21/2023)      Encounter for palliative care (1/24/2023)      Debility (1/24/2023)    Past Medical History:   Diagnosis Date    Angina at rest [facility])     Anxiety     CVA, old, facial weakness 2012    left ptosis    Dental caries     Osteoarthritis     Osteopenia     Right lower lobe lung mass 04/15/2022    Offered Biopsy and couldn't decide between PAlliative Care and Biopsy      Past Surgical History:   Procedure Laterality Date    HX WISDOM TEETH EXTRACTION        Family History   Problem Relation Age of Onset    Cancer Daughter         Unspecified Cancer    Cancer Cousin         Unspecified Cancer x2     History reviewed, no pertinent family history. Social History     Tobacco Use    Smoking status: Some Days     Packs/day: 0.50     Years: 15.00     Pack years: 7.50     Types: Cigarettes    Smokeless tobacco: Never    Tobacco comments:     Patient reports that she is a Some Day Smoker x20 years (as of 1/21/2023).    Substance Use Topics    Alcohol use: Yes     Comment: 8 drinks per week     Allergies   Allergen Reactions    Aspirin Nausea Only      Current Facility-Administered Medications   Medication Dose Route Frequency    levoFLOXacin (LEVAQUIN) tablet 750 mg  750 mg Oral Q24H    vancomycin (VANCOCIN) 1,000 mg in 0.9% sodium chloride 250 mL (VIAL-MATE)  1,000 mg IntraVENous Q12H    heparin (porcine) injection 5,000 Units  5,000 Units SubCUTAneous Q8H    guaiFENesin ER (MUCINEX) tablet 600 mg  600 mg Oral Q12H    sodium chloride (NS) flush 5-40 mL  5-40 mL IntraVENous Q8H    sodium chloride (NS) flush 5-40 mL  5-40 mL IntraVENous PRN    acetaminophen (TYLENOL) tablet 650 mg  650 mg Oral Q6H PRN    Or    acetaminophen (TYLENOL) suppository 650 mg  650 mg Rectal Q6H PRN    polyethylene glycol (MIRALAX) packet 17 g  17 g Oral DAILY PRN    ondansetron (ZOFRAN ODT) tablet 4 mg  4 mg Oral Q8H PRN    Or    ondansetron (ZOFRAN) injection 4 mg  4 mg IntraVENous Q6H PRN    melatonin tablet 5 mg  5 mg Oral QHS PRN    naloxone (NARCAN) injection 0.4 mg  0.4 mg IntraVENous EVERY 2 MINUTES AS NEEDED    oxyCODONE-acetaminophen (PERCOCET) 5-325 mg per tablet 1 Tablet  1 Tablet Oral Q6H PRN    albuterol/ipratropium (DUONEB) neb solution  1 Dose Nebulization Q6H PRN          Clinical Pain Assessment (nonverbal scale for nonverbal patients): Clinical Pain Assessment  Severity: 9  Location: right upper back  Character: ache  Duration: several hours  Effect: difficultly with rest  Factors: s/p biopsy  Frequency: occ          Duration: for how long has pt been experiencing pain (e.g., 2 days, 1 month, years)  Frequency: how often pain is an issue (e.g., several times per day, once every few days, constant)     FUNCTIONAL ASSESSMENT:     Palliative Performance Scale (PPS):  PPS: 70    ECOG  ECOG Status : Ambulatory, but unable to carry out work activities     PSYCHOSOCIAL/SPIRITUAL SCREENING:      Any spiritual / Religion concerns:  [] Yes /  [x] No    Caregiver Burnout:  [] Yes /  [] No /  [x] No Caregiver Present      Anticipatory grief assessment:   [x] Normal  / [] Maladaptive        REVIEW OF SYSTEMS:     Systems: constitutional, ears/nose/mouth/throat, respiratory, gastrointestinal, genitourinary, musculoskeletal, integumentary, neurologic, psychiatric, endocrine. Positive findings noted below.   Modified ESAS Completed by: provider   Fatigue: 2 Drowsiness: 0   Depression: 0 Pain: 9   Anxiety: 0 Nausea: 0     Dyspnea: 0           Stool Occurrence(s): 1   Positive and pertinent negative findings in ROS are noted above in HPI. The following systems were [x] reviewed / [] unable to be reviewed as noted in HPI  Other findings are noted below. PHYSICAL EXAM:     Constitutional: reclining in bed alert talkative, in NAD   Eyes: pupils equal  ENMT: moist MM   Cardiovascular: RRR  Respiratory: respirations not labored on room air   Gastrointestinal: soft   Last bowel movement: 1/23/2023  Skin: warm, dry  Neurologic: alert and oriented x 4   Psychiatric: full affect, no hallucinations, calm     Other: Wt Readings from Last 3 Encounters:   01/23/23 71.2 kg (157 lb)   01/20/23 71.2 kg (157 lb)   01/13/23 71.2 kg (157 lb)     Blood pressure 109/69, pulse 92, temperature 98.8 °F (37.1 °C), resp. rate 16, height 5' 1\" (1.549 m), weight 71.2 kg (157 lb), SpO2 96 %. Pain:  Pain Scale 1: Numeric (0 - 10)  Pain Intensity 1: 7  Pain Onset 1: intermittent  Pain Location 1: Back  Pain Orientation 1: Medial, Right  Pain Description 1: Aching  Pain Intervention(s) 1: Medication (see MAR), Distraction       LAB AND IMAGING FINDINGS:     Lab Results   Component Value Date/Time    WBC 9.7 01/22/2023 05:02 AM    HGB 12.8 01/22/2023 05:02 AM    PLATELET 114 (H) 27/02/6042 05:02 AM     Lab Results   Component Value Date/Time    Sodium 138 01/26/2023 03:35 AM    Potassium 3.8 01/26/2023 03:35 AM    Chloride 108 01/26/2023 03:35 AM    CO2 27 01/26/2023 03:35 AM    BUN 7 01/26/2023 03:35 AM    Creatinine 0.67 01/26/2023 03:35 AM    Calcium 9.5 01/26/2023 03:35 AM    Magnesium 2.3 01/21/2023 02:39 AM      Lab Results   Component Value Date/Time    Alk.  phosphatase 91 01/22/2023 05:02 AM    Protein, total 7.1 01/22/2023 05:02 AM    Albumin 2.8 (L) 01/22/2023 05:02 AM    Globulin 4.3 (H) 01/22/2023 05:02 AM     Lab Results   Component Value Date/Time    INR 1.0 01/22/2023 05:02 AM    Prothrombin time 13.5 01/22/2023 05:02 AM    aPTT 31.4 12/22/2021 12:47 PM      Lab Results   Component Value Date/Time    Iron 87 12/22/2021 12:47 PM TIBC 363 12/22/2021 12:47 PM    Iron % saturation 24 12/22/2021 12:47 PM    Ferritin 253 12/22/2021 12:47 PM      No results found for: PH, PCO2, PO2  No components found for: [place]   Lab Results   Component Value Date/Time     (H) 11/06/2021 02:21 AM    CK - MB 1.3 11/06/2021 02:21 AM              Total time: 25 minutes    > 50% counseling / coordination:  Time spent in direct consultation with the patient, medical team, and family     Prolonged service was provided for  []30 min   []75 min in face to face time in the presence of the patient, spent as noted above. Time Start:   Time End:     Disclaimer: Sections of this note are dictated using utilizing voice recognition software, which may have resulted in some phonetic based errors in grammar and contents. Even though attempts were made to correct all the mistakes, some may have been missed, and remained in the body of the document. If questions arise, please contact our department.

## 2023-01-27 NOTE — PROGRESS NOTES
[facility]  Pulmonary, Critical Care, and Sleep Medicine    Name: Chante Mars MRN: [id number]   : 1950 Hospital: [facility]   Date: 2023        Pulmonary Consult Progress Note                                              Consult requesting physician: Dr. DANISH AMARO  Reason for Consult: Lung mass    IMPRESSION:   Large RLL pulmonary mass with right hilar and subcarinal LAD now with lytic lesions in right lateral 6th rib, L posterior lateral 6th rib and T4 left transverse process. The pulmonary mass and subcarinal LN were both PET avid a year ago and have progressed all pointing to a malignancy likely primary lung. Post-obstructive pneumonia versus extension of malignancy RML/RLL. WBC elevated pointing to acute infectious process. No O2 requirement. Tobacco use  Anxiety disorder  Osteoarthritis  Gram + cocci in BC x 2- ID consulted, Probable Coag negative staph, does not appear toxic, afebrile,- Follow up BC NGTD,   COPD suspected        RECOMMENDATIONS:   Follow-up pathology results from bone biopsy, currently not available  Agree with oncology consultation, advise follow-up as outpatient  Focus on pulmonary hygiene  Maintain aspiration precautions  SpO2 goals 92-95%- AVOID OVER OXYGENATION  Defer anti-infective therapy to ID, discussed with Dr. Loli Powell  Nutrition: per primary team  Replace electrolytes as needed     GI Prophylaxis: per primary team    DVT Prophylaxis: not currently on DVT prophylaxis - recommend starting subq heparin as high risk given likely underlying active malignancy and now infection. Smoking cessation recommended.   Palliative Care Evaluation and discussion of goals or care     Follow-up in pulmonary clinic in 1-3 weeks post discharge       Subjective/History:     Chante Mars is a 71 y.o. female with PMHx significant for RLL lung mass, tobacco use, anxiety who presented with several day history of increasing chest pain, cough and dyspnea. Denies fever or chills. Decreased appetite and weight loss noted over the last 2 weeks. Has history of RLL lung mass that has been followed for over 1 year and is progressive, and PET positive. Offered biopsy by her oncologist in April 2022 but did not pursue. CTA today showed extension of pulmonary mass, and hilar subcarinal LAD with likely post-obstructive PNA versus extension of likely maligancy. Also note lytic lesions on ribs and T4 transverse process. Pulmonary consulted for recommendations regarding biopsy. Upon extensive discussion with patient she does seem willing to pursue biopsy at this time but would like to go home and follow-up as an outpatient if possible. Patient does smoke daily about a 1/2 pack per day, has been smoking for the last 20 years. Patient not requiring any supplemental O2. Interval Evaluation  1/27/23  Patient seen and examined at bedside. No acute events overnight. Patient very upset, reporting she wants to go home. Reports that she continues to have pain from biopsy site. Denies nausea, vomiting, diarrhea.   Refuses to answer further questions    Patient Vitals for the past 24 hrs:   Temp Pulse Resp BP SpO2   01/27/23 1244 98.8 °F (37.1 °C) 92 16 109/69 96 %   01/27/23 1007 99.3 °F (37.4 °C) 86 16 112/67 96 %   01/27/23 0513 98.9 °F (37.2 °C) 87 16 117/75 95 %   01/26/23 2320 98.9 °F (37.2 °C) 94 16 123/70 92 %   01/26/23 1957 98.8 °F (37.1 °C) 99 16 133/88 94 %   01/26/23 1543 -- 82 22 134/82 92 %   01/26/23 1533 -- 90 24 (!) 141/86 91 %   01/26/23 1524 -- (!) 101 20 (!) 140/87 96 %   01/26/23 1513 -- 91 23 (!) 142/87 100 %     Inpat Anti-Infectives (From admission, onward)       Start     Ordered Stop    01/25/23 1300  levoFLOXacin (LEVAQUIN) tablet 750 mg  750 mg,   Oral,   EVERY 24 HOURS         01/25/23 1224 01/30/23 1259    01/24/23 0900  vancomycin (VANCOCIN) 1,000 mg in 0.9% sodium chloride 250 mL (VIAL-MATE)  1,000 mg,   IntraVENous,   EVERY 12 HOURS [facility] Orders Report. 01/24/23 0806 --                        Allergies   Allergen Reactions    Aspirin Nausea Only        Past Medical History:   Diagnosis Date    Angina at rest [facility])     Anxiety     CVA, old, facial weakness 2012    left ptosis    Dental caries     Osteoarthritis     Osteopenia     Right lower lobe lung mass 04/15/2022    Offered Biopsy and couldn't decide between PAlliative Care and Biopsy        Past Surgical History:   Procedure Laterality Date    HX WISDOM TEETH EXTRACTION          Family History   Problem Relation Age of Onset    Cancer Daughter         Unspecified Cancer    Cancer Cousin         Unspecified Cancer x2        Social History     Tobacco Use    Smoking status: Some Days     Packs/day: 0.50     Years: 15.00     Pack years: 7.50     Types: Cigarettes    Smokeless tobacco: Never    Tobacco comments:     Patient reports that she is a Some Day Smoker x20 years (as of 1/21/2023). Substance Use Topics    Alcohol use: Yes     Comment: 8 drinks per week          Prior to Admission medications    Medication Sig Start Date End Date Taking? Authorizing Provider   amoxicillin-clavulanate (Augmentin) 875-125 mg per tablet Take 1 Tablet by mouth two (2) times a day for 4 days. 1/27/23 1/31/23 Yes Bernard Woody MD   levoFLOXacin (LEVAQUIN) 750 mg tablet Take 1 Tablet by mouth every twenty-four (24) hours for 5 days. 1/26/23 1/31/23 Yes Bernard Woody MD   oxyCODONE-acetaminophen (PERCOCET) 5-325 mg per tablet Take 1 Tablet by mouth every six (6) hours as needed for Pain for up to 3 days. Max Daily Amount: 4 Tablets. 1/26/23 1/29/23 Yes Bernard Woody MD   ibuprofen (MOTRIN) 400 mg tablet Take 600 mg by mouth every six (6) hours as needed for Pain. Provider, Historical   acetaminophen (TYLENOL) 325 mg tablet Take 2 Tablets by mouth every six (6) hours as needed for Pain.  9/7/22   SKYE Ruano       Current Facility-Administered Medications Medication Dose Route Frequency    levoFLOXacin (LEVAQUIN) tablet 750 mg  750 mg Oral Q24H    vancomycin (VANCOCIN) 1,000 mg in 0.9% sodium chloride 250 mL (VIAL-MATE)  1,000 mg IntraVENous Q12H    heparin (porcine) injection 5,000 Units  5,000 Units SubCUTAneous Q8H    guaiFENesin ER (MUCINEX) tablet 600 mg  600 mg Oral Q12H    sodium chloride (NS) flush 5-40 mL  5-40 mL IntraVENous Q8H    sodium chloride (NS) flush 5-40 mL  5-40 mL IntraVENous PRN    acetaminophen (TYLENOL) tablet 650 mg  650 mg Oral Q6H PRN    Or    acetaminophen (TYLENOL) suppository 650 mg  650 mg Rectal Q6H PRN    polyethylene glycol (MIRALAX) packet 17 g  17 g Oral DAILY PRN    ondansetron (ZOFRAN ODT) tablet 4 mg  4 mg Oral Q8H PRN    Or    ondansetron (ZOFRAN) injection 4 mg  4 mg IntraVENous Q6H PRN    melatonin tablet 5 mg  5 mg Oral QHS PRN    naloxone (NARCAN) injection 0.4 mg  0.4 mg IntraVENous EVERY 2 MINUTES AS NEEDED    oxyCODONE-acetaminophen (PERCOCET) 5-325 mg per tablet 1 Tablet  1 Tablet Oral Q6H PRN    albuterol/ipratropium (DUONEB) neb solution  1 Dose Nebulization Q6H PRN         Objective:   Vital Signs:    Visit Vitals  /69 (BP 1 Location: Left upper arm, BP Patient Position: Sitting)   Pulse 92   Temp 98.8 °F (37.1 °C)   Resp 16   Ht 5' 1\" (1.549 m)   Wt 71.2 kg (157 lb)   SpO2 96%   BMI 29.66 kg/m²       O2 Device: None (Room air)       Temp (24hrs), Av.9 °F (37.2 °C), Min:98.8 °F (37.1 °C), Max:99.3 °F (37.4 °C)       Intake/Output:   Last shift:      No intake/output data recorded. Last 3 shifts:  1901 -  0700  In: 200 [I.V.:200]  Out: -   No intake or output data in the 24 hours ending 23 1504      Physical Exam:     General:  Appeared underweight, +chronicly ill, mild emotional distress, alert and oriented  Head:   Normocephalic, without obvious abnormality, atraumatic. Eye:   Conjunctivae/corneas clear.  PERRLA, no scleral icterus, no pallor, no cyanosis  Nose:   Nares normal. Septum midline. Mucosa normal without erythema/exudate. No drainage/discharge. No sinus tenderness. Throat:  Lips, mucosa, and tongue normal. Teeth and gums normal. No tonsillar enlargement, no erythema, no exudates, no oral thrush  Neck:   Supple, symmetric, thyroid: no enlargement/tenderness/nodule, no JVD,  no lymphadenopathy. Trachea midline  Back & spine: Symmetric,   Chest wall: Mild tenderness to lateral chest wall bilaterally  Lung:   Decreased breath sounds on right without wheezing, she does have some scattered rhonchi, left lung field normal.+ some wet and coarse breath sounds again noted  Heart:   Regular rate & rhythm. S1 S2 present, no murmur, no gallop, no click, no rub  Abdomen:  Soft, NT, ND, +BS  Extremities:  No pedal edema, no cyanosis, no clubbing  Pulses: 2+ and symmetric in DP  Lymphatic:  No cervical, supraclavicular nodes palpated   Musculoskeletal: No joint swelling or tenderness. Neurologic:  Grossly non focal.+ generalized deconditioning        Data:         Chemistry Recent Labs     01/26/23  0335 01/25/23  0148   GLU 88 138*    138   K 3.8 3.6    106   CO2 27 28   BUN 7 11   CREA 0.67 0.88   CA 9.5 9.2   AGAP 3 4   BUCR 10* 13          Lactic Acid Lactic acid   Date Value Ref Range Status   11/07/2021 1.5 0.4 - 2.0 MMOL/L Final     No results for input(s): LAC in the last 72 hours. Liver Enzymes Protein, total   Date Value Ref Range Status   01/22/2023 7.1 6.4 - 8.2 g/dL Final     Albumin   Date Value Ref Range Status   01/22/2023 2.8 (L) 3.4 - 5.0 g/dL Final     Globulin   Date Value Ref Range Status   01/22/2023 4.3 (H) 2.0 - 4.0 g/dL Final     A-G Ratio   Date Value Ref Range Status   01/22/2023 0.7 (L) 0.8 - 1.7   Final     Alk. phosphatase   Date Value Ref Range Status   01/22/2023 91 45 - 117 U/L Final     No results for input(s): TP, ALB, GLOB, AGRAT, AP, TBIL in the last 72 hours.     No lab exists for component: SGOT, GPT, DBIL       CBC w/Diff No results for input(s): WBC, RBC, HGB, HCT, PLT, GRANS, LYMPH, EOS, HGBEXT, HCTEXT, PLTEXT, HGBEXT, HCTEXT, PLTEXT in the last 72 hours. Cardiac Enzymes No results found for: CPK, CK, CKMMB, CKMB, RCK3, CKMBT, CKNDX, CKND1, SHAHLA, TROPT, TROIQ, SERGEY, TROPT, TNIPOC, BNP, BNPP     BNP No results found for: BNP, BNPP, XBNPT     Coagulation No results for input(s): PTP, INR, APTT, INREXT, INREXT in the last 72 hours. Thyroid  Lab Results   Component Value Date/Time    TSH 1.45 12/22/2021 12:47 PM          Lipid Panel No results found for: CHOL, CHOLPOCT, CHOLX, CHLST, CHOLV, 177271, HDL, HDLP, LDL, LDLC, DLDLP, 051741, VLDLC, VLDL, TGLX, TRIGL, TRIGP, TGLPOCT, CHHD, CHHDX     ABG No results for input(s): PHI, PHI, POC2, PCO2I, PO2, PO2I, HCO3, HCO3I, FIO2, FIO2I in the last 72 hours.      Urinalysis Lab Results   Component Value Date/Time    Color DARK YELLOW 11/06/2021 05:22 AM    Appearance CLOUDY 11/06/2021 05:22 AM    Specific gravity >1.030 (H) 11/06/2021 05:22 AM    pH (UA) 5.5 11/06/2021 05:22 AM    Protein 100 (A) 11/06/2021 05:22 AM    Glucose Negative 11/06/2021 05:22 AM    Ketone TRACE (A) 11/06/2021 05:22 AM    Bilirubin Negative 11/06/2021 05:22 AM    Urobilinogen 1.0 11/06/2021 05:22 AM    Nitrites Negative 11/06/2021 05:22 AM    Leukocyte Esterase LARGE (A) 11/06/2021 05:22 AM    Epithelial cells FEW 11/06/2021 05:22 AM    Bacteria 2+ (A) 11/06/2021 05:22 AM    WBC 25 to 30 11/06/2021 05:22 AM    RBC Negative 11/06/2021 05:22 AM        Micro  Results       Procedure Component Value Units Date/Time    CULTURE, BLOOD [[id number]] Collected: 01/23/23 1449    Order Status: Completed Specimen: Blood Updated: 01/27/23 0709     Special Requests: NO SPECIAL REQUESTS        Culture result: NO GROWTH 4 DAYS       CULTURE, BLOOD [[id number]] Collected: 01/23/23 1449    Order Status: Completed Specimen: Blood Updated: 01/27/23 0709     Special Requests: NO SPECIAL REQUESTS        Culture result: NO GROWTH 4 DAYS CULTURE, RESPIRATORY/SPUTUM/BRONCH Sol Plump [[id number]] Collected: 01/22/23 0900    Order Status: Canceled Specimen: Sputum     RESPIRATORY VIRUS PANEL W/COVID-19, PCR [[id number]] Collected: 01/21/23 0700    Order Status: Completed Specimen: Nasopharyngeal Updated: 01/21/23 0817     Adenovirus Not detected        Coronavirus 229E Not detected        Coronavirus HKU1 Not detected        Coronavirus CVNL63 Not detected        Coronavirus OC43 Not detected        SARS-CoV-2, PCR Not detected        Metapneumovirus Not detected        Rhinovirus and Enterovirus Not detected        Influenza A Not detected        Influenza B Not detected        Parainfluenza 1 Not detected        Parainfluenza 2 Not detected        Parainfluenza 3 Not detected        Parainfluenza virus 4 Not detected        RSV by PCR Not detected        B. parapertussis, PCR Not detected        Bordetella pertussis - PCR Not detected        Chlamydophila pneumoniae DNA, QL, PCR Not detected        Mycoplasma pneumoniae DNA, QL, PCR Not detected       CULTURE, BLOOD [[id number]]  (Abnormal) Collected: 01/21/23 0655    Order Status: Completed Specimen: Blood Updated: 01/24/23 1547     Special Requests: NO SPECIAL REQUESTS        GRAM STAIN       ANAEROBIC BOTTLE Gram positive cocci IN GROUPS                  SMEAR CALLED TO AND CORRECTLY REPEATED BY: YOLANDA PEARL [place] RN 3N [id number] 1/22/23 BY VMB           Culture result:       STAPHYLOCOCCUS EPIDERMIDIS GROWING IN 1 OF 2 BOTTLES DRAWN NO SITE REFER TO [id number] FOR ID AND SENSITIVITIES          CULTURE, BLOOD [[id number]]  (Abnormal)  (Susceptibility) Collected: 01/21/23 0645    Order Status: Completed Specimen: Blood Updated: 01/24/23 1544     Special Requests: NO SPECIAL REQUESTS        GRAM STAIN       AEROBIC AND ANAEROBIC BOTTLES Gram positive cocci IN GROUPS                  SMEAR CALLED TO AND CORRECTLY REPEATED BY: YOLANDA HOANG [facility] RN [id number] 1/22/23 BY VMB           Culture result:       STAPHYLOCOCCUS EPIDERMIDIS GROWING IN BOTH BOTTLES DRAWN No Site Indicated          Susceptibility        Staphylococcus epidermidis      ADRIEN      Ciprofloxacin ($) Susceptible      Clindamycin ($) Susceptible      Daptomycin ($$$$$) Susceptible      Erythromycin ($$$$) Resistant      Gentamicin ($) Susceptible      Inducible Clindamycin Susceptible      Levofloxacin ($) Susceptible      Linezolid ($$$$$) Susceptible      Oxacillin Susceptible      Tetracycline Susceptible      Trimeth/Sulfa Resistant      Vancomycin ($) Susceptible                           CULTURE, RESPIRATORY/SPUTUM/BRONCH Boaz Hogan [[id number]] Collected: 01/21/23 0645    Order Status: Canceled Specimen: Sputum     BLOOD CULTURE ID PANEL [[id number]]  (Abnormal) Collected: 01/21/23 0645    Order Status: Completed Specimen: Blood Updated: 01/22/23 2006     Acc. no. from [address] [place] [address] [id number]     Enterococcus faecalis Not detected        Enterococcus faecium Not detected        Listeria monocytogenes Not detected        Staphylococcus Detected        Staphylococcus aureus Not detected        Staph epidermidis Detected        Staph lugdunensis Not detected        Streptococcus Not detected        Streptococcus agalactiae (Group B) Not detected        Streptococcus pneumoniae Not detected        Streptococcus pyogenes (Group A) Not detected        Acinetobacter calcoaceticus-baumanii complex Not detected        Bacteroides fragilis Not detected        Enterobacterales species Not detected        Enterobacter cloacae complex Not detected        Escherichia coli Not detected        Klebsiella aerogenes Not detected        Klebsiella oxytoca Not detected        Klebsiella pneumoniae group Not detected        Proteus Not detected        Salmonella Not detected        Serratia marcescens Not detected        Haemophilus influenzae Not detected        Neisseria meningitidis Not detected        Pseudomonas aeruginosa Not detected        Steno maltophilia Not detected Candida albicans Not detected        Candida auris Not detected        Candida glabrata Not detected        Candida krusei Not detected        Candida parapsilosis Not detected        Candida tropicalis Not detected        Crypto neoformans/gattii Not detected        RESISTANT GENES:            MECA/C (Methicillin resistant gene) Not detected        Comment       False positive results may rarely occur. Correlate with clinical,epidemiologic, and other laboratory findings           Comment: Please see BC[facility] Interpretation Guide in EPIC Links                  XR (Most Recent). CXR reviewed by me and compared with previous CXR Results from Hospital Encounter encounter on 01/21/23    XR CHEST SNGL V    Narrative  CHEST PORTABLE    CPT CODE: 05917    COMPARISON: 1/21/2023    INDICATIONS: Immediately status post right sixth rib biopsy    FINDINGS: There is a destructive lesion involving the lateral right sixth rib. I  see no pneumothorax. There is enlarging right effusion. The heart is probably  slightly enlarged. The left lung is clear. Impression  Destructive right sixth rib lesion. No pneumothorax. Enlarging right effusion. CT (Most Recent) Results from Hospital Encounter encounter on 01/21/23    CT BX BONE SUPER    Narrative  Procedure:  CT-guided biopsy right sixth rib    Preoperative diagnosis:  Lung mass with osseous metastatic disease    Postoperative diagnosis:  Same    :  Jake Hunt MD    Assistant:  None    Type of anesthesia:  MAC provided by the department of anesthesia    Findings:  Informed consent was obtained. The patient was placed supine on the  CT table. The anterolateral right chest wall was prepped and draped in a sterile  fashion using maximum sterile barrier technique. There is a destructive lesion  involving the lateral aspect of the sixth rib.  Using CT guidance a 17-gauge  guiding needle was placed through the anterior lateral right chest wall to the  level of the right sixth rib. There is a prominent soft tissue component which  was biopsied. 5 18-gauge core biopsy specimens were obtained. Initial pathologic  evaluation demonstrated the presence of malignancy. The guiding needle was  removed. Follow-up CT demonstrated a tiny lateral pneumothorax at the biopsy  site. Estimated blood loss:  2 ml    Specimen removed:  5 18-gauge core biopsy specimens    Drains:  None    Implants:  None. Complications:  Tiny pneumothorax    Condition:  Stable    Disposition:  Recovery per anesthesia and back to Hospital room    Conclusion:    CT-guided biopsy destructive lesion right sixth rib as described above. All CT scans at this facility are performed using dose optimization technique as  appropriate to a performed exam, to include automated exposure control,  adjustment of the mA and/or KV according to patient's size (including  appropriate matching for site-specific examinations), or use of iterative  reconstruction technique. ECHO  01/21/23    ECHO ADULT COMPLETE 01/23/2023 1/23/2023    Interpretation Summary    No obvious valvular vegetations visualized. Left Ventricle: Normal left ventricular systolic function with a visually estimated EF of 65 - 70%. Left ventricle size is normal. LVIDd is 3.8 cm. Moderate septal thickening. Normal wall motion. Diastolic dysfunction present (grade I) with normal LV EF. Tricuspid Valve: Normal RVSP. The estimated RVSP is 29 mmHg. Aorta: Normal sized ascending aorta. Mildly dilated aortic root. Ao Root diameter is 3.3 cm. Ao Ascending diameter is 3.6 cm. Descending aorta is not well-visualized. Pericardium: Ascites present. Signed by: Kalia Mitchell MD on 1/23/2023 12:06 PM         PFT No flowsheet data found. Other      No results found for this or any previous visit (from the past 24 hour(s)). Complex decision making was made in the evaluation and management plans during this consultation.   More than 50% of time was spent in counseling and coordination of care including review of data and discussion with other team members.         Carmen Sainz MD/MPH     Pulmonary, Critical Care Medicine  [facility]

## 2023-01-27 NOTE — PROGRESS NOTES
INTERVENTIONAL RADIOLOGY DAILY PROGRESS NOTE                  Mavis Newton is a 71 y.o. female who is being seen for follow up, s/p biopsy lytic right sixth rib. Subjective: Patient tolerated procedure well with appropriate expected tenderness and discomfort at biopsy site no bleeding shortness of breath denies headache chest pain abdominal pain nausea vomiting fevers or chills questions asked and answered      Impression:     Patient Active Problem List    Diagnosis Date Noted    Encounter for palliative care 01/24/2023    Debility 01/24/2023    Chest pain 01/21/2023    Community acquired pneumonia 01/21/2023    Suspected lung cancer 01/21/2023    CVA, old, facial weakness 01/21/2023    Anxiety 01/21/2023    Tobacco abuse 01/21/2023    Osteopenia 01/21/2023    Osteoarthritis 01/21/2023    SIRENA (acute kidney injury) ([facility] [address] 11/06/2021    Sepsis ([facility] [address] 11/06/2021    Gingivitis 06/11/2017    Homelessness 06/11/2017         Plan:   1. Follow biopsy results and further medical management per primary team we will continue be available as needed please reconsult interventional radiology      Imaging: Procedural images available for review no pneumothorax on chest x-ray 1/26/2023 post biopsy    Past Medical History:  Past Medical History:   Diagnosis Date    Angina at rest [facility])     Anxiety     CVA, old, facial weakness 2012    left ptosis    Dental caries     Osteoarthritis     Osteopenia     Right lower lobe lung mass 04/15/2022    Offered Biopsy and couldn't decide between PAlliative Care and Biopsy       Medications:   Prior to Admission medications    Medication Sig Start Date End Date Taking? Authorizing Provider   levoFLOXacin (LEVAQUIN) 750 mg tablet Take 1 Tablet by mouth every twenty-four (24) hours for 5 days. 1/26/23 1/31/23 Yes Kirstie Martinez MD   linezolid (Zyvox) 600 mg tablet Take 1 Tablet by mouth two (2) times a day for 5 days.  1/26/23 1/31/23 Yes Kirstie Martinez MD   oxyCODONE-acetaminophen (PERCOCET) 5-325 mg per tablet Take 1 Tablet by mouth every six (6) hours as needed for Pain for up to 3 days. Max Daily Amount: 4 Tablets. 1/26/23 1/29/23 Yes Abeba Saeed MD   ibuprofen (MOTRIN) 400 mg tablet Take 600 mg by mouth every six (6) hours as needed for Pain. Provider, Historical   acetaminophen (TYLENOL) 325 mg tablet Take 2 Tablets by mouth every six (6) hours as needed for Pain. 9/7/22   [place]       Allergies: Allergies   Allergen Reactions    Aspirin Nausea Only       Social History:  Social History     Socioeconomic History    Marital status:      Spouse name: Not on file    Number of children: Not on file    Years of education: Not on file    Highest education level: Not on file   Occupational History    Not on file   Tobacco Use    Smoking status: Some Days     Packs/day: 0.50     Years: 15.00     Pack years: 7.50     Types: Cigarettes    Smokeless tobacco: Never    Tobacco comments:     Patient reports that she is a Some Day Smoker x20 years (as of 1/21/2023).    Substance and Sexual Activity    Alcohol use: Yes     Comment: 8 drinks per week    Drug use: No     Comment: none in 2 years    Sexual activity: Never   Other Topics Concern    Not on file   Social History Narrative    Not on file     Social Determinants of Health     Financial Resource Strain: Not on file   Food Insecurity: Not on file   Transportation Needs: Not on file   Physical Activity: Not on file   Stress: Not on file   Social Connections: Not on file   Intimate Partner Violence: Not on file   Housing Stability: Not on file       Family History:  Family History   Problem Relation Age of Onset    Cancer Daughter         Unspecified Cancer    Cancer Cousin         Unspecified Cancer x2       Review of Systems:    General:  No f/c, n/v,   Cardio:  No CP  Pulm:  No SOB  Abd:  No abdominal pain  Appropriately uncomfortable at biopsy site    Data:  Basic Metabolic Profile   Lab Results   Component Value Date     01/26/2023    CO2 27 01/26/2023    BUN 7 01/26/2023        CBC w/Diff   Lab Results   Component Value Date/Time    WBC 9.7 01/22/2023 05:02 AM    HCT 35.5 01/22/2023 05:02 AM        Coagulation   Lab Results   Component Value Date    INR 1.0 01/22/2023    APTT 31.4 12/22/2021          Physical Assessment:  Visit Vitals  /75 (BP 1 Location: Left upper arm, BP Patient Position: At rest)   Pulse 87   Temp 98.9 °F (37.2 °C)   Resp 16   Ht 5' 1\" (1.549 m)   Wt 71.2 kg (157 lb)   SpO2 95%   BMI 29.66 kg/m²       Const:  NAD, alert and oriented x3   Cardio:  RRR, no murmurs, rubs, or gallops  Pulm:  CTAB, no wheezes, rales, or rhonchi Normal respiratory effort  Abd:  Soft, NT, ND, +BS  Extr:  No LE edema, bilat  Neuro:   Moves all extremities well  Skin:  Warm and dry  Biopsy site tender to palpation no bleeding or drainage from bandage        Limmie FREEDOM La

## 2023-01-27 NOTE — PROGRESS NOTES
Spoke with patient, she does not have a family doctor, patient stated can you call me at home, the doctor is in here talking to me.

## 2023-01-27 NOTE — PROGRESS NOTES
Care Management Interventions  PCP Verified by CM: No (Patient said she does not have a PCP, and wants assistance with finding a PCP before discharge. )  Mode of Transport at Discharge: Self (Family or friend will be transporting patient home at time of discharge. )  Transition of Care Consult (CM Consult): Discharge Planning  Discharge Durable Medical Equipment: No  Physical Therapy Consult: Yes  Occupational Therapy Consult: Yes  Speech Therapy Consult: No  Support Systems: Other (Comment) (Patient lives alone. )  Confirm Follow Up Transport: Family  Discharge Location  Patient Expects to be Discharged to[de-identified] Home        Patient lives alone, uses a friend's Cane at home. Patient was not using [address] at home, has stayed at SNF/Rehab before, but not in the last 60 days. Patient is not a Dialysis patient, and was not using Oxygen at home. No CM needs at this time. Patient said family/friend will be transporting patient home today at time of discharge.              Tayo Calderon RN  Case Management [phone] Walk in

## 2023-01-27 NOTE — PROGRESS NOTES
Nutrition Note      Po diet and nutrition supplements resumed yesterday last afternoon. Pt reported good appetite and po intake. Tolerating diet. Likes/consuming nutrition supplements. BM 1/25,  12/3. Nutrition goals are being met. Nutrition Recommendations/Plan:   Continue current nutrition interventions: po diet and nutrition supplements,  Ensure Enlive (each provides 350 kcal, 20g protein) TID. Continue all other nutrition interventions. Encourage/ monitor po intake of meals and supplements.            Electronically signed by Karina Morales RD on 1/27/2023 at 2:02 PM    Contact: [phone]

## 2023-01-27 NOTE — WOUND CARE
Physical Exam  Room 353  Order for fingernails trimming/toenails debridement received from [facility]. Introduced myself & services. Explained procedure: assessment, hygiene, education, & nail service and no questions asked. Informed of possible complications: nip/cut skin, infection, & nail splits or crumbles, and all no questions asked. Pt verbalized understanding of procedure and associated risks and agrees to have services provided at this time. [facility]    1. History:   Chief Complaint   Patient presents with    Chest Pain    Anxiety   ,   Active Ambulatory Problems     Diagnosis Date Noted    Gingivitis 06/11/2017    Homelessness 06/11/2017    SIRENA (acute kidney injury) ([facility] [address] 11/06/2021    Sepsis ([facility] [address] 11/06/2021     Resolved Ambulatory Problems     Diagnosis Date Noted    No Resolved Ambulatory Problems     Past Medical History:   Diagnosis Date    Angina at rest [facility])     Anxiety     CVA, old, facial weakness 2012    Dental caries     Osteoarthritis     Osteopenia     Right lower lobe lung mass 04/15/2022       2. Assessment     Skin           Dryness (+  ) mild both feet. Maceration (-  )              Discoloration (-  )           Varicosities (-  )                     Edema (-  )                     Temperature (-  )          Ulcers (-  )          Fissures (-  )          Corns (-  )          Callus (+  ) near both great toes. Thinning Fat Pads (-  )          Other (-  )     Nails          Debris (+  ) mild both feet. Thickened (+  ) both feet. Discolored (+  ) yellowing & casas both feet. Deformed (-  )          Incurvated (-  )              Ingrown (-  )          Brittle (-  )          Crumbly (-  )          Split (-  )          Loose (-  )          Fungus (+  ) likely both feet. Other (+  )  Carlton's horns left 4th & 5th toenails.       Toes          Deformed (-  )          Claw toes (-  )          Hammer toes (-  ) Mallet toes (-  )          Overlapping toes (-  )          Other (-  )     Feet          Deformed (-  )          Bunions (-  )          Hallux Valgus (-  )          Flat feet (-  )          Other (-  )    3.  Interventions          Hygiene Provided (+  )                          Remove loose debris (+  )                          Toenails manually debrided (+  )                         Toenails manually filed (+  )         Fingernails manually filed (+ )         Fingernails manually trimmed (+ )                              Buff corns/calluses (+  )             Patient education (+  )    Davie Bello RN, BSN, [address], [facility]  1/27/2023  1:53 PM

## 2023-01-27 NOTE — PROGRESS NOTES
NICA Dial CM Specialist and [address] [place] [address] to assist with finding a PCP for patient.                Hayden Yanez, RN  Case Management [phone]

## 2023-01-27 NOTE — PROGRESS NOTES
Pt given D/C instructions. All questions and concerns answered accordingly. Pt given a food pantry list and food tray from dietary. Removed the heart monitor from and 22g PIV from the pt. Pt wheeled to the main entrance by this nurse.

## 2023-01-27 NOTE — PROGRESS NOTES
D/C order noted for today. Orders reviewed. No needs identified at this time. Patient said she will have a ride home today at time of discharge. CM remains available if needed.          Meghan Fernandez, [phone]

## 2023-01-27 NOTE — DISCHARGE SUMMARY
[facility]  [facility]       Patient: Shari Wells Age: 71 y.o. : 1950 MR#: [id number]: [id number]  PCP on record: None  Admit date: 2023  Discharge date: 2023    Consults:    -   Procedures:  -     Significant Diagnostic Studies:   -    Discharge Diagnoses:                                           Patient Active Problem List   Diagnosis Code    Gingivitis K05.10    Homelessness Z59.00    SIRENA (acute kidney injury) ([facility] [address] N17.9    Sepsis ([facility] [address] A41.9    Chest pain R07.9    Community acquired pneumonia J18.9    Suspected lung cancer R68.89    CVA, old, facial weakness I69.392    Anxiety F41.9    Tobacco abuse Z72.0    Osteopenia M85.80    Osteoarthritis M19.90    Encounter for palliative care Z51.5    Debility R53.81       Hospital Course by Problem   1. Today's examination of the patient revealed:     Subjective:     Objective:   VS: Visit Vitals  /69 (BP 1 Location: Left upper arm, BP Patient Position: Sitting)   Pulse 92   Temp 98.8 °F (37.1 °C)   Resp 16   Ht 5' 1\" (1.549 m)   Wt 71.2 kg (157 lb)   SpO2 96%   BMI 29.66 kg/m²      Tmax/24hrs: Temp (24hrs), Av.7 °F (37.1 °C), Min:97.9 °F (36.6 °C), Max:99.3 °F (37.4 °C)     Input/Output:   Intake/Output Summary (Last 24 hours) at 2023 1409  Last data filed at 2023 1414  Gross per 24 hour   Intake 200 ml   Output --   Net 200 ml       General:    Cardiovascular:    Pulmonary:    GI:    Extremities: Additional:      Labs:    No results found for this or any previous visit (from the past 24 hour(s)).   Additional Data Reviewed:     Condition:   Disposition:    []Home   []Home with Home Health   []SNF/NH   []Rehab   []Home with family   []Alternate Facility:____________________      Discharge Medications:     Current Discharge Medication List        START taking these medications    Details   amoxicillin-clavulanate (Augmentin) 875-125 mg per tablet Take 1 Tablet by mouth two (2) times a day for 4 days. Qty: 8 Tablet, Refills: 0      levoFLOXacin (LEVAQUIN) 750 mg tablet Take 1 Tablet by mouth every twenty-four (24) hours for 5 days. Qty: 5 Tablet, Refills: 0      oxyCODONE-acetaminophen (PERCOCET) 5-325 mg per tablet Take 1 Tablet by mouth every six (6) hours as needed for Pain for up to 3 days. Max Daily Amount: 4 Tablets. Qty: 12 Tablet, Refills: 0    Associated Diagnoses: Lung cancer metastatic to bone ([facility] [address]           CONTINUE these medications which have NOT CHANGED    Details   ibuprofen (MOTRIN) 400 mg tablet Take 600 mg by mouth every six (6) hours as needed for Pain. acetaminophen (TYLENOL) 325 mg tablet Take 2 Tablets by mouth every six (6) hours as needed for Pain. Qty: 20 Tablet, Refills: 0               Follow-up Appointments:   1. Your PCP: None, within 7-10days  2.          Please follow-up on tests/labs that are still pendin.     >30 minutes spent coordinating this discharge (review instructions/follow-up, prescriptions, preparing report for sign off)    Signed:  Dianne Solitario MD  2023  2:09 PM

## 2023-01-27 NOTE — PROGRESS NOTES
Palliative Medicine       Palliative Team members Kip Santos, LIZETTE, and this writer met with Ms. Nabil Ingram in room. Attending MD was present at bedside. She was alert and aware x4 lying in bed. Patient is s/p rib biopsy. She c/o moderate to severe amount of pain. Bedside RN was there to administer pain medication. Palliative team addressed her questions and concerns to the best of our ability. Stated she was OK with discharge to home today. She is aware of her follow up appointments and will get help from friends for transport. She expressed some frustrations about the care provided her during her procedure. Palliative team were active listeners to her concerns and offered support. Ms Nabil Ingram declined to complete and AMD during this hospital admission. She also made it clear she wants the full efforts of CPR and Intubation. Team stressed she keep her family involved in her care so they care provide assist when needed. CODE STATUS- FULL CODE WITH FULL INTERVENTIONS      Thank you for this consult.       Ana COBIANN, RN, [facility]  Palliative Medicine Inpatient RN  Palliative [facility] Line: [phone]

## 2023-01-27 NOTE — PROGRESS NOTES
[facility]  Progress Note    Patient: Shelley Michel Age: 71 y.o. : 1950 MR#: [id number] SSN: [id number]  Date/Time: 2023     Subjective:     Pt w/o specific complaints. Waiting for biopsy. Assessment/Plan:   a75-year-old female w/ h/o tobacco abuse and known right lower lobe lung mass not previously worked up due to pt being lost to follow up admitted after she presented to the emergency room with complaints of chest pain, shortness of breath. ER evaluation-CTA chest showed complete consolidation of the right middle lobe and extensive consolidation in the right lower lobe, likely representing a combination of tumor associated postobstructive atelectasis versus pneumonia. Findings are consistent with tumor worsening including increasing metastatic adenopathy, also noted to have rib lytic metastases with pathologic fractures and T4 left transverse process lytic metastases. Pneumonia likely postobstructive with bacteremia and underlying cancer-On antibiotics, ID following. Coag negative Staph bacteremia, 3/4 bottles, repeat blood cx on -->NGTD. TTE done -->no obvious valvular vegetations,   Lung mass suggestive of malignancy-pulmonology on board, IR consulted for bx of lytic rib lesions, to be done today under general anesthesia. Chronic smoker-patient has been counseled to stop, mentions that she recently quit smoking about 2 weeks ago  DVT prophylaxis-Heparin  Full code        Dispo: likely home once stable after biopsy under general anesthesia today.  S/p PT eval, pt reported that she was ambulating independently w/ no concerns        Cherelle Freeman MD  23      Case discussed with:  [x]Patient  []Family  [x]Nursing  [x]Case Management  DVT Prophylaxis:  []Lovenox  [x]Hep SQ  []SCDs  []Coumadin   []On Heparin gtt    Objective:   VS: Visit Vitals  /88   Pulse 99   Temp 98.8 °F (37.1 °C)   Resp 16   Ht 5' 1\" (1.549 m)   Wt 71.2 kg (157 lb)   SpO2 94%   BMI 29.66 kg/m²      Tmax/24hrs: Temp (24hrs), Av.2 °F (36.8 °C), Min:97.5 °F (36.4 °C), Max:99.1 °F (37.3 °C)  IOBRIEF  Intake/Output Summary (Last 24 hours) at 20232  Last data filed at 2023 1414  Gross per 24 hour   Intake 200 ml   Output --   Net 200 ml         General:  Alert, cooperative, no acute distress    Pulmonary:  Decreased BS in bases   Cardiovascular: Regular rate and Rhythm. GI:  Soft, Non distended, Non tender. + Bowel sounds. Extremities:  No edema, cyanosis, clubbing. No calf tenderness. Neurologic: Alert and oriented X 3. No acute neuro deficits.   Additional:    Medications:   Current Facility-Administered Medications   Medication Dose Route Frequency    levoFLOXacin (LEVAQUIN) tablet 750 mg  750 mg Oral Q24H    vancomycin (VANCOCIN) 1,000 mg in 0.9% sodium chloride 250 mL (VIAL-MATE)  1,000 mg IntraVENous Q12H    heparin (porcine) injection 5,000 Units  5,000 Units SubCUTAneous Q8H    guaiFENesin ER (MUCINEX) tablet 600 mg  600 mg Oral Q12H    sodium chloride (NS) flush 5-40 mL  5-40 mL IntraVENous Q8H    sodium chloride (NS) flush 5-40 mL  5-40 mL IntraVENous PRN    acetaminophen (TYLENOL) tablet 650 mg  650 mg Oral Q6H PRN    Or    acetaminophen (TYLENOL) suppository 650 mg  650 mg Rectal Q6H PRN    polyethylene glycol (MIRALAX) packet 17 g  17 g Oral DAILY PRN    ondansetron (ZOFRAN ODT) tablet 4 mg  4 mg Oral Q8H PRN    Or    ondansetron (ZOFRAN) injection 4 mg  4 mg IntraVENous Q6H PRN    melatonin tablet 5 mg  5 mg Oral QHS PRN    naloxone (NARCAN) injection 0.4 mg  0.4 mg IntraVENous EVERY 2 MINUTES AS NEEDED    oxyCODONE-acetaminophen (PERCOCET) 5-325 mg per tablet 1 Tablet  1 Tablet Oral Q6H PRN    albuterol/ipratropium (DUONEB) neb solution  1 Dose Nebulization Q6H PRN       Imaging:   XR Results (most recent):  Results from Hospital Encounter encounter on 23    XR CHEST SNGL V    Narrative  CHEST PORTABLE    CPT CODE: 62608    COMPARISON: 1/21/2023    INDICATIONS: Immediately status post right sixth rib biopsy    FINDINGS: There is a destructive lesion involving the lateral right sixth rib. I  see no pneumothorax. There is enlarging right effusion. The heart is probably  slightly enlarged. The left lung is clear. Impression  Destructive right sixth rib lesion. No pneumothorax. Enlarging right effusion. CT Results (most recent):  Results from Hospital Encounter encounter on 01/21/23    CT BX BONE SUPER    Narrative  Procedure:  CT-guided biopsy right sixth rib    Preoperative diagnosis:  Lung mass with osseous metastatic disease    Postoperative diagnosis:  Same    :  Leon Gil MD    Assistant:  None    Type of anesthesia:  MAC provided by the department of anesthesia    Findings:  Informed consent was obtained. The patient was placed supine on the  CT table. The anterolateral right chest wall was prepped and draped in a sterile  fashion using maximum sterile barrier technique. There is a destructive lesion  involving the lateral aspect of the sixth rib. Using CT guidance a 17-gauge  guiding needle was placed through the anterior lateral right chest wall to the  level of the right sixth rib. There is a prominent soft tissue component which  was biopsied. 5 18-gauge core biopsy specimens were obtained. Initial pathologic  evaluation demonstrated the presence of malignancy. The guiding needle was  removed. Follow-up CT demonstrated a tiny lateral pneumothorax at the biopsy  site. Estimated blood loss:  2 ml    Specimen removed:  5 18-gauge core biopsy specimens    Drains:  None    Implants:  None. Complications:  Tiny pneumothorax    Condition:  Stable    Disposition:  Recovery per anesthesia and back to Hospital room    Conclusion:    CT-guided biopsy destructive lesion right sixth rib as described above.     All CT scans at this facility are performed using dose optimization technique as  appropriate to a performed exam, to include automated exposure control,  adjustment of the mA and/or KV according to patient's size (including  appropriate matching for site-specific examinations), or use of iterative  reconstruction technique. MRI Results (most recent):  No results found for this or any previous visit. Labs:    Recent Results (from the past 48 hour(s))   METABOLIC PANEL, BASIC    Collection Time: 01/25/23  1:48 AM   Result Value Ref Range    Sodium 138 136 - 145 mmol/L    Potassium 3.6 3.5 - 5.5 mmol/L    Chloride 106 100 - 111 mmol/L    CO2 28 21 - 32 mmol/L    Anion gap 4 3.0 - 18 mmol/L    Glucose 138 (H) 74 - 99 mg/dL    BUN 11 7.0 - 18 MG/DL    Creatinine 0.88 0.6 - 1.3 MG/DL    BUN/Creatinine ratio 13 12 - 20      eGFR >60 >60 ml/min/1.73m2    Calcium 9.2 8.5 - 58.1 MG/DL   METABOLIC PANEL, BASIC    Collection Time: 01/26/23  3:35 AM   Result Value Ref Range    Sodium 138 136 - 145 mmol/L    Potassium 3.8 3.5 - 5.5 mmol/L    Chloride 108 100 - 111 mmol/L    CO2 27 21 - 32 mmol/L    Anion gap 3 3.0 - 18 mmol/L    Glucose 88 74 - 99 mg/dL    BUN 7 7.0 - 18 MG/DL    Creatinine 0.67 0.6 - 1.3 MG/DL    BUN/Creatinine ratio 10 (L) 12 - 20      eGFR >60 >60 ml/min/1.73m2    Calcium 9.5 8.5 - 10.1 MG/DL   VANCOMYCIN, RANDOM    Collection Time: 01/26/23  3:35 AM   Result Value Ref Range    Vancomycin, random 19.6 5.0 - 40.0 UG/ML       Signed By: Dianne Solitario MD     January 26, 2023      I spent 35 minutes with the patient in face-to-face consultation, of which greater than 50% was spent in counseling and coordination of care as described above    Disclaimer: Sections of this note are dictated using utilizing voice recognition software. Minor typographical errors may be present. If questions arise, please do not hesitate to contact me or call our department.

## 2023-02-01 ENCOUNTER — DOCUMENTATION ONLY (OUTPATIENT)
Dept: PULMONOLOGY | Age: 73
End: 2023-02-01

## 2023-02-01 NOTE — PROGRESS NOTES
The patient had a rib biopsy performed at [facility] that was + for adenocarcinoma. I did discuss with the patient in the hospital that there was a high likely garza that she has metastatic cancer. She has bulky lung disease in lung. She was being treated for post obstructive pneumonia while in the hospital.  As such, we opted to pursue a biopsy of the rib, which has provided up with a diagnosis as well as staging. Our office attempted to contact the patient 3 separate times. I was notified that the patient answered the phone every time and then hung up on our staff. I attempted to call the patient twice today and each time the patient answered the phone and said \"hello\" and then hung up the phone when I identified myself. I have asked our office to send the patient a certified letter requesting that she follow up with our office to discuss the results of her biopsy and plan of care.       Sujata Resendiz DO, [facility]    [facility]  Pulmonary, Critical Care, and Sleep Medicine

## 2023-02-01 NOTE — PROGRESS NOTES
Received a staff message from Dr. Armand Friedman about setting up a hospital follow up for this patient. I have called her three times. The first time she hung up on me, the second time she asked If I was calling about her refills and I said no I was trying to set up a follow up appointment with pulmonary and she said 'mmhmm' and hung up on me again. I just called her for the third time and told her again who I was and why I was calling and she hung up on me again.

## 2023-02-05 ENCOUNTER — HOSPITAL ENCOUNTER (EMERGENCY)
Age: 73
Discharge: HOME | End: 2023-02-05
Attending: STUDENT IN AN ORGANIZED HEALTH CARE EDUCATION/TRAINING PROGRAM
Payer: MEDICAID

## 2023-02-05 ENCOUNTER — APPOINTMENT (OUTPATIENT)
Dept: GENERAL RADIOLOGY | Age: 73
End: 2023-02-05
Attending: EMERGENCY MEDICINE
Payer: MEDICAID

## 2023-02-05 VITALS
HEART RATE: 94 BPM | TEMPERATURE: 98.2 F | SYSTOLIC BLOOD PRESSURE: 149 MMHG | RESPIRATION RATE: 22 BRPM | OXYGEN SATURATION: 100 % | DIASTOLIC BLOOD PRESSURE: 90 MMHG

## 2023-02-05 DIAGNOSIS — C80.1 ADENOCARCINOMA (HCC): ICD-10-CM

## 2023-02-05 DIAGNOSIS — C34.91 ADENOCARCINOMA OF LUNG, RIGHT (HCC): Primary | ICD-10-CM

## 2023-02-05 DIAGNOSIS — R07.81 RIB PAIN ON RIGHT SIDE: ICD-10-CM

## 2023-02-05 DIAGNOSIS — J18.9 OBSTRUCTIVE PNEUMONIA: ICD-10-CM

## 2023-02-05 LAB
ALBUMIN SERPL-MCNC: 3.1 G/DL (ref 3.4–5)
ALBUMIN/GLOB SERPL: 0.8 (ref 0.8–1.7)
ALP SERPL-CCNC: 109 U/L (ref 45–117)
ALT SERPL-CCNC: 23 U/L (ref 13–56)
ANION GAP SERPL CALC-SCNC: 3 MMOL/L (ref 3–18)
ARTERIAL PATENCY WRIST A: POSITIVE
AST SERPL-CCNC: 20 U/L (ref 10–38)
BASE EXCESS BLD CALC-SCNC: 1.5 MMOL/L
BASOPHILS # BLD: 0.1 K/UL (ref 0–0.1)
BASOPHILS NFR BLD: 1 % (ref 0–2)
BILIRUB SERPL-MCNC: 1.1 MG/DL (ref 0.2–1)
BUN SERPL-MCNC: 14 MG/DL (ref 7–18)
BUN/CREAT SERPL: 20 (ref 12–20)
CALCIUM SERPL-MCNC: 8.9 MG/DL (ref 8.5–10.1)
CHLORIDE SERPL-SCNC: 109 MMOL/L (ref 100–111)
CO2 SERPL-SCNC: 29 MMOL/L (ref 21–32)
CREAT SERPL-MCNC: 0.69 MG/DL (ref 0.6–1.3)
DIFFERENTIAL METHOD BLD: ABNORMAL
EOSINOPHIL # BLD: 0.4 K/UL (ref 0–0.4)
EOSINOPHIL NFR BLD: 4 % (ref 0–5)
ERYTHROCYTE [DISTWIDTH] IN BLOOD BY AUTOMATED COUNT: 14.7 % (ref 11.6–14.5)
GAS FLOW.O2 O2 DELIVERY SYS: ABNORMAL
GLOBULIN SER CALC-MCNC: 3.7 G/DL (ref 2–4)
GLUCOSE SERPL-MCNC: 112 MG/DL (ref 74–99)
HCO3 BLD-SCNC: 26.6 MMOL/L (ref 22–26)
HCT VFR BLD AUTO: 29.3 % (ref 35–45)
HGB BLD-MCNC: 10.6 G/DL (ref 12–16)
IMM GRANULOCYTES # BLD AUTO: 0 K/UL (ref 0–0.04)
IMM GRANULOCYTES NFR BLD AUTO: 0 % (ref 0–0.5)
LACTATE BLD-SCNC: 1.02 MMOL/L (ref 0.4–2)
LYMPHOCYTES # BLD: 1.7 K/UL (ref 0.9–3.6)
LYMPHOCYTES NFR BLD: 21 % (ref 21–52)
MCH RBC QN AUTO: 29.9 PG (ref 24–34)
MCHC RBC AUTO-ENTMCNC: 36.2 G/DL (ref 31–37)
MCV RBC AUTO: 82.8 FL (ref 78–100)
MONOCYTES # BLD: 0.8 K/UL (ref 0.05–1.2)
MONOCYTES NFR BLD: 10 % (ref 3–10)
NEUTS SEG # BLD: 5.2 K/UL (ref 1.8–8)
NEUTS SEG NFR BLD: 63 % (ref 40–73)
NRBC # BLD: 0 K/UL (ref 0–0.01)
NRBC BLD-RTO: 0 PER 100 WBC
PCO2 BLD: 42.5 MMHG (ref 35–45)
PH BLD: 7.4 (ref 7.35–7.45)
PLATELET # BLD AUTO: 380 K/UL (ref 135–420)
PMV BLD AUTO: 9.6 FL (ref 9.2–11.8)
PO2 BLD: 66 MMHG (ref 80–100)
POTASSIUM SERPL-SCNC: 3.4 MMOL/L (ref 3.5–5.5)
PROT SERPL-MCNC: 6.8 G/DL (ref 6.4–8.2)
RBC # BLD AUTO: 3.54 M/UL (ref 4.2–5.3)
SAO2 % BLD: 92.8 % (ref 92–97)
SERVICE CMNT-IMP: ABNORMAL
SODIUM SERPL-SCNC: 141 MMOL/L (ref 136–145)
SPECIMEN TYPE: ABNORMAL
WBC # BLD AUTO: 8.2 K/UL (ref 4.6–13.2)

## 2023-02-05 PROCEDURE — 82803 BLOOD GASES ANY COMBINATION: CPT

## 2023-02-05 PROCEDURE — 96375 TX/PRO/DX INJ NEW DRUG ADDON: CPT

## 2023-02-05 PROCEDURE — 77010033678 HC OXYGEN DAILY

## 2023-02-05 PROCEDURE — 96365 THER/PROPH/DIAG IV INF INIT: CPT

## 2023-02-05 PROCEDURE — 96367 TX/PROPH/DG ADDL SEQ IV INF: CPT

## 2023-02-05 PROCEDURE — 80053 COMPREHEN METABOLIC PANEL: CPT

## 2023-02-05 PROCEDURE — 85025 COMPLETE CBC W/AUTO DIFF WBC: CPT

## 2023-02-05 PROCEDURE — 74011250636 HC RX REV CODE- 250/636: Performed by: EMERGENCY MEDICINE

## 2023-02-05 PROCEDURE — 83605 ASSAY OF LACTIC ACID: CPT

## 2023-02-05 PROCEDURE — 87040 BLOOD CULTURE FOR BACTERIA: CPT

## 2023-02-05 PROCEDURE — 99284 EMERGENCY DEPT VISIT MOD MDM: CPT

## 2023-02-05 PROCEDURE — 71101 X-RAY EXAM UNILAT RIBS/CHEST: CPT

## 2023-02-05 PROCEDURE — 94762 N-INVAS EAR/PLS OXIMTRY CONT: CPT

## 2023-02-05 PROCEDURE — 96366 THER/PROPH/DIAG IV INF ADDON: CPT

## 2023-02-05 PROCEDURE — 99221 1ST HOSP IP/OBS SF/LOW 40: CPT | Performed by: INTERNAL MEDICINE

## 2023-02-05 PROCEDURE — 36600 WITHDRAWAL OF ARTERIAL BLOOD: CPT

## 2023-02-05 RX ORDER — AMOXICILLIN AND CLAVULANATE POTASSIUM 875; 125 MG/1; MG/1
1 TABLET, FILM COATED ORAL 2 TIMES DAILY
Qty: 14 TABLET | Refills: 0 | Status: SHIPPED | OUTPATIENT
Start: 2023-02-05 | End: 2023-02-12

## 2023-02-05 RX ORDER — LEVOFLOXACIN 5 MG/ML
750 INJECTION, SOLUTION INTRAVENOUS ONCE
Status: COMPLETED | OUTPATIENT
Start: 2023-02-05 | End: 2023-02-05

## 2023-02-05 RX ORDER — MORPHINE SULFATE 4 MG/ML
4 INJECTION INTRAVENOUS
Status: COMPLETED | OUTPATIENT
Start: 2023-02-05 | End: 2023-02-05

## 2023-02-05 RX ORDER — LEVOFLOXACIN 750 MG/1
750 TABLET ORAL DAILY
Qty: 7 TABLET | Refills: 0 | Status: SHIPPED | OUTPATIENT
Start: 2023-02-05

## 2023-02-05 RX ORDER — OXYCODONE AND ACETAMINOPHEN 5; 325 MG/1; MG/1
1 TABLET ORAL
Qty: 12 TABLET | Refills: 0 | Status: SHIPPED | OUTPATIENT
Start: 2023-02-05 | End: 2023-02-09

## 2023-02-05 RX ORDER — VANCOMYCIN/0.9 % SOD CHLORIDE 1.5G/250ML
1500 PLASTIC BAG, INJECTION (ML) INTRAVENOUS
Status: COMPLETED | OUTPATIENT
Start: 2023-02-05 | End: 2023-02-05

## 2023-02-05 RX ADMIN — MORPHINE SULFATE 4 MG: 4 INJECTION, SOLUTION INTRAMUSCULAR; INTRAVENOUS at 15:31

## 2023-02-05 RX ADMIN — LEVOFLOXACIN 750 MG: 5 INJECTION, SOLUTION INTRAVENOUS at 14:31

## 2023-02-05 RX ADMIN — VANCOMYCIN HYDROCHLORIDE 1500 MG: 10 INJECTION, POWDER, LYOPHILIZED, FOR SOLUTION INTRAVENOUS at 16:30

## 2023-02-05 NOTE — ED NOTES
Ambulated pt in the hallway. O2 sat at 95-98%. No complaints of SOB or dizziness. Assisted back to bed. Pt fell asleep and o2 noted at 88%.  Placed on o2 at 2lpm via NC.

## 2023-02-05 NOTE — PROGRESS NOTES
[facility] Pharmacokinetic Monitoring Service - Vancomycin     Sánchez Altman is a 71 y.o. female starting on vancomycin therapy for Pneumonia (HAP). One time loading dose ordered and dose adjusted from 1 gram to 1500 mg    Additional Antimicrobials: Levaquin    Pertinent Laboratory Values:   Temp: 98.2 °F (36.8 °C)  Recent Labs     02/05/23  1312   CREA 0.69   BUN 14   WBC 8.2     CrCl cannot be calculated (Unknown ideal weight.). Pertinent Cultures:  Culture Date Source Results   02/05/23 Blood pending   MRSA Nasal Swab: N/A.  Non-respiratory infection    Plan:  Pharmacy will continue to monitor patient and dose therapy if a consult is ordered    Thank you,  Joesph Mireles, [facility] [place] [facility] [place]  2/5/2023

## 2023-02-05 NOTE — ED NOTES
Patient is seen and discharged by provider. IV removed. Discharge papers given. Ambulated to the exit without difficulty.

## 2023-02-05 NOTE — CONSULTS
[address] [facility]. Pulmonary, Critical Care, and Sleep Medicine    Initial Patient Consult    Name: Amy Cohn MRN: [id number]   : 1950 Hospital: [address] [place] [address] Dr   Date: 2023        IMPRESSION:   Large RLL pulmonary mass-   - s/p biopsy - Lung Adenocarcinoma  Post-obstructive pneumonia versus extension of malignancy RML/RLL. WBC elevated pointing to acute infectious process. No O2 requirement. 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 Lesleigh Cushing to discuss treatment  Based on the CT scan, I think there is likely endobronchial obstruction of the RLL. She will continue to have atelectasis until the tumor shrinks and the airway opens up. She will also be at risk for recurrent pneumonia    Okay to be discharged from my standpoint if she is not requiring O2. Subjective: This patient has been seen and evaluated at the request of Dr. Eli Beasley for . Patient is a 71 y.o. female with PMHx of recently diagnosed adenocarcinoma of the lung. She is well known to our service and recently was admitted for post obstructive pneumonia. She presented to the ED to get her ABX refilled. She had a biopsy on  by IR. Our clinic had attempted multiple times to call her to notify her of her results but she kept hanging up on us. I was called by the patient to discuss the results with her.        Past Medical History:   Diagnosis Date Angina at rest [facility])     Anxiety     CVA, old, facial weakness     left ptosis    Dental caries     Osteoarthritis     Osteopenia     Right lower lobe lung mass 04/15/2022    Offered Biopsy and couldn't decide between PAlliative Care and Biopsy      Past Surgical History:   Procedure Laterality Date    HX WISDOM TEETH EXTRACTION        Prior to Admission medications    Medication Sig Start Date End Date Taking? Authorizing Provider   ibuprofen (MOTRIN) 400 mg tablet Take 600 mg by mouth every six (6) hours as needed for Pain. Provider, Historical   acetaminophen (TYLENOL) 325 mg tablet Take 2 Tablets by mouth every six (6) hours as needed for Pain. 22   SKYE Velazquez     Allergies   Allergen Reactions    Aspirin Nausea Only      Social History     Tobacco Use    Smoking status: Some Days     Packs/day: 0.50     Years: 15.00     Pack years: 7.50     Types: Cigarettes    Smokeless tobacco: Never    Tobacco comments:     Patient reports that she is a Some Day Smoker x20 years (as of 2023). Substance Use Topics    Alcohol use: Yes     Comment: 8 drinks per week      Family History   Problem Relation Age of Onset    Cancer Daughter         Unspecified Cancer    Cancer Cousin         Unspecified Cancer x2        Current Facility-Administered Medications   Medication Dose Route Frequency    levoFLOXacin (LEVAQUIN) 750 mg in D5W IVPB  750 mg IntraVENous ONCE    vancomycin (VANCOCIN) 1500 mg in  ml infusion  1,500 mg IntraVENous NOW       Review of Systems:  Pertinent items are noted in HPI. ROS    Objective:   Vital Signs:    Visit Vitals  /73   Pulse 94   Temp 98.2 °F (36.8 °C)   Resp 16   SpO2 100%       O2 Device: Nasal cannula   O2 Flow Rate (L/min): 2 l/min   Temp (24hrs), Av.2 °F (36.8 °C), Min:98.2 °F (36.8 °C), Max:98.2 °F (36.8 °C)       Intake/Output:   Last shift:      No intake/output data recorded. Last 3 shifts: No intake/output data recorded.   No intake or output data in the 24 hours ending 02/05/23 1534   Physical Exam:   General:  Alert, cooperative, no distress, appears stated age. Head:  Normocephalic, without obvious abnormality, atraumatic. Eyes:  Conjunctivae/corneas clear. ANicteric   Nose: Nares normal. Mucosa normal. No drainage or sinus tenderness. Throat: Lips, mucosa dry. NO thrush;    Neck: Supple, symmetrical, trachea midline, no adenopathy, thyroid: no enlargment/tenderness/nodules, no carotid bruit and no JVD. No crepitus   Back:   Symmetric, no curvature, no spine tenderness or flank pain   Lungs:   Bilateral auscultation decreased at R base. No wheezes, no rales. Chest wall:  No tenderness or deformity. NO CREPITUS   Heart:  Regular rate and rhythm, S1, S2 normal, no murmur, click, rub or gallop. Abdomen:   Soft, non-tender. Bowel sounds normal. No masses,  No organomegaly. No paradox   Extremities: normal, atraumatic, no cyanosis or edema. Pulses: 1-2+ and symmetric all extremities. Skin: Skin color, texture, turgor normal. No rashes or lesions   Lymph nodes: Cervical, supraclavicular, and axillary nodes normal.   Neurologic: Grossly nonfocal          Data review:   Labs:  Recent Results (from the past 24 hour(s))   CBC WITH AUTOMATED DIFF    Collection Time: 02/05/23  1:12 PM   Result Value Ref Range    WBC 8.2 4.6 - 13.2 K/uL    RBC 3.54 (L) 4.20 - 5.30 M/uL    HGB 10.6 (L) 12.0 - 16.0 g/dL    HCT 29.3 (L) 35.0 - 45.0 %    MCV 82.8 78.0 - 100.0 FL    MCH 29.9 24.0 - 34.0 PG    MCHC 36.2 31.0 - 37.0 g/dL    RDW 14.7 (H) 11.6 - 14.5 %    PLATELET 838 246 - 393 K/uL    MPV 9.6 9.2 - 11.8 FL    NRBC 0.0 0  WBC    ABSOLUTE NRBC 0.00 0.00 - 0.01 K/uL    NEUTROPHILS 63 40 - 73 %    LYMPHOCYTES 21 21 - 52 %    MONOCYTES 10 3 - 10 %    EOSINOPHILS 4 0 - 5 %    BASOPHILS 1 0 - 2 %    IMMATURE GRANULOCYTES 0 0.0 - 0.5 %    ABS. NEUTROPHILS 5.2 1.8 - 8.0 K/UL    ABS. LYMPHOCYTES 1.7 0.9 - 3.6 K/UL    ABS. MONOCYTES 0.8 0.05 - 1.2 K/UL    ABS. EOSINOPHILS 0.4 0.0 - 0.4 K/UL    ABS. BASOPHILS 0.1 0.0 - 0.1 K/UL    ABS. IMM. GRANS. 0.0 0.00 - 0.04 K/UL    DF AUTOMATED     METABOLIC PANEL, COMPREHENSIVE    Collection Time: 02/05/23  1:12 PM   Result Value Ref Range    Sodium 141 136 - 145 mmol/L    Potassium 3.4 (L) 3.5 - 5.5 mmol/L    Chloride 109 100 - 111 mmol/L    CO2 29 21 - 32 mmol/L    Anion gap 3 3.0 - 18 mmol/L    Glucose 112 (H) 74 - 99 mg/dL    BUN 14 7.0 - 18 MG/DL    Creatinine 0.69 0.6 - 1.3 MG/DL    BUN/Creatinine ratio 20 12 - 20      eGFR >60 >60 ml/min/1.73m2    Calcium 8.9 8.5 - 10.1 MG/DL    Bilirubin, total 1.1 (H) 0.2 - 1.0 MG/DL    ALT (SGPT) 23 13 - 56 U/L    AST (SGOT) 20 10 - 38 U/L    Alk.  phosphatase 109 45 - 117 U/L    Protein, total 6.8 6.4 - 8.2 g/dL    Albumin 3.1 (L) 3.4 - 5.0 g/dL    Globulin 3.7 2.0 - 4.0 g/dL    A-G Ratio 0.8 0.8 - 1.7     BLOOD GAS, ARTERIAL POC    Collection Time: 02/05/23  1:22 PM   Result Value Ref Range    Device: ROOM AIR      pH (POC) 7.40 7.35 - 7.45      pCO2 (POC) 42.5 35.0 - 45.0 MMHG    pO2 (POC) 66 (L) 80 - 100 MMHG    HCO3 (POC) 26.6 (H) 22 - 26 MMOL/L    sO2 (POC) 92.8 92 - 97 %    Base excess (POC) 1.5 mmol/L    Allens test (POC) Positive      Specimen type (POC) ARTERIAL      Performed by Hayden Velazco    POC LACTIC ACID    Collection Time: 02/05/23  1:25 PM   Result Value Ref Range    Lactic Acid (POC) 1.02 0.40 - 2.00 mmol/L         Imaging:  I have personally reviewed the patients radiographs and have reviewed the reports:  CXR Results  (Last 48 hours)      None          CT Results  (Last 48 hours)      None              High complexity decision making was performed during the evaluation of this patient at high risk for decompensation with multiple organ involvement     Above mentioned total time spent on reviewing the case/medical record/data/notes/EMR/patient examination/documentation/coordinating care with nurse/consultants, exclusive of procedures with complex decision making performed and > 50% time spent in face to face evaluation.      Latesha Drake MD

## 2023-02-05 NOTE — ED PROVIDER NOTES
EMERGENCY DEPARTMENT HISTORY AND PHYSICAL EXAM    Date: 2/5/2023  Patient Name: Denisha Martinez    History of Presenting Illness     Chief Complaint   Patient presents with    Medication Refill         History Provided By: Patient      Additional History (Context): Denisha Martinez is a 71 y.o. female with osteoarthritis and right lower lobe lung mass biopsied January 2023 positive for adenocarcinoma  who presents with request for medication refill. Patient apparently has been on Levaquin and Augmentin for her postobstructive pneumonia. While she was hospitalized a rib biopsy was performed that is positive for adenocarcinoma. Patient is unaware of this diagnosis at this time because while she has received 5 different phone calls documented by different providers with pulmonary critical care team, patient has hung up the phone or not answered each time that he call us been made. Certified letter sent to address listed on file but patient has a diagnosis of homelessness as well. She is out of her pain medications. Denies fever. Still feels like there is quite a bit of difficulty breathing on that side. PCP: Sergio Peters, NP    Current Facility-Administered Medications   Medication Dose Route Frequency Provider Last Rate Last Admin    vancomycin (VANCOCIN) 1500 mg in  ml infusion  1,500 mg IntraVENous NOW Marlyn Argueta PA         Current Outpatient Medications   Medication Sig Dispense Refill    levoFLOXacin (Levaquin) 750 mg tablet Take 1 Tablet by mouth daily. 7 Tablet 0    amoxicillin-clavulanate (Augmentin) 875-125 mg per tablet Take 1 Tablet by mouth two (2) times a day for 7 days. 14 Tablet 0    oxyCODONE-acetaminophen (Percocet) 5-325 mg per tablet Take 1 Tablet by mouth every eight (8) hours as needed for Pain for up to 4 days. Max Daily Amount: 3 Tablets. 12 Tablet 0    ibuprofen (MOTRIN) 400 mg tablet Take 600 mg by mouth every six (6) hours as needed for Pain.       acetaminophen (TYLENOL) 325 mg tablet Take 2 Tablets by mouth every six (6) hours as needed for Pain. 20 Tablet 0       Past History     Past Medical History:  Past Medical History:   Diagnosis Date    Angina at rest [facility])     Anxiety     CVA, old, facial weakness 2012    left ptosis    Dental caries     Osteoarthritis     Osteopenia     Right lower lobe lung mass 04/15/2022    Offered Biopsy and couldn't decide between PAlliative Care and Biopsy       Past Surgical History:  Past Surgical History:   Procedure Laterality Date    HX WISDOM TEETH EXTRACTION         Family History:  Family History   Problem Relation Age of Onset    Cancer Daughter         Unspecified Cancer    Cancer Cousin         Unspecified Cancer x2       Social History:  Social History     Tobacco Use    Smoking status: Some Days     Packs/day: 0.50     Years: 15.00     Pack years: 7.50     Types: Cigarettes    Smokeless tobacco: Never    Tobacco comments:     Patient reports that she is a Some Day Smoker x20 years (as of 1/21/2023). Substance Use Topics    Alcohol use: Yes     Comment: 8 drinks per week    Drug use: No     Comment: none in 2 years       Allergies: Allergies   Allergen Reactions    Aspirin Nausea Only         Review of Systems   Review of Systems   Constitutional:  Positive for fatigue. Negative for fever. HENT: Negative. Eyes: Negative. Respiratory:  Positive for shortness of breath. Cardiovascular:  Positive for chest pain. Gastrointestinal: Negative. Endocrine: Negative. Genitourinary: Negative. Musculoskeletal: Negative. Skin: Negative. Allergic/Immunologic: Negative. Neurological: Negative. Hematological: Negative. Psychiatric/Behavioral: Negative.      All Other Systems Negative  Physical Exam     Vitals:    02/05/23 1448 02/05/23 1503 02/05/23 1518 02/05/23 1533   BP:  (!) 143/77  130/73   Pulse: 73 99 85 94   Resp: 16 22 21 16   Temp:       SpO2: 98% 98% 97% 100%     Physical Exam  Vitals and nursing note reviewed. Constitutional:       Appearance: She is well-developed and normal weight. Comments: Appears tired   HENT:      Head: Normocephalic and atraumatic. Right Ear: External ear normal.      Left Ear: External ear normal.      Nose: Nose normal.   Eyes:      Conjunctiva/sclera: Conjunctivae normal.      Pupils: Pupils are equal, round, and reactive to light. Neck:      Vascular: No JVD. Trachea: No tracheal deviation. Cardiovascular:      Rate and Rhythm: Normal rate and regular rhythm. Heart sounds: Normal heart sounds. No murmur heard. No friction rub. No gallop. Pulmonary:      Effort: Pulmonary effort is normal. No respiratory distress. Breath sounds: No wheezing or rales. Comments: Diminished lung sounds on right  Abdominal:      General: Bowel sounds are normal. There is no distension. Palpations: Abdomen is soft. There is no mass. Tenderness: There is no abdominal tenderness. There is no guarding or rebound. Musculoskeletal:         General: No tenderness. Normal range of motion. Cervical back: Normal range of motion and neck supple. Skin:     General: Skin is warm and dry. Findings: No rash. Neurological:      Mental Status: She is alert and oriented to person, place, and time. Cranial Nerves: No cranial nerve deficit. Deep Tendon Reflexes: Reflexes are normal and symmetric.    Psychiatric:         Behavior: Behavior normal.            Diagnostic Study Results     Labs -     Recent Results (from the past 12 hour(s))   CBC WITH AUTOMATED DIFF    Collection Time: 02/05/23  1:12 PM   Result Value Ref Range    WBC 8.2 4.6 - 13.2 K/uL    RBC 3.54 (L) 4.20 - 5.30 M/uL    HGB 10.6 (L) 12.0 - 16.0 g/dL    HCT 29.3 (L) 35.0 - 45.0 %    MCV 82.8 78.0 - 100.0 FL    MCH 29.9 24.0 - 34.0 PG    MCHC 36.2 31.0 - 37.0 g/dL    RDW 14.7 (H) 11.6 - 14.5 %    PLATELET 453 508 - 262 K/uL    MPV 9.6 9.2 - 11.8 FL    NRBC 0.0 0  WBC ABSOLUTE NRBC 0.00 0.00 - 0.01 K/uL    NEUTROPHILS 63 40 - 73 %    LYMPHOCYTES 21 21 - 52 %    MONOCYTES 10 3 - 10 %    EOSINOPHILS 4 0 - 5 %    BASOPHILS 1 0 - 2 %    IMMATURE GRANULOCYTES 0 0.0 - 0.5 %    ABS. NEUTROPHILS 5.2 1.8 - 8.0 K/UL    ABS. LYMPHOCYTES 1.7 0.9 - 3.6 K/UL    ABS. MONOCYTES 0.8 0.05 - 1.2 K/UL    ABS. EOSINOPHILS 0.4 0.0 - 0.4 K/UL    ABS. BASOPHILS 0.1 0.0 - 0.1 K/UL    ABS. IMM. GRANS. 0.0 0.00 - 0.04 K/UL    DF AUTOMATED     METABOLIC PANEL, COMPREHENSIVE    Collection Time: 02/05/23  1:12 PM   Result Value Ref Range    Sodium 141 136 - 145 mmol/L    Potassium 3.4 (L) 3.5 - 5.5 mmol/L    Chloride 109 100 - 111 mmol/L    CO2 29 21 - 32 mmol/L    Anion gap 3 3.0 - 18 mmol/L    Glucose 112 (H) 74 - 99 mg/dL    BUN 14 7.0 - 18 MG/DL    Creatinine 0.69 0.6 - 1.3 MG/DL    BUN/Creatinine ratio 20 12 - 20      eGFR >60 >60 ml/min/1.73m2    Calcium 8.9 8.5 - 10.1 MG/DL    Bilirubin, total 1.1 (H) 0.2 - 1.0 MG/DL    ALT (SGPT) 23 13 - 56 U/L    AST (SGOT) 20 10 - 38 U/L    Alk. phosphatase 109 45 - 117 U/L    Protein, total 6.8 6.4 - 8.2 g/dL    Albumin 3.1 (L) 3.4 - 5.0 g/dL    Globulin 3.7 2.0 - 4.0 g/dL    A-G Ratio 0.8 0.8 - 1.7     BLOOD GAS, ARTERIAL POC    Collection Time: 02/05/23  1:22 PM   Result Value Ref Range    Device: ROOM AIR      pH (POC) 7.40 7.35 - 7.45      pCO2 (POC) 42.5 35.0 - 45.0 MMHG    pO2 (POC) 66 (L) 80 - 100 MMHG    HCO3 (POC) 26.6 (H) 22 - 26 MMOL/L    sO2 (POC) 92.8 92 - 97 %    Base excess (POC) 1.5 mmol/L    Allens test (POC) Positive      Specimen type (POC) ARTERIAL      Performed by Chuck Rosales    POC LACTIC ACID    Collection Time: 02/05/23  1:25 PM   Result Value Ref Range    Lactic Acid (POC) 1.02 0.40 - 2.00 mmol/L       Radiologic Studies -   XR RIBS RT W PA CXR MIN 3 V   Final Result   1. Interval improvement in right pleural effusion.    2.  Persistent right lung base consolidation consistent with known tumor and   postobstructive atelectasis versus pneumonia. 3.  Bilateral lytic rib lesions with pathologic fractures. Additional bone   metastases better seen on prior cross-sectional imaging. CT Results  (Last 48 hours)      None          CXR Results  (Last 48 hours)      None              Medical Decision Making   I am the first provider for this patient. I reviewed the vital signs, available nursing notes, past medical history, past surgical history, family history and social history. Vital Signs-Reviewed the patient's vital signs. Records Reviewed: Prior medical records, Previous Radiology studies, and Nursing notes    Procedures:  Procedures    Provider Notes (Medical Decision Making): Patient on antibiotics for outpatient therapy of her postobstructive pneumonia. Her chest x-ray shows considerable disease process. Have a ordered labs and an ABG. I also asked Dr. Joey Melo from pulmonology to come speak with patient regarding her diagnosis as patient is unaware. Consulted with Dr. Dominique Aquino for infectious disease who recommends that if patient is to be outpatient she should follow-up with clinic and will write a write for Augmentin and levofloxacin for 7 more days and I will also include pain medications for her. Patient maintains her oxygen saturation while ambulating. Once while at rest she was 88% was placed on 2 L oxygen. However since then she has maintained her oxygenation. Has received IV Levaquin and vancomycin here. We will have her follow-up with Dr. Joceline Mcdonald and keep her next scheduled appointment and is clinic as well as returning here for any concerns or worsening of symptoms. I did offer her inpatient admission which she declined because of court case tomorrow.     MED RECONCILIATION:  Current Facility-Administered Medications   Medication Dose Route Frequency    vancomycin (VANCOCIN) 1500 mg in  ml infusion  1,500 mg IntraVENous NOW     Current Outpatient Medications   Medication Sig    levoFLOXacin (Levaquin) 750 mg tablet Take 1 Tablet by mouth daily. amoxicillin-clavulanate (Augmentin) 875-125 mg per tablet Take 1 Tablet by mouth two (2) times a day for 7 days. oxyCODONE-acetaminophen (Percocet) 5-325 mg per tablet Take 1 Tablet by mouth every eight (8) hours as needed for Pain for up to 4 days. Max Daily Amount: 3 Tablets. ibuprofen (MOTRIN) 400 mg tablet Take 600 mg by mouth every six (6) hours as needed for Pain. acetaminophen (TYLENOL) 325 mg tablet Take 2 Tablets by mouth every six (6) hours as needed for Pain. Disposition:  home    DISCHARGE NOTE:   4:17 PM    Pt has been reexamined. Patient has no new complaints, changes, or physical findings. Care plan outlined and precautions discussed. Results of labs, CXR were reviewed with the patient. All medications were reviewed with the patient; will d/c home with levaquin, augmentin, percocet. All of pt's questions and concerns were addressed. Patient was instructed and agrees to follow up with ID, pulmonary, PCP, oncology, as well as to return to the ED upon further deterioration. Patient is ready to go home.     Follow-up Information       Follow up With Specialties Details Why Contact Info    Morelia Nelson MD Hematology and Oncology, Hematology Physician, Oncology  Keep your next scheduled appointment [phone] [facility]  Schedule an appointment as soon as possible for a visit in 1 day  [place] [phone]    [facility] EMERGENCY DEPT Emergency Medicine  If symptoms worsen return immediately [address]  [facility], DO Infectious Disease Physician, Hospitalist Schedule an appointment as soon as possible for a visit   [address]  [phone]              Current Discharge Medication List        START taking these medications    Details levoFLOXacin (Levaquin) 750 mg tablet Take 1 Tablet by mouth daily. Qty: 7 Tablet, Refills: 0  Start date: 2/5/2023      amoxicillin-clavulanate (Augmentin) 875-125 mg per tablet Take 1 Tablet by mouth two (2) times a day for 7 days. Qty: 14 Tablet, Refills: 0  Start date: 2/5/2023, End date: 2/12/2023      oxyCODONE-acetaminophen (Percocet) 5-325 mg per tablet Take 1 Tablet by mouth every eight (8) hours as needed for Pain for up to 4 days. Max Daily Amount: 3 Tablets. Qty: 12 Tablet, Refills: 0  Start date: 2/5/2023, End date: 2/9/2023    Comments: ED Attending: Gregorio Venegas DO  MARISA: [id number]  Associated Diagnoses: Rib pain on right side               Core Measures:    Critical Care Time:   Critical Care Time:   I have spent 35 minutes of critical care time involved in lab review, consultations with specialist, family decision-making, and documentation. During this entire length of time I was immediately available to the patient. Critical Care: The reason for providing this level of medical care for this critically ill patient was due a critical illness that impaired one or more vital organ systems such that there was a high probability of imminent or life threatening deterioration in the patients condition. This care involved high complexity decision making to assess, manipulate, and support vital system functions, to treat this degreee vital organ system failure and to prevent further life threatening deterioration of the patients condition. Critical care time exclusive of any billable procedures. Diagnosis     Clinical Impression:   1. Adenocarcinoma of lung, right ([address]    2. Adenocarcinoma ([address]    3. Obstructive pneumonia    4.  Rib pain on right side

## 2023-02-06 LAB
BACTERIA SPEC CULT: NORMAL
BACTERIA SPEC CULT: NORMAL
SERVICE CMNT-IMP: NORMAL

## 2023-02-10 ENCOUNTER — HOSPITAL ENCOUNTER (OUTPATIENT)
Dept: PHYSICAL THERAPY | Age: 73
Discharge: HOME | End: 2023-02-10
Payer: MEDICAID

## 2023-02-10 PROCEDURE — 97162 PT EVAL MOD COMPLEX 30 MIN: CPT

## 2023-02-10 NOTE — PROGRESS NOTES
PT DAILY TREATMENT NOTE     Patient Name: Ann Eisenberg  AILI:[id number]  : 1950  [x]  Patient  Verified  Payor: [facility] MEDICAID / Plan: [facility] COMMUNITY PLAN [facility] / Product Type: Managed Care Medicaid /    In time:10:24  Out time:10:54  Total Treatment Time (min): 30 min. Visit #: 1 of 10    Treatment Area: Pain in left knee [M25.562]  Pain in left hip [M25.552]    SUBJECTIVE  Pain Level (0-10 scale): 8/10  Any medication changes, allergies to medications, adverse drug reactions, diagnosis change, or new procedure performed?: [x] No    [] Yes (see summary sheet for update)  Subjective functional status/changes:   [] No changes reported  See Eval/POC. OBJECTIVE    30 min [x]Eval                  []Re-Eval     With   [x] TE   [x] TA   [x] neuro   [] other: Patient Education: [x] Review HEP    [x] Progressed/Changed HEP based on:   [x] positioning   [x] body mechanics   [x] transfers   [x] heat/ice application    [x] other:      Other Objective/Functional Measures: See Eval/POC. Pain Level (0-10 scale) post treatment: 8/10    ASSESSMENT/Changes in Function: See Eval/POC. Patient will continue to benefit from skilled PT services to modify and progress therapeutic interventions, address functional mobility deficits, address ROM deficits, address strength deficits, analyze and address soft tissue restrictions, analyze and cue movement patterns, analyze and modify body mechanics/ergonomics, assess and modify postural abnormalities, address imbalance/dizziness, and instruct in home and community integration to attain remaining goals. [x]  See Plan of Care  []  See progress note/recertification  []  See Discharge Summary         Progress towards goals / Updated goals:  See Eval/POC.     PLAN  [x]  Upgrade activities as tolerated     [x]  Continue plan of care  []  Update interventions per flow sheet       []  Discharge due to:_  []  Other:_      Gilma Nunez, PT 2/10/2023  5:16 PM    No future appointments.

## 2023-02-10 NOTE — PROGRESS NOTES
[facility] - [place] [address]  [address], [address] [phone] fax    Plan of Care/ Statement of Necessity for Physical Therapy Services           Patient name: Eddie Last Start of Care: 2/10/2023   Referral source: Chance Gant MD : 1950    Medical Diagnosis: Pain in left knee [M25.562]  Pain in left hip [M25.552]  Payor: [facility] MEDICAID / Plan: [facility] COMMUNITY PLAN [facility] / Product Type: Managed Care Medicaid /  Onset Date:  10/13/2022    Treatment Diagnosis: left knee and hip/lower back pain   Prior Hospitalization: see medical history Provider#: [id number]   Medications: Verified on Patient summary List    Comorbidities: Asthma, Cancer, Weight Change   Prior Level of Function: Independent and unrestricted for ADLs, chores, community mobility and sleep. The Plan of Care and following information is based on the information from the initial evaluation. Assessment/ key information: Patient is a 71year old female referred to PT by her orthopedist for left knee, hip and lower back pain. Pt was seated in a truck on 10/13/22 and reaches out to close the door and fell out to her left ~3', landing on her left knee and hip areas. Xrays of the left hip showed osteopenia and of left knee showed moderately severe medial left knee joint space narrowing with small osteophytes  and mild varus deformity. Today in PT, patient complains primarily of left anterior knee pain and sometimes left lower back/posterior hip pain. Left knee pain sometimes increased with walking; sometimes with getting into/out of car, with getting up/down from/to sitting. Patient's pains can make sleeping difficult and/or awaken her in the night, but she feels better in the mornings. Household chores can bother her pain and she sometimes needs help from friends; especially bad is cleaning shower/tub.   Pt denies lef tknee buckling and knee has not had any recent locking; sometimes crepitus present. Examination finds some hand use with transfers and uses right LE > left. Gait is slowed and mildly antalgic left, mild left knee varus. Left LE MMT 4/5 hamstrings, 4 to 4+ quads, 4+ to 5- hip ER, 5- hip IR, 5- hip flex, 4+ hip add, and 4 to 4+ hip aBd; some hip or knee discomfort for all; 5/5 and OK right. Left knee flexion to 126 degrees with end range discomfort; hip ER to 40 degrees and IR ~15 deg; hamstrings about -40 to -45 deg. Tenderness left GT, proximal lateral thigh, quadratus, L-S paraspinals; also tender left patella, patellar tendon, and distal quads/quad tendon, minimally so popliteal, medial and lateral joint lines and posterior leg/calf. Trunk AROM for SB is WFL bilat, but some left LB discomfort for motion to left, flexion to mid shins with some discomfort, and right rotation decreased ~25% with some left low back discomfort. Pain scale is to max of 9/10. FOTO = 43. Patient would benefit from skilled PT to decrease pain and to address these deficits to improve functional mobility and restore PLOF  Evaluation Complexity History MEDIUM  Complexity : 1-2 comorbidities / personal factors will impact the outcome/ POC ; Examination MEDIUM Complexity : 3 Standardized tests and measures addressing body structure, function, activity limitation and / or participation in recreation  ;Presentation MEDIUM Complexity : Evolving with changing characteristics  ; Clinical Decision Making MEDIUM Complexity : FOTO score of 26-74  Overall Complexity Rating: MEDIUM  Problem List: pain affecting function, decrease ROM, decrease strength, impaired gait/ balance, decrease ADL/ functional abilitiies, decrease activity tolerance, decrease flexibility/ joint mobility, and decrease transfer abilities   Treatment Plan may include any combination of the following: Therapeutic exercise, Neuromuscular reeducation, Manual therapy, Therapeutic activity, Self care/home management, Electric stim unattended , Gait training, Ultrasound, and Other: MHP/CP prn. Patient / Family readiness to learn indicated by: asking questions, trying to perform skills, and interest  Persons(s) to be included in education: patient (P)  Barriers to Learning/Limitations: None  Patient Goal (s): No response from patient; per PT--decrease pain  Patient Self Reported Health Status: fair  Rehabilitation Potential: good  Short Term Goals: To be accomplished in 3-4 weeks:  Patient independent and complaint with HEP and self-care routine. Eval:  No HEP. Decrease max pain scale rating to </= 5-6/10. Eval:  up to 9/10. Increase left knee flex PROM to >/= 135 deg. Eval:  126 deg. Increase trunk AROM flexion to finger tips to ankles; right rotation = left. Eval:  flex to mid shins, right rotation decreased ~25% and left WFL. Long Term Goals: To be accomplished in 4-6weeks:  Improve FOTO to >/= 53. Eval:  43.  No/minimal tenderness to palpation. Eval:  Tenderness left GT, proximal lateral thigh, quadratus, L-S paraspinals; also tender left patella, patellar tendon, and distal quads/quad tendon, minimally so popliteal, medial and lateral joint lines and posterior leg/calf. Increase left LE MMT scores to >/= 5-/5 throughout. Eval:  4/5 hamstrings, 4 to 4+ quads, 4+ to 5- hip ER, 5- hip IR, 5- hip flex, 4+ hip add, and 4 to 4+ hip aBd. Frequency / Duration: Patient to be seen 2 times per week for 4-6 weeks. Patient/ Caregiver education and instruction: Diagnosis, prognosis, self care, activity modification, and exercises   [x]  Plan of care has been reviewed with LIANA Pizano, PT 2/10/2023 4:50 PM    ________________________________________________________________________    I certify that the above Therapy Services are being furnished while the patient is under my care. I agree with the treatment plan and certify that this therapy is necessary.     [facility] Signature:____________Date:_________TIME:________     Maurice Peralta MD  ** Signature, Date and Time must be completed for valid certification **    Please sign and return to [facility] [place] [address]  [address] [place], [address] [phone] fax

## 2023-02-14 ENCOUNTER — HOSPITAL ENCOUNTER (OUTPATIENT)
Facility: HOSPITAL | Age: 73
Setting detail: RECURRING SERIES
Discharge: HOME | End: 2023-02-17
Payer: MEDICAID

## 2023-02-14 PROCEDURE — 97530 THERAPEUTIC ACTIVITIES: CPT

## 2023-02-14 PROCEDURE — 97112 NEUROMUSCULAR REEDUCATION: CPT

## 2023-02-14 PROCEDURE — 97110 THERAPEUTIC EXERCISES: CPT

## 2023-02-14 NOTE — PROGRESS NOTES
PHYSICAL / OCCUPATIONAL THERAPY - DAILY TREATMENT NOTE (updated )    Patient Name: Salazar Franco    Date: 2023    : 1950  Insurance: Payor: [facility] PL / Plan: [id number] / Product Type: *No Product type* /      Patient  verified Yes     Visit #   Current / Total 2 10   Time   In / Out 230 306   Pain   In / Out 5-6 8   Subjective Functional Status/Changes: Pt reports she is tired today because she took her medications before she came and walked fast to get here. She had to walk fast because she was late   Changes to:  Meds, Allergies, Med Hx, Sx Hx? If yes, update Summary List no       TREATMENT AREA =  Pain in left knee [M25.562]  Pain in left hip [M25.552]  Other low back pain [M54.59]    OBJECTIVE         Therapeutic Procedures: Tx Min Billable or 1:1 Min (if diff from Tx Min) Procedure, Rationale, Specifics   16  24959 Therapeutic Exercise (timed):  increase ROM, strength, coordination, balance, and proprioception to improve patient's ability to progress to PLOF and address remaining functional goals. (see flow sheet as applicable)     Details if applicable:       8  18885 Neuromuscular Re-Education (timed):  improve balance, coordination, kinesthetic sense, posture, core stability and proprioception to improve patient's ability to develop conscious control of individual muscles and awareness of position of extremities in order to progress to PLOF and address remaining functional goals. (see flow sheet as applicable)     Details if applicable:  glut and quad samanta   12  01622 Therapeutic Activity (timed):  use of dynamic activities replicating functional movements to increase ROM, strength, coordination, balance, and proprioception in order to improve patient's ability to progress to PLOF and address remaining functional goals.   (see flow sheet as applicable)     Details if applicable:  functional LE strength      36  MC BC Totals Reminder: bill using total billable min of TIMED therapeutic procedures (example: do not include dry needle or estim unattended, both untimed codes, in totals to left)  8-22 min = 1 unit; 23-37 min = 2 units; 38-52 min = 3 units; 53-67 min = 4 units; 68-82 min = 5 units   Total Total     [x]  Patient Education billed concurrently with other procedures   [x] Review HEP    [] Progressed/Changed HEP, detail:    [] Other detail:       Objective Information/Functional Measures/Assessment    Pt reported increased left side/rib/upper quarter pain at completion of exercises; she reported nausea and had an episode of vomiting. Instructed pt to monitor symptoms and seek medical attention if they worsened. Pt verbalized understanding but attributed the nausea to the medications she took prior to coming to PT. Pt reported pain with bridging, requiring cuing to reduce height for reduction of symptoms. Pt fatigued quickly and had a poor tolerance to standing exercises. Patient will continue to benefit from skilled PT / OT services to modify and progress therapeutic interventions, analyze and address functional mobility deficits, analyze and address ROM deficits, analyze and address strength deficits, analyze and address soft tissue restrictions, analyze and cue for proper movement patterns, analyze and modify for postural abnormalities, analyze and address imbalance/dizziness, and instruct in home and community integration to address functional deficits and attain remaining goals. Progress toward goals / Updated goals:  []  See Progress Note/Recertification    Short Term Goals: To be accomplished in 3-4 weeks:  Patient independent and complaint with HEP and self-care routine. Eval:  No HEP. Created today - distribute NV  Decrease max pain scale rating to </= 5-6/10. Eval:  up to 9/10. Progressing = 8/10   Increase left knee flex PROM to >/= 135 deg. Eval:  126 deg.   Measure NV  Increase trunk AROM flexion to finger tips to ankles; right rotation = left. Eval:  flex to mid shins, right rotation decreased ~25% and left WFL. Tolerated initiation of exercises    Long Term Goals: To be accomplished in 4-6weeks:  Improve FOTO to >/= 53. Eval:  43.  Reassess at end of 10 visits or 30days  No/minimal tenderness to palpation. Eval:  Tenderness left GT, proximal lateral thigh, quadratus, L-S paraspinals; also tender left patella, patellar tendon, and distal quads/quad tendon, minimally so popliteal, medial and lateral joint lines and posterior leg/calf. Noted continued pain on pt side and knee  Increase left LE MMT scores to >/= 5-/5 throughout. Eval:  4/5 hamstrings, 4 to 4+ quads, 4+ to 5- hip ER, 5- hip IR, 5- hip flex, 4+ hip add, and 4 to 4+ hip aBd.   Tolerated initiation of exercises    PLAN  Yes  Continue plan of care  []  Upgrade activities as tolerated  []  Discharge due to :  []  Other:    Mark Aiken PT    2/14/2023    2:33 PM    Future Appointments   Date Time Provider [place]   2/17/2023  9:00 AM [facility] PT [address] [facility]   2/21/2023  9:00 AM Valerie Moscoso PT [facility]   2/24/2023  9:00 AM Valerie Moscoso PT [facility]   2/28/2023  9:30 AM [facility] PT [address] [facility]

## 2023-02-17 ENCOUNTER — HOSPITAL ENCOUNTER (OUTPATIENT)
Facility: HOSPITAL | Age: 73
Setting detail: RECURRING SERIES
End: 2023-02-17
Payer: MEDICAID

## 2023-02-23 ENCOUNTER — HOSPITAL ENCOUNTER (OUTPATIENT)
Facility: HOSPITAL | Age: 73
Setting detail: SPECIMEN
Discharge: HOME | End: 2023-02-26

## 2023-02-24 ENCOUNTER — APPOINTMENT (OUTPATIENT)
Facility: HOSPITAL | Age: 73
End: 2023-02-24
Payer: MEDICAID

## 2023-02-26 ENCOUNTER — APPOINTMENT (OUTPATIENT)
Facility: HOSPITAL | Age: 73
DRG: 720 | End: 2023-02-26
Payer: MEDICAID

## 2023-02-26 ENCOUNTER — HOSPITAL ENCOUNTER (INPATIENT)
Facility: HOSPITAL | Age: 73
LOS: 7 days | Discharge: HOSPICE | DRG: 720 | End: 2023-03-05
Attending: EMERGENCY MEDICINE | Admitting: STUDENT IN AN ORGANIZED HEALTH CARE EDUCATION/TRAINING PROGRAM
Payer: MEDICAID

## 2023-02-26 DIAGNOSIS — C34.91 PRIMARY MALIGNANT NEOPLASM OF RIGHT LUNG METASTATIC TO OTHER SITE (HCC): ICD-10-CM

## 2023-02-26 DIAGNOSIS — R91.8 LUNG MASS: ICD-10-CM

## 2023-02-26 DIAGNOSIS — J90 PLEURAL EFFUSION ON RIGHT: Primary | ICD-10-CM

## 2023-02-26 PROBLEM — Z72.0 TOBACCO ABUSE: Status: ACTIVE | Noted: 2023-01-21

## 2023-02-26 PROBLEM — I69.392 CVA, OLD, FACIAL WEAKNESS: Status: ACTIVE | Noted: 2023-01-21

## 2023-02-26 PROBLEM — C34.90 METASTATIC LUNG CANCER (METASTASIS FROM LUNG TO OTHER SITE) (HCC): Status: ACTIVE | Noted: 2023-01-21

## 2023-02-26 PROBLEM — R00.0 TACHYCARDIA: Status: ACTIVE | Noted: 2023-02-26

## 2023-02-26 PROBLEM — A41.9 SEPSIS (HCC): Status: ACTIVE | Noted: 2021-11-06

## 2023-02-26 LAB
ABO + RH BLD: NORMAL
ALBUMIN SERPL-MCNC: 3.1 G/DL (ref 3.4–5)
ALBUMIN/GLOB SERPL: 0.8 (ref 0.8–1.7)
ALP SERPL-CCNC: 171 U/L (ref 45–117)
ALT SERPL-CCNC: 29 U/L (ref 13–56)
ANION GAP SERPL CALC-SCNC: 9 MMOL/L (ref 3–18)
AST SERPL-CCNC: 45 U/L (ref 10–38)
B PERT DNA SPEC QL NAA+PROBE: NOT DETECTED
BASOPHILS # BLD: 0 K/UL (ref 0–0.1)
BASOPHILS # BLD: 0.1 K/UL (ref 0–0.1)
BASOPHILS NFR BLD: 0 % (ref 0–2)
BASOPHILS NFR BLD: 1 % (ref 0–2)
BILIRUB SERPL-MCNC: 1.4 MG/DL (ref 0.2–1)
BLOOD GROUP ANTIBODIES SERPL: NORMAL
BORDETELLA PARAPERTUSSIS BY PCR: NOT DETECTED
BUN SERPL-MCNC: 20 MG/DL (ref 7–18)
BUN/CREAT SERPL: 26 (ref 12–20)
C PNEUM DNA SPEC QL NAA+PROBE: NOT DETECTED
CALCIUM SERPL-MCNC: 9.5 MG/DL (ref 8.5–10.1)
CHLORIDE SERPL-SCNC: 99 MMOL/L (ref 100–111)
CO2 SERPL-SCNC: 27 MMOL/L (ref 21–32)
CREAT SERPL-MCNC: 0.77 MG/DL (ref 0.6–1.3)
DIFFERENTIAL METHOD BLD: ABNORMAL
DIFFERENTIAL METHOD BLD: ABNORMAL
EOSINOPHIL # BLD: 0 K/UL (ref 0–0.4)
EOSINOPHIL # BLD: 0 K/UL (ref 0–0.4)
EOSINOPHIL NFR BLD: 0 % (ref 0–5)
EOSINOPHIL NFR BLD: 0 % (ref 0–5)
ERYTHROCYTE [DISTWIDTH] IN BLOOD BY AUTOMATED COUNT: 15.1 % (ref 11.6–14.5)
ERYTHROCYTE [DISTWIDTH] IN BLOOD BY AUTOMATED COUNT: 15.3 % (ref 11.6–14.5)
FLUAV SUBTYP SPEC NAA+PROBE: NOT DETECTED
FLUBV RNA SPEC QL NAA+PROBE: NOT DETECTED
GLOBULIN SER CALC-MCNC: 3.9 G/DL (ref 2–4)
GLUCOSE SERPL-MCNC: 124 MG/DL (ref 74–99)
HADV DNA SPEC QL NAA+PROBE: NOT DETECTED
HCOV 229E RNA SPEC QL NAA+PROBE: NOT DETECTED
HCOV HKU1 RNA SPEC QL NAA+PROBE: NOT DETECTED
HCOV NL63 RNA SPEC QL NAA+PROBE: NOT DETECTED
HCOV OC43 RNA SPEC QL NAA+PROBE: NOT DETECTED
HCT VFR BLD AUTO: 33.5 % (ref 35–45)
HCT VFR BLD AUTO: 34.4 % (ref 35–45)
HGB BLD-MCNC: 12.1 G/DL (ref 12–16)
HGB BLD-MCNC: 12.4 G/DL (ref 12–16)
HMPV RNA SPEC QL NAA+PROBE: NOT DETECTED
HPIV1 RNA SPEC QL NAA+PROBE: NOT DETECTED
HPIV2 RNA SPEC QL NAA+PROBE: NOT DETECTED
HPIV3 RNA SPEC QL NAA+PROBE: NOT DETECTED
HPIV4 RNA SPEC QL NAA+PROBE: NOT DETECTED
IMM GRANULOCYTES # BLD AUTO: 0.1 K/UL (ref 0–0.04)
IMM GRANULOCYTES # BLD AUTO: 0.2 K/UL (ref 0–0.04)
IMM GRANULOCYTES NFR BLD AUTO: 1 % (ref 0–0.5)
IMM GRANULOCYTES NFR BLD AUTO: 1 % (ref 0–0.5)
LYMPHOCYTES # BLD: 0.7 K/UL (ref 0.9–3.6)
LYMPHOCYTES # BLD: 1 K/UL (ref 0.9–3.6)
LYMPHOCYTES NFR BLD: 4 % (ref 21–52)
LYMPHOCYTES NFR BLD: 9 % (ref 21–52)
M PNEUMO DNA SPEC QL NAA+PROBE: NOT DETECTED
MCH RBC QN AUTO: 29.7 PG (ref 24–34)
MCH RBC QN AUTO: 29.8 PG (ref 24–34)
MCHC RBC AUTO-ENTMCNC: 36 G/DL (ref 31–37)
MCHC RBC AUTO-ENTMCNC: 36.1 G/DL (ref 31–37)
MCV RBC AUTO: 82.3 FL (ref 78–100)
MCV RBC AUTO: 82.5 FL (ref 78–100)
MONOCYTES # BLD: 1.2 K/UL (ref 0.05–1.2)
MONOCYTES # BLD: 1.2 K/UL (ref 0.05–1.2)
MONOCYTES NFR BLD: 10 % (ref 3–10)
MONOCYTES NFR BLD: 7 % (ref 3–10)
NEUTS SEG # BLD: 15.7 K/UL (ref 1.8–8)
NEUTS SEG # BLD: 9.3 K/UL (ref 1.8–8)
NEUTS SEG NFR BLD: 80 % (ref 40–73)
NEUTS SEG NFR BLD: 88 % (ref 40–73)
NRBC # BLD: 0.02 K/UL (ref 0–0.01)
NRBC # BLD: 0.03 K/UL (ref 0–0.01)
NRBC BLD-RTO: 0.2 PER 100 WBC
NRBC BLD-RTO: 0.2 PER 100 WBC
NT PRO BNP: 291 PG/ML (ref 0–900)
PLATELET # BLD AUTO: 356 K/UL (ref 135–420)
PLATELET # BLD AUTO: 390 K/UL (ref 135–420)
PLATELET COMMENT: ABNORMAL
PMV BLD AUTO: 9.5 FL (ref 9.2–11.8)
PMV BLD AUTO: 9.9 FL (ref 9.2–11.8)
POTASSIUM SERPL-SCNC: 3.8 MMOL/L (ref 3.5–5.5)
PROT SERPL-MCNC: 7 G/DL (ref 6.4–8.2)
RBC # BLD AUTO: 4.06 M/UL (ref 4.2–5.3)
RBC # BLD AUTO: 4.18 M/UL (ref 4.2–5.3)
RBC MORPH BLD: ABNORMAL
RSV RNA SPEC QL NAA+PROBE: NOT DETECTED
RV+EV RNA SPEC QL NAA+PROBE: NOT DETECTED
SARS-COV-2 RNA RESP QL NAA+PROBE: NOT DETECTED
SODIUM SERPL-SCNC: 135 MMOL/L (ref 136–145)
SPECIMEN EXP DATE BLD: NORMAL
TROPONIN I SERPL HS-MCNC: 47 NG/L (ref 0–54)
WBC # BLD AUTO: 11.7 K/UL (ref 4.6–13.2)
WBC # BLD AUTO: 17.8 K/UL (ref 4.6–13.2)

## 2023-02-26 PROCEDURE — 83880 ASSAY OF NATRIURETIC PEPTIDE: CPT

## 2023-02-26 PROCEDURE — 71275 CT ANGIOGRAPHY CHEST: CPT

## 2023-02-26 PROCEDURE — 2140000001 HC CVICU INTERMEDIATE R&B

## 2023-02-26 PROCEDURE — 80053 COMPREHEN METABOLIC PANEL: CPT

## 2023-02-26 PROCEDURE — 6360000002 HC RX W HCPCS: Performed by: PHYSICIAN ASSISTANT

## 2023-02-26 PROCEDURE — 0202U NFCT DS 22 TRGT SARS-COV-2: CPT

## 2023-02-26 PROCEDURE — 99285 EMERGENCY DEPT VISIT HI MDM: CPT

## 2023-02-26 PROCEDURE — 71045 X-RAY EXAM CHEST 1 VIEW: CPT

## 2023-02-26 PROCEDURE — 93005 ELECTROCARDIOGRAM TRACING: CPT | Performed by: EMERGENCY MEDICINE

## 2023-02-26 PROCEDURE — 6360000004 HC RX CONTRAST MEDICATION: Performed by: EMERGENCY MEDICINE

## 2023-02-26 PROCEDURE — APPSS60 APP SPLIT SHARED TIME 46-60 MINUTES

## 2023-02-26 PROCEDURE — 6360000002 HC RX W HCPCS

## 2023-02-26 PROCEDURE — 2500000003 HC RX 250 WO HCPCS: Performed by: EMERGENCY MEDICINE

## 2023-02-26 PROCEDURE — 6360000002 HC RX W HCPCS: Performed by: EMERGENCY MEDICINE

## 2023-02-26 PROCEDURE — 86900 BLOOD TYPING SEROLOGIC ABO: CPT

## 2023-02-26 PROCEDURE — 32551 INSERTION OF CHEST TUBE: CPT | Performed by: EMERGENCY MEDICINE

## 2023-02-26 PROCEDURE — 96374 THER/PROPH/DIAG INJ IV PUSH: CPT | Performed by: EMERGENCY MEDICINE

## 2023-02-26 PROCEDURE — 2580000003 HC RX 258

## 2023-02-26 PROCEDURE — 84484 ASSAY OF TROPONIN QUANT: CPT

## 2023-02-26 PROCEDURE — 0W9930Z DRAINAGE OF RIGHT PLEURAL CAVITY WITH DRAINAGE DEVICE, PERCUTANEOUS APPROACH: ICD-10-PCS | Performed by: HOSPITALIST

## 2023-02-26 PROCEDURE — 96376 TX/PRO/DX INJ SAME DRUG ADON: CPT | Performed by: EMERGENCY MEDICINE

## 2023-02-26 PROCEDURE — 71046 X-RAY EXAM CHEST 2 VIEWS: CPT

## 2023-02-26 PROCEDURE — 85025 COMPLETE CBC W/AUTO DIFF WBC: CPT

## 2023-02-26 RX ORDER — ACETAMINOPHEN 650 MG/1
650 SUPPOSITORY RECTAL EVERY 6 HOURS PRN
Status: DISCONTINUED | OUTPATIENT
Start: 2023-02-26 | End: 2023-03-05 | Stop reason: HOSPADM

## 2023-02-26 RX ORDER — ONDANSETRON 2 MG/ML
4 INJECTION INTRAMUSCULAR; INTRAVENOUS EVERY 6 HOURS PRN
Status: DISCONTINUED | OUTPATIENT
Start: 2023-02-26 | End: 2023-03-05 | Stop reason: HOSPADM

## 2023-02-26 RX ORDER — HYDROMORPHONE HYDROCHLORIDE 1 MG/ML
1 INJECTION, SOLUTION INTRAMUSCULAR; INTRAVENOUS; SUBCUTANEOUS
Status: COMPLETED | OUTPATIENT
Start: 2023-02-26 | End: 2023-02-26

## 2023-02-26 RX ORDER — ONDANSETRON 4 MG/1
4 TABLET, ORALLY DISINTEGRATING ORAL EVERY 8 HOURS PRN
Status: DISCONTINUED | OUTPATIENT
Start: 2023-02-26 | End: 2023-03-05 | Stop reason: HOSPADM

## 2023-02-26 RX ORDER — NALOXONE HYDROCHLORIDE 0.4 MG/ML
0.4 INJECTION, SOLUTION INTRAMUSCULAR; INTRAVENOUS; SUBCUTANEOUS PRN
Status: DISCONTINUED | OUTPATIENT
Start: 2023-02-26 | End: 2023-03-05 | Stop reason: HOSPADM

## 2023-02-26 RX ORDER — HYDROMORPHONE HYDROCHLORIDE 1 MG/ML
1 INJECTION, SOLUTION INTRAMUSCULAR; INTRAVENOUS; SUBCUTANEOUS ONCE
Status: COMPLETED | OUTPATIENT
Start: 2023-02-26 | End: 2023-02-26

## 2023-02-26 RX ORDER — POLYETHYLENE GLYCOL 3350 17 G/17G
17 POWDER, FOR SOLUTION ORAL DAILY PRN
Status: DISCONTINUED | OUTPATIENT
Start: 2023-02-26 | End: 2023-03-05 | Stop reason: HOSPADM

## 2023-02-26 RX ORDER — MORPHINE SULFATE 2 MG/ML
2 INJECTION, SOLUTION INTRAMUSCULAR; INTRAVENOUS EVERY 4 HOURS PRN
Status: DISCONTINUED | OUTPATIENT
Start: 2023-02-26 | End: 2023-02-28

## 2023-02-26 RX ORDER — SODIUM CHLORIDE 0.9 % (FLUSH) 0.9 %
5-40 SYRINGE (ML) INJECTION PRN
Status: DISCONTINUED | OUTPATIENT
Start: 2023-02-26 | End: 2023-03-05 | Stop reason: HOSPADM

## 2023-02-26 RX ORDER — MIDAZOLAM HYDROCHLORIDE 2 MG/2ML
4 INJECTION, SOLUTION INTRAMUSCULAR; INTRAVENOUS
Status: COMPLETED | OUTPATIENT
Start: 2023-02-26 | End: 2023-02-26

## 2023-02-26 RX ORDER — ACETAMINOPHEN 325 MG/1
650 TABLET ORAL EVERY 6 HOURS PRN
Status: DISCONTINUED | OUTPATIENT
Start: 2023-02-26 | End: 2023-03-05 | Stop reason: HOSPADM

## 2023-02-26 RX ORDER — SODIUM CHLORIDE 0.9 % (FLUSH) 0.9 %
5-40 SYRINGE (ML) INJECTION EVERY 12 HOURS SCHEDULED
Status: DISCONTINUED | OUTPATIENT
Start: 2023-02-26 | End: 2023-03-05 | Stop reason: HOSPADM

## 2023-02-26 RX ORDER — OXYCODONE HYDROCHLORIDE AND ACETAMINOPHEN 5; 325 MG/1; MG/1
1 TABLET ORAL EVERY 4 HOURS PRN
Status: DISCONTINUED | OUTPATIENT
Start: 2023-02-26 | End: 2023-02-28

## 2023-02-26 RX ORDER — SODIUM CHLORIDE 9 MG/ML
INJECTION, SOLUTION INTRAVENOUS CONTINUOUS
Status: DISPENSED | OUTPATIENT
Start: 2023-02-26 | End: 2023-02-27

## 2023-02-26 RX ORDER — FAMOTIDINE 20 MG/1
20 TABLET, FILM COATED ORAL DAILY
Status: DISCONTINUED | OUTPATIENT
Start: 2023-02-27 | End: 2023-03-05 | Stop reason: HOSPADM

## 2023-02-26 RX ORDER — MIDAZOLAM HYDROCHLORIDE 1 MG/ML
INJECTION INTRAMUSCULAR; INTRAVENOUS
Status: COMPLETED
Start: 2023-02-26 | End: 2023-02-26

## 2023-02-26 RX ADMIN — MIDAZOLAM HYDROCHLORIDE 4 MG: 2 INJECTION, SOLUTION INTRAMUSCULAR; INTRAVENOUS at 18:16

## 2023-02-26 RX ADMIN — HYDROMORPHONE HYDROCHLORIDE 1 MG: 1 INJECTION, SOLUTION INTRAMUSCULAR; INTRAVENOUS; SUBCUTANEOUS at 17:29

## 2023-02-26 RX ADMIN — LIDOCAINE HYDROCHLORIDE 10 ML: 20 INJECTION, SOLUTION INFILTRATION; PERINEURAL at 18:02

## 2023-02-26 RX ADMIN — MORPHINE SULFATE 2 MG: 2 INJECTION, SOLUTION INTRAMUSCULAR; INTRAVENOUS at 23:18

## 2023-02-26 RX ADMIN — PIPERACILLIN AND TAZOBACTAM 4500 MG: 4; .5 INJECTION, POWDER, FOR SOLUTION INTRAVENOUS at 23:19

## 2023-02-26 RX ADMIN — SODIUM CHLORIDE, PRESERVATIVE FREE 10 ML: 5 INJECTION INTRAVENOUS at 23:18

## 2023-02-26 RX ADMIN — HYDROMORPHONE HYDROCHLORIDE 1 MG: 1 INJECTION, SOLUTION INTRAMUSCULAR; INTRAVENOUS; SUBCUTANEOUS at 18:03

## 2023-02-26 RX ADMIN — IOPAMIDOL 100 ML: 755 INJECTION, SOLUTION INTRAVENOUS at 15:50

## 2023-02-26 RX ADMIN — SODIUM CHLORIDE: 9 INJECTION, SOLUTION INTRAVENOUS at 23:51

## 2023-02-26 RX ADMIN — VANCOMYCIN HYDROCHLORIDE 1750 MG: 10 INJECTION, POWDER, LYOPHILIZED, FOR SOLUTION INTRAVENOUS at 23:46

## 2023-02-26 RX ADMIN — LIDOCAINE HYDROCHLORIDE 10 ML: 20 INJECTION, SOLUTION INFILTRATION; PERINEURAL at 17:29

## 2023-02-26 RX ADMIN — MIDAZOLAM 4 MG: 1 INJECTION INTRAMUSCULAR; INTRAVENOUS at 18:16

## 2023-02-26 ASSESSMENT — PAIN DESCRIPTION - ORIENTATION
ORIENTATION: RIGHT
ORIENTATION: RIGHT;OTHER (COMMENT)

## 2023-02-26 ASSESSMENT — PAIN SCALES - GENERAL
PAINLEVEL_OUTOF10: 1
PAINLEVEL_OUTOF10: 10
PAINLEVEL_OUTOF10: 8
PAINLEVEL_OUTOF10: 10

## 2023-02-26 ASSESSMENT — PAIN DESCRIPTION - DESCRIPTORS: DESCRIPTORS: ACHING

## 2023-02-26 ASSESSMENT — PAIN DESCRIPTION - LOCATION
LOCATION: ABDOMEN
LOCATION: RIB CAGE
LOCATION: ABDOMEN;SHOULDER

## 2023-02-26 ASSESSMENT — PAIN - FUNCTIONAL ASSESSMENT
PAIN_FUNCTIONAL_ASSESSMENT: PREVENTS OR INTERFERES WITH ALL ACTIVE AND SOME PASSIVE ACTIVITIES
PAIN_FUNCTIONAL_ASSESSMENT: 0-10

## 2023-02-26 ASSESSMENT — PAIN DESCRIPTION - FREQUENCY: FREQUENCY: CONTINUOUS

## 2023-02-26 ASSESSMENT — PAIN DESCRIPTION - ONSET: ONSET: ON-GOING

## 2023-02-26 ASSESSMENT — PAIN DESCRIPTION - PAIN TYPE: TYPE: ACUTE PAIN

## 2023-02-26 NOTE — ED TRIAGE NOTES
Pt states she had a lung biopsy on 2/9 ans since has had a decreased appetite,pain  in her right shoulder and pain in her chest.  Pt also states she is coughing up blood along with shortness of breath.

## 2023-02-26 NOTE — ED PROVIDER NOTES
EMERGENCY DEPARTMENT HISTORY AND PHYSICAL EXAM      Patient Name: Lawrence Garcia  MRN: [id number]  YOB: 1950  Provider: Tung Quinonez MD  PCP: ALEJANDRO Meza NP   Time/Date of evaluation: 5:00 PM EST on 2/26/23    History of Presenting Illness     Chief Complaint   Patient presents with    Shortness of Breath    Abdominal Pain       History Provided By: Patient     History Soha Yun): Lawrence Garcia is a 71 y.o. female with a PMHX of right lower lobe lung mass, anxiety, prior CVA, and osteopenia  who presents to the emergency department  by POV C/O shortness of breath, right shoulder pain, coughing up blood, and chest pain. She recently had a lung biopsy done on February 9, 2023 according to her. Upon review of her chart, she had her lung biopsy in late January. The patient is very anxious and she is somewhat of a poor historian. Past History     Past Medical History:  Past Medical History:   Diagnosis Date    Angina at rest [facility])     Anxiety     CVA, old, facial weakness 2012    left ptosis    Dental caries     Osteoarthritis     Osteopenia     Right lower lobe lung mass 04/15/2022    Offered Biopsy and couldn't decide between PAlliative Care and Biopsy       Past Surgical History:  Past Surgical History:   Procedure Laterality Date    CT BIOPSY PERCUTANEOUS SUPERFICIAL BONE  1/26/2023    CT BIOPSY PERCUTANEOUS SUPERFICIAL BONE 1/26/2023 [facility] RAD CT    WISDOM TOOTH EXTRACTION         Family History:  Family History   Problem Relation Age of Onset    Cancer Daughter         Unspecified Cancer    Cancer Cousin         Unspecified Cancer x2       Social History:  Social History     Tobacco Use    Smoking status: Some Days     Packs/day: 0.50     Types: Cigarettes    Smokeless tobacco: Never   Substance Use Topics    Alcohol use: Yes    Drug use: No       Medications:  No current facility-administered medications for this encounter.      Current Outpatient Medications   Medication Sig Dispense Refill    ipratropium-albuterol (DUONEB) 0.5-2.5 (3) MG/3ML SOLN nebulizer solution Inhale 3 mLs into the lungs every 4 hours as needed for Shortness of Breath 60 each 0    gabapentin (NEURONTIN) 100 MG capsule Take 1 capsule by mouth nightly for 30 days. Max Daily Amount: 100 mg 30 capsule 0    [START ON 3/6/2023] levoFLOXacin (LEVAQUIN) 750 MG tablet Take 1 tablet by mouth every 48 hours for 2 doses 2 tablet 0    oxyCODONE (ROXICODONE) 5 MG immediate release tablet Take 1 tablet by mouth every 6 hours as needed for Pain for up to 3 days. Max Daily Amount: 20 mg 12 tablet 0    metoprolol tartrate (LOPRESSOR) 50 MG tablet Take 1 tablet by mouth 2 times daily 60 tablet 0    [START ON 3/6/2023] lidocaine 4 % external patch Place 1 patch onto the skin daily Apply patch to right chest.  Patch may remain in place for up to 12 hours in any 24 hour period. 30 each 0    polyethylene glycol (GLYCOLAX) 17 g packet Take 17 g by mouth daily as needed for Constipation 527 g 0    acetaminophen (TYLENOL) 325 MG tablet Take 650 mg by mouth every 6 hours as needed      ibuprofen (ADVIL;MOTRIN) 400 MG tablet Take 600 mg by mouth every 6 hours as needed         Allergies: Allergies   Allergen Reactions    Aspirin Nausea Only       Social Determinants of Health:  Social Determinants of Health     Tobacco Use: High Risk    Smoking Tobacco Use: Some Days    Smokeless Tobacco Use: Never    Passive Exposure: Not on file   Alcohol Use: Not on file   Financial Resource Strain: Not on file   Food Insecurity: Not on file   Transportation Needs: Not on file   Physical Activity: Not on file   Stress: Not on file   Social Connections: Not on file   Intimate Partner Violence: Not on file   Depression: Not on file   Housing Stability: Not on file       Review of Systems     Negative except as listed above in HPI.     Physical Exam     Vitals:    03/05/23 0200 03/05/23 0400 03/05/23 0828 03/05/23 1104   BP:  (!) 167/99 (!) 158/95 (!) 142/96   Pulse: 85 96 95 (!) 110   Resp:  20 20 20   Temp:  98 °F (36.7 °C) 98 °F (36.7 °C) 98.4 °F (36.9 °C)   TempSrc:  Oral Oral Oral   SpO2:  97% 98% 94%   Weight:       Height:           Constitutional: Anxious, moderate distress  Head: Normocephalic, Atraumatic  Neck: Supple  Cardiovascular: Tachycardic, regular rhythm, no murmurs, rubs, or gallops  Chest: Normal work of breathing and chest excursion bilaterally  Lungs: Decreased breath sounds on right  Abdomen: Soft, non tender, non distended, normoactive bowel sounds  Back: No evidence of trauma or deformity  Extremities: No evidence of trauma or deformity, no LE edema  Skin: Warm and dry, normal cap refill  Neuro: Alert and appropriate, CN intact, normal speech, strength and sensation full and symmetric bilaterally, normal gait, normal coordination  Psychiatric: Extremely anxious          Diagnostic Study Results     Labs:  Recent Results (from the past 12 hour(s))   CBC with Auto Differential    Collection Time: 02/26/23  1:45 PM   Result Value Ref Range    WBC 11.7 4.6 - 13.2 K/uL    RBC 4.18 (L) 4.20 - 5.30 M/uL    Hemoglobin 12.4 12.0 - 16.0 g/dL    Hematocrit 34.4 (L) 35.0 - 45.0 %    MCV 82.3 78.0 - 100.0 FL    MCH 29.7 24.0 - 34.0 PG    MCHC 36.0 31.0 - 37.0 g/dL    RDW 15.3 (H) 11.6 - 14.5 %    Platelets 837 991 - 450 K/uL    MPV 9.9 9.2 - 11.8 FL    Nucleated RBCs 0.2 (H) 0  WBC    nRBC 0.02 (H) 0.00 - 0.01 K/uL    Seg Neutrophils 80 (H) 40 - 73 %    Lymphocytes 9 (L) 21 - 52 %    Monocytes 10 3 - 10 %    Eosinophils % 0 0 - 5 %    Basophils 1 0 - 2 %    Immature Granulocytes 1 (H) 0.0 - 0.5 %    Segs Absolute 9.3 (H) 1.8 - 8.0 K/UL    Absolute Lymph # 1.0 0.9 - 3.6 K/UL    Absolute Mono # 1.2 0.05 - 1.2 K/UL    Absolute Eos # 0.0 0.0 - 0.4 K/UL    Basophils Absolute 0.1 0.0 - 0.1 K/UL    Absolute Immature Granulocyte 0.1 (H) 0.00 - 0.04 K/UL    Differential Type AUTOMATED     CMP    Collection Time: 02/26/23  1:45 PM   Result Value Ref Range    Sodium 135 (L) 136 - 145 mmol/L    Potassium 3.8 3.5 - 5.5 mmol/L    Chloride 99 (L) 100 - 111 mmol/L    CO2 27 21 - 32 mmol/L    Anion Gap 9 3.0 - 18 mmol/L    Glucose 124 (H) 74 - 99 mg/dL    BUN 20 (H) 7.0 - 18 MG/DL    Creatinine 0.77 0.6 - 1.3 MG/DL    Bun/Cre Ratio 26 (H) 12 - 20      Est, Glom Filt Rate >60 >60 ml/min/1.73m2    Calcium 9.5 8.5 - 10.1 MG/DL    Total Bilirubin 1.4 (H) 0.2 - 1.0 MG/DL    ALT 29 13 - 56 U/L    AST 45 (H) 10 - 38 U/L    Alk Phosphatase 171 (H) 45 - 117 U/L    Total Protein 7.0 6.4 - 8.2 g/dL    Albumin 3.1 (L) 3.4 - 5.0 g/dL    Globulin 3.9 2.0 - 4.0 g/dL    Albumin/Globulin Ratio 0.8 0.8 - 1.7     Troponin    Collection Time: 02/26/23  1:45 PM   Result Value Ref Range    Troponin, High Sensitivity 47 0 - 54 ng/L   Brain Natriuretic Peptide    Collection Time: 02/26/23  1:45 PM   Result Value Ref Range    NT Pro- 0 - 900 PG/ML       Radiologic Studies:   CTA CHEST W WO CONTRAST PE Eval   Final Result      1. No pulmonary embolism. 2. Substantially increased now large right pleural effusion since prior study   1/21/2023, with complete atelectasis of the right lung. This is presumably   postobstructive related to the previously somewhat better delineated lung mass   and right hilar adenopathy. Superimposed pneumonia is possible.   -This creates leftward mediastinal shift      3. New trace left pleural effusion. 4. New nodular density in the superior left lower lobe, possibly metastasis   versus infectious/inflammatory. 5. New tree-in-bud nodularity in the lingula, likely infectious/inflammatory. 6. Increased size of bulky metastatic subcarinal conglomerate adenopathy. AP   window lymph node has also increased in size. 7. Redemonstration of multiple lytic lesions since 1/21/2023. Left T4 lesion   appears slightly progressed from prior. Pathologic fractures of the right   lateral sixth rib and left posterior lateral sixth rib.       8. New indeterminate liver lesion in the left hepatic lobe, extremely concerning   for metastasis. 9. Enlarged gastrohepatic lymph nodes are also likely metastatic. XR CHEST (2 VW)   Final Result      Interval complete opacification of the right hemithorax as above. XR CHEST PORTABLE    (Results Pending)     CXR:  Chest tube in place with reexpanded right lung    EKG interpretation: (Preliminary)  EKG read by Dr. Gustavo Wesley at 4263 sinus tachycardia, normal axis, normal intervals, no ST or T wave changes    Procedures     Chest Tube    Date/Time: 3/5/2023 11:14 AM  Performed by: Quinten Nunez MD  Authorized by: Jessie Johnston MD     Consent:     Consent obtained:  Verbal    Consent given by:  Patient    Risks, benefits, and alternatives were discussed: yes      Risks discussed:  Bleeding, incomplete drainage, nerve damage, pain, infection and damage to surrounding structures    Alternatives discussed:  Delayed treatment, no treatment and alternative treatment  Universal protocol:     Procedure explained and questions answered to patient or proxy's satisfaction: yes      Relevant documents present and verified: yes      Imaging studies available: yes      Site/side marked: yes      Immediately prior to procedure, a time out was called: yes      Patient identity confirmed:  Verbally with patient  Pre-procedure details:     Skin preparation:  Povidone-iodine    Preparation: Patient was prepped and draped in the usual sterile fashion    Sedation:     Sedation type:   Anxiolysis  Anesthesia:     Anesthesia method:  Local infiltration    Local anesthetic:  Lidocaine 1% w/o epi  Procedure details:     Placement location:  R lateral    Scalpel size:  10    Tube size (Fr):  28    Dissection instrument:  Melanie clamp and finger    Ultrasound guidance: no      Tension pneumothorax: yes      Tube connected to:  Suction and Heimlich valve    Drainage characteristics:  Serosanguinous    Suture material: 2-0 silk    Dressing:  Petrolatum-impregnated gauze and 4x4 sterile gauze  Post-procedure details:     Post-insertion x-ray findings: tube in good position      Procedure completion:  Tolerated with difficulty  Comments:      1000 mL serosanguinous drainage out from the chest tube immediately    ED Course     5:00 PM NITHYA Montemayor MD) am the first provider for this patient. Initial assessment performed. I reviewed the vital signs, available nursing notes, past medical history, past surgical history, family history and social history. The patients presenting problems have been discussed, and they are in agreement with the care plan formulated and outlined with them. I have encouraged them to ask questions as they arise throughout their visit. Records Reviewed: Nursing Notes, Old Medical Records, Clinical Records from a Different Specialty (oncology), Previous EKGs, Previous Radiology Studies, and Previous Laboratory Studies    Is this patient to be included in the SEP-1 core measure due to severe sepsis or septic shock? No Exclusion criteria - the patient is NOT to be included for SEP-1 Core Measure due to:  Infection is not suspected    MEDICATIONS ADMINISTERED IN THE ED:  Medications   sodium chloride flush 0.9 % injection 5-40 mL (10 mLs IntraVENous Given 3/5/23 4354)   sodium chloride flush 0.9 % injection 5-40 mL (has no administration in time range)   ondansetron (ZOFRAN-ODT) disintegrating tablet 4 mg ( Oral See Alternative 2/28/23 7474)     Or   ondansetron (ZOFRAN) injection 4 mg (4 mg IntraVENous Given 2/28/23 2884)   polyethylene glycol (GLYCOLAX) packet 17 g (has no administration in time range)   famotidine (PEPCID) tablet 20 mg (20 mg Oral Given 3/5/23 3152)   acetaminophen (TYLENOL) tablet 650 mg (has no administration in time range)     Or   acetaminophen (TYLENOL) suppository 650 mg (has no administration in time range)   0.9 % sodium chloride infusion (100 mLs IntraVENous New Bag 2/27/23 1043)   naloxone (NARCAN) injection 0.4 mg (has no administration in time range)   nicotine (NICODERM CQ) 14 MG/24HR 1 patch (1 patch TransDERmal Patch Removed 3/5/23 0846)   HYDROcodone-chlorpheniramine (TUSSIONEX) 10-8 MG/5ML oral suspension 5 mL (5 mLs Oral Given 3/1/23 0345)   lidocaine 4 % external patch 1 patch (1 patch TransDERmal Patch Applied 3/5/23 0845)   oxyCODONE (ROXICODONE) immediate release tablet 5 mg (5 mg Oral Given 3/5/23 0918)     Or   oxyCODONE (ROXICODONE) immediate release tablet 10 mg ( Oral See Alternative 3/5/23 0918)   oxyCODONE-acetaminophen (PERCOCET) 5-325 MG per tablet 1 tablet (has no administration in time range)   gabapentin (NEURONTIN) capsule 100 mg (100 mg Oral Given 3/4/23 2055)   0.9 % sodium chloride infusion ( IntraVENous New Bag 3/2/23 1859)   enoxaparin Sodium (LOVENOX) injection 30 mg (0 mg SubCUTAneous Held 3/5/23 0844)   ipratropium-albuterol (DUONEB) nebulizer solution 1 ampule (has no administration in time range)   levoFLOXacin (LEVAQUIN) tablet 750 mg (750 mg Oral Given 3/4/23 1250)   metoprolol tartrate (LOPRESSOR) tablet 25 mg (25 mg Oral Given 3/5/23 0846)   methylPREDNISolone sodium (SOLU-MEDROL) injection 20 mg (20 mg IntraVENous Given 3/5/23 0846)   iopamidol (ISOVUE-370) 76 % injection 100 mL (100 mLs IntraVENous Given 2/26/23 1550)   lidocaine 2 % injection 10 mL (10 mLs IntraDERmal Given 2/26/23 1729)   HYDROmorphone HCl PF (DILAUDID) injection 1 mg (1 mg IntraVENous Given 2/26/23 1729)   HYDROmorphone HCl PF (DILAUDID) injection 1 mg (1 mg IntraVENous Given 2/26/23 1803)   lidocaine 2 % injection 10 mL (10 mLs IntraDERmal Given 2/26/23 1802)   midazolam PF (VERSED) injection 4 mg (4 mg IntraVENous Given 2/26/23 1816)   vancomycin (VANCOCIN) 1750 mg in sodium chloride 0.9 % 500 mL IVPB (0 mg IntraVENous Stopped 2/27/23 0148)   piperacillin-tazobactam (ZOSYN) 4,500 mg in sodium chloride 0.9 % 100 mL IVPB (mini-bag) (0 mg IntraVENous Stopped 2/26/23 0208)  ketorolac (TORADOL) injection 15 mg (15 mg IntraVENous Given 2/27/23 1332)   potassium chloride (KLOR-CON M) extended release tablet 40 mEq (40 mEq Oral Given 3/1/23 3736)   morphine (PF) injection 2 mg (2 mg IntraVENous Given 2/28/23 9786)            Medical Decision Making     CC/HPI Summary, DDx, ED Course, and Reassessment: The patient is a 71-year-old woman with a right lung mass, who is status post a right CT-guided lung biopsy in late January, who presents to the ED today with right shoulder pain, right chest pain, shortness of breath, and coughing up blood. She is tachycardic and her CT shows a very large right-sided pleural effusion, with complete collapse of the right lung, and mediastinal shift to the left. I am concerned about tension physiology. Please refer to procedure note for placing chest tube. The patient tolerated it with some difficulty. During the procedure, I did consult CT surgery because the patient's chest tube produced greater than 1000 mL of serosanguineous fluid. CT surgery wanted me to proceed with placing the chest tube and did not believe that she needed emergent surgical intervention at this time. Once the chest tube was placed, it continues to drain serosanguineous fluid. I consulted the hospitalist for admission for further evaluation and management. She has accepted the patient on her service. I ordered a repeat CBC to follow serial H/Hs.     Disposition Considerations (tests considered but not done, Admit vs D/C, Shared Decision Making, Pt Expectation of Test or Tx.): Immediate surgical intervention versus continue with chest tube      Critical Care Time:     Critical Care Procedure Note  Authorized and Performed by: Jeffrey Zheng MD  Total critical care time: Approximately 120 minutes  Due to a high probability of clinically significant, life threatening deterioration, the patient required my highest level of preparedness to intervene emergently and I personally spent this critical care time directly and personally managing the patient. This critical care time included obtaining a history; examining the patient; pulse oximetry; ordering and review of studies; arranging urgent treatment with development of a management plan; evaluation of patient's response to treatment; frequent reassessment; and, discussions with other providers. This critical care time was performed to assess and manage the high probability of imminent, life-threatening deterioration that could result in multi-organ failure. It was exclusive of separately billable procedures and treating other patients and teaching time. Please see MDM section and the rest of the note for further information on patient assessment and treatment. Alex Juan MD    Diagnosis and Disposition     I Greta Jensen MD am the primary clinician of record. DIAGNOSIS:   1. Pleural effusion on right    2. Lung mass    3. Primary malignant neoplasm of right lung metastatic to other site [facility])        DISPOSITION Admitted 02/26/2023 08:12:53 PM      PATIENT REFERRED TO:  [address] [facility]  [address] [place] [address]  [phone]  Follow up  Your preferred agency    [address]  Follow up  Your preferred agency. Please call with questions or concerns about Home Oxygen Portable or Concentrator, or [employer], or [employer].     Katerina Carlos., APRN - NP  [address] [address] [phone]    Schedule an appointment as soon as possible for a visit in 1 week(s)      Luke Ngo MD  [address] [address]  [phone]    Schedule an appointment as soon as possible for a visit in 1 week(s)      Erin Bianchi[address] [address] [phone]    Schedule an appointment as soon as possible for a visit in 1 week(s)      [place]is [place]  [address] [address] [address]  [phone]    Schedule an appointment as soon as possible for a visit in 2 week(s)      DISCHARGE MEDICATIONS:  Discharge Medication List as of 3/5/2023  1:57 PM        START taking these medications    Details   ipratropium-albuterol (DUONEB) 0.5-2.5 (3) MG/3ML SOLN nebulizer solution Inhale 3 mLs into the lungs every 4 hours as needed for Shortness of Breath, Disp-60 each, R-0Normal      gabapentin (NEURONTIN) 100 MG capsule Take 1 capsule by mouth nightly for 30 days. Max Daily Amount: 100 mg, Disp-30 capsule, R-0Normal      oxyCODONE (ROXICODONE) 5 MG immediate release tablet Take 1 tablet by mouth every 6 hours as needed for Pain for up to 3 days. Max Daily Amount: 20 mg, Disp-12 tablet, R-0Normal      metoprolol tartrate (LOPRESSOR) 50 MG tablet Take 1 tablet by mouth 2 times daily, Disp-60 tablet, R-0Normal      lidocaine 4 % external patch Place 1 patch onto the skin daily Apply patch to right chest.  Patch may remain in place for up to 12 hours in any 24 hour period. , TransDERmal, DAILY Starting Mon 3/6/2023, Until Wed 4/5/2023, For 30 days, Disp-30 each, R-0, Normal      polyethylene glycol (GLYCOLAX) 17 g packet Take 17 g by mouth daily as needed for Constipation, Disp-527 g, R-0Normal             DISCONTINUED MEDICATIONS:  Discharge Medication List as of 3/5/2023  1:57 PM                 (Please note that portions of this note were completed with a voice recognition program.  Efforts were made to edit the dictations but occasionally words are mis-transcribed.)    Sina Cary MD (electronically signed)        Dragon Disclaimer     Please note that this dictation was completed with Pixelpipe, the computer voice recognition software. Quite often unanticipated grammatical, syntax, homophones, and other interpretive errors are inadvertently transcribed by the computer software. Please disregard these errors.   Please excuse any errors that have escaped final proofreading.  Bessie Telles MD  03/05/23 2022

## 2023-02-27 ENCOUNTER — APPOINTMENT (OUTPATIENT)
Facility: HOSPITAL | Age: 73
DRG: 720 | End: 2023-02-27
Payer: MEDICAID

## 2023-02-27 PROBLEM — Z71.89 GOALS OF CARE, COUNSELING/DISCUSSION: Status: ACTIVE | Noted: 2023-02-27

## 2023-02-27 PROBLEM — Z71.89 DNR (DO NOT RESUSCITATE) DISCUSSION: Status: ACTIVE | Noted: 2023-02-27

## 2023-02-27 PROBLEM — Z51.5 PALLIATIVE CARE ENCOUNTER: Status: ACTIVE | Noted: 2023-02-27

## 2023-02-27 LAB
ANION GAP SERPL CALC-SCNC: 9 MMOL/L (ref 3–18)
BASOPHILS # BLD: 0.1 K/UL (ref 0–0.1)
BASOPHILS NFR BLD: 0 % (ref 0–2)
BUN SERPL-MCNC: 20 MG/DL (ref 7–18)
BUN/CREAT SERPL: 24 (ref 12–20)
CALCIUM SERPL-MCNC: 8.5 MG/DL (ref 8.5–10.1)
CHLORIDE SERPL-SCNC: 104 MMOL/L (ref 100–111)
CO2 SERPL-SCNC: 23 MMOL/L (ref 21–32)
CREAT SERPL-MCNC: 0.82 MG/DL (ref 0.6–1.3)
DIFFERENTIAL METHOD BLD: ABNORMAL
EKG ATRIAL RATE: 123 BPM
EKG DIAGNOSIS: NORMAL
EKG P AXIS: 76 DEGREES
EKG P-R INTERVAL: 116 MS
EKG Q-T INTERVAL: 356 MS
EKG QRS DURATION: 78 MS
EKG QTC CALCULATION (BAZETT): 509 MS
EKG R AXIS: 62 DEGREES
EKG T AXIS: 59 DEGREES
EKG VENTRICULAR RATE: 123 BPM
EOSINOPHIL # BLD: 0 K/UL (ref 0–0.4)
EOSINOPHIL NFR BLD: 0 % (ref 0–5)
ERYTHROCYTE [DISTWIDTH] IN BLOOD BY AUTOMATED COUNT: 15.1 % (ref 11.6–14.5)
GLUCOSE SERPL-MCNC: 147 MG/DL (ref 74–99)
HCT VFR BLD AUTO: 27 % (ref 35–45)
HCT VFR BLD AUTO: 30.5 % (ref 35–45)
HCT VFR BLD AUTO: 30.5 % (ref 35–45)
HGB BLD-MCNC: 10.7 G/DL (ref 12–16)
HGB BLD-MCNC: 10.8 G/DL (ref 12–16)
HGB BLD-MCNC: 9.6 G/DL (ref 12–16)
IMM GRANULOCYTES # BLD AUTO: 0.1 K/UL (ref 0–0.04)
IMM GRANULOCYTES NFR BLD AUTO: 1 % (ref 0–0.5)
LYMPHOCYTES # BLD: 1.1 K/UL (ref 0.9–3.6)
LYMPHOCYTES NFR BLD: 6 % (ref 21–52)
MAGNESIUM SERPL-MCNC: 2.1 MG/DL (ref 1.6–2.6)
MAGNESIUM SERPL-MCNC: NORMAL MG/DL
MCH RBC QN AUTO: 29.4 PG (ref 24–34)
MCHC RBC AUTO-ENTMCNC: 35.1 G/DL (ref 31–37)
MCV RBC AUTO: 83.8 FL (ref 78–100)
MONOCYTES # BLD: 1.5 K/UL (ref 0.05–1.2)
MONOCYTES NFR BLD: 8 % (ref 3–10)
NEUTS SEG # BLD: 16.3 K/UL (ref 1.8–8)
NEUTS SEG NFR BLD: 85 % (ref 40–73)
NRBC # BLD: 0.02 K/UL (ref 0–0.01)
NRBC BLD-RTO: 0.1 PER 100 WBC
PLATELET # BLD AUTO: 308 K/UL (ref 135–420)
PMV BLD AUTO: 10.4 FL (ref 9.2–11.8)
POTASSIUM SERPL-SCNC: 3.4 MMOL/L (ref 3.5–5.5)
RBC # BLD AUTO: 3.64 M/UL (ref 4.2–5.3)
SODIUM SERPL-SCNC: 136 MMOL/L (ref 136–145)
WBC # BLD AUTO: 19.1 K/UL (ref 4.6–13.2)

## 2023-02-27 PROCEDURE — 2700000000 HC OXYGEN THERAPY PER DAY

## 2023-02-27 PROCEDURE — 97161 PT EVAL LOW COMPLEX 20 MIN: CPT

## 2023-02-27 PROCEDURE — 2580000003 HC RX 258

## 2023-02-27 PROCEDURE — 85014 HEMATOCRIT: CPT

## 2023-02-27 PROCEDURE — 71250 CT THORAX DX C-: CPT

## 2023-02-27 PROCEDURE — 99233 SBSQ HOSP IP/OBS HIGH 50: CPT | Performed by: STUDENT IN AN ORGANIZED HEALTH CARE EDUCATION/TRAINING PROGRAM

## 2023-02-27 PROCEDURE — 80048 BASIC METABOLIC PNL TOTAL CA: CPT

## 2023-02-27 PROCEDURE — 99222 1ST HOSP IP/OBS MODERATE 55: CPT | Performed by: PHYSICIAN ASSISTANT

## 2023-02-27 PROCEDURE — 85025 COMPLETE CBC W/AUTO DIFF WBC: CPT

## 2023-02-27 PROCEDURE — 2140000001 HC CVICU INTERMEDIATE R&B

## 2023-02-27 PROCEDURE — 6360000002 HC RX W HCPCS: Performed by: STUDENT IN AN ORGANIZED HEALTH CARE EDUCATION/TRAINING PROGRAM

## 2023-02-27 PROCEDURE — 87040 BLOOD CULTURE FOR BACTERIA: CPT

## 2023-02-27 PROCEDURE — 6360000002 HC RX W HCPCS

## 2023-02-27 PROCEDURE — 99223 1ST HOSP IP/OBS HIGH 75: CPT | Performed by: INTERNAL MEDICINE

## 2023-02-27 PROCEDURE — 83735 ASSAY OF MAGNESIUM: CPT

## 2023-02-27 PROCEDURE — 36415 COLL VENOUS BLD VENIPUNCTURE: CPT

## 2023-02-27 PROCEDURE — 6370000000 HC RX 637 (ALT 250 FOR IP)

## 2023-02-27 PROCEDURE — 97165 OT EVAL LOW COMPLEX 30 MIN: CPT

## 2023-02-27 PROCEDURE — 71045 X-RAY EXAM CHEST 1 VIEW: CPT

## 2023-02-27 PROCEDURE — 2580000003 HC RX 258: Performed by: STUDENT IN AN ORGANIZED HEALTH CARE EDUCATION/TRAINING PROGRAM

## 2023-02-27 RX ORDER — KETOROLAC TROMETHAMINE 15 MG/ML
15 INJECTION, SOLUTION INTRAMUSCULAR; INTRAVENOUS ONCE
Status: COMPLETED | OUTPATIENT
Start: 2023-02-27 | End: 2023-02-27

## 2023-02-27 RX ORDER — POTASSIUM CHLORIDE 20 MEQ/1
40 TABLET, EXTENDED RELEASE ORAL
Status: COMPLETED | OUTPATIENT
Start: 2023-02-28 | End: 2023-03-01

## 2023-02-27 RX ORDER — NICOTINE 21 MG/24HR
1 PATCH, TRANSDERMAL 24 HOURS TRANSDERMAL DAILY
Status: DISCONTINUED | OUTPATIENT
Start: 2023-02-27 | End: 2023-03-05 | Stop reason: HOSPADM

## 2023-02-27 RX ADMIN — VANCOMYCIN HYDROCHLORIDE 750 MG: 750 INJECTION, POWDER, LYOPHILIZED, FOR SOLUTION INTRAVENOUS at 13:17

## 2023-02-27 RX ADMIN — FAMOTIDINE 20 MG: 20 TABLET ORAL at 08:13

## 2023-02-27 RX ADMIN — PIPERACILLIN SODIUM AND TAZOBACTAM SODIUM 3375 MG: 3; .375 INJECTION, POWDER, LYOPHILIZED, FOR SOLUTION INTRAVENOUS at 20:30

## 2023-02-27 RX ADMIN — OXYCODONE AND ACETAMINOPHEN 1 TABLET: 325; 5 TABLET ORAL at 07:59

## 2023-02-27 RX ADMIN — SODIUM CHLORIDE 100 ML: 9 INJECTION, SOLUTION INTRAVENOUS at 10:43

## 2023-02-27 RX ADMIN — MORPHINE SULFATE 2 MG: 2 INJECTION, SOLUTION INTRAMUSCULAR; INTRAVENOUS at 09:49

## 2023-02-27 RX ADMIN — KETOROLAC TROMETHAMINE 15 MG: 15 INJECTION, SOLUTION INTRAMUSCULAR; INTRAVENOUS at 11:42

## 2023-02-27 RX ADMIN — SODIUM CHLORIDE, PRESERVATIVE FREE 5 ML: 5 INJECTION INTRAVENOUS at 20:55

## 2023-02-27 RX ADMIN — OXYCODONE AND ACETAMINOPHEN 1 TABLET: 325; 5 TABLET ORAL at 00:39

## 2023-02-27 RX ADMIN — SODIUM CHLORIDE, PRESERVATIVE FREE 10 ML: 5 INJECTION INTRAVENOUS at 08:13

## 2023-02-27 RX ADMIN — PIPERACILLIN SODIUM AND TAZOBACTAM SODIUM 3375 MG: 3; .375 INJECTION, POWDER, LYOPHILIZED, FOR SOLUTION INTRAVENOUS at 04:56

## 2023-02-27 RX ADMIN — PIPERACILLIN SODIUM AND TAZOBACTAM SODIUM 3375 MG: 3; .375 INJECTION, POWDER, LYOPHILIZED, FOR SOLUTION INTRAVENOUS at 11:43

## 2023-02-27 RX ADMIN — OXYCODONE AND ACETAMINOPHEN 1 TABLET: 325; 5 TABLET ORAL at 19:02

## 2023-02-27 RX ADMIN — MORPHINE SULFATE 2 MG: 2 INJECTION, SOLUTION INTRAMUSCULAR; INTRAVENOUS at 04:56

## 2023-02-27 RX ADMIN — MORPHINE SULFATE 2 MG: 2 INJECTION, SOLUTION INTRAMUSCULAR; INTRAVENOUS at 13:17

## 2023-02-27 RX ADMIN — MORPHINE SULFATE 2 MG: 2 INJECTION, SOLUTION INTRAMUSCULAR; INTRAVENOUS at 23:04

## 2023-02-27 ASSESSMENT — PAIN DESCRIPTION - ORIENTATION
ORIENTATION: RIGHT

## 2023-02-27 ASSESSMENT — PAIN DESCRIPTION - LOCATION
LOCATION: CHEST
LOCATION: ABDOMEN

## 2023-02-27 ASSESSMENT — PAIN SCALES - GENERAL
PAINLEVEL_OUTOF10: 9
PAINLEVEL_OUTOF10: 7
PAINLEVEL_OUTOF10: 6
PAINLEVEL_OUTOF10: 8
PAINLEVEL_OUTOF10: 9
PAINLEVEL_OUTOF10: 9

## 2023-02-27 NOTE — PROGRESS NOTES
conducted an initial consultation and Spiritual Assessment for [facility], who is a 71 y.o.,female. Patient's Primary Language is: [place]. According to the patient's EMR Protestant Affiliation is: [facility].     The reason the Patient came to the hospital is:   Patient Active Problem List    Diagnosis Date Noted    DNR (do not resuscitate) discussion 02/27/2023    Palliative care encounter 02/27/2023    Goals of care, counseling/discussion 02/27/2023    Pleural effusion 02/26/2023    Tachycardia 02/26/2023    Encounter for palliative care 01/24/2023    Debility 01/24/2023    Chest pain 01/21/2023    Community acquired pneumonia 01/21/2023    Metastatic lung cancer (metastasis from lung to other site) [facility]) 01/21/2023    Suspected lung cancer 01/21/2023    CVA, old, facial weakness 01/21/2023    Anxiety 01/21/2023    Tobacco abuse 01/21/2023    Osteopenia 01/21/2023    Osteoarthritis 01/21/2023    RITA (acute kidney injury) ([facility] [address] 11/06/2021    Sepsis ([facility] [address] 11/06/2021    Homelessness 06/11/2017    Gingivitis 06/11/2017        The  provided the following Interventions:  Initiated a relationship of care and support. Explored issues of emmanuel, belief, spirituality and Muslim/ritual needs while hospitalized. Listened empathically. Provided chaplaincy education concerning Advance Medical Directive. Provided information about Spiritual Care Services. Offered prayer and assurance of continued prayers on patient's behalf. Chart reviewed. The following outcomes where achieved:  Patient not interested in completing an Advance Medical Directive at this time. Patient shared limited information about both their medical narrative and spiritual journey/beliefs.  confirmed Patient's Protestant Affiliation. Patient processed feeling about current hospitalization. Patient expressed gratitude for 's visit.     Assessment:  Patient does not have any Muslim/cultural needs that will affect patient's preferences in health care. Plan:  Chaplains will continue to follow and will provide pastoral care on an as needed/requested basis.  recommends bedside caregivers page  on duty if patient shows signs of acute spiritual or emotional distress.     [address]   [phone]

## 2023-02-27 NOTE — PLAN OF CARE
Problem: Physical Therapy - Adult  Goal: By Discharge: Performs mobility at highest level of function for planned discharge setting. See evaluation for individualized goals. Description: Physical Therapy Goals:  Initiated 2/27/2023 to be met within 7-10 days. 1.  Patient will move from supine to sit and sit to supine , scoot up and down, and roll side to side in bed with independence. 2.  Patient will transfer from bed to chair and chair to bed with modified independence using the least restrictive device. 3.  Patient will perform sit to stand with modified independence. 4.  Patient will ambulate with modified independence for 50 feet with the least restrictive device. 5.  Patient will participate in LE exercise to tolerance. PLOF: Pt reporting she lives alone in 1 story house with 0 BRYCE. Independent. Outcome: Progressing   PHYSICAL THERAPY EVALUATION    Patient: Klaudia Desai (3 y.o. female)  Date: 2/27/2023  Primary Diagnosis: Pleural effusion [J90]       Precautions: Fall Risk  ASSESSMENT :  Pt cleared to participate in PT session, pt received semi-reclined in bed and agreeable to therapy session. Based on the objective data described below, the patient presents with decreased endurance, decreased strength, decreased balance reactions, gait deviations, increased pain, and decreased independence in functional mobility. Pt educated on role of acute care PT, pt thinking this PT was from outpatient and reporting she thought she wasn't allowed to do therapy anymore. Pt provided PLOF, no reports of pain, PT exiting room to provide pt with new gown. Once PT returned pt reporting increased pain of chest tube site, reporting she would be unable to participate in PT session. Pt rolling and positioning self in bed with [place], moving Les volitionally WNL but would not complete bed mobility due to pain. Physician in room with encouragement but pt declining.  Pt positioned for comfort and educated to call for assist before getting up, pt verbalized understanding. Pt left with all needs met and call bell in reach. RN notified of position and participation. OT entering room. DEFICITS/IMPAIRMENTS:    , Body Structures, Functions, Activity Limitations Requiring Skilled Therapeutic Intervention: Decreased functional mobility ; Decreased safe awareness;Decreased coordination;Decreased endurance;Decreased body mechanics; Decreased balance;Decreased strength    Patient will benefit from skilled intervention to address the above impairments. Patient's rehabilitation potential/Therapy Prognosis: Fair. Factors which may influence rehabilitation potential include:   []         None noted  []         Mental ability/status  [x]         Medical condition  [x]         Home/family situation and support systems  []         Safety awareness  [x]         Pain tolerance/management  []         Other:      PLAN :  Recommendations and Planned Interventions:   [x]           Bed Mobility Training             [x]    Neuromuscular Re-Education  [x]           Transfer Training                   []    Orthotic/Prosthetic Training  [x]           Gait Training                          []    Modalities  [x]           Therapeutic Exercises           []    Edema Management/Control  [x]           Therapeutic Activities            [x]    Family Training/Education  [x]           Patient Education  []           Other (comment):    Frequency/Duration: Patient will be followed by physical therapy to address goals, 1-2 times per day/3-5 days per week to address goals. Further Equipment Recommendations for Discharge: rolling walker    Discharge Recommendations: Home with Home health PT;Home with assist PRN    AMPAC: Current research shows that an AM-PAC score of 18 or greater is associated with a discharge to the patient's home setting.  Based on an AM-PAC score of 18/24 and their current functional mobility deficits, it is recommended that the patient have 2-3 sessions per week of Physical Therapy at d/c to increase the patient's independence. This AMPAC score should be considered in conjunction with interdisciplinary team recommendations to determine the most appropriate discharge setting. Patient's social support, diagnosis, medical stability, and prior level of function should also be taken into consideration. SUBJECTIVE:   Patient stated I can't do that right now. 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    Offered Biopsy and couldn't decide between PAlliative Care and Biopsy     Past Surgical History:   Procedure Laterality Date    CT BIOPSY PERCUTANEOUS SUPERFICIAL BONE  1/26/2023    CT BIOPSY PERCUTANEOUS SUPERFICIAL BONE 1/26/2023 [facility] RAD CT    WISDOM TOOTH EXTRACTION         Home Situation:  Social/Functional History  Lives With: Alone  Type of Home: House  Home Layout: One level  Home Access: Level entry  Critical Behavior:  Orientation  Overall Orientation Status: Within Normal Limits    Range Of Motion:  AROM: Within functional limits    Functional Mobility:  Bed Mobility:     Bed Mobility Training  Bed Mobility Training: Yes  Rolling: Modified independent    Pain:  Pain level pre-treatment: 8/10   Pain level post-treatment: 8/10   Pain Intervention(s): Rest, Repositioning  Response to intervention: Nurse notified    Activity Tolerance:   Activity Tolerance: Patient limited by pain  Please refer to the flowsheet for vital signs taken during this treatment.     After treatment:   []         Patient left in no apparent distress sitting up in chair  [x]         Patient left in no apparent distress in bed  [x]         Call bell left within reach  [x]         Nursing notified  []         Caregiver present  []         Bed alarm activated  []         SCDs applied    COMMUNICATION/EDUCATION:   Patient Education  Education Given To: Patient  Education Provided: Role of Therapy;Plan of Care;Transfer Training  Education Method: Verbal  Barriers to Learning: Cognition  Education Outcome: Continued education needed    Thank you for this referral.  Keith Gamboa, PT  Minutes: 10      Eval Complexity: [employer] AM-PAC® Basic Mobility Inpatient Short Form (6-Clicks) Version 2    How much HELP from another person does the patient currently need    (If the patient hasn't done an activity recently, how much help from another person do you think he/she would need if he/she tried?)   Total (Total A or Dep)   A Lot  (Mod to Max A)   A Little (Sup or Min A)   None (Mod I to I)   Turning from your back to your side while in a flat bed without using bedrails? [] 1 [] 2 [x] 3 [] 4   2. Moving from lying on your back to sitting on the side of a flat bed without using bedrails? [] 1 [] 2 [x] 3 [] 4   3. Moving to and from a bed to a chair (including a wheelchair)? [] 1 [] 2 [x] 3 [] 4   4. Standing up from a chair using your arms (e.g., wheelchair, or bedside chair)? [] 1 [] 2 [x] 3 [] 4   5. Walking in hospital room? [] 1 [] 2 [x] 3 [] 4   6. Climbing 3-5 steps with a railing?+   [] 1 [] 2 [x] 3 [] 4   +If stair climbing cannot be assessed, skip item #6. Sum responses from items 1-5.

## 2023-02-27 NOTE — PROGRESS NOTES
[facility] Pharmacokinetic Monitoring Service - Vancomycin    Indication: PNA  Goal AUC/MARY: 400-600 mg*hr/L  Day of Therapy: 1  Additional Antimicrobials: pip-tazo    Pertinent Laboratory Values: Wt Readings from Last 1 Encounters:   02/27/23 112 lb 7 oz (51 kg)     Temp Readings from Last 1 Encounters:   02/27/23 97.3 °F (36.3 °C) (Oral)     Procalcitonin   Date Value Ref Range Status   11/08/2021 0.06 ng/mL Final     Comment:          Suspected Sepsis:  <0.50 ng/mL     Low likelihood of sepsis. 0.50-2.00 ng/mL    Increased likelihood of sepsis. Antibiotics encouraged. >2.00 ng/mL  High risk of sepsis/shock. Antibiotics strongly encouraged. Suspected Lower Resp Tract Infections:  <0.24 ng/mL    Low likelihood of bacterial infection. >0.24 ng/mL    Increased likelihood of bacterial infection. Antibiotics encouraged. With successful antibiotic therapy, PCT levels should decrease rapidly. (Half-life of 24 to 36 hours)       Procalcitonin values from samples collected within the first 6 hours of systemic infection may still be low. Retesting may be indicated. Values from day 1 and day 4 can be entered into the Change in Procalcitonin Calculator ([email or web address]-pct-[email or web address]. Access Media 3) to determine the patient's Mortality Risk Prognosis. In healthy neonates, plasma Procalcitonin (PCT) concentrations increase gradually after birth, reaching peak values at about 24 hours of age then decrease to normal values below 0.5 ng/mL by 48-72 hours of age. Estimated Creatinine Clearance: 48 mL/min (based on SCr of 0.82 mg/dL). Recent Labs     02/26/23  1345 02/26/23  2040 02/27/23  0141   CREATININE 0.77  --  0.82   WBC 11.7 17.8* 19.1*     Pertinent Cultures:  Culture Date Source Results   2/27 blood NGTD   MRSA Nasal Swab: not ordered. Order placed by pharmacy.     Assessment:  Date Current Dose Concentration (mg/L) Timing of Concentration (h) AUC   2/26 1,750 mg x1 - - -   2/27 750 mg q18h - - -   Note: Serum concentrations collected for AUC dosing may appear elevated if collected in close proximity to the dose administered, this is not necessarily an indication of toxicity    Plan:  Adjust dose from 1,250 mg q24h to 750 mg q18h  Ordered a level for 2/28 with AM labs  Pharmacy will continue to monitor patient and adjust therapy as indicated    Thank you for the consult,  Roxanne Jones [facility]  2/27/2023

## 2023-02-27 NOTE — ACP (ADVANCE CARE PLANNING)
Advance Care Planning     General Advance Care Planning (ACP) Conversation    Date of Conversation: 2/26/2023  Conducted with: Patient with Glenda Rooney MD with Palliative Care and this LMSW met with pt at bedside to assess. Pt laying in bed with head elevated. AAOx3, wishes to pursue treatment for cancer if any is offered. Pt reports no specific questions or concerns at this time other than the discomfort of the chest tube. Healthcare Decision Maker:  No healthcare decision makers have been documented. Click here to complete [employer] including selection of the Healthcare Decision Maker Relationship (ie \"Primary\")  Today we identified pt would want her daughter, Arianna Olivier at [phone] as primary decision maker, and her daughter Nupur Fisher at [phone] as her secondary decision maker, but was not willing to sign an AMD making this an official decision/document. Pt is aware that until such a time as she does this we will have to get a consensus of the majority of her 5 children. Content/Action Overview:    Reviewed DNR/DNI and patient elects Full Code (Attempt Resuscitation)  treatment goals, benefit/burden of treatment options, artificial nutrition, ventilation preferences, and resuscitation preferences  Pt has stated she would not want to be on life support longer than 10 days and would not want a trach, but will not complete any documentation to this effect. Length of Voluntary ACP Conversation in minutes:  75    This LMSW and Kai Rodriguez RN made follow up visit to pt to address need to sign AMD.  Pt was vomiting at the time, and stated she did not wish to sign at this time. LMSW reiterated to her that without AMD will have to get a concensus of the majority of her five children to make decisions for her, if she cannot do so herself.   She verbalized understanding, and as we were leaving the room, stated, \"I'll sign it next time you come.  I promise. \"      Thank you for this referral to Palliative Care. The palliative care team remains availble to provide support for patient and her family. Goals of care have been addressed.     CODE STATUS:  FULL CODE / FULL AGGRESSIVE MEASURES     Pavan Oglesby, [facility]  Palliative Medicine Inpatient   [facility]  Palliative COPE Line: [phone])

## 2023-02-27 NOTE — H&P
History and Physical          Subjective     HPI: Ariel Kenney is a 71 y.o. female with a PMHx of RLL lung mass, CVA, osteoarthritis, osteopenia who presented to the ED with complaints of shortness of breath, pain to biopsy site, chest pain. Patient has a newly diagnosed lung cancer and had right sided lung biopsy performed on 2/9. Patient states since then she has been feeling pain, sob and chest pain. Unable to further describe the pain. Patient is a poor historian so unable to obtain much history. Patient states she has an eviction tomorrow from her apartment and was hesitant to be admitted, but patient agreeable after educating the need for admission at this time. Denies fever, chills, nvd, abdominal pain, headache, dizziness, urinary symptoms. States she quit smoking and illicit drug use about 3 weeks ago. Denies alcohol use. Upon my evaluation, patient also seemed to be semi drowsy from dialudid received in the ER. Patient is slow to respond with slow but comprehensible speech. Patient on 2L oxgyen via NC satting 96%. Does not look in acute distress. Chest tube in place draining sanguinous drainage 60cc noted in chest tube. In the ED, Temp 97.5, Resp 14-36, -134, /119 - 104/61, 97%. Glucose 124, proBNP 291, Alk Phos 171, AST 45, Tbili 1.4. CBC without significant abnormality. RVP negative. CTA chest with no PE, large right pleural effusion, superimposed PNA is possible, trace left pleural effusion, new nodular density in the superior left lower lobe- possibly metastasis vs infectious/inflammatory, new indeterminate liver lesion in the left hepatic lobe, concerning for metastasis, enlarged gastrohepatic lymph nodes also likely metastatic. CT surgery consulted by the ED. Received 2mg Dilaudid, lidocaine and versed in the ED.      Denies taking any  medications at home  Code status discussed- FULL code       PMHx:  Past Medical History:   Diagnosis Date    Angina at rest [facility]) Anxiety     CVA, old, facial weakness 2012    left ptosis    Dental caries     Osteoarthritis     Osteopenia     Right lower lobe lung mass 04/15/2022    Offered Biopsy and couldn't decide between PAlliative Care and Biopsy       PSurgHx:  Past Surgical History:   Procedure Laterality Date    CT BIOPSY PERCUTANEOUS SUPERFICIAL BONE  1/26/2023    CT BIOPSY PERCUTANEOUS SUPERFICIAL BONE 1/26/2023 [address]    WISDOM TOOTH EXTRACTION         SocialHx:  Social History     Socioeconomic History    Marital status:    Tobacco Use    Smoking status: Some Days     Packs/day: 0.50     Types: Cigarettes    Smokeless tobacco: Never   Substance and Sexual Activity    Alcohol use: Yes    Drug use: No       FamilyHx:  Family History   Problem Relation Age of Onset    Cancer Daughter         Unspecified Cancer    Cancer Cousin         Unspecified Cancer x2       Home Medications:  Prior to Admission medications    Medication Sig Start Date End Date Taking? Authorizing Provider   acetaminophen (TYLENOL) 325 MG tablet Take 650 mg by mouth every 6 hours as needed 9/7/22   Ar Automatic Reconciliation   ibuprofen (ADVIL;MOTRIN) 400 MG tablet Take 600 mg by mouth every 6 hours as needed    Ar Automatic Reconciliation   levoFLOXacin (LEVAQUIN) 750 MG tablet Take 750 mg by mouth daily 2/5/23   Ar Automatic Reconciliation       Allergies: Allergies   Allergen Reactions    Aspirin Nausea Only        Review of Systems:  CONST: no fever or chills, no fatigue  Eyes: No change in vision, no itching or drainage  ENT: No earache, no tinnitus, no sore throat or sinus congestion. PULM: + shortness of breath, no cough or wheeze. CV: no pnd or orthopnea, + CP, no palpitations, no edema  GI: No abdominal pain, no nausea, no vomiting or diarrhea  : No urinary frequency, no urgency, no hesitancy or dysuria. MSK: No joint or muscle pain, no back pain, no neck pain, no recent trauma. INTEG: No rash, no itching, no lesions.    ENDO: No polyuria, no polydipsia, no heat or cold intolerance. HEME: No anemia or easy bruising or bleeding. NEURO: No headache, no dizziness, no seizures, no numbness, no tingling or weakness.    PSYCH: No anxiety, no depression      Objective     Physical Exam:  Visit Vitals  /85   Pulse (!) 124   Temp 97.5 °F (36.4 °C) (Oral)   Resp 21   Ht 4' 11\" (1.499 m)   Wt 129 lb (58.5 kg)   SpO2 (!) 89%   BMI 26.05 kg/m²       General: NAD, appears stated age, alert and oriented to person place and time but slow to respond  Skin: warm, dry, no rashes  Eyes: PERRL, sclera is icteric  HENT: normocephalic/atraumatic, moist mucus membranes  Respiratory: diminished breath sounds to R side with no signs of respiratory distress, right sided chest tube in place to wall suction- dressing intact with dried blood    Cardiovascular: tachycardia, no m/r/g, no cyanosis or peripheral edema of extremities,   GI: soft, non-tender, normal bowel sounds  Neuro: moves all extremities, no focal deficits, normal but slow speech  Psych: appropriate mood and affect, no visual or auditory hallucinations    Laboratory Studies:  Recent Results (from the past 24 hour(s))   CBC with Auto Differential    Collection Time: 02/26/23  1:45 PM   Result Value Ref Range    WBC 11.7 4.6 - 13.2 K/uL    RBC 4.18 (L) 4.20 - 5.30 M/uL    Hemoglobin 12.4 12.0 - 16.0 g/dL    Hematocrit 34.4 (L) 35.0 - 45.0 %    MCV 82.3 78.0 - 100.0 FL    MCH 29.7 24.0 - 34.0 PG    MCHC 36.0 31.0 - 37.0 g/dL    RDW 15.3 (H) 11.6 - 14.5 %    Platelets 355 868 - 310 K/uL    MPV 9.9 9.2 - 11.8 FL    Nucleated RBCs 0.2 (H) 0  WBC    nRBC 0.02 (H) 0.00 - 0.01 K/uL    Seg Neutrophils 80 (H) 40 - 73 %    Lymphocytes 9 (L) 21 - 52 %    Monocytes 10 3 - 10 %    Eosinophils % 0 0 - 5 %    Basophils 1 0 - 2 %    Immature Granulocytes 1 (H) 0.0 - 0.5 %    Segs Absolute 9.3 (H) 1.8 - 8.0 K/UL    Absolute Lymph # 1.0 0.9 - 3.6 K/UL    Absolute Mono # 1.2 0.05 - 1.2 K/UL    Absolute Eos # 0.0 0.0 - 0.4 K/UL    Basophils Absolute 0.1 0.0 - 0.1 K/UL    Absolute Immature Granulocyte 0.1 (H) 0.00 - 0.04 K/UL    Differential Type AUTOMATED     CMP    Collection Time: 02/26/23  1:45 PM   Result Value Ref Range    Sodium 135 (L) 136 - 145 mmol/L    Potassium 3.8 3.5 - 5.5 mmol/L    Chloride 99 (L) 100 - 111 mmol/L    CO2 27 21 - 32 mmol/L    Anion Gap 9 3.0 - 18 mmol/L    Glucose 124 (H) 74 - 99 mg/dL    BUN 20 (H) 7.0 - 18 MG/DL    Creatinine 0.77 0.6 - 1.3 MG/DL    Bun/Cre Ratio 26 (H) 12 - 20      Est, Glom Filt Rate >60 >60 ml/min/1.73m2    Calcium 9.5 8.5 - 10.1 MG/DL    Total Bilirubin 1.4 (H) 0.2 - 1.0 MG/DL    ALT 29 13 - 56 U/L    AST 45 (H) 10 - 38 U/L    Alk Phosphatase 171 (H) 45 - 117 U/L    Total Protein 7.0 6.4 - 8.2 g/dL    Albumin 3.1 (L) 3.4 - 5.0 g/dL    Globulin 3.9 2.0 - 4.0 g/dL    Albumin/Globulin Ratio 0.8 0.8 - 1.7     Troponin    Collection Time: 02/26/23  1:45 PM   Result Value Ref Range    Troponin, High Sensitivity 47 0 - 54 ng/L   Brain Natriuretic Peptide    Collection Time: 02/26/23  1:45 PM   Result Value Ref Range    NT Pro- 0 - 900 PG/ML       Imaging Reviewed:  XR CHEST (2 VW)    Result Date: 2/26/2023  Chest AP and lateral INDICATION: Cough COMPARISON: Recent prior FINDINGS: Two views of the chest were obtained. There is interval complete opacification of the right hemithorax may represent postobstructive atelectasis/infiltrate and underlying pleural effusion. Increased interstitial markings on the left. Osseous structures are unchanged. Interval complete opacification of the right hemithorax as above. CTA CHEST W WO CONTRAST PE Eval    Result Date: 2/26/2023  EXAM: CTA CHEST PULMONARY EMBOLISM CLINICAL INDICATION/HISTORY: PE; Status post lung biopsy on February 6, no coughing up blood, short of breath and tachycardic. Decreased appetite, chest pain, pain in right shoulder. Hemoptysis and shortness of breath.  COMPARISON:  CTA chest 1/21/2023 TECHNIQUE: CTA of the chest was performed using timing optimized for pulmonary embolism technique, with IV contrast.  To maximize sensitivity the sagittal and coronal reconstructions were created using a 3D multislice MIP (maximal intensity projection) methodology. CT scans at this facility are performed using dose optimization technique as appropriate to the performed exam, to include automated exposure control, adjustment of the mA and/or kV according to patient size (including appropriate matching for site specific examinations), or use of iterative reconstruction technique. FINDINGS: Lung/Airway:  Significantly increased size of the now large right pleural effusion since 1/21/2023. There is now complete consolidation of the right lung, with only few tiny central air bronchograms right upper lobe, also new/worsened from prior. The remainder of the right middle and lower lobe bronchi are opacified. Trace left pleural effusion is new from prior. New small area of nodular density in the superior left lower lobe measuring 0.9 x 0.7 cm (3, 19). Similar pleural-parenchymal scarring left lung apex. New tree-in-bud nodularity in the lingula. No pneumothorax. Lower neck: Unremarkable. Mediastinum:  Mild leftward mediastinal shift. Enlarged AP window lymph node measures 1.5 cm short axis, increased from prior. Conglomerate subcarinal lymphadenopathy also appears worsened compared to prior, difficult to measure, but approximately 4.7 x 4.9 cm, previously 3.1 x 4.7 cm (2, 120). Right paratracheal lymph nodes measuring up to 1.3 cm are likely similar to prior. Pulmonary arteries (includes assessment of MIP images): Main pulmonary artery measures less than 3 cm. No filling defect is seen in the main, lobar, or visualized segmental pulmonary arteries. Aorta and other cardiovascular structures: No aortic aneurysm or dissection. Coronary artery calcifications.  Heart Strain assessment: -  RV/LV ratio (normal <0.9): Normal -  Dysfunction or bowing of interventricular septum: None -  There is not visualization of contrast reflux from the right heart into the IVC/hepatic veins. Upper abdominal structures: 1.1 cm indeterminate liver lesion left hepatic lobe (2, 207). Enlarged gastrohepatic lymph nodes, measuring 1.1 to 1.2 cm short axis. Chest wall: Unremarkable. Bones: Lytic lesion left T4 pedicle and transverse process (3, 12), appears slightly progressed from prior. Right lateral sixth rib lytic lesion with pathologic fracture and associated soft tissue component. Pathologic fracture left posterior lateral sixth rib, with associated new callus formation and pleural thickening. 1. No pulmonary embolism. 2. Substantially increased now large right pleural effusion since prior study 1/21/2023, with complete atelectasis of the right lung. This is presumably postobstructive related to the previously somewhat better delineated lung mass and right hilar adenopathy. Superimposed pneumonia is possible. -This creates leftward mediastinal shift 3. New trace left pleural effusion. 4. New nodular density in the superior left lower lobe, possibly metastasis versus infectious/inflammatory. 5. New tree-in-bud nodularity in the lingula, likely infectious/inflammatory. 6. Increased size of bulky metastatic subcarinal conglomerate adenopathy. AP window lymph node has also increased in size. 7. Redemonstration of multiple lytic lesions since 1/21/2023. Left T4 lesion appears slightly progressed from prior. Pathologic fractures of the right lateral sixth rib and left posterior lateral sixth rib. 8. New indeterminate liver lesion in the left hepatic lobe, extremely concerning for metastasis. 9. Enlarged gastrohepatic lymph nodes are also likely metastatic.           Assessment/Plan     Hospital Problems             Last Modified POA    * (Principal) Pleural effusion 2/26/2023 Yes    Tachycardia 2/26/2023 Yes    Sepsis ([facility] [address] 2/26/2023 Yes    Metastatic lung cancer (metastasis from lung to other site) [facility]) 2/26/2023 Yes    CVA, old, facial weakness 2/26/2023 Yes    Tobacco abuse 2/26/2023 Yes     Assessment-   Sepsis 2/2 infected pleural effusion- per ER \"sterile field was compromised\" during the procedure   Right Pleural Effusion   Metastatic Lung Cancer   Tachycardia   Hx  of CVA with left facial ptosis  Hx of Anxiety  Tobacco Abuse        Plan-  - CT surgery on board advised chest tube to continuous low wall suction- appreciate assistance. It was passed down in the report from the ER that 'Sterile field was compromised'. Stat repeat CBC resulted with WBC 11.7 --> 17.8. Could be reactive from chest tube insertion, but will start on Abx due to what was passed down in report. Obtain blood cultures x2 and start IV abx- Vanc and Zosyn. Follow blood cultures  - Repeat CXR in the AM   - IVF -  ml/hr   - Evidently, patient had copious blood loss post chest tube insertion per ER physician: Obtain q6h H&H and type and screen. Monitor for signs of bleeding. Hold anticoagulation. SCD's   - Pain management with PRN PO Percocet and IV Morphine with naloxone  - Cardiac monitoring   - Strict I&O   - Patient not currently taking any medications for hx of CVA/Anxiety   - PT/OT/IS/PPI      Emergency Contact SisterWarren Duque [phone]      Anticipated Discharge: 2 days     DVT Prophylaxis:  []Lovenox  []Hep SQ  [x]SCDs  []Coumadin []DOAC  []On Heparin gtt     I have personally reviewed all pertinent labs, films and EKGs that have officially resulted. I reviewed available electronic documentation outlining the initial presentation as well as the emergency room physician's encounter.   Time spent reviewing records, independently interpreting results, obtaining history from patient or caregiver, performing physical exam, ordering tests and medications, communicating with specialists, documenting in the chart, and coordinating overall care is  >75 minutes     Ede Lucia PILIPKALYAN  [facility]  Hospitalist Division  Office:  [phone]

## 2023-02-27 NOTE — PROGRESS NOTES
OT order received and chart reviewed. Patient off the unit for testing. Will continue to follow and see patient when available/as appropriate.           Thank you for this referral,   Theodora Andrew MS, OTR/L

## 2023-02-27 NOTE — PLAN OF CARE
Problem: Occupational Therapy - Adult  Goal: By Discharge: Performs self-care activities at highest level of function for planned discharge setting. See evaluation for individualized goals. Description: Occupational Therapy Goals:  Initiated 2/27/2023 to be met within 7-10 days. 1.  Patient will perform upper body dressing with modified independence. 2.  Patient will perform lower body dressing with modified independence. 3.  Patient will perform grooming task standing at sink with modified independence, F+ balance. 4.  Patient will perform toilet transfers with supervision/set-up. 5.  Patient will perform all aspects of toileting with modified independence. 6.  Patient will participate in upper extremity therapeutic exercise/activities with modified independence for 8-10 minutes to increase ROM/strength for ADLs. 7.  Patient will utilize energy conservation techniques during functional activities with verbal cues. PLOF:Patient was independent with self-care and functional mobility PTA. Outcome: Progressing       OCCUPATIONAL THERAPY EVALUATION    Patient: Alison Denny (70 y.o. female)  Date: 2/27/2023  Primary Diagnosis: Pleural effusion [J90]  Precautions: Fall Risk      ASSESSMENT :  Upon entering the room, DO and PT present with patient reporting pain at chest tube site. Patient able to roll self to bed from supine > left side and again to supine with modified independence despite pain reports. Patient educated on the role of OT, evaluation process, and safety during this admission with patient verbalizing understanding. Patient agitated, reporting she was told to put on a gown but wanted to wash up first. Patient agreeable to this OT assisting in gathering basin/soap for bathing ADL. OT obtained items and patient then stating she did not need them. Patient with manipulative/ self-limiting behaviors, encouragement needed.  Patient able to raise BUE and use them for assistance with bed mobility this session. Patient left in bed with all needs met, call bell in bertin and RN (Hawa) now present.      DEFICITS/IMPAIRMENTS:  Performance deficits / Impairments: Decreased functional mobility ;Decreased ADL status;Decreased strength;Decreased endurance    Patient will benefit from skilled intervention to address the above impairments.  Patient's rehabilitation potential/Prognosis: Fair.  Factors which may influence rehabilitation potential include:   []             None noted  [x]             Mental ability/status  [x]             Medical condition  [x]             Home/family situation and support systems  [x]             Safety awareness  [x]             Pain tolerance/management  []             Other:      PLAN :  Recommendations and Planned Interventions:   [x]               Self Care Training                  [x]      Therapeutic Activities  [x]               Functional Mobility Training   []      Cognitive Retraining  [x]               Therapeutic Exercises           [x]      Endurance Activities  [x]               Balance Training                    []      Neuromuscular Re-Education  []               Visual/Perceptual Training     [x]      Home Safety Training  [x]               Patient Education                   [x]      Family Training/Education  []               Other (comment):    Frequency/Duration: Patient will be followed by occupational therapy to address goals, 1-2 times per day/3-5 days per week to address goals.    Further Equipment Recommendations for Discharge: TBD pending pt progress with therapy    Discharge Recommendations: Home with Home health OT    [facility] Current research shows that an AM-PAC score of 18 or greater is associated with a discharge to the patient's home setting.  Based on an AM-PAC score of 19/24 and their current ADL deficits; it is recommended that the patient have 2-3 sessions per week of Occupational Therapy at d/c to increase the patient's independence.   This AMPAC score should be considered in conjunction with interdisciplinary team recommendations to determine the most appropriate discharge setting. Patient's social support, diagnosis, medical stability, and prior level of function should also be taken into consideration.      SUBJECTIVE:   Patient stated, You're being sarcastic when asked if she wanted a basin for bathing    OBJECTIVE DATA SUMMARY:     Past Medical History:   Diagnosis Date    Angina at rest [facility])     Anxiety     CVA, old, facial weakness 2012    left ptosis    Dental caries     Osteoarthritis     Osteopenia     Right lower lobe lung mass 04/15/2022    Offered Biopsy and couldn't decide between PAlliative Care and Biopsy     Past Surgical History:   Procedure Laterality Date    CT BIOPSY PERCUTANEOUS SUPERFICIAL BONE  1/26/2023    CT BIOPSY PERCUTANEOUS SUPERFICIAL BONE 1/26/2023 [facility] CT    WISDOM TOOTH EXTRACTION         Home Situation:   Social/Functional History  Lives With: Alone  Type of Home: House  Home Layout: One level  Home Access: Level entry  [x]  Right hand dominant   []  Left hand dominant    Cognitive/Behavioral Status:  Orientation  Overall Orientation Status: Within Normal Limits  Orientation Level: Oriented to person;Oriented to place    Skin: Intact  Edema: None noted    Coordination: BUE  Within Functional Limits    Strength: BUE  Strength: Generally decreased, functional    Tone & Sensation: BUE  Tone: Normal    Range of Motion: BUE  AROM: Within functional limits    Functional Mobility and Transfers for ADLs:  Bed Mobility:  Bed Mobility Training  Bed Mobility Training: Yes  Rolling: Modified independent      ADL Assessment:   Feeding: Independent  Grooming: Modified independent   UE Bathing: Modified Independent  LE Bathing: Stand by assistance  UE Dressing: Stand by assistance  LE Dressing: Stand by assistance  Toileting: Stand by assistance    Pain:  Pain indicated around chest tube site; no numerical value given  Pain Intervention(s): Rest, Ice, Repositioning   Response to intervention: Nurse notified    Activity Tolerance:   Activity Tolerance: Patient limited by pain  Please refer to the flowsheet for vital signs taken during this treatment. After treatment:   [] Patient left in no apparent distress sitting up in chair  [x] Patient left in no apparent distress in bed  [x] Call bell left within reach  [x] Nursing notified  [] Caregiver present  [] Bed alarm activated    COMMUNICATION/EDUCATION:   Patient Education  Education Given To: Patient  Education Provided: Plan of Care;Role of Therapy; ADL Adaptive Strategies; Energy Conservation; Fall Prevention Strategies  Education Method: Demonstration;Verbal;Teach Back  Barriers to Learning: None  Education Outcome: Verbalized understanding;Continued education needed    Thank you for this referral.  Lizeth Crook OTR/L  Minutes: 10    Eval Complexity: [facility] AM-PAC® Daily Activity Inpatient Short Form (6-Clicks)*    How much HELP from another person does the patient currently need    (If the patient hasn't done an activity recently, how much help from another person do you think he/she would need if he/she tried?)   Total (Total A or Dep)   A Lot  (Mod to Max A)   A Little (Sup or Min A)   None (Mod I to I)   Putting on and taking off regular lower body clothing? [] 1 [] 2 [x] 3 [] 4   2. Bathing (including washing, rinsing,      drying)? [] 1 [] 2 [x] 3 [] 4   3. Toileting, which includes using toilet, bedpan or urinal?   [] 1 [] 2 [x] 3 [] 4   4. Putting on and taking off regular upper body clothing? [] 1 [] 2 [x] 3 [] 4   5. Taking care of personal grooming such as brushing teeth? [] 1 [] 2 [x] 3 [] 4   6. Eating meals?    [] 1 [] 2 [] 3 [x] 4

## 2023-02-27 NOTE — PROGRESS NOTES
Progress Note  Hospitalist Service    Patient: Tiana Penaloza MRN: [id number]   SSN: [id number]  YOB: 1950   Age: 71 y.o. Sex: female      Admit Date: 2/26/2023    LOS: 1 day   Chief Complaint   Patient presents with    Shortness of Breath    Abdominal Pain       Subjective:     Patient seen and examined. Largely difficult to engage in conversation. Patient with pain that is hard to control on current regimen. Blood pressures have been intermittently soft. Objective:     Vitals:  /78   Pulse (!) 101   Temp 97.9 °F (36.6 °C) (Oral)   Resp 19   Ht 4' 11\" (1.499 m)   Wt 112 lb 7 oz (51 kg)   SpO2 94%   BMI 22.71 kg/m²     Physical Exam:   General appearance: alert, appears stated age, and combative  Lungs: on 2L nc. Chest tube in place.  Patient agitated - did not attempt to ascultate   Heart: regular rate and rhythm, S1, S2 normal, no murmur, click, rub or gallop  Abdomen: soft, non-tender; bowel sounds normal; no masses,  no organomegaly  Pulses: 2+ and symmetric  Skin: Skin color, texture, turgor normal. No rashes or lesions  Neuro:  normal without focal findings, mental status, speech normal, alert and oriented x3, VASQUEZ, and reflexes normal and symmetric    Intake and Output:  Current Shift: 02/27 0701 - 02/27 1900  In: -   Out: 470 [Urine:450]  Last three shifts: 02/25 1901 - 02/27 0700  In: -   Out: 120     Lab/Data Review:  Recent Results (from the past 12 hour(s))   Hemoglobin and Hematocrit    Collection Time: 02/27/23  8:19 AM   Result Value Ref Range    Hemoglobin 10.8 (L) 12.0 - 16.0 g/dL    Hematocrit 30.5 (L) 35.0 - 45.0 %   Magnesium    Collection Time: 02/27/23  8:19 AM   Result Value Ref Range    Magnesium 2.1 1.6 - 2.6 mg/dL   Hemoglobin and Hematocrit    Collection Time: 02/27/23  1:30 PM   Result Value Ref Range    Hemoglobin 9.6 (L) 12.0 - 16.0 g/dL    Hematocrit 27.0 (L) 35.0 - 45.0 %         Key Findings or tests:       Telemetry NONE   Oxygen NONE Assessment and Plan:     Sepsis 2/2 infected pleural effusion- per ER \"sterile field was compromised\" during the procedure   Right Pleural Effusion   Metastatic Lung Cancer - adenocarcinoma, diagnosed 1 month ago    #1-3. Patient with right sided chest tube inserted in ED. CT surgery following. Patient to receive daily CXR. To be continued on vanc/zosyn (2/27-current). Awaiting culture. Palliative medicine is involved.    Tachycardia - borderline - possibly secondary to pain   Hx  of CVA with left facial ptosis  Hx of Anxiety  Tobacco Abuse - nicotine patch       Diet Regular diet    DVT Prophylax    GI Prophylaxis Famotidine    Code status Full    Disposition 2-3 days         Tonya DO Sree, hospitalist   February 27, 2023

## 2023-02-27 NOTE — CONSULTS
[facility] [place]: [phone])  [facility]: [phone]     Patient Name: Deion Stapleton  YOB: 1950    Date of Initial Consult: 2/27/23  Reason for Consult: goals of care discussion/symptoms management  Requesting Provider: dr. Nimco Vazquez   Primary Care Physician: ALEJANDRO Fagan NP      SUMMARY:     Deion Stapleton is a 71 y.o. with a past history of right lower lobe lung mass secondary to adenocarcinoma, CVA, osteoarthritis, and osteopenia, who was admitted on 2/26/2023 from home with a diagnosis of worsening shortness of breath and chest pain at biopsy site. Patient had CTA chest done in the emergency room was negative for PE showed large right pleural effusion and newer nodular density in the superior left lower lobe with new indeterminate liver lesions. Had right-sided chest tube placed for metastatic pleural effusion and 1 L of bloody drainage was taken out. Patient continues to have pain at that site. Due to recent diagnosis of adenocarcinoma and current medical issues leading to Palliative Medicine involvement include: To discuss goals of care. 2/27/23: Patient was seen in presence of palliative care RN. Patient continues to have chest discomfort at chest tube site. Shortness of breath and no exacerbation. No cough currently. No hemoptysis. No nausea or vomiting. No constipation denies for any tingling or numbness.      HISTORY:     History obtained from: Patient    CHIEF COMPLAINT: Shortness of breath and chest pain    HPI/SUBJECTIVE:    The patient is:   [x] Verbal and participatory  [] Non-participatory due to:         PHYSICAL EXAM:     From RN flowsheet:  Wt Readings from Last 3 Encounters:   02/27/23 112 lb 7 oz (51 kg)   01/20/23 157 lb (71.2 kg)   01/13/23 157 lb (71.2 kg)       BP 93/63   Pulse (!) 105   Temp 97.3 °F (36.3 °C) (Oral)   Resp 17   Ht 4' 11\" (1.499 m)   Wt 112 lb 7 oz (51 kg)   SpO2 93% BMI 22.71 kg/m²     Constitutional: Awake, follows verbal commands appropriately, not in acute distress, nasal cannula is in situ. Eyes: pupils equal, anicteric  ENMT: no nasal discharge, moist mucous membranes  Cardiovascular: regular rhythm, distal pulses intact  Respiratory: Diminished breath sound bibasilar, right-sided chest tube present. No air leak currently. Gastrointestinal: soft non-tender, +bowel sounds  Musculoskeletal: No pedal edema, no deformity, no tenderness to palpation  Skin: warm, dry  Neurologic: following commands, moving all extremities, no tremors noted  Psychiatric: full affect, no hallucinations, follows verbal commands appropriately  Other:       PALLIATIVE DIAGNOSES:   goals of care discussion/Advance care planning   2. Metastatic lung cancer  3. Right pleural patient s/p chest tube placement  4. Chest pain and shortness of breath  5. Comorbidities including CVA, osteoarthritis, pleural effusion, anxiety disorder etc.    Patient Active Problem List   Diagnosis    Homelessness    Gingivitis    RITA (acute kidney injury) ([facility] [address]    Sepsis ([facility]    Chest pain    Community acquired pneumonia    Metastatic lung cancer (metastasis from lung to other site) [facility])    Suspected lung cancer    CVA, old, facial weakness    Anxiety    Tobacco abuse    Osteopenia    Osteoarthritis    Encounter for palliative care    Debility    Pleural effusion    Tachycardia            GOALS OF CARE / TREATMENT PREFERENCES:     GOALS OF CARE:     2/27/23: Patient was seen in presence of palliative care staff member. Patient is awake and oriented x3. Patient is able to participate in her decision-making process based on our clinical exam.  We introduced ourselves and explained her the purpose of our visit. Patient knows her recent diagnosis of lung cancer. She lives by herself but she has 5 kids. 4 daughters and 1 son. Her oldest daughter is in prison.   She has had some dyspnea on exertion but no tingling numbness on chronic nausea or constipation. She has had some pain around the chest tube site. We discussed with her about her current clinical issues including the need for chest tube. She verbalized understanding about it. We discussed with her about the importance of having AMD to be completed. She wants her daughter, Ms. Trent Murray, to be primary medical decision-maker and her daughter Ms. Bisi Villaseñor, to be secondary medical decision maker. When we ask her about her CODE STATUS after explaining the benefits and burdens of CPR, shocks and intubation, she decided to get everything to be done with understanding high risk of rib fractures and pneumothorax. She stated she would like to stay on machine for 10 to 14 days but wants to think about it. She stated she needs time to think over before signing this paperwork. We also discussed with her about options of treatment and encouraged her to talk to an oncologist about it. Plan: Patient will remain full code with full intervention at this point, AMD will be completed later on today. TREATMENT PREFERENCES:   Code Status: Full Code    Advance Care Planning:  Demographics 2/27/2023   Marital Status           Other Instructions: none          FUNCTIONAL ASSESSMENT:     Palliative Performance Scale (PPS):50       Clinical Pain Assessment (nonverbal scale for severity on nonverbal patients): 4/10             PSYCHOSOCIAL/SPIRITUAL SCREENING:       Any spiritual / Adventist concerns:  [] Yes /  [x] No    Caregiver Burnout:  [] Yes /  [] No /  [x] No Caregiver Present      Anticipatory grief assessment:   [] Normal  / [] Maladaptive          REVIEW OF SYSTEMS:     Positive and pertinent negative findings in ROS are noted above in HPI. The following systems were [x] reviewed / [] unable to be reviewed as noted in HPI  Other findings are noted below.   Systems: constitutional, ears/nose/mouth/throat, respiratory, gastrointestinal, genitourinary, musculoskeletal, integumentary, neurologic, psychiatric, endocrine. Positive findings noted below. Modified ESAS Completed by: provider                                              HISTORY:     Past Medical History:   Diagnosis Date    Angina at rest [facility])     Anxiety     CVA, old, facial weakness 2012    left ptosis    Dental caries     Osteoarthritis     Osteopenia     Right lower lobe lung mass 04/15/2022    Offered Biopsy and couldn't decide between PAlliative Care and Biopsy      Past Surgical History:   Procedure Laterality Date    CT BIOPSY PERCUTANEOUS SUPERFICIAL BONE  1/26/2023    CT BIOPSY PERCUTANEOUS SUPERFICIAL BONE 1/26/2023 [facility] RAD CT    WISDOM TOOTH EXTRACTION        Family History   Problem Relation Age of Onset    Cancer Daughter         Unspecified Cancer    Cancer Cousin         Unspecified Cancer x2      History reviewed, no pertinent family history. Social History     Tobacco Use    Smoking status: Some Days     Packs/day: 0.50     Types: Cigarettes    Smokeless tobacco: Never   Substance Use Topics    Alcohol use:  Yes     Allergies   Allergen Reactions    Aspirin Nausea Only      Current Facility-Administered Medications   Medication Dose Route Frequency    vancomycin (VANCOCIN) 750 mg in sodium chloride 0.9 % 250 mL IVPB (vial-mate)  750 mg IntraVENous Q18H    sodium chloride flush 0.9 % injection 5-40 mL  5-40 mL IntraVENous 2 times per day    sodium chloride flush 0.9 % injection 5-40 mL  5-40 mL IntraVENous PRN    ondansetron (ZOFRAN-ODT) disintegrating tablet 4 mg  4 mg Oral Q8H PRN    Or    ondansetron (ZOFRAN) injection 4 mg  4 mg IntraVENous Q6H PRN    polyethylene glycol (GLYCOLAX) packet 17 g  17 g Oral Daily PRN    famotidine (PEPCID) tablet 20 mg  20 mg Oral Daily    acetaminophen (TYLENOL) tablet 650 mg  650 mg Oral Q6H PRN    Or    acetaminophen (TYLENOL) suppository 650 mg  650 mg Rectal Q6H PRN    0.9 % sodium chloride infusion   IntraVENous Continuous    oxyCODONE-acetaminophen (PERCOCET) 5-325 MG per tablet 1 tablet  1 tablet Oral Q4H PRN    morphine (PF) injection 2 mg  2 mg IntraVENous Q4H PRN    naloxone (NARCAN) injection 0.4 mg  0.4 mg IntraVENous PRN    piperacillin-tazobactam (ZOSYN) 3,375 mg in sodium chloride 0.9 % 100 mL extended IVPB (mini-bag)  3,375 mg IntraVENous Q8H        LAB AND IMAGING FINDINGS:     Recent Results (from the past 24 hour(s))   EKG 12 Lead    Collection Time: 02/26/23  1:44 PM   Result Value Ref Range    Ventricular Rate 123 BPM    Atrial Rate 123 BPM    P-R Interval 116 ms    QRS Duration 78 ms    Q-T Interval 356 ms    QTc Calculation (Bazett) 509 ms    P Axis 76 degrees    R Axis 62 degrees    T Axis 59 degrees    Diagnosis Sinus tachycardia    CBC with Auto Differential    Collection Time: 02/26/23  1:45 PM   Result Value Ref Range    WBC 11.7 4.6 - 13.2 K/uL    RBC 4.18 (L) 4.20 - 5.30 M/uL    Hemoglobin 12.4 12.0 - 16.0 g/dL    Hematocrit 34.4 (L) 35.0 - 45.0 %    MCV 82.3 78.0 - 100.0 FL    MCH 29.7 24.0 - 34.0 PG    MCHC 36.0 31.0 - 37.0 g/dL    RDW 15.3 (H) 11.6 - 14.5 %    Platelets 466 397 - 471 K/uL    MPV 9.9 9.2 - 11.8 FL    Nucleated RBCs 0.2 (H) 0  WBC    nRBC 0.02 (H) 0.00 - 0.01 K/uL    Seg Neutrophils 80 (H) 40 - 73 %    Lymphocytes 9 (L) 21 - 52 %    Monocytes 10 3 - 10 %    Eosinophils % 0 0 - 5 %    Basophils 1 0 - 2 %    Immature Granulocytes 1 (H) 0.0 - 0.5 %    Segs Absolute 9.3 (H) 1.8 - 8.0 K/UL    Absolute Lymph # 1.0 0.9 - 3.6 K/UL    Absolute Mono # 1.2 0.05 - 1.2 K/UL    Absolute Eos # 0.0 0.0 - 0.4 K/UL    Basophils Absolute 0.1 0.0 - 0.1 K/UL    Absolute Immature Granulocyte 0.1 (H) 0.00 - 0.04 K/UL    Differential Type AUTOMATED     CMP    Collection Time: 02/26/23  1:45 PM   Result Value Ref Range    Sodium 135 (L) 136 - 145 mmol/L    Potassium 3.8 3.5 - 5.5 mmol/L    Chloride 99 (L) 100 - 111 mmol/L    CO2 27 21 - 32 mmol/L    Anion Gap 9 3.0 - 18 mmol/L    Glucose 124 (H) 74 - 99 mg/dL    BUN 20 (H) 7.0 - 18 MG/DL Creatinine 0.77 0.6 - 1.3 MG/DL    Bun/Cre Ratio 26 (H) 12 - 20      Est, Glom Filt Rate >60 >60 ml/min/1.73m2    Calcium 9.5 8.5 - 10.1 MG/DL    Total Bilirubin 1.4 (H) 0.2 - 1.0 MG/DL    ALT 29 13 - 56 U/L    AST 45 (H) 10 - 38 U/L    Alk Phosphatase 171 (H) 45 - 117 U/L    Total Protein 7.0 6.4 - 8.2 g/dL    Albumin 3.1 (L) 3.4 - 5.0 g/dL    Globulin 3.9 2.0 - 4.0 g/dL    Albumin/Globulin Ratio 0.8 0.8 - 1.7     Troponin    Collection Time: 02/26/23  1:45 PM   Result Value Ref Range    Troponin, High Sensitivity 47 0 - 54 ng/L   Brain Natriuretic Peptide    Collection Time: 02/26/23  1:45 PM   Result Value Ref Range    NT Pro- 0 - 900 PG/ML   CBC with Auto Differential    Collection Time: 02/26/23  8:40 PM   Result Value Ref Range    WBC 17.8 (H) 4.6 - 13.2 K/uL    RBC 4.06 (L) 4.20 - 5.30 M/uL    Hemoglobin 12.1 12.0 - 16.0 g/dL    Hematocrit 33.5 (L) 35.0 - 45.0 %    MCV 82.5 78.0 - 100.0 FL    MCH 29.8 24.0 - 34.0 PG    MCHC 36.1 31.0 - 37.0 g/dL    RDW 15.1 (H) 11.6 - 14.5 %    Platelets 855 012 - 538 K/uL    MPV 9.5 9.2 - 11.8 FL    Nucleated RBCs 0.2 (H) 0  WBC    nRBC 0.03 (H) 0.00 - 0.01 K/uL    Seg Neutrophils 88 (H) 40 - 73 %    Lymphocytes 4 (L) 21 - 52 %    Monocytes 7 3 - 10 %    Eosinophils % 0 0 - 5 %    Basophils 0 0 - 2 %    Immature Granulocytes 1 (H) 0.0 - 0.5 %    Segs Absolute 15.7 (H) 1.8 - 8.0 K/UL    Absolute Lymph # 0.7 (L) 0.9 - 3.6 K/UL    Absolute Mono # 1.2 0.05 - 1.2 K/UL    Absolute Eos # 0.0 0.0 - 0.4 K/UL    Basophils Absolute 0.0 0.0 - 0.1 K/UL    Absolute Immature Granulocyte 0.2 (H) 0.00 - 0.04 K/UL    Differential Type AUTOMATED      Platelet Comment ADEQUATE PLATELETS      RBC Comment ANISOCYTOSIS  1+        RBC Comment TARGET CELLS  1+        RBC Comment SCHISTOCYTES  PRESENT        RBC Comment POLYCHROMASIA  1+       TYPE AND SCREEN    Collection Time: 02/26/23  8:40 PM   Result Value Ref Range    Crossmatch expiration date 03/01/2023,2359     ABO/Rh A POSITIVE     Antibody Screen NEG    Respiratory Panel, Molecular, with COVID-19 (Restricted: peds pts or suitable admitted adults)    Collection Time: 02/26/23  8:40 PM    Specimen: Nasopharyngeal   Result Value Ref Range    Adenovirus by PCR Not detected NOTD      Coronavirus 229E by PCR Not detected NOTD      Coronavirus HKU1 by PCR Not detected NOTD      Coronavirus NL63 by PCR Not detected NOTD      Coronavirus OC43 by PCR Not detected NOTD      SARS-CoV-2, PCR Not detected NOTD      Human Metapneumovirus by PCR Not detected NOTD      Rhinovirus Enterovirus PCR Not detected NOTD      Influenza A by PCR Not detected NOTD      Influenza B PCR Not detected NOTD      Parainfluenza 1 PCR Not detected NOTD      Parainfluenza 2 PCR Not detected NOTD      Parainfluenza 3 PCR Not detected NOTD      Parainfluenza 4 PCR Not detected NOTD      Respiratory Syncytial Virus by PCR Not detected NOTD      Bordetella parapertussis by PCR Not detected NOTD      Bordetella pertussis by PCR Not detected NOTD      Chlamydophila Pneumonia PCR Not detected NOTD      Mycoplasma pneumo by PCR Not detected NOTD     Basic Metabolic Panel w/ Reflex to MG    Collection Time: 02/27/23  1:41 AM   Result Value Ref Range    Sodium 136 136 - 145 mmol/L    Potassium 3.4 (L) 3.5 - 5.5 mmol/L    Chloride 104 100 - 111 mmol/L    CO2 23 21 - 32 mmol/L    Anion Gap 9 3.0 - 18 mmol/L    Glucose 147 (H) 74 - 99 mg/dL    BUN 20 (H) 7.0 - 18 MG/DL    Creatinine 0.82 0.6 - 1.3 MG/DL    Bun/Cre Ratio 24 (H) 12 - 20      Est, Glom Filt Rate >60 >60 ml/min/1.73m2    Calcium 8.5 8.5 - 10.1 MG/DL   CBC with Auto Differential    Collection Time: 02/27/23  1:41 AM   Result Value Ref Range    WBC 19.1 (H) 4.6 - 13.2 K/uL    RBC 3.64 (L) 4.20 - 5.30 M/uL    Hemoglobin 10.7 (L) 12.0 - 16.0 g/dL    Hematocrit 30.5 (L) 35.0 - 45.0 %    MCV 83.8 78.0 - 100.0 FL    MCH 29.4 24.0 - 34.0 PG    MCHC 35.1 31.0 - 37.0 g/dL    RDW 15.1 (H) 11.6 - 14.5 %    Platelets 765 536 - 420 K/uL    MPV 10.4 9.2 - 11.8 FL    Nucleated RBCs 0.1 (H) 0  WBC    nRBC 0.02 (H) 0.00 - 0.01 K/uL    Seg Neutrophils 85 (H) 40 - 73 %    Lymphocytes 6 (L) 21 - 52 %    Monocytes 8 3 - 10 %    Eosinophils % 0 0 - 5 %    Basophils 0 0 - 2 %    Immature Granulocytes 1 (H) 0.0 - 0.5 %    Segs Absolute 16.3 (H) 1.8 - 8.0 K/UL    Absolute Lymph # 1.1 0.9 - 3.6 K/UL    Absolute Mono # 1.5 (H) 0.05 - 1.2 K/UL    Absolute Eos # 0.0 0.0 - 0.4 K/UL    Basophils Absolute 0.1 0.0 - 0.1 K/UL    Absolute Immature Granulocyte 0.1 (H) 0.00 - 0.04 K/UL    Differential Type AUTOMATED     Culture, Blood 2    Collection Time: 02/27/23  1:41 AM    Specimen: Blood   Result Value Ref Range    Special Requests NO SPECIAL REQUESTS      Culture NO GROWTH AFTER 2 HOURS     Magnesium    Collection Time: 02/27/23  1:41 AM   Result Value Ref Range    Magnesium QUANTITY NOT SUFFICIENT. SUGGEST RECOLLECTION mg/dL   Culture, Blood 1    Collection Time: 02/27/23  1:47 AM    Specimen: Blood   Result Value Ref Range    Special Requests NO SPECIAL REQUESTS      Culture NO GROWTH AFTER 2 HOURS     Hemoglobin and Hematocrit    Collection Time: 02/27/23  8:19 AM   Result Value Ref Range    Hemoglobin 10.8 (L) 12.0 - 16.0 g/dL    Hematocrit 30.5 (L) 35.0 - 45.0 %   Magnesium    Collection Time: 02/27/23  8:19 AM   Result Value Ref Range    Magnesium 2.1 1.6 - 2.6 mg/dL             Total time: 75 minutes    Disclaimer: Sections of this note are dictated using utilizing voice recognition software, which may have resulted in some phonetic based errors in grammar and contents. Even though attempts were made to correct all the mistakes, some may have been missed, and remained in the body of the document. If questions arise, please contact our department.

## 2023-02-27 NOTE — PROGRESS NOTES
TRANSFER - IN REPORT:     Verbal report received Clare Duong on [facility]  being transferred from the ED for routine progression of care. Report consisted of patients Situation, Background, Assessment and Recommendations(SBAR). Pt arrived on unit  alert and stable verbalizing pain at chest tube site. Skin assessment completed with RUBY Martinez. V/s completed    Pt's chest tube secured to the floor. Upon arrival pt's chest tube output is 60ml. Purewick placed      Pt oriented to room, call light within reach.

## 2023-02-27 NOTE — PROGRESS NOTES
While assessing pt nurse noted approx a quarter sized amount of bloody sputum that pt spit on to her blanket. Pt given napkin and requesting to spit into the nakins in the future so that medical staff can take a look. Pt verbalied understanding. Pt chest tube total output 120ml. On call provider notified   Contacted the law drawing team to remind of pt's q6 labs.      Will continue to monitor

## 2023-02-27 NOTE — CONSULTS
[facility]  -  Consult  2/27/2023    Deion Stapleton is a 71 y.o. female who is being seen on consult for right effusion    Assessment:   RIGHT effusion in setting of RIGHT lung mass s/p rib biopsy 1 month ago with adenocarcinoma. S/p RIGHT chest tube placed in ED with 1 L bloody drainage    Patient Active Problem List    Diagnosis Date Noted    Pleural effusion 02/26/2023    Tachycardia 02/26/2023    Encounter for palliative care 01/24/2023    Debility 01/24/2023    Chest pain 01/21/2023    Community acquired pneumonia 01/21/2023    Metastatic lung cancer (metastasis from lung to other site) [facility]) 01/21/2023    Suspected lung cancer 01/21/2023    CVA, old, facial weakness 01/21/2023    Anxiety 01/21/2023    Tobacco abuse 01/21/2023    Osteopenia 01/21/2023    Osteoarthritis 01/21/2023    RITA (acute kidney injury) ([facility] [address] 11/06/2021    Sepsis ([facility] [address] 11/06/2021    Homelessness 06/11/2017    Gingivitis 06/11/2017       Plan:   Repeat CT chest  RIGHT chest tube to suction. Daily CXR  OOB. IS      Subjective:         Chief Complaint   Patient presents with    Shortness of Breath    Abdominal Pain     History of Present Illness:   Deion Stapleton is a 71 y.o. female  with h/o Right lung mass presented to ED c/o sob getting worse. Imaging revealed large right effusion. A right chest tube was placed in ED with improvement in effusion. CT Surgery is consulted for management.        Past Medical History:     Past Medical History:   Diagnosis Date    Angina at rest [facility])     Anxiety     CVA, old, facial weakness 2012    left ptosis    Dental caries     Osteoarthritis     Osteopenia     Right lower lobe lung mass 04/15/2022    Offered Biopsy and couldn't decide between PAlliative Care and Biopsy       Past Surgical History:     Past Surgical History:   Procedure Laterality Date    CT BIOPSY PERCUTANEOUS SUPERFICIAL BONE  1/26/2023    CT BIOPSY PERCUTANEOUS SUPERFICIAL BONE 1/26/2023 [facility] RAD CT WISDOM TOOTH EXTRACTION         Social History:   She  reports that she has been smoking cigarettes. She has been smoking an average of .5 packs per day. She has never used smokeless tobacco.  She  reports current alcohol use. Family History:     Family History   Problem Relation Age of Onset    Cancer Daughter         Unspecified Cancer    Cancer Cousin         Unspecified Cancer x2       Allergies and Intolerances:      Allergies   Allergen Reactions    Aspirin Nausea Only       Home Medications:     Present medications include   Current Facility-Administered Medications   Medication Dose Route Frequency Provider Last Rate Last Admin    vancomycin (VANCOCIN) 750 mg in sodium chloride 0.9 % 250 mL IVPB (vial-mate)  750 mg IntraVENous Q18H Serena Kirby,         ketorolac (TORADOL) injection 15 mg  15 mg IntraVENous Once [employer], DO        sodium chloride flush 0.9 % injection 5-40 mL  5-40 mL IntraVENous 2 times per day Minor Other, APRN - NP   10 mL at 02/27/23 0813    sodium chloride flush 0.9 % injection 5-40 mL  5-40 mL IntraVENous PRN Minor Other, APRN - NP        ondansetron (ZOFRAN-ODT) disintegrating tablet 4 mg  4 mg Oral Q8H PRN Minor Other, APRN - NP        Or    ondans[facility]ron [facility]) injection 4 mg  4 mg IntraVENous Q6H PRN Minor Other, APRN - NP        polyethylene glycol (GLYCOLAX) packet 17 g  17 g Oral Daily PRN Minor Other, APRN - NP        famotidine (PEPCID) tablet 20 mg  20 mg Oral Daily Minor Other, APRN - NP   20 mg at 02/27/23 0813    acetaminophen (TYLENOL) tablet 650 mg  650 mg Oral Q6H PRN Minor Other, APRN - NP        Or    acetaminophen (TYLENOL) suppository 650 mg  650 mg Rectal Q6H PRN Minor Other, APRN - NP        0.9 % sodium chloride infusion   IntraVENous Continuous Minor Other, APRN -  mL/hr at 02/27/23 1043 100 mL at 02/27/23 1043    oxyCODONE-acetaminophen (PERCOCET) 5-325 MG per tablet 1 tablet  1 tablet Oral Q4H PRN Minor Other, APRN - NP   1 tablet at 02/27/23 0759    morphine (PF) injection 2 mg  2 mg IntraVENous Q4H PRN Rebekah Sergeant, APRN - NP   2 mg at 02/27/23 0949    naloxone [facility]) injection 0.4 mg  0.4 mg IntraVENous PRN Rebekah Sergeant, APRN - NP        piperacillin-tazobactam (ZOSYN) 3,375 mg in sodium chloride 0.9 % 100 mL extended IVPB (mini-bag)  3,375 mg IntraVENous Q8H Rebekah Sergeant, APRN - NP   Stopped at 02/27/23 0820       Review of Systems: (NEGATIVE unless checked or in HPI)   Cardiac:   [] Chest pain or chest pressure  [] Shortness of breath upon activity  [] Shortness of breath when lying flat  [] Irregular heart rhythm     Vascular:   [] Pain in calf, thigh, or hip brought on by walking  [] Pain in feet at night that wakes you up from your sleep  [] Blood clot in your veins  [] Leg swelling  [] bulging leg veins     Pulmonary:   [] Oxygen at home  [] Productive cough  [] Wheezing     Neurologic:   [] Sudden weakness in arms or legs  [] Sudden numbness in arms or legs  [] Sudden onset of difficult speaking or slurred speech  [] Temporary loss of vision in one eye  [] Problems with dizziness    Gastrointestinal:   [] Blood in stool  [] Vomited blood    Genitourinary:   [] Burning when urinating  [] Blood in urine     Psychiatric:   [] Major depression     Hematologic:   [] Bleeding problems  [] Problems with blood clotting  [] bulging leg veins  [] calf pain with exertion    Dermatologic:   [] Rashes or ulcers     Constitutional:   [] Fever or chills  [] weight gain or loss     Ear/Nose/Throat:   [] Dentition   [] Change in hearing  [] Nose bleeds  [] Sore throat     Musculoskeletal:   [] Back pain  [] Joint pain  [] Muscle pain    Physical Examination:   BP 93/63   Pulse (!) 105   Temp 97.3 °F (36.3 °C) (Oral)   Resp 17   Ht 4' 11\" (1.499 m)   Wt 112 lb 7 oz (51 kg)   SpO2 93%   BMI 22.71 kg/m²   PHYSICAL EXAMINATION:  Vital signs:   BP Readings from Last 3 Encounters:   02/27/23 93/63   04/15/22 106/77 21 130/85     Temp (24hrs), Av.6 °F (36.4 °C), Min:96.8 °F (36 °C), Max:98.8 °F (37.1 °C)    Wt Readings from Last 3 Encounters:   23 112 lb 7 oz (51 kg)   23 157 lb (71.2 kg)   23 157 lb (71.2 kg)     Ht Readings from Last 3 Encounters:   23 4' 11\" (1.499 m)   23 5' 1\" (1.549 m)   04/15/22 4' 11\" (1.499 m)       General:   awake alert and oriented   Skin:   no rashes or skin lesions noted on limited exam   HEENT:  Normocephalic, atraumatic, PERRL,   Lymph Nodes:   no cervical, axillary or inguinal adenopathy   Lungs:   non-labored, bilaterally clear to aspiration- no crackles wheezes rales or rhonchi   Heart:  RRR, s1 and s2; no murmurs rubs or gallops, no edema, + pedal pulses   Abdomen:  soft, non-distended, active bowel sounds, no hepatomegaly, no splenomegaly. Appropriate surgical scars for stated surgeries. Non-tender   Genitourinary:  deferred   Extremities:   no clubbing, cyanosis; no joint effusions or swelling;  Full ROM of all large joints to the upper and lower extremities; muscle mass appropriate for age       Laboratory Data:     Lab Results   Component Value Date/Time    WBC 19.1 2023 01:41 AM    HGB 10.8 2023 08:19 AM    HCT 30.5 2023 08:19 AM     2023 01:41 AM     Lab Results   Component Value Date/Time     2023 01:41 AM    K 3.4 2023 01:41 AM     2023 01:41 AM    CO2 23 2023 01:41 AM    BUN 20 2023 01:41 AM     No results found for: CHOL, CHOLX, CHLST, CHOLV, HDL, HDLC, LDL, LDLC, TGLX, TRIGL  Hemoglobin A1C   Date Value Ref Range Status   2021 6.0 (H) 4.2 - 5.6 % Final     Comment:     (NOTE)  HbA1C Interpretive Ranges  <5.7              Normal  5.7 - 6.4         Consider Prediabetes  >6.5              Consider Diabetes           EKG: (independently reviewed)   Encounter Date: 23   EKG 12 Lead   Result Value    Ventricular Rate 123    Atrial Rate 123    P-R Interval 116 QRS Duration 78    Q-T Interval 356    QTc Calculation (Bazett) 509    P Axis 76    R Axis 62    T Axis 59    Diagnosis Sinus tachycardia     ECHO:   No results found for this or any previous visit. RADIOLOGY DATA: (images independently reviewed)    XR Results (most recent):  Xray Result (most recent):  XR CHEST PORTABLE 02/27/2023    Narrative  EXAM: XR CHEST PORTABLE    CLINICAL INDICATION/HISTORY: 71 years Female. repeat. Additional History: None    TECHNIQUE: Frontal view of the chest    COMPARISON: Chest radiograph 2/26/2023, reference CT chest 2/26/2023    FINDINGS:    Right apical chest tube in similar position to prior. Multiple devices project  over the patient. Rightward rotation. Obscuration of the right heart border and right hemidiaphragm. Cardiac  silhouette appears similar to prior. Left costophrenic sulcus is sharp. No  definite left-sided pneumothorax. Near complete opacification of the right hemithorax with mild residual aeration  at the right apex. Lucency at the right midlung zone which may reflect a  pneumothorax, likely present previously but previously at a basilar location. Additional lucency at the peripheral right midlung zone, likely small  pneumothorax. Small amount of soft tissue gas within the right chest wall,  slightly decreased from prior. Mild pulmonary vascular congestion, unchanged. Left lung opacities described on prior chest CT is not well evaluated by  radiographs. Left-sided mediastinal and hilar lymphadenopathy, unchanged. Lytic lesion involving the left posterolateral sixth rib. Additional lytic  lesions are better evaluated prior chest CT. Impression  Slight interval increased size of the large right pleural effusion. Similar  pulmonary opacities at the right lung apex. Similar dense opacities at the right  mid and lower lung zones which may reflect atelectasis and/or pulmonary  parenchymal disease.     Suspected small right-sided pneumothorax at the medial right hemithorax, similar  in size to prior. Right-sided chest tube remains in place. Unchanged mild pulmonary vascular congestion. Left mediastinal an hilar  lymphadenopathy. Multiple lytic bone lesions concerning for metastatic disease. Louann Scanlon PA-C  [facility]  [phone]    PLEASE NOTE:  This document has been produced using voice recognition software. Unrecognized errors in transcription may be present. NOTE TO PATIENT:  The purpose of this note is to communicate optimally with the other providers involved in your care. It is written using standard medical terminology. If you have questions regarding details of the note please call my office at [phone] and make an appointment to discuss your concerns.

## 2023-02-27 NOTE — PROGRESS NOTES
Pharmacy Pharmacokinetic Monitoring Service - Vancomycin     Cheryle Grange is a 71 y.o. female starting on vancomycin therapy for  sepsis 2/2 parapneumonic effusion . Pharmacy consulted by Provider: ALEJANDRO Muniz NP for monitoring and adjustment. Target Concentration: Goal AUC/MARY 400-600 mg*hr/L    Additional Antimicrobials: Piperacillin/Tazobactam    Pertinent Laboratory Values:   Temp: 98.8 °F (37.1 °C), Weight: 112 lb 7 oz (51 kg)  Recent Labs     02/26/23  1345 02/26/23  2040 02/27/23  0141   BUN 20*  --  20*   WBC 11.7 17.8* 19.1*     No results for input(s): VANRA in the last 72 hours. Invalid input(s): VANCP, VANCT, VANCR  Estimated Creatinine Clearance: 48 mL/min (based on SCr of 0.82 mg/dL). Pertinent Cultures:  Culture Date Source Results   02/27 Blood x2 N/A   MRSA Nasal Swab: N/A. Non-respiratory infection    Plan:  Dosing recommendations based on Bayesian software  Start vancomycin 1750 mg IV x1 (given 23:46 2/26), then 1250 mg IV q24h  Anticipated AUC of 541 mg/L.hr and trough concentration of 15.3 mg/L at steady state  Renal labs as indicated   VancomycinR concentration ordered for  today @ 18:00  Pharmacy will continue to monitor patient and adjust therapy as indicated    Thank you for the consult,  Amber Valverde, [facility]  2/27/2023 04:44 hrs.

## 2023-02-28 ENCOUNTER — APPOINTMENT (OUTPATIENT)
Facility: HOSPITAL | Age: 73
DRG: 720 | End: 2023-02-28
Payer: MEDICAID

## 2023-02-28 ENCOUNTER — APPOINTMENT (OUTPATIENT)
Facility: HOSPITAL | Age: 73
End: 2023-02-28
Payer: MEDICAID

## 2023-02-28 PROBLEM — R52 PAIN MANAGEMENT: Status: ACTIVE | Noted: 2023-02-28

## 2023-02-28 PROBLEM — Z71.89 ACP (ADVANCE CARE PLANNING): Status: ACTIVE | Noted: 2023-02-28

## 2023-02-28 LAB
ANION GAP SERPL CALC-SCNC: 6 MMOL/L (ref 3–18)
BASOPHILS # BLD: 0.1 K/UL (ref 0–0.1)
BASOPHILS NFR BLD: 1 % (ref 0–2)
BUN SERPL-MCNC: 12 MG/DL (ref 7–18)
BUN/CREAT SERPL: 22 (ref 12–20)
CALCIUM SERPL-MCNC: 8.7 MG/DL (ref 8.5–10.1)
CHLORIDE SERPL-SCNC: 106 MMOL/L (ref 100–111)
CO2 SERPL-SCNC: 26 MMOL/L (ref 21–32)
CREAT SERPL-MCNC: 0.55 MG/DL (ref 0.6–1.3)
DIFFERENTIAL METHOD BLD: ABNORMAL
EOSINOPHIL # BLD: 0.2 K/UL (ref 0–0.4)
EOSINOPHIL NFR BLD: 1 % (ref 0–5)
ERYTHROCYTE [DISTWIDTH] IN BLOOD BY AUTOMATED COUNT: 15.3 % (ref 11.6–14.5)
GLUCOSE SERPL-MCNC: 99 MG/DL (ref 74–99)
HCT VFR BLD AUTO: 28.6 % (ref 35–45)
HGB BLD-MCNC: 9.9 G/DL (ref 12–16)
IMM GRANULOCYTES # BLD AUTO: 0.1 K/UL (ref 0–0.04)
IMM GRANULOCYTES NFR BLD AUTO: 1 % (ref 0–0.5)
LYMPHOCYTES # BLD: 1.2 K/UL (ref 0.9–3.6)
LYMPHOCYTES NFR BLD: 8 % (ref 21–52)
MAGNESIUM SERPL-MCNC: 2.1 MG/DL (ref 1.6–2.6)
MCH RBC QN AUTO: 29.6 PG (ref 24–34)
MCHC RBC AUTO-ENTMCNC: 34.6 G/DL (ref 31–37)
MCV RBC AUTO: 85.6 FL (ref 78–100)
MONOCYTES # BLD: 1.3 K/UL (ref 0.05–1.2)
MONOCYTES NFR BLD: 9 % (ref 3–10)
NEUTS SEG # BLD: 12.6 K/UL (ref 1.8–8)
NEUTS SEG NFR BLD: 82 % (ref 40–73)
NRBC # BLD: 0.02 K/UL (ref 0–0.01)
NRBC BLD-RTO: 0.1 PER 100 WBC
PLATELET # BLD AUTO: 266 K/UL (ref 135–420)
PMV BLD AUTO: 10 FL (ref 9.2–11.8)
POTASSIUM SERPL-SCNC: 3.4 MMOL/L (ref 3.5–5.5)
RBC # BLD AUTO: 3.34 M/UL (ref 4.2–5.3)
SODIUM SERPL-SCNC: 138 MMOL/L (ref 136–145)
VANCOMYCIN SERPL-MCNC: 9.9 UG/ML (ref 5–40)
WBC # BLD AUTO: 15.5 K/UL (ref 4.6–13.2)

## 2023-02-28 PROCEDURE — 6360000002 HC RX W HCPCS

## 2023-02-28 PROCEDURE — 6370000000 HC RX 637 (ALT 250 FOR IP): Performed by: STUDENT IN AN ORGANIZED HEALTH CARE EDUCATION/TRAINING PROGRAM

## 2023-02-28 PROCEDURE — 6360000002 HC RX W HCPCS: Performed by: STUDENT IN AN ORGANIZED HEALTH CARE EDUCATION/TRAINING PROGRAM

## 2023-02-28 PROCEDURE — 2140000001 HC CVICU INTERMEDIATE R&B

## 2023-02-28 PROCEDURE — 71045 X-RAY EXAM CHEST 1 VIEW: CPT

## 2023-02-28 PROCEDURE — 2580000003 HC RX 258: Performed by: STUDENT IN AN ORGANIZED HEALTH CARE EDUCATION/TRAINING PROGRAM

## 2023-02-28 PROCEDURE — 36415 COLL VENOUS BLD VENIPUNCTURE: CPT

## 2023-02-28 PROCEDURE — 6370000000 HC RX 637 (ALT 250 FOR IP)

## 2023-02-28 PROCEDURE — 6370000000 HC RX 637 (ALT 250 FOR IP): Performed by: INTERNAL MEDICINE

## 2023-02-28 PROCEDURE — 80048 BASIC METABOLIC PNL TOTAL CA: CPT

## 2023-02-28 PROCEDURE — 83735 ASSAY OF MAGNESIUM: CPT

## 2023-02-28 PROCEDURE — 85025 COMPLETE CBC W/AUTO DIFF WBC: CPT

## 2023-02-28 PROCEDURE — 2700000000 HC OXYGEN THERAPY PER DAY

## 2023-02-28 PROCEDURE — 94761 N-INVAS EAR/PLS OXIMETRY MLT: CPT

## 2023-02-28 PROCEDURE — 99233 SBSQ HOSP IP/OBS HIGH 50: CPT | Performed by: STUDENT IN AN ORGANIZED HEALTH CARE EDUCATION/TRAINING PROGRAM

## 2023-02-28 PROCEDURE — 2580000003 HC RX 258

## 2023-02-28 PROCEDURE — 80202 ASSAY OF VANCOMYCIN: CPT

## 2023-02-28 RX ORDER — OXYCODONE HYDROCHLORIDE 5 MG/1
10 TABLET ORAL EVERY 6 HOURS PRN
Status: DISCONTINUED | OUTPATIENT
Start: 2023-02-28 | End: 2023-03-05 | Stop reason: HOSPADM

## 2023-02-28 RX ORDER — OXYCODONE HYDROCHLORIDE 5 MG/1
5 TABLET ORAL EVERY 4 HOURS PRN
Status: DISCONTINUED | OUTPATIENT
Start: 2023-02-28 | End: 2023-03-05 | Stop reason: HOSPADM

## 2023-02-28 RX ORDER — OXYCODONE HYDROCHLORIDE AND ACETAMINOPHEN 5; 325 MG/1; MG/1
1 TABLET ORAL EVERY 4 HOURS PRN
Status: DISCONTINUED | OUTPATIENT
Start: 2023-02-28 | End: 2023-02-28

## 2023-02-28 RX ORDER — MORPHINE SULFATE 2 MG/ML
2 INJECTION, SOLUTION INTRAMUSCULAR; INTRAVENOUS ONCE
Status: COMPLETED | OUTPATIENT
Start: 2023-02-28 | End: 2023-02-28

## 2023-02-28 RX ORDER — GABAPENTIN 100 MG/1
100 CAPSULE ORAL NIGHTLY
Status: DISCONTINUED | OUTPATIENT
Start: 2023-02-28 | End: 2023-03-05 | Stop reason: HOSPADM

## 2023-02-28 RX ORDER — OXYCODONE HYDROCHLORIDE AND ACETAMINOPHEN 5; 325 MG/1; MG/1
1 TABLET ORAL EVERY 4 HOURS PRN
Status: DISCONTINUED | OUTPATIENT
Start: 2023-02-28 | End: 2023-03-05 | Stop reason: HOSPADM

## 2023-02-28 RX ORDER — HYDROCODONE POLISTIREX AND CHLORPHENIRAMINE POLISTIREX 10; 8 MG/5ML; MG/5ML
5 SUSPENSION, EXTENDED RELEASE ORAL EVERY 12 HOURS PRN
Status: DISPENSED | OUTPATIENT
Start: 2023-02-28 | End: 2023-03-03

## 2023-02-28 RX ORDER — LIDOCAINE 4 G/G
1 PATCH TOPICAL DAILY
Status: DISCONTINUED | OUTPATIENT
Start: 2023-02-28 | End: 2023-03-05 | Stop reason: HOSPADM

## 2023-02-28 RX ADMIN — MORPHINE SULFATE 2 MG: 2 INJECTION, SOLUTION INTRAMUSCULAR; INTRAVENOUS at 22:47

## 2023-02-28 RX ADMIN — ONDANSETRON 4 MG: 2 INJECTION INTRAMUSCULAR; INTRAVENOUS at 16:54

## 2023-02-28 RX ADMIN — PIPERACILLIN SODIUM AND TAZOBACTAM SODIUM 3375 MG: 3; .375 INJECTION, POWDER, LYOPHILIZED, FOR SOLUTION INTRAVENOUS at 23:06

## 2023-02-28 RX ADMIN — OXYCODONE AND ACETAMINOPHEN 1 TABLET: 325; 5 TABLET ORAL at 01:50

## 2023-02-28 RX ADMIN — PIPERACILLIN SODIUM AND TAZOBACTAM SODIUM 3375 MG: 3; .375 INJECTION, POWDER, LYOPHILIZED, FOR SOLUTION INTRAVENOUS at 14:55

## 2023-02-28 RX ADMIN — OXYCODONE AND ACETAMINOPHEN 1 TABLET: 325; 5 TABLET ORAL at 19:50

## 2023-02-28 RX ADMIN — SODIUM CHLORIDE, PRESERVATIVE FREE 10 ML: 5 INJECTION INTRAVENOUS at 21:00

## 2023-02-28 RX ADMIN — PIPERACILLIN SODIUM AND TAZOBACTAM SODIUM 3375 MG: 3; .375 INJECTION, POWDER, LYOPHILIZED, FOR SOLUTION INTRAVENOUS at 04:58

## 2023-02-28 RX ADMIN — VANCOMYCIN HYDROCHLORIDE 1000 MG: 1 INJECTION, POWDER, LYOPHILIZED, FOR SOLUTION INTRAVENOUS at 09:39

## 2023-02-28 RX ADMIN — GABAPENTIN 100 MG: 100 CAPSULE ORAL at 22:49

## 2023-02-28 RX ADMIN — FAMOTIDINE 20 MG: 20 TABLET ORAL at 09:25

## 2023-02-28 RX ADMIN — MORPHINE SULFATE 2 MG: 2 INJECTION, SOLUTION INTRAMUSCULAR; INTRAVENOUS at 04:58

## 2023-02-28 RX ADMIN — MORPHINE SULFATE 2 MG: 2 INJECTION, SOLUTION INTRAMUSCULAR; INTRAVENOUS at 16:55

## 2023-02-28 RX ADMIN — OXYCODONE AND ACETAMINOPHEN 1 TABLET: 325; 5 TABLET ORAL at 08:20

## 2023-02-28 RX ADMIN — MORPHINE SULFATE 2 MG: 2 INJECTION, SOLUTION INTRAMUSCULAR; INTRAVENOUS at 09:24

## 2023-02-28 RX ADMIN — Medication 5 ML: at 00:48

## 2023-02-28 RX ADMIN — SODIUM CHLORIDE, PRESERVATIVE FREE 10 ML: 5 INJECTION INTRAVENOUS at 09:40

## 2023-02-28 RX ADMIN — Medication 5 ML: at 11:00

## 2023-02-28 RX ADMIN — ONDANSETRON 4 MG: 4 TABLET, ORALLY DISINTEGRATING ORAL at 08:20

## 2023-02-28 RX ADMIN — OXYCODONE AND ACETAMINOPHEN 1 TABLET: 325; 5 TABLET ORAL at 16:55

## 2023-02-28 RX ADMIN — OXYCODONE HYDROCHLORIDE 10 MG: 5 TABLET ORAL at 19:49

## 2023-02-28 RX ADMIN — VANCOMYCIN HYDROCHLORIDE 1000 MG: 1 INJECTION, POWDER, LYOPHILIZED, FOR SOLUTION INTRAVENOUS at 22:55

## 2023-02-28 RX ADMIN — POTASSIUM CHLORIDE 40 MEQ: 1500 TABLET, EXTENDED RELEASE ORAL at 09:25

## 2023-02-28 ASSESSMENT — PAIN DESCRIPTION - LOCATION
LOCATION: CHEST
LOCATION: BACK
LOCATION: CHEST;BACK
LOCATION: BACK
LOCATION: OTHER (COMMENT)

## 2023-02-28 ASSESSMENT — PAIN SCALES - GENERAL
PAINLEVEL_OUTOF10: 9
PAINLEVEL_OUTOF10: 9
PAINLEVEL_OUTOF10: 8
PAINLEVEL_OUTOF10: 4
PAINLEVEL_OUTOF10: 0
PAINLEVEL_OUTOF10: 9
PAINLEVEL_OUTOF10: 10
PAINLEVEL_OUTOF10: 8

## 2023-02-28 ASSESSMENT — PAIN DESCRIPTION - PAIN TYPE
TYPE: SURGICAL PAIN
TYPE: SURGICAL PAIN

## 2023-02-28 ASSESSMENT — PAIN DESCRIPTION - FREQUENCY: FREQUENCY: CONTINUOUS

## 2023-02-28 ASSESSMENT — PAIN DESCRIPTION - DESCRIPTORS
DESCRIPTORS: ACHING
DESCRIPTORS: SHARP

## 2023-02-28 ASSESSMENT — PAIN DESCRIPTION - ORIENTATION
ORIENTATION: POSTERIOR;LOWER
ORIENTATION: RIGHT;OTHER (COMMENT)

## 2023-02-28 ASSESSMENT — PAIN SCALES - WONG BAKER: WONGBAKER_NUMERICALRESPONSE: 0

## 2023-02-28 ASSESSMENT — PAIN DESCRIPTION - ONSET: ONSET: ON-GOING

## 2023-02-28 NOTE — PROGRESS NOTES
Chart review  Minimal chest tube drainage  Will likely remove chest tube tomorrow.      Canelo Olsen PA-C  [facility]  [phone]

## 2023-02-28 NOTE — PROGRESS NOTES
Palliative Medicine follow up note  [phone] ([phone][facility]  [facility]: [phone]     Patient Name: Maureen Zepeda  YOB: 1950    Date of Initial Consult: 2/27/23  Date of service:2/28/23  Reason for Consult: goals of care discussion/symptoms management  Requesting Provider: dr. Amos Medrano   Primary Care Physician: Sharyle Peru, APRN - NP      SUMMARY:     Maureen Zepeda is a 71 y.o. with a past history of right lower lobe lung mass secondary to adenocarcinoma, CVA, osteoarthritis, and osteopenia, who was admitted on 2/26/2023 from home with a diagnosis of worsening shortness of breath and chest pain at biopsy site. Patient had CTA chest done in the emergency room was negative for PE showed large right pleural effusion and newer nodular density in the superior left lower lobe with new indeterminate liver lesions. Had right-sided chest tube placed for metastatic pleural effusion and 1 L of bloody drainage was taken out. Patient continues to have pain at that site. Due to recent diagnosis of adenocarcinoma and current medical issues leading to Palliative Medicine involvement include: To discuss goals of care. 2/28/23: Patient was seen in presence of palliative care staff member. Patient continues to have right-sided chest tube site pain. Has had intermittent nausea and vomiting. Denies any constipation. Cough and shortness of breath present. No headaches or dizziness currently    2/27/23: Patient was seen in presence of palliative care RN. Patient continues to have chest discomfort at chest tube site. Shortness of breath and no exacerbation. No cough currently. No hemoptysis. No nausea or vomiting. No constipation denies for any tingling or numbness.      HISTORY:     History obtained from: Patient    CHIEF COMPLAINT: Shortness of breath and chest pain    HPI/SUBJECTIVE:    The patient is:   [x] Verbal and participatory  [] Non-participatory due to: PHYSICAL EXAM:     From RN flowsheet:  Wt Readings from Last 3 Encounters:   02/28/23 125 lb 14.1 oz (57.1 kg)   01/20/23 157 lb (71.2 kg)   01/13/23 157 lb (71.2 kg)       /70   Pulse 100   Temp 97.9 °F (36.6 °C) (Axillary)   Resp 20   Ht 4' 11\" (1.499 m)   Wt 125 lb 14.1 oz (57.1 kg)   SpO2 93%   BMI 25.43 kg/m²     Constitutional: Awake, follows verbal commands appropriately, not in acute distress, nasal cannula is in situ. Cardiovascular: regular rhythm, distal pulses intact  Respiratory: Diminished breath sound bibasilar, right-sided chest tube present. No air leak currently. Gastrointestinal: soft non-tender, +bowel sounds  Musculoskeletal: No pedal edema, no deformity, no tenderness to palpation  Neurologic: following commands, moving all extremities, no tremors noted  Psychiatric: full affect, no hallucinations, follows verbal commands appropriately  Other:       PALLIATIVE DIAGNOSES:   goals of care discussion/Advance care planning   2. Metastatic lung cancer  3. Right pleural patient s/p chest tube placement  4. Chest pain and shortness of breath  5. Comorbidities including CVA, osteoarthritis, pleural effusion, anxiety disorder etc.    Patient Active Problem List   Diagnosis    Homelessness    Gingivitis    RITA (acute kidney injury) ([facility] [address]    Sepsis ([facility] [address]    Chest pain    Community acquired pneumonia    Metastatic lung cancer (metastasis from lung to other site) [facility])    Suspected lung cancer    CVA, old, facial weakness    Anxiety    Tobacco abuse    Osteopenia    Osteoarthritis    Encounter for palliative care    Debility    Pleural effusion    Tachycardia    DNR (do not resuscitate) discussion    Palliative care encounter    Goals of care, counseling/discussion            GOALS OF CARE / TREATMENT PREFERENCES:     GOALS OF CARE:    2/28/23: Patient was seen in presence of palliative care staff member.   Patient continues to have right-sided chest tube site pain and intermittent nausea and vomiting. Patient recognized me from yesterday visit. She completed her AMD naming her daughters to be medical power of . She wants us to take the chest tube out. I explained her the importance of having chest tube in. We will add lidocaine patch. Patient is on Zofran as needed for nausea and vomiting management. Plan: Patient will remain full code with full interventions, AMD completed    2/27/23: Patient was seen in presence of palliative care staff member. Patient is awake and oriented x3. Patient is able to participate in her decision-making process based on our clinical exam.  We introduced ourselves and explained her the purpose of our visit. Patient knows her recent diagnosis of lung cancer. She lives by herself but she has 5 kids. 4 daughters and 1 son. Her oldest daughter is in alf. She has had some dyspnea on exertion but no tingling numbness on chronic nausea or constipation. She has had some pain around the chest tube site. We discussed with her about her current clinical issues including the need for chest tube. She verbalized understanding about it. We discussed with her about the importance of having AMD to be completed. She wants her daughter, Ms. Niraj Fuller, to be primary medical decision-maker and her daughter Ms. Raghavendra Salmeron, to be secondary medical decision maker. When we ask her about her CODE STATUS after explaining the benefits and burdens of CPR, shocks and intubation, she decided to get everything to be done with understanding high risk of rib fractures and pneumothorax. She stated she would like to stay on machine for 10 to 14 days but wants to think about it. She stated she needs time to think over before signing this paperwork. We also discussed with her about options of treatment and encouraged her to talk to an oncologist about it.   Plan: Patient will remain full code with full intervention at this point, AMD will be completed later on today.    TREATMENT PREFERENCES:   Code Status: Full Code    Advance Care Planning:  Demographics 2/27/2023   Marital Status           Other Instructions: none          FUNCTIONAL ASSESSMENT:     Palliative Performance Scale (PPS):50       Clinical Pain Assessment (nonverbal scale for severity on nonverbal patients): 4/10             PSYCHOSOCIAL/SPIRITUAL SCREENING:       Any spiritual / Pentecostal concerns:  [] Yes /  [x] No    Caregiver Burnout:  [] Yes /  [] No /  [x] No Caregiver Present      Anticipatory grief assessment:   [] Normal  / [] Maladaptive          REVIEW OF SYSTEMS:     Positive and pertinent negative findings in ROS are noted above in HPI. The following systems were [x] reviewed / [] unable to be reviewed as noted in HPI  Other findings are noted below. Systems: constitutional, ears/nose/mouth/throat, respiratory, gastrointestinal, genitourinary, musculoskeletal, integumentary, neurologic, psychiatric, endocrine. Positive findings noted below. HISTORY:     Past Medical History:   Diagnosis Date    Angina at rest [facility])     Anxiety     CVA, old, facial weakness 2012    left ptosis    Dental caries     Osteoarthritis     Osteopenia     Right lower lobe lung mass 04/15/2022    Offered Biopsy and couldn't decide between PAlliative Care and Biopsy      Past Surgical History:   Procedure Laterality Date    CT BIOPSY PERCUTANEOUS SUPERFICIAL BONE  1/26/2023    CT BIOPSY PERCUTANEOUS SUPERFICIAL BONE 1/26/2023 [address]    WISDOM TOOTH EXTRACTION        Family History   Problem Relation Age of Onset    Cancer Daughter         Unspecified Cancer    Cancer Cousin         Unspecified Cancer x2      History reviewed, no pertinent family history. Social History     Tobacco Use    Smoking status: Some Days     Packs/day: 0.50     Types: Cigarettes    Smokeless tobacco: Never   Substance Use Topics    Alcohol use:  Yes     Allergies   Allergen Reactions    Aspirin Nausea Only      Current Facility-Administered Medications   Medication Dose Route Frequency    HYDROcodone-chlorpheniramine (TUSSIONEX) 10-8 MG/5ML oral suspension 5 mL  5 mL Oral Q12H PRN    vancomycin (VANCOCIN) 1,000 mg in sodium chloride 0.9 % 250 mL (vial-mate) IVPB  1,000 mg IntraVENous Q12H    potassium chloride (KLOR-CON M) extended release tablet 40 mEq  40 mEq Oral Daily with breakfast    nicotine (NICODERM CQ) 14 MG/24HR 1 patch  1 patch TransDERmal Daily    sodium chloride flush 0.9 % injection 5-40 mL  5-40 mL IntraVENous 2 times per day    sodium chloride flush 0.9 % injection 5-40 mL  5-40 mL IntraVENous PRN    ondansetron (ZOFRAN-ODT) disintegrating tablet 4 mg  4 mg Oral Q8H PRN    Or    ondansetron (ZOFRAN) injection 4 mg  4 mg IntraVENous Q6H PRN    polyethylene glycol (GLYCOLAX) packet 17 g  17 g Oral Daily PRN    famotidine (PEPCID) tablet 20 mg  20 mg Oral Daily    acetaminophen (TYLENOL) tablet 650 mg  650 mg Oral Q6H PRN    Or    acetaminophen (TYLENOL) suppository 650 mg  650 mg Rectal Q6H PRN    oxyCODONE-acetaminophen (PERCOCET) 5-325 MG per tablet 1 tablet  1 tablet Oral Q4H PRN    morphine (PF) injection 2 mg  2 mg IntraVENous Q4H PRN    naloxone (NARCAN) injection 0.4 mg  0.4 mg IntraVENous PRN    piperacillin-tazobactam (ZOSYN) 3,375 mg in sodium chloride 0.9 % 100 mL extended IVPB (mini-bag)  3,375 mg IntraVENous Q8H        LAB AND IMAGING FINDINGS:     Recent Results (from the past 24 hour(s))   Basic Metabolic Panel w/ Reflex to MG    Collection Time: 02/28/23  2:05 AM   Result Value Ref Range    Sodium 138 136 - 145 mmol/L    Potassium 3.4 (L) 3.5 - 5.5 mmol/L    Chloride 106 100 - 111 mmol/L    CO2 26 21 - 32 mmol/L    Anion Gap 6 3.0 - 18 mmol/L    Glucose 99 74 - 99 mg/dL    BUN 12 7.0 - 18 MG/DL    Creatinine 0.55 (L) 0.6 - 1.3 MG/DL    Bun/Cre Ratio 22 (H) 12 - 20      Est, Glom Filt Rate >60 >60 ml/min/1.73m2    Calcium 8.7 8.5 - 10.1 MG/DL   CBC with Auto Differential    Collection Time: 02/28/23 2:05 AM   Result Value Ref Range    WBC 15.5 (H) 4.6 - 13.2 K/uL    RBC 3.34 (L) 4.20 - 5.30 M/uL    Hemoglobin 9.9 (L) 12.0 - 16.0 g/dL    Hematocrit 28.6 (L) 35.0 - 45.0 %    MCV 85.6 78.0 - 100.0 FL    MCH 29.6 24.0 - 34.0 PG    MCHC 34.6 31.0 - 37.0 g/dL    RDW 15.3 (H) 11.6 - 14.5 %    Platelets 097 878 - 335 K/uL    MPV 10.0 9.2 - 11.8 FL    Nucleated RBCs 0.1 (H) 0  WBC    nRBC 0.02 (H) 0.00 - 0.01 K/uL    Seg Neutrophils 82 (H) 40 - 73 %    Lymphocytes 8 (L) 21 - 52 %    Monocytes 9 3 - 10 %    Eosinophils % 1 0 - 5 %    Basophils 1 0 - 2 %    Immature Granulocytes 1 (H) 0.0 - 0.5 %    Segs Absolute 12.6 (H) 1.8 - 8.0 K/UL    Absolute Lymph # 1.2 0.9 - 3.6 K/UL    Absolute Mono # 1.3 (H) 0.05 - 1.2 K/UL    Absolute Eos # 0.2 0.0 - 0.4 K/UL    Basophils Absolute 0.1 0.0 - 0.1 K/UL    Absolute Immature Granulocyte 0.1 (H) 0.00 - 0.04 K/UL    Differential Type AUTOMATED     Vancomycin Level, Random    Collection Time: 02/28/23  2:05 AM   Result Value Ref Range    Vancomycin Rm 9.9 5.0 - 40.0 UG/ML   Magnesium    Collection Time: 02/28/23  2:05 AM   Result Value Ref Range    Magnesium 2.1 1.6 - 2.6 mg/dL             Total time: 45 minutes    Disclaimer: Sections of this note are dictated using utilizing voice recognition software, which may have resulted in some phonetic based errors in grammar and contents. Even though attempts were made to correct all the mistakes, some may have been missed, and remained in the body of the document. If questions arise, please contact our department.

## 2023-02-28 NOTE — PROGRESS NOTES
0040: Patient with frequent coughing and blood tinged sputum, paged Royce Lee MD with update requested cough PRN. Orders received. 0268: Patients urine cloudy brown, still intermittently confused requiring lots of redirection. Paged MD for UA order.

## 2023-02-28 NOTE — PROGRESS NOTES
Progress Note  Hospitalist Service    Patient: Basilio Barnes MRN: [id number]   SSN: [id number]  YOB: 1950   Age: 71 y.o. Sex: female      Admit Date: 2/26/2023    LOS: 2 days   Chief Complaint   Patient presents with    Shortness of Breath    Abdominal Pain       Subjective:     Patient seen and examined. She appears more comfortable today. Patient has many life stressors including her new diagnosis, upcoming eviction. Pain tolerable. Chest tube still in place. Objective:     Vitals:  /69   Pulse (!) 104   Temp 98.4 °F (36.9 °C) (Axillary)   Resp 22   Ht 4' 11\" (1.499 m)   Wt 125 lb 14.1 oz (57.1 kg)   SpO2 93%   BMI 25.43 kg/m²     Physical Exam:   General appearance: alert, appears stated age, and combative  Lungs: on 2L nc. Chest tube in place.  Patient agitated - did not attempt to ascultate   Heart: regular rate and rhythm, S1, S2 normal, no murmur, click, rub or gallop  Abdomen: soft, non-tender; bowel sounds normal; no masses,  no organomegaly  Pulses: 2+ and symmetric  Skin: Skin color, texture, turgor normal. No rashes or lesions  Neuro:  normal without focal findings, mental status, speech normal, alert and oriented x3, VASQUEZ, and reflexes normal and symmetric    Intake and Output:  Current Shift: 02/28 0701 - 02/28 1900  In: -   Out: 20   Last three shifts: 02/26 1901 - 02/28 0700  In: -   Out: 1640 [Urine:1400]    Lab/Data Review:  Recent Results (from the past 12 hour(s))   Basic Metabolic Panel w/ Reflex to MG    Collection Time: 02/28/23  2:05 AM   Result Value Ref Range    Sodium 138 136 - 145 mmol/L    Potassium 3.4 (L) 3.5 - 5.5 mmol/L    Chloride 106 100 - 111 mmol/L    CO2 26 21 - 32 mmol/L    Anion Gap 6 3.0 - 18 mmol/L    Glucose 99 74 - 99 mg/dL    BUN 12 7.0 - 18 MG/DL    Creatinine 0.55 (L) 0.6 - 1.3 MG/DL    Bun/Cre Ratio 22 (H) 12 - 20      Est, Glom Filt Rate >60 >60 ml/min/1.73m2    Calcium 8.7 8.5 - 10.1 MG/DL   CBC with Auto Differential Collection Time: 02/28/23  2:05 AM   Result Value Ref Range    WBC 15.5 (H) 4.6 - 13.2 K/uL    RBC 3.34 (L) 4.20 - 5.30 M/uL    Hemoglobin 9.9 (L) 12.0 - 16.0 g/dL    Hematocrit 28.6 (L) 35.0 - 45.0 %    MCV 85.6 78.0 - 100.0 FL    MCH 29.6 24.0 - 34.0 PG    MCHC 34.6 31.0 - 37.0 g/dL    RDW 15.3 (H) 11.6 - 14.5 %    Platelets 763 059 - 578 K/uL    MPV 10.0 9.2 - 11.8 FL    Nucleated RBCs 0.1 (H) 0  WBC    nRBC 0.02 (H) 0.00 - 0.01 K/uL    Seg Neutrophils 82 (H) 40 - 73 %    Lymphocytes 8 (L) 21 - 52 %    Monocytes 9 3 - 10 %    Eosinophils % 1 0 - 5 %    Basophils 1 0 - 2 %    Immature Granulocytes 1 (H) 0.0 - 0.5 %    Segs Absolute 12.6 (H) 1.8 - 8.0 K/UL    Absolute Lymph # 1.2 0.9 - 3.6 K/UL    Absolute Mono # 1.3 (H) 0.05 - 1.2 K/UL    Absolute Eos # 0.2 0.0 - 0.4 K/UL    Basophils Absolute 0.1 0.0 - 0.1 K/UL    Absolute Immature Granulocyte 0.1 (H) 0.00 - 0.04 K/UL    Differential Type AUTOMATED     Vancomycin Level, Random    Collection Time: 02/28/23  2:05 AM   Result Value Ref Range    Vancomycin Rm 9.9 5.0 - 40.0 UG/ML   Magnesium    Collection Time: 02/28/23  2:05 AM   Result Value Ref Range    Magnesium 2.1 1.6 - 2.6 mg/dL         Key Findings or tests:       Telemetry NONE   Oxygen NONE     Assessment and Plan:     Sepsis 2/2 infected pleural effusion- per ER \"sterile field was compromised\" during the procedure   Right Pleural Effusion   Metastatic Lung Cancer - adenocarcinoma, diagnosed 1 month ago    #1-3. Patient with right sided chest tube inserted in ED. CT surgery following. Patient to receive daily CXR. To be continued on vanc/zosyn (2/27-current). Awaiting culture. Palliative medicine is involved.    Tachycardia - borderline - possibly secondary to pain   Hx  of CVA with left facial ptosis  Hx of Anxiety  Tobacco Abuse - nicotine patch       Diet Regular diet    DVT Prophylax    GI Prophylaxis Famotidine    Code status Full    Disposition 2-3 days         Sita Zuniga DO, hospitalist February 28, 2023

## 2023-02-28 NOTE — PROGRESS NOTES
Comprehensive Nutrition Assessment    Type and Reason for Visit:  Initial, Positive Nutrition Screen    Nutrition Recommendations/Plan:   Plan to add oral nutrition supplement to optimize nutrition intake opportunity: Ensure Enlive (each provides 350 kcal, 20g protein) BID    Continue current diet as tolerated by patient - include feeding assistance as needed  Monitor PO intake, compliance of oral supplement, weight, labs, and plan of care during admission.       Malnutrition Assessment:  Malnutrition Status:  Moderate malnutrition (02/28/23 1041)    Context:  Acute Illness     Findings of the 6 clinical characteristics of malnutrition:  Energy Intake:  Mild decrease in energy intake (Comment)  Weight Loss:  Greater than 5% over 1 month ((-20% significant change x 30 days).)     Body Fat Loss:  Mild body fat loss Buccal region, Orbital   Muscle Mass Loss:  Mild muscle mass loss Clavicles (pectoralis & deltoids)  Fluid Accumulation:  No significant fluid accumulation     Strength:  Not Performed    Nutrition History and Allergies:   Past medical history:RLL lung mass, CVA, osteoarthritis, osteopenia. NKFA.  Weight history per chart review:  CBW: 2/28/2023: 125 lbs-14 oz, 30 days: 1/23/2023: 157 lbs, > 180 days: 7/27/2022: 130 lbs. Weight trending down x 30 days (-20% significant change x 30 days).     Nutrition Assessment:    Pt admitted with shortness of breath, pain to biopsy site, chest pain-recently diagnosed with lung cancer per MD note. Positive nutrition screen noted, MST: unsure of weight loss; weight loss noted (-20% significant change x 30 days). Pt denies abdominal pain nor d/c/n/v. Confused noted. Current lab shows low Potassium (3.4)-replaced, Creatinine (0.55). Pt tolerating current diet with less than 50% PO intake at most meals - no additional oral supplements at this time.    Nutrition Related Findings:    Last BM PTA. Output: 350mL (urine). Pertinent Medications: pepcid, potassium chloride.  Wound Type: Skin Tears      Current Nutrition Intake & Therapies:    Average Meal Intake: 1-25%  Average Supplements Intake: None Ordered  ADULT DIET; Regular    Anthropometric Measures:  Height: 4' 11\" (149.9 cm)  Ideal Body Weight (IBW): 95 lbs (43 kg)       Current Body Weight: 125 lb 14.1 oz (57.1 kg), 132.5 % IBW.  Weight Source: Bed Scale  Current BMI (kg/m2): 25.4  Weight Adjustment For: No Adjustment  BMI Categories: Normal Weight (BMI 22.0 to 24.9) age over 72    Estimated Daily Nutrient Needs:  Energy Requirements Based On: Kcal/kg (25-30)  Weight Used for Energy Requirements: Current  Energy (kcal/day): 2360-5850  Weight Used for Protein Requirements: Current (1.0-1.3)  Protein (g/day): 57-74  Method Used for Fluid Requirements: 1 ml/kcal  Fluid (ml/day): 9775-3674    Nutrition Diagnosis:   Inadequate oral intake related to early satiety, inadequate protein-energy intake, impaired respiratory function as evidenced by intake 0-25%, intake 26-50%, poor intake prior to admission, weight loss  Moderate malnutrition, In context of acute illness or injury related to catabolic illness, increase demand for energy/nutrients as evidenced by Criteria as identified in malnutrition assessment    Nutrition Interventions:   Food and/or Nutrient Delivery: Continue Current Diet, Start Oral Nutrition Supplement  Nutrition Education/Counseling: Education not indicated, No recommendation at this time  Coordination of Nutrition Care: Continue to monitor while inpatient       Goals:     Goals: Meet at least 75% of estimated needs, by next RD assessment       Nutrition Monitoring and Evaluation:   Behavioral-Environmental Outcomes: None Identified  Food/Nutrient Intake Outcomes: Diet Advancement/Tolerance, Food and Nutrient Intake, Supplement Intake  Physical Signs/Symptoms Outcomes: Biochemical Data, GI Status, Meal Time Behavior, Weight, Nutrition Focused Physical Findings    Discharge Planning:    Continue current diet     MIKY WILSON, DASHAWN  Contact: [phone]

## 2023-02-28 NOTE — ACP (ADVANCE CARE PLANNING)
Advance Care Planning     General Advance Care Planning (ACP) Conversation    Palliative Medicine    Visited patient along with Palliative team member [address]UM[address] [facility] [facility]. Patient sitting up in bed, awake, alert. Chest tube remains intact. Follow up visit for completion of Advanced Medical Directive. Pt does not have an Advance Directive on file in EMR. She is agreeable to completing one today naming her daughter Ruth Ann Ty as her primary medical decision maker, and her daughter Ruth Ann Ty as the secondary medical decision. Primary Decision Maker (Health Care Agent): Ruth Ann Ty  Relationship to patient: daughter  Phone number: [phone]  [x] Named in a scanned document   [] Legal Next of Kin  [] Guardian    Secondary Decision Maker ([address]): Ruth Ann Ty  Relationship to patient: daughter  Phone number: [phone]  [x] Named in a scanned document   [] Legal Next of Kin  [] Guardian    ACP documents you currently have include:  [x] Advance Directive or Living Will  [] Durable Do Not Resuscitate  [] Physician Orders for Scope of Treatment (POST)  [] Medical Power of   [] Other    Thank you for the Palliative Medicine consult and allowing us to participate in the care of Ms Camacho Rubio  Will continue to monitor and provide support.     CODE STATUS: FULL CODE WITH FULL INTERVENTIONS    Jaun Reis RN  Palliative Medicine Inpatient RN  DR. KUMAR [facility]  Palliative COPE Line: [phone])

## 2023-02-28 NOTE — PROGRESS NOTES
[address] [facility] Pharmacokinetic Monitoring Service - Vancomycin    Indication: PNA  Goal AUC/MARY: 400-600 mg*hr/L  Day of Therapy: 2  Additional Antimicrobials: pip-tazo    Pertinent Laboratory Values: Wt Readings from Last 1 Encounters:   02/28/23 125 lb 14.1 oz (57.1 kg)     Temp Readings from Last 1 Encounters:   02/28/23 98.4 °F (36.9 °C) (Axillary)     Procalcitonin   Date Value Ref Range Status   11/08/2021 0.06 ng/mL Final     Comment:          Suspected Sepsis:  <0.50 ng/mL     Low likelihood of sepsis. 0.50-2.00 ng/mL    Increased likelihood of sepsis. Antibiotics encouraged. >2.00 ng/mL  High risk of sepsis/shock. Antibiotics strongly encouraged. Suspected Lower Resp Tract Infections:  <0.24 ng/mL    Low likelihood of bacterial infection. >0.24 ng/mL    Increased likelihood of bacterial infection. Antibiotics encouraged. With successful antibiotic therapy, PCT levels should decrease rapidly. (Half-life of 24 to 36 hours)       Procalcitonin values from samples collected within the first 6 hours of systemic infection may still be low. Retesting may be indicated. Values from day 1 and day 4 can be entered into the Change in Procalcitonin Calculator (www.[facility]-pct-calculator. BATTERIES & BANDS) to determine the patient's Mortality Risk Prognosis. In healthy neonates, plasma Procalcitonin (PCT) concentrations increase gradually after birth, reaching peak values at about 24 hours of age then decrease to normal values below 0.5 ng/mL by 48-72 hours of age. Estimated Creatinine Clearance: 72 mL/min (A) (based on SCr of 0.55 mg/dL (L)). Recent Labs     02/27/23  0141 02/28/23  0205   CREATININE 0.82 0.55*   WBC 19.1* 15.5*     Pertinent Cultures:  Culture Date Source Results   2/27 blood NGTD   MRSA Nasal Swab: not ordered. Order placed by pharmacy.     Assessment:  Date Current Dose Concentration (mg/L) Timing of Concentration (h) AUC   2/26 1,750 mg x1 - - -   2/27 750 mg q18h - - -   2/28 750 mg q18h  1,000 mg q12h 9.9 13 250  488   Note: Serum concentrations collected for AUC dosing may appear elevated if collected in close proximity to the dose administered, this is not necessarily an indication of toxicity    Plan:  Increase dose from 750 mg q18h to 1,000 mg q12h  No level ordered at this time  Pharmacy will continue to monitor patient and adjust therapy as indicated    Thank you for the consult,  Estella Mendoza, [facility]  2/28/2023

## 2023-03-01 ENCOUNTER — APPOINTMENT (OUTPATIENT)
Facility: HOSPITAL | Age: 73
DRG: 720 | End: 2023-03-01
Payer: MEDICAID

## 2023-03-01 LAB
ALBUMIN SERPL-MCNC: 1.9 G/DL (ref 3.4–5)
ALBUMIN/GLOB SERPL: 0.5 (ref 0.8–1.7)
ALP SERPL-CCNC: 128 U/L (ref 45–117)
ALT SERPL-CCNC: 19 U/L (ref 13–56)
ANION GAP SERPL CALC-SCNC: 5 MMOL/L (ref 3–18)
APPEARANCE UR: ABNORMAL
AST SERPL-CCNC: 43 U/L (ref 10–38)
BACTERIA URNS QL MICRO: ABNORMAL /HPF
BASOPHILS # BLD: 0.1 K/UL (ref 0–0.1)
BASOPHILS NFR BLD: 1 % (ref 0–2)
BILIRUB SERPL-MCNC: 1.1 MG/DL (ref 0.2–1)
BILIRUB UR QL: ABNORMAL
BUN SERPL-MCNC: 8 MG/DL (ref 7–18)
BUN/CREAT SERPL: 16 (ref 12–20)
CALCIUM SERPL-MCNC: 8.4 MG/DL (ref 8.5–10.1)
CHLORIDE SERPL-SCNC: 106 MMOL/L (ref 100–111)
CO2 SERPL-SCNC: 27 MMOL/L (ref 21–32)
COLOR UR: ABNORMAL
CREAT SERPL-MCNC: 0.5 MG/DL (ref 0.6–1.3)
DIFFERENTIAL METHOD BLD: ABNORMAL
EOSINOPHIL # BLD: 0.1 K/UL (ref 0–0.4)
EOSINOPHIL NFR BLD: 1 % (ref 0–5)
EPITH CASTS URNS QL MICRO: ABNORMAL /LPF (ref 0–5)
ERYTHROCYTE [DISTWIDTH] IN BLOOD BY AUTOMATED COUNT: 15.2 % (ref 11.6–14.5)
GLOBULIN SER CALC-MCNC: 3.5 G/DL (ref 2–4)
GLUCOSE SERPL-MCNC: 109 MG/DL (ref 74–99)
GLUCOSE UR STRIP.AUTO-MCNC: NEGATIVE MG/DL
HCT VFR BLD AUTO: 25 % (ref 35–45)
HGB BLD-MCNC: 8.9 G/DL (ref 12–16)
HGB UR QL STRIP: NEGATIVE
IMM GRANULOCYTES # BLD AUTO: 0.1 K/UL (ref 0–0.04)
IMM GRANULOCYTES NFR BLD AUTO: 0 % (ref 0–0.5)
KETONES UR QL STRIP.AUTO: ABNORMAL MG/DL
LEUKOCYTE ESTERASE UR QL STRIP.AUTO: ABNORMAL
LYMPHOCYTES # BLD: 1.1 K/UL (ref 0.9–3.6)
LYMPHOCYTES NFR BLD: 8 % (ref 21–52)
MCH RBC QN AUTO: 29.3 PG (ref 24–34)
MCHC RBC AUTO-ENTMCNC: 35.6 G/DL (ref 31–37)
MCV RBC AUTO: 82.2 FL (ref 78–100)
MONOCYTES # BLD: 1.3 K/UL (ref 0.05–1.2)
MONOCYTES NFR BLD: 9 % (ref 3–10)
MUCOUS THREADS URNS QL MICRO: ABNORMAL /LPF
NEUTS SEG # BLD: 11.2 K/UL (ref 1.8–8)
NEUTS SEG NFR BLD: 81 % (ref 40–73)
NITRITE UR QL STRIP.AUTO: NEGATIVE
NRBC # BLD: 0.02 K/UL (ref 0–0.01)
NRBC BLD-RTO: 0.1 PER 100 WBC
PH UR STRIP: 5.5 (ref 5–8)
PLATELET # BLD AUTO: 254 K/UL (ref 135–420)
PMV BLD AUTO: 9.7 FL (ref 9.2–11.8)
POTASSIUM SERPL-SCNC: 3.6 MMOL/L (ref 3.5–5.5)
PROT SERPL-MCNC: 5.4 G/DL (ref 6.4–8.2)
PROT UR STRIP-MCNC: 30 MG/DL
RBC # BLD AUTO: 3.04 M/UL (ref 4.2–5.3)
RBC #/AREA URNS HPF: ABNORMAL /HPF (ref 0–5)
SODIUM SERPL-SCNC: 138 MMOL/L (ref 136–145)
SP GR UR REFRACTOMETRY: 1.03 (ref 1–1.03)
TRICHOMONAS UR QL MICRO: ABNORMAL
UROBILINOGEN UR QL STRIP.AUTO: 1 EU/DL (ref 0.2–1)
WBC # BLD AUTO: 13.8 K/UL (ref 4.6–13.2)
WBC URNS QL MICRO: ABNORMAL /HPF (ref 0–4)

## 2023-03-01 PROCEDURE — 6360000002 HC RX W HCPCS

## 2023-03-01 PROCEDURE — 87086 URINE CULTURE/COLONY COUNT: CPT

## 2023-03-01 PROCEDURE — 99232 SBSQ HOSP IP/OBS MODERATE 35: CPT | Performed by: PHYSICIAN ASSISTANT

## 2023-03-01 PROCEDURE — 2700000000 HC OXYGEN THERAPY PER DAY

## 2023-03-01 PROCEDURE — 99232 SBSQ HOSP IP/OBS MODERATE 35: CPT | Performed by: INTERNAL MEDICINE

## 2023-03-01 PROCEDURE — 2140000001 HC CVICU INTERMEDIATE R&B

## 2023-03-01 PROCEDURE — APPNB30 APP NON BILLABLE TIME 0-30 MINS: Performed by: PHYSICIAN ASSISTANT

## 2023-03-01 PROCEDURE — 6370000000 HC RX 637 (ALT 250 FOR IP): Performed by: STUDENT IN AN ORGANIZED HEALTH CARE EDUCATION/TRAINING PROGRAM

## 2023-03-01 PROCEDURE — 6370000000 HC RX 637 (ALT 250 FOR IP)

## 2023-03-01 PROCEDURE — 6370000000 HC RX 637 (ALT 250 FOR IP): Performed by: INTERNAL MEDICINE

## 2023-03-01 PROCEDURE — 80053 COMPREHEN METABOLIC PANEL: CPT

## 2023-03-01 PROCEDURE — 71045 X-RAY EXAM CHEST 1 VIEW: CPT

## 2023-03-01 PROCEDURE — 2580000003 HC RX 258: Performed by: STUDENT IN AN ORGANIZED HEALTH CARE EDUCATION/TRAINING PROGRAM

## 2023-03-01 PROCEDURE — 85025 COMPLETE CBC W/AUTO DIFF WBC: CPT

## 2023-03-01 PROCEDURE — 2580000003 HC RX 258

## 2023-03-01 PROCEDURE — 6360000002 HC RX W HCPCS: Performed by: STUDENT IN AN ORGANIZED HEALTH CARE EDUCATION/TRAINING PROGRAM

## 2023-03-01 PROCEDURE — 36415 COLL VENOUS BLD VENIPUNCTURE: CPT

## 2023-03-01 PROCEDURE — 2500000003 HC RX 250 WO HCPCS: Performed by: INTERNAL MEDICINE

## 2023-03-01 PROCEDURE — 81001 URINALYSIS AUTO W/SCOPE: CPT

## 2023-03-01 PROCEDURE — 87077 CULTURE AEROBIC IDENTIFY: CPT

## 2023-03-01 PROCEDURE — 2580000003 HC RX 258: Performed by: INTERNAL MEDICINE

## 2023-03-01 PROCEDURE — 87186 SC STD MICRODIL/AGAR DIL: CPT

## 2023-03-01 RX ADMIN — FAMOTIDINE 20 MG: 20 TABLET ORAL at 08:58

## 2023-03-01 RX ADMIN — POTASSIUM CHLORIDE 40 MEQ: 1500 TABLET, EXTENDED RELEASE ORAL at 08:57

## 2023-03-01 RX ADMIN — SODIUM CHLORIDE, PRESERVATIVE FREE 10 ML: 5 INJECTION INTRAVENOUS at 09:00

## 2023-03-01 RX ADMIN — OXYCODONE HYDROCHLORIDE 5 MG: 5 TABLET ORAL at 21:36

## 2023-03-01 RX ADMIN — VANCOMYCIN HYDROCHLORIDE 1000 MG: 1 INJECTION, POWDER, LYOPHILIZED, FOR SOLUTION INTRAVENOUS at 08:58

## 2023-03-01 RX ADMIN — Medication 5 ML: at 03:45

## 2023-03-01 RX ADMIN — VANCOMYCIN HYDROCHLORIDE 1000 MG: 1 INJECTION, POWDER, LYOPHILIZED, FOR SOLUTION INTRAVENOUS at 21:37

## 2023-03-01 RX ADMIN — PIPERACILLIN SODIUM AND TAZOBACTAM SODIUM 3375 MG: 3; .375 INJECTION, POWDER, LYOPHILIZED, FOR SOLUTION INTRAVENOUS at 16:30

## 2023-03-01 RX ADMIN — PIPERACILLIN SODIUM AND TAZOBACTAM SODIUM 3375 MG: 3; .375 INJECTION, POWDER, LYOPHILIZED, FOR SOLUTION INTRAVENOUS at 07:53

## 2023-03-01 RX ADMIN — SODIUM CHLORIDE, PRESERVATIVE FREE 10 ML: 5 INJECTION INTRAVENOUS at 21:38

## 2023-03-01 RX ADMIN — GABAPENTIN 100 MG: 100 CAPSULE ORAL at 21:35

## 2023-03-01 ASSESSMENT — PAIN SCALES - WONG BAKER: WONGBAKER_NUMERICALRESPONSE: 2

## 2023-03-01 ASSESSMENT — PAIN DESCRIPTION - DESCRIPTORS: DESCRIPTORS: THROBBING

## 2023-03-01 ASSESSMENT — PAIN SCALES - GENERAL
PAINLEVEL_OUTOF10: 8
PAINLEVEL_OUTOF10: 8
PAINLEVEL_OUTOF10: 3
PAINLEVEL_OUTOF10: 7

## 2023-03-01 ASSESSMENT — PAIN DESCRIPTION - PAIN TYPE
TYPE: SURGICAL PAIN
TYPE: SURGICAL PAIN

## 2023-03-01 ASSESSMENT — PAIN DESCRIPTION - ORIENTATION
ORIENTATION: RIGHT

## 2023-03-01 ASSESSMENT — PAIN DESCRIPTION - LOCATION
LOCATION: CHEST

## 2023-03-01 NOTE — PROGRESS NOTES
Progress Note  Hospitalist Service    Patient: Armen Fitzgerald MRN: [id number]   SSN: [id number]  YOB: 1950   Age: 71 y.o. Sex: female      Admit Date: 2/26/2023    LOS: 3 days   Chief Complaint   Patient presents with    Shortness of Breath    Abdominal Pain       Subjective:     Patient seen and examined. Patient has many life stressors including her new diagnosis, upcoming eviction. Pain tolerable. Chest tube still in place. Patient with son and daughter visiting today. Patient would not allow writer to update her today - began screaming immediately and could not be reasoned with. Will revisit tomorrow. Objective:     Vitals:  /72   Pulse (!) 109   Temp 99.6 °F (37.6 °C) (Oral)   Resp 16   Ht 4' 11\" (1.499 m)   Wt 125 lb 14.1 oz (57.1 kg)   SpO2 90%   BMI 25.43 kg/m²     Physical Exam:   General appearance: alert, appears stated age, and combative  Lungs: on 2L nc. Chest tube in place. Patient agitated - did not attempt to ascultate   Heart: regular rate and rhythm, S1, S2 normal, no murmur, click, rub or gallop  Abdomen: soft, non-tender; bowel sounds normal; no masses,  no organomegaly  Pulses: 2+ and symmetric  Skin: Skin color, texture, turgor normal. No rashes or lesions  Neuro:  normal without focal findings, mental status, speech normal, alert and oriented x3, VASQUEZ, and reflexes normal and symmetric      Intake and Output:  Current Shift: 03/01 0701 - 03/01 1900  In: -   Out: 50   Last three shifts: 02/27 1901 - 03/01 0700  In: -   Out: 0320 [Urine:1050]    Lab/Data Review:  No results found for this or any previous visit (from the past 12 hour(s)). Key Findings or tests:       Telemetry NONE   Oxygen NONE     Assessment and Plan:     Sepsis 2/2 infected pleural effusion- per ER \"sterile field was compromised\" during the procedure   Right Pleural Effusion   Metastatic Lung Cancer - adenocarcinoma, diagnosed 1 month ago    #1-3.  Patient with right sided chest tube inserted in ED. CT surgery following. Patient to receive daily CXR. To be continued on vanc/zosyn (2/27-current). Awaiting culture. Palliative medicine is involved. Hopefully chest tube can be removed tomorrow.    Tachycardia - borderline - possibly secondary to pain   Hx  of CVA with left facial ptosis  Hx of Anxiety  Tobacco Abuse - nicotine patch       Diet Regular diet    DVT Prophylax    GI Prophylaxis Famotidine    Code status Full    Disposition 2-3 days         Delia Citizen, DO, hospitalist   March 1, 2023

## 2023-03-01 NOTE — PLAN OF CARE
Problem: Safety - Adult  Goal: Free from fall injury  Outcome: Progressing  Flowsheets (Taken 3/1/2023 0702)  Free From Fall Injury:   Instruct family/caregiver on patient safety   Based on caregiver fall risk screen, instruct family/caregiver to ask for assistance with transferring infant if caregiver noted to have fall risk factors  Note: Pt free from fall or injury during shift. Safety precautions in place during shift. Problem: Pain  Goal: Verbalizes/displays adequate comfort level or baseline comfort level  Outcome: Not Progressing  Flowsheets (Taken 3/1/2023 0702)  Verbalizes/displays adequate comfort level or baseline comfort level:   Encourage patient to monitor pain and request assistance   Assess pain using appropriate pain scale   Administer analgesics based on type and severity of pain and evaluate response   Implement non-pharmacological measures as appropriate and evaluate response  Note: Assess pt pain level using proper scale during shift. Give prn pain as prescribed during shift.

## 2023-03-01 NOTE — PROGRESS NOTES
1930-VSS, Pt tachy and tachypnic. No signs of distress observed. Pt denies any needs at this time. Will continue to monitor. 0346-Pt coughing frequently and c/o pain 7/10. Gave prn cough medicine, see MAR. Pt SPO2 90% on 3L. Will continue to monitor. Pt refuses to use IS although educated on benefit to increase lung function. 0415-Assisted pt up to bedside commode. Pt has weak gait. Dressing on chest tube soiled. Applied new dressing, petroleum gauze, split gauze x2 and silk tape. Pt tolerated well. New electrodes, bed pad, gown and linen change. Pt back in bed. Bed alarm on.

## 2023-03-01 NOTE — PROGRESS NOTES
[facility]          Chief Complaint:  Follow up for RIGHT effusion    Interval History:  RIGHT chest tube drained 200ml last 24h atrium at 400 ml. ROS:    denies CP or SOB. Exam:   BP 99/67   Pulse (!) 106   Temp 98.6 °F (37 °C) (Oral)   Resp 16   Ht 4' 11\" (1.499 m)   Wt 125 lb 14.1 oz (57.1 kg)   SpO2 91%   BMI 25.43 kg/m²      Const:  alert and oriented. NAD   CV:   RRR without murmur or rub. PMI not displaced. Respiratory:  Clear to ascultation without wheezes, rales or rhonchi. No accessory muscle use.     Assessment:    RIGHT effusion s/p chest tube  RIGHT lung mass    PLAN:  keep chest tube today    Erica Pereira PA-C  [facility]  [phone]

## 2023-03-01 NOTE — CARE COORDINATION
03/01/23 1259   Service Assessment   Patient Orientation Alert and Oriented   Cognition Alert   History Provided By Patient   Primary Caregiver Friend   Support Systems Friends/Neighbors   Prior Functional Level Independent in ADLs/IADLs   Current Functional Level Independent in ADLs/IADLs   Can patient return to prior living arrangement Yes   Ability to make needs known: Good   Family able to assist with home care needs: Other (comment)  (patient says she has a friend that stays with her from 5pm to 4 am every day overnight.)   Financial Resources   ([facility])   Social/Functional History   Lives With Friend(s)   Type of [address] [employer] [address]   Home Equipment None   Receives Help From Friend(s)   ADL Assistance Independent   Homemaking Assistance Independent   Ambulation Assistance Independent   Transfer Assistance Independent   Discharge Planning   Type of [facility] Friends   Patient expects to be discharged to: [address] [place] [address] Discharge   Transition of Care Consult (CM Consult) [facility] Yes   Mode of Transport at Discharge Self  (pt states her sister will transport her home at time of discharge.)   Confirm Follow Up Transport Self     Case Management Assessment  Initial Evaluation    Date/Time of Evaluation: 3/1/2023 1:27 PM  Assessment Completed by: Tate Christy RN    If patient is discharged prior to next notation, then this note serves as note for discharge by case management. Patient Name: Klaudia Desai                   YOB: 1950  Diagnosis: Pleural effusion [J90]                   Date / Time: 2/26/2023  1:41 PM    Patient Admission Status: Inpatient   Readmission Risk (Low < 19, Mod (19-27), High > 27): Readmission Risk Score: 17.5    Current PCP: ALEJANDRO Darnell NP  PCP verified by CM?       Chart Reviewed: Yes      History Provided by: Patient  Patient Orientation: Alert and Oriented    Patient Cognition: Alert    Hospitalization in the last 30 days (Readmission):  No    If yes, Readmission Assessment in  Navigator will be completed. Advance Directives:      Code Status: Full Code   Patient's Primary Decision Maker is:      Primary Decision Maker: Keyanna Ivy (Daughter) - Child - [phone]    Secondary Decision Maker: Keyanna Ivy (Daughter) - Child - [phone]    Discharge Planning:    Patient lives with: (P) Friends Type of Home: (P) Apartment  Primary Care Giver: Friend  Patient Support Systems include: Friends/Neighbors   Current Financial resources:  ([facility])  Current community resources:    Current services prior to admission:              Current DME:              Type of Home Care services:       ADLS  Prior functional level: Independent in ADLs/IADLs  Current functional level: Independent in ADLs/IADLs    PT AM-PAC:   /24  OT AM-PAC:   /24    Family can provide assistance at DC: Other (comment) (patient says she has a friend that stays with her from 5pm to 4 am every day overnight.)  Would you like Case Management to discuss the discharge plan with any other family members/significant others, and if so, who? Plans to Return to Present Housing: Yes  Other Identified Issues/Barriers to RETURNING to current housing: none at this time.   Potential Assistance needed at discharge:              Potential DME:    Patient expects to discharge to: (P) [address] [place] [address] for transportation at discharge: (P) Self    Financial    Payor: Jitendra 58 / Plan: [address] / Product Type: *No Product type* /     Does insurance require precert for SNF: does not cover    Potential assistance Purchasing Medications:    Meds-to-Beds request:      No Pharmacies Listed    Notes:    Factors facilitating achievement of predicted outcomes: Caregiver support, Motivated, Cooperative, and Pleasant    Barriers to discharge: Additional Case Management Notes: The Plan for Transition of Care is related to the following treatment goals of Pleural effusion [U83]    IF APPLICABLE: The Patient and/or patient representative Kadi Richardson and her family were provided with a choice of provider and agrees with the discharge plan. Freedom of choice list with basic dialogue that supports the patient's individualized plan of care/goals and shares the quality data associated with the providers was provided to:     Patient Representative Name:       The Patient and/or Patient Representative Agree with the Discharge Plan?       Nelli Peralta RN  Case Management Department  Ph: [phone]

## 2023-03-01 NOTE — PROGRESS NOTES
Palliative Medicine follow up note  DR. SPENCER'[facility]: [phone] ([phone])  [facility]: [phone]     Patient Name: Lesli Pineda  YOB: 1950    Date of Initial Consult: 2/27/23  Date of service:3/1/23  Reason for Consult: goals of care discussion/symptoms management  Requesting Provider: dr. Aakash Bruner   Primary Care Physician: ALEJANDRO Hui NP      SUMMARY:     Lesli Pineda is a 71 y.o. with a past history of right lower lobe lung mass secondary to adenocarcinoma, CVA, osteoarthritis, and osteopenia, who was admitted on 2/26/2023 from home with a diagnosis of worsening shortness of breath and chest pain at biopsy site. Patient had CTA chest done in the emergency room was negative for PE showed large right pleural effusion and newer nodular density in the superior left lower lobe with new indeterminate liver lesions. Had right-sided chest tube placed for metastatic pleural effusion and 1 L of bloody drainage was taken out. Patient continues to have pain at that site. Due to recent diagnosis of adenocarcinoma and current medical issues leading to Palliative Medicine involvement include: To discuss goals of care. 3/1/23: Patient was seen in presence of palliative care staff member. Patient continues to have right-sided chest discomfort. She wants us to pull chest tube out if possible. Continue on exertion present. No nausea or vomiting.    2/28/23: Patient was seen in presence of palliative care staff member. Patient continues to have right-sided chest tube site pain. Has had intermittent nausea and vomiting. Denies any constipation. Cough and shortness of breath present. No headaches or dizziness currently    2/27/23: Patient was seen in presence of palliative care RN. Patient continues to have chest discomfort at chest tube site. Shortness of breath and no exacerbation. No cough currently. No hemoptysis. No nausea or vomiting.   No constipation denies for any tingling or numbness. HISTORY:     History obtained from: Patient    CHIEF COMPLAINT: Shortness of breath and chest pain    HPI/SUBJECTIVE:    The patient is:   [x] Verbal and participatory  [] Non-participatory due to:         PHYSICAL EXAM:     From RN flowsheet:  Wt Readings from Last 3 Encounters:   02/28/23 125 lb 14.1 oz (57.1 kg)   01/20/23 157 lb (71.2 kg)   01/13/23 157 lb (71.2 kg)       BP 99/67   Pulse (!) 106   Temp 98.6 °F (37 °C) (Oral)   Resp 16   Ht 4' 11\" (1.499 m)   Wt 125 lb 14.1 oz (57.1 kg)   SpO2 91%   BMI 25.43 kg/m²     Constitutional: Awake, follows verbal commands appropriately, not in acute distress, nasal cannula is in situ. Cardiovascular: regular rhythm, distal pulses intact  Respiratory: Diminished breath sound bibasilar, right-sided chest tube present. No air leak currently. Gastrointestinal: soft non-tender, +bowel sounds  Musculoskeletal: No pedal edema, no deformity, no tenderness to palpation  Neurologic: following commands, moving all extremities, no tremors noted  Psychiatric: full affect, no hallucinations, follows verbal commands appropriately  Other:       PALLIATIVE DIAGNOSES:   goals of care discussion/Advance care planning   2. Metastatic lung cancer  3. Right pleural patient s/p chest tube placement  4. Chest pain and shortness of breath  5.   Comorbidities including CVA, osteoarthritis, pleural effusion, anxiety disorder etc.    Patient Active Problem List   Diagnosis    Homelessness    Gingivitis    RITA (acute kidney injury) ([facility] [address]    Sepsis ([facility] [address]    Chest pain    Community acquired pneumonia    Metastatic lung cancer (metastasis from lung to other site) [facility])    Suspected lung cancer    CVA, old, facial weakness    Anxiety    Tobacco abuse    Osteopenia    Osteoarthritis    Encounter for palliative care    Debility    Pleural effusion    Tachycardia    DNR (do not resuscitate) discussion    Palliative care encounter    Goals of care, counseling/discussion    Pain management    ACP (advance care planning)            GOALS OF CARE / TREATMENT PREFERENCES:     GOALS OF CARE:    3/1/23: Patient was seen in presence of palliative care staff member. Patient recognizes from previous visit. She stated she has talked to her family member and providing them an update including making her daughter, Ms. Garcia Sor to be primary medical decision-maker. She also wants to talk to . We called [facility] and informed about patient's decision for [facility]. She says she wants to try cancer treatment before deciding any further change in her goals of care. We discussed with her about DNR status but she is not interested in it. There are no new symptoms today for us to manage. Plan: Patient will remain full code with full intervention, we will sign off at this point, reconsult us if patient decides to change her goals of care    2/28/23: Patient was seen in presence of palliative care staff member. Patient continues to have right-sided chest tube site pain and intermittent nausea and vomiting. Patient recognized me from yesterday visit. She completed her AMD naming her daughters to be medical power of . She wants us to take the chest tube out. I explained her the importance of having chest tube in. We will add lidocaine patch. Patient is on Zofran as needed for nausea and vomiting management. Plan: Patient will remain full code with full interventions, AMD completed    2/27/23: Patient was seen in presence of palliative care staff member. Patient is awake and oriented x3. Patient is able to participate in her decision-making process based on our clinical exam.  We introduced ourselves and explained her the purpose of our visit. Patient knows her recent diagnosis of lung cancer. She lives by herself but she has 5 kids. 4 daughters and 1 son. Her oldest daughter is in group home.   She has had some dyspnea on exertion but no tingling numbness on chronic nausea or constipation. She has had some pain around the chest tube site. We discussed with her about her current clinical issues including the need for chest tube. She verbalized understanding about it. We discussed with her about the importance of having AMD to be completed. She wants her daughter, Ms. Lorin Richardson, to be primary medical decision-maker and her daughter Ms. Jonathan Mccartney, to be secondary medical decision maker. When we ask her about her CODE STATUS after explaining the benefits and burdens of CPR, shocks and intubation, she decided to get everything to be done with understanding high risk of rib fractures and pneumothorax. She stated she would like to stay on machine for 10 to 14 days but wants to think about it. She stated she needs time to think over before signing this paperwork. We also discussed with her about options of treatment and encouraged her to talk to an oncologist about it. Plan: Patient will remain full code with full intervention at this point, AMD will be completed later on today. TREATMENT PREFERENCES:   Code Status: Full Code    Advance Care Planning:  Demographics 2/27/2023   Marital Status           Other Instructions: none          FUNCTIONAL ASSESSMENT:     Palliative Performance Scale (PPS):50       Clinical Pain Assessment (nonverbal scale for severity on nonverbal patients): 4/10             PSYCHOSOCIAL/SPIRITUAL SCREENING:       Any spiritual / Christian concerns:  [] Yes /  [x] No    Caregiver Burnout:  [] Yes /  [] No /  [x] No Caregiver Present      Anticipatory grief assessment:   [] Normal  / [] Maladaptive          REVIEW OF SYSTEMS:     Positive and pertinent negative findings in ROS are noted above in HPI. The following systems were [x] reviewed / [] unable to be reviewed as noted in HPI  Other findings are noted below.   Systems: constitutional, ears/nose/mouth/throat, respiratory, gastrointestinal, genitourinary, musculoskeletal, integumentary, neurologic, psychiatric, endocrine. Positive findings noted below. HISTORY:     Past Medical History:   Diagnosis Date    Angina at rest [facility])     Anxiety     CVA, old, facial weakness 2012    left ptosis    Dental caries     Osteoarthritis     Osteopenia     Right lower lobe lung mass 04/15/2022    Offered Biopsy and couldn't decide between PAlliative Care and Biopsy      Past Surgical History:   Procedure Laterality Date    CT BIOPSY PERCUTANEOUS SUPERFICIAL BONE  1/26/2023    CT BIOPSY PERCUTANEOUS SUPERFICIAL BONE 1/26/2023 [facility] RAD CT    WISDOM TOOTH EXTRACTION        Family History   Problem Relation Age of Onset    Cancer Daughter         Unspecified Cancer    Cancer Cousin         Unspecified Cancer x2      History reviewed, no pertinent family history. Social History     Tobacco Use    Smoking status: Some Days     Packs/day: 0.50     Types: Cigarettes    Smokeless tobacco: Never   Substance Use Topics    Alcohol use:  Yes     Allergies   Allergen Reactions    Aspirin Nausea Only      Current Facility-Administered Medications   Medication Dose Route Frequency    HYDROcodone-chlorpheniramine (TUSSIONEX) 10-8 MG/5ML oral suspension 5 mL  5 mL Oral Q12H PRN    vancomycin (VANCOCIN) 1,000 mg in sodium chloride 0.9 % 250 mL (vial-mate) IVPB  1,000 mg IntraVENous Q12H    lidocaine 4 % external patch 1 patch  1 patch TransDERmal Daily    oxyCODONE (ROXICODONE) immediate release tablet 5 mg  5 mg Oral Q4H PRN    Or    oxyCODONE (ROXICODONE) immediate release tablet 10 mg  10 mg Oral Q6H PRN    oxyCODONE-acetaminophen (PERCOCET) 5-325 MG per tablet 1 tablet  1 tablet Oral Q4H PRN    gabapentin (NEURONTIN) capsule 100 mg  100 mg Oral Nightly    nicotine (NICODERM CQ) 14 MG/24HR 1 patch  1 patch TransDERmal Daily    sodium chloride flush 0.9 % injection 5-40 mL  5-40 mL IntraVENous 2 times per day    sodium chloride flush 0.9 % injection 5-40 mL  5-40 mL IntraVENous PRN    ondansetron (ZOFRAN-ODT) disintegrating tablet 4 mg  4 mg Oral Q8H PRN    Or    ondansetron (ZOFRAN) injection 4 mg  4 mg IntraVENous Q6H PRN    polyethylene glycol (GLYCOLAX) packet 17 g  17 g Oral Daily PRN    famotidine (PEPCID) tablet 20 mg  20 mg Oral Daily    acetaminophen (TYLENOL) tablet 650 mg  650 mg Oral Q6H PRN    Or    acetaminophen (TYLENOL) suppository 650 mg  650 mg Rectal Q6H PRN    naloxone (NARCAN) injection 0.4 mg  0.4 mg IntraVENous PRN    piperacillin-tazobactam (ZOSYN) 3,375 mg in sodium chloride 0.9 % 100 mL extended IVPB (mini-bag)  3,375 mg IntraVENous Q8H        LAB AND IMAGING FINDINGS:     Recent Results (from the past 24 hour(s))   CBC with Auto Differential    Collection Time: 03/01/23  1:37 AM   Result Value Ref Range    WBC 13.8 (H) 4.6 - 13.2 K/uL    RBC 3.04 (L) 4.20 - 5.30 M/uL    Hemoglobin 8.9 (L) 12.0 - 16.0 g/dL    Hematocrit 25.0 (L) 35.0 - 45.0 %    MCV 82.2 78.0 - 100.0 FL    MCH 29.3 24.0 - 34.0 PG    MCHC 35.6 31.0 - 37.0 g/dL    RDW 15.2 (H) 11.6 - 14.5 %    Platelets 439 079 - 413 K/uL    MPV 9.7 9.2 - 11.8 FL    Nucleated RBCs 0.1 (H) 0  WBC    nRBC 0.02 (H) 0.00 - 0.01 K/uL    Seg Neutrophils 81 (H) 40 - 73 %    Lymphocytes 8 (L) 21 - 52 %    Monocytes 9 3 - 10 %    Eosinophils % 1 0 - 5 %    Basophils 1 0 - 2 %    Immature Granulocytes 0 0.0 - 0.5 %    Segs Absolute 11.2 (H) 1.8 - 8.0 K/UL    Absolute Lymph # 1.1 0.9 - 3.6 K/UL    Absolute Mono # 1.3 (H) 0.05 - 1.2 K/UL    Absolute Eos # 0.1 0.0 - 0.4 K/UL    Basophils Absolute 0.1 0.0 - 0.1 K/UL    Absolute Immature Granulocyte 0.1 (H) 0.00 - 0.04 K/UL    Differential Type AUTOMATED     Comprehensive Metabolic Panel    Collection Time: 03/01/23  1:37 AM   Result Value Ref Range    Sodium 138 136 - 145 mmol/L    Potassium 3.6 3.5 - 5.5 mmol/L    Chloride 106 100 - 111 mmol/L    CO2 27 21 - 32 mmol/L    Anion Gap 5 3.0 - 18 mmol/L    Glucose 109 (H) 74 - 99 mg/dL    BUN 8 7.0 - 18 MG/DL    Creatinine 0.50 (L) 0.6 - 1.3 MG/DL    Bun/Cre Ratio 16 12 - 20      Est, Glom Filt Rate >60 >60 ml/min/1.73m2    Calcium 8.4 (L) 8.5 - 10.1 MG/DL    Total Bilirubin 1.1 (H) 0.2 - 1.0 MG/DL    ALT 19 13 - 56 U/L    AST 43 (H) 10 - 38 U/L    Alk Phosphatase 128 (H) 45 - 117 U/L    Total Protein 5.4 (L) 6.4 - 8.2 g/dL    Albumin 1.9 (L) 3.4 - 5.0 g/dL    Globulin 3.5 2.0 - 4.0 g/dL    Albumin/Globulin Ratio 0.5 (L) 0.8 - 1.7     Urinalysis    Collection Time: 03/01/23  4:00 AM   Result Value Ref Range    Color, UA DARK YELLOW      Appearance CLOUDY      Specific Gravity, UA 1.026 1.005 - 1.030      pH, Urine 5.5 5.0 - 8.0      Protein, UA 30 (A) NEG mg/dL    Glucose, UA Negative NEG mg/dL    Ketones, Urine TRACE (A) NEG mg/dL    Bilirubin Urine SMALL (A) NEG      Blood, Urine Negative NEG      Urobilinogen, Urine 1.0 0.2 - 1.0 EU/dL    Nitrite, Urine Negative NEG      Leukocyte Esterase, Urine SMALL (A) NEG     Urinalysis, Micro    Collection Time: 03/01/23  4:00 AM   Result Value Ref Range    WBC, UA 8 to 10 0 - 4 /hpf    RBC, UA NONE 0 - 5 /hpf    Epithelial Cells UA 3+ 0 - 5 /lpf    BACTERIA, URINE 3+ (A) NEG /hpf    Mucus, UA 1+ (A) NEG /lpf    Trichomonas, Urine FEW (A) NEG               Total time: 45 minutes    Disclaimer: Sections of this note are dictated using utilizing voice recognition software, which may have resulted in some phonetic based errors in grammar and contents. Even though attempts were made to correct all the mistakes, some may have been missed, and remained in the body of the document. If questions arise, please contact our department.

## 2023-03-02 ENCOUNTER — APPOINTMENT (OUTPATIENT)
Facility: HOSPITAL | Age: 73
DRG: 720 | End: 2023-03-02
Payer: MEDICAID

## 2023-03-02 PROBLEM — J98.11 ATELECTASIS OF RIGHT LUNG: Status: ACTIVE | Noted: 2023-03-02

## 2023-03-02 LAB
ALBUMIN SERPL-MCNC: 1.8 G/DL (ref 3.4–5)
ALBUMIN/GLOB SERPL: 0.5 (ref 0.8–1.7)
ALP SERPL-CCNC: 130 U/L (ref 45–117)
ALT SERPL-CCNC: 27 U/L (ref 13–56)
ANION GAP SERPL CALC-SCNC: 3 MMOL/L (ref 3–18)
AST SERPL-CCNC: 59 U/L (ref 10–38)
BASOPHILS # BLD: 0 K/UL (ref 0–0.1)
BASOPHILS NFR BLD: 0 % (ref 0–2)
BILIRUB SERPL-MCNC: 1.2 MG/DL (ref 0.2–1)
BUN SERPL-MCNC: 16 MG/DL (ref 7–18)
BUN/CREAT SERPL: 12 (ref 12–20)
CALCIUM SERPL-MCNC: 8.8 MG/DL (ref 8.5–10.1)
CHLORIDE SERPL-SCNC: 109 MMOL/L (ref 100–111)
CO2 SERPL-SCNC: 26 MMOL/L (ref 21–32)
CREAT SERPL-MCNC: 1.36 MG/DL (ref 0.6–1.3)
DIFFERENTIAL METHOD BLD: ABNORMAL
EOSINOPHIL # BLD: 0.1 K/UL (ref 0–0.4)
EOSINOPHIL NFR BLD: 1 % (ref 0–5)
ERYTHROCYTE [DISTWIDTH] IN BLOOD BY AUTOMATED COUNT: 15.9 % (ref 11.6–14.5)
GLOBULIN SER CALC-MCNC: 3.7 G/DL (ref 2–4)
GLUCOSE SERPL-MCNC: 95 MG/DL (ref 74–99)
HCT VFR BLD AUTO: 24.4 % (ref 35–45)
HGB BLD-MCNC: 8.7 G/DL (ref 12–16)
IMM GRANULOCYTES # BLD AUTO: 0 K/UL (ref 0–0.04)
IMM GRANULOCYTES NFR BLD AUTO: 0 % (ref 0–0.5)
LYMPHOCYTES # BLD: 1 K/UL (ref 0.9–3.6)
LYMPHOCYTES NFR BLD: 7 % (ref 21–52)
MCH RBC QN AUTO: 29.9 PG (ref 24–34)
MCHC RBC AUTO-ENTMCNC: 35.7 G/DL (ref 31–37)
MCV RBC AUTO: 83.8 FL (ref 78–100)
MONOCYTES # BLD: 1.5 K/UL (ref 0.05–1.2)
MONOCYTES NFR BLD: 10 % (ref 3–10)
NEUTS SEG # BLD: 12 K/UL (ref 1.8–8)
NEUTS SEG NFR BLD: 82 % (ref 40–73)
NRBC # BLD: 0.03 K/UL (ref 0–0.01)
NRBC BLD-RTO: 0.2 PER 100 WBC
PLATELET # BLD AUTO: 261 K/UL (ref 135–420)
PLATELET COMMENT: ABNORMAL
PMV BLD AUTO: 10.9 FL (ref 9.2–11.8)
POTASSIUM SERPL-SCNC: 4.1 MMOL/L (ref 3.5–5.5)
PROT SERPL-MCNC: 5.5 G/DL (ref 6.4–8.2)
RBC # BLD AUTO: 2.91 M/UL (ref 4.2–5.3)
RBC MORPH BLD: ABNORMAL
SODIUM SERPL-SCNC: 138 MMOL/L (ref 136–145)
T4 FREE SERPL-MCNC: 1.3 NG/DL (ref 0.7–1.5)
VANCOMYCIN SERPL-MCNC: 21.6 UG/ML (ref 5–40)
WBC # BLD AUTO: 14.6 K/UL (ref 4.6–13.2)

## 2023-03-02 PROCEDURE — 6360000002 HC RX W HCPCS: Performed by: STUDENT IN AN ORGANIZED HEALTH CARE EDUCATION/TRAINING PROGRAM

## 2023-03-02 PROCEDURE — 80202 ASSAY OF VANCOMYCIN: CPT

## 2023-03-02 PROCEDURE — 6370000000 HC RX 637 (ALT 250 FOR IP): Performed by: STUDENT IN AN ORGANIZED HEALTH CARE EDUCATION/TRAINING PROGRAM

## 2023-03-02 PROCEDURE — 99223 1ST HOSP IP/OBS HIGH 75: CPT | Performed by: INTERNAL MEDICINE

## 2023-03-02 PROCEDURE — 6360000002 HC RX W HCPCS: Performed by: HOSPITALIST

## 2023-03-02 PROCEDURE — 36415 COLL VENOUS BLD VENIPUNCTURE: CPT

## 2023-03-02 PROCEDURE — 84439 ASSAY OF FREE THYROXINE: CPT

## 2023-03-02 PROCEDURE — 94762 N-INVAS EAR/PLS OXIMTRY CONT: CPT

## 2023-03-02 PROCEDURE — 97530 THERAPEUTIC ACTIVITIES: CPT

## 2023-03-02 PROCEDURE — 88305 TISSUE EXAM BY PATHOLOGIST: CPT

## 2023-03-02 PROCEDURE — 85025 COMPLETE CBC W/AUTO DIFF WBC: CPT

## 2023-03-02 PROCEDURE — 6370000000 HC RX 637 (ALT 250 FOR IP): Performed by: INTERNAL MEDICINE

## 2023-03-02 PROCEDURE — 71045 X-RAY EXAM CHEST 1 VIEW: CPT

## 2023-03-02 PROCEDURE — 2140000001 HC CVICU INTERMEDIATE R&B

## 2023-03-02 PROCEDURE — 2580000003 HC RX 258

## 2023-03-02 PROCEDURE — 6370000000 HC RX 637 (ALT 250 FOR IP)

## 2023-03-02 PROCEDURE — 94761 N-INVAS EAR/PLS OXIMETRY MLT: CPT

## 2023-03-02 PROCEDURE — 97535 SELF CARE MNGMENT TRAINING: CPT

## 2023-03-02 PROCEDURE — 80053 COMPREHEN METABOLIC PANEL: CPT

## 2023-03-02 PROCEDURE — 2580000003 HC RX 258: Performed by: HOSPITALIST

## 2023-03-02 PROCEDURE — 2580000003 HC RX 258: Performed by: STUDENT IN AN ORGANIZED HEALTH CARE EDUCATION/TRAINING PROGRAM

## 2023-03-02 PROCEDURE — 99232 SBSQ HOSP IP/OBS MODERATE 35: CPT | Performed by: HOSPITALIST

## 2023-03-02 PROCEDURE — 88112 CYTOPATH CELL ENHANCE TECH: CPT

## 2023-03-02 PROCEDURE — 2700000000 HC OXYGEN THERAPY PER DAY

## 2023-03-02 PROCEDURE — 6360000002 HC RX W HCPCS

## 2023-03-02 RX ORDER — ENOXAPARIN SODIUM 100 MG/ML
40 INJECTION SUBCUTANEOUS DAILY
Status: DISCONTINUED | OUTPATIENT
Start: 2023-03-02 | End: 2023-03-03

## 2023-03-02 RX ORDER — SODIUM CHLORIDE 9 MG/ML
INJECTION, SOLUTION INTRAVENOUS CONTINUOUS
Status: DISPENSED | OUTPATIENT
Start: 2023-03-02 | End: 2023-03-03

## 2023-03-02 RX ADMIN — PIPERACILLIN SODIUM AND TAZOBACTAM SODIUM 3375 MG: 3; .375 INJECTION, POWDER, LYOPHILIZED, FOR SOLUTION INTRAVENOUS at 03:17

## 2023-03-02 RX ADMIN — VANCOMYCIN HYDROCHLORIDE 1000 MG: 1 INJECTION, POWDER, LYOPHILIZED, FOR SOLUTION INTRAVENOUS at 10:45

## 2023-03-02 RX ADMIN — OXYCODONE HYDROCHLORIDE 10 MG: 5 TABLET ORAL at 13:56

## 2023-03-02 RX ADMIN — SODIUM CHLORIDE, PRESERVATIVE FREE 10 ML: 5 INJECTION INTRAVENOUS at 21:01

## 2023-03-02 RX ADMIN — PIPERACILLIN SODIUM AND TAZOBACTAM SODIUM 3375 MG: 3; .375 INJECTION, POWDER, LYOPHILIZED, FOR SOLUTION INTRAVENOUS at 10:59

## 2023-03-02 RX ADMIN — FAMOTIDINE 20 MG: 20 TABLET ORAL at 10:46

## 2023-03-02 RX ADMIN — GABAPENTIN 100 MG: 100 CAPSULE ORAL at 20:59

## 2023-03-02 RX ADMIN — SODIUM CHLORIDE: 9 INJECTION, SOLUTION INTRAVENOUS at 18:59

## 2023-03-02 RX ADMIN — PIPERACILLIN SODIUM AND TAZOBACTAM SODIUM 3375 MG: 3; .375 INJECTION, POWDER, LYOPHILIZED, FOR SOLUTION INTRAVENOUS at 19:00

## 2023-03-02 RX ADMIN — SODIUM CHLORIDE, PRESERVATIVE FREE 10 ML: 5 INJECTION INTRAVENOUS at 10:48

## 2023-03-02 RX ADMIN — VANCOMYCIN HYDROCHLORIDE 1000 MG: 1 INJECTION, POWDER, LYOPHILIZED, FOR SOLUTION INTRAVENOUS at 21:07

## 2023-03-02 RX ADMIN — OXYCODONE HYDROCHLORIDE 5 MG: 5 TABLET ORAL at 03:15

## 2023-03-02 RX ADMIN — ENOXAPARIN SODIUM 40 MG: 100 INJECTION SUBCUTANEOUS at 18:59

## 2023-03-02 ASSESSMENT — PAIN SCALES - GENERAL
PAINLEVEL_OUTOF10: 10
PAINLEVEL_OUTOF10: 0
PAINLEVEL_OUTOF10: 10
PAINLEVEL_OUTOF10: 10
PAINLEVEL_OUTOF10: 0

## 2023-03-02 ASSESSMENT — PAIN DESCRIPTION - LOCATION
LOCATION: FLANK
LOCATION: FLANK
LOCATION: CHEST

## 2023-03-02 ASSESSMENT — PAIN DESCRIPTION - PAIN TYPE
TYPE: SURGICAL PAIN
TYPE: SURGICAL PAIN

## 2023-03-02 ASSESSMENT — PAIN SCALES - WONG BAKER
WONGBAKER_NUMERICALRESPONSE: 0
WONGBAKER_NUMERICALRESPONSE: 0
WONGBAKER_NUMERICALRESPONSE: 0;2

## 2023-03-02 ASSESSMENT — PAIN DESCRIPTION - DESCRIPTORS
DESCRIPTORS: ACHING;PRESSURE
DESCRIPTORS: THROBBING

## 2023-03-02 ASSESSMENT — PAIN DESCRIPTION - ORIENTATION
ORIENTATION: RIGHT

## 2023-03-02 NOTE — PROGRESS NOTES
[facility] Pharmacokinetic Monitoring Service - Vancomycin    Indication: PNA  Goal AUC/MARY: 400-600 mg*hr/L  Day of Therapy: 5  Additional Antimicrobials: pip-tazo    Pertinent Laboratory Values: Wt Readings from Last 1 Encounters:   02/28/23 125 lb 14.1 oz (57.1 kg)     Temp Readings from Last 1 Encounters:   03/02/23 99.7 °F (37.6 °C) (Oral)     Procalcitonin   Date Value Ref Range Status   11/08/2021 0.06 ng/mL Final     Comment:          Suspected Sepsis:  <0.50 ng/mL     Low likelihood of sepsis. 0.50-2.00 ng/mL    Increased likelihood of sepsis. Antibiotics encouraged. >2.00 ng/mL  High risk of sepsis/shock. Antibiotics strongly encouraged. Suspected Lower Resp Tract Infections:  <0.24 ng/mL    Low likelihood of bacterial infection. >0.24 ng/mL    Increased likelihood of bacterial infection. Antibiotics encouraged. With successful antibiotic therapy, PCT levels should decrease rapidly. (Half-life of 24 to 36 hours)       Procalcitonin values from samples collected within the first 6 hours of systemic infection may still be low. Retesting may be indicated. Values from day 1 and day 4 can be entered into the Change in Procalcitonin Calculator ([email or web address]) to determine the patient's Mortality Risk Prognosis. In healthy neonates, plasma Procalcitonin (PCT) concentrations increase gradually after birth, reaching peak values at about 24 hours of age then decrease to normal values below 0.5 ng/mL by 48-72 hours of age. Estimated Creatinine Clearance: 29 mL/min (A) (based on SCr of 1.36 mg/dL (H)).   Recent Labs     03/01/23  0137 03/02/23  0301   CREATININE 0.50* 1.36*   WBC 13.8* 14.6*     Pertinent Cultures:  Culture Date Source Results   2/27 blood NGTD   3/1 urine 22,000 Enterococcus   MRSA Nasal Swab: awaiting result    Assessment:  Date Current Dose Concentration (mg/L) Timing of Concentration (h) AUC   2/26 1,750 mg x1 - - -   2/27 750 mg q18h - - -   2/28 750 mg q18h  1,000 mg q12h 9.9 13 250  488   3/1 1,000 mg q12h - - -   3/2 1,000 mg q12h 21.6 5 494   Note: Serum concentrations collected for AUC dosing may appear elevated if collected in close proximity to the dose administered, this is not necessarily an indication of toxicity    Plan:  Continue current dose of 1,000 mg q12h  No level ordered at this time  Pharmacy will continue to monitor patient and adjust therapy as indicated    Thank you for the consult,  CARTER Lawler [facility]  3/2/2023

## 2023-03-02 NOTE — PROGRESS NOTES
Assumed care of patient from [place], RN (off going nurse). Patient alert and oriented. No apparent distress noted. Patient offers no concerns and can verbalize needs. Chest tube in place. Assessment to follow. Call bell within reach. Bed in lowest, locked position.

## 2023-03-02 NOTE — PROGRESS NOTES
CT Surgery    D/w Dr. Jennifer Chase  Chest tube drained 50ml /24h.  RIGHT chest tube removed without difficulty. Occlusive dressing placed. She has has several silk sutures that will need to be removed in 1 week or so. She can follow up in our clinic for removal if needed.      Chad Tyler PA-C  [facility]  [phone]

## 2023-03-02 NOTE — PROGRESS NOTES
[facility] ANTIMICROBIAL STEWARDSHIP TEAM  PRESCRIBER COMMUNICATION FORM      We have briefly reviewed the antimicrobials  currently prescribed for your patient along with  the clinical indications and would like to Make the following recommendations:    DISCONTINUE ANTIBIOTICS      Rationale:    (  )  No evidence of bacterial infection    (  )  Microbiology report does not support use    (  )  Narrowing broad-spectrum empiric coverage    (  )  Therapeutic duplication: overlapping antimicrobial spectrum    (  )  Clinical situation dictates change    (  )  Prolonged duration of therapy not needed    (  )  Allergy/toxicity/drug interaction present    (  ) More clinically/cost effective therapy available  ADD ANTIBIOTICS  Rationale:        (  ) Microbiology report supports use    (  ) Expanded coverage needed: gm positive /negative / anaerobic /                               atypical    ( ) More clinicaly/cost effective therapy than current regimen    ( ) Needed for synergy    ( ) Double coverage needed    ( ) Less toxic therapy    ( ) Antifungal therapy needed  ADDITIONAL SUGGESTIONS    ( ) continue current therapy with the following change    ( ) additional laboratory testing    ( ) consider infectious disease consultation    ( ) consider outpatient IV therapy    ( ) other    COMMENTS: consider pulmonary evaluation to determine need for continued antibiotics - ? Pneumonia versus malignant pleural effusion. thanks       This communication is not to be considered a consultation. You are under no obligation to follow these suggestions. A physician-patient relationship has not been established between the physician Completing this form and your patient. If a thorough analysis of the case is desired, please request an ID consultation.

## 2023-03-02 NOTE — PLAN OF CARE
Problem: Physical Therapy - Adult  Goal: By Discharge: Performs mobility at highest level of function for planned discharge setting. See evaluation for individualized goals. Description: Physical Therapy Goals:  Initiated 2/27/2023 to be met within 7-10 days. 1.  Patient will move from supine to sit and sit to supine , scoot up and down, and roll side to side in bed with independence. 2.  Patient will transfer from bed to chair and chair to bed with modified independence using the least restrictive device. 3.  Patient will perform sit to stand with modified independence. 4.  Patient will ambulate with modified independence for 50 feet with the least restrictive device. 5.  Patient will participate in LE exercise to tolerance. PLOF: Pt reporting she lives alone in 1 story house with 0 BRYCE. Independent. Outcome: Progressing     PHYSICAL THERAPY TREATMENT    Patient: Segun Garcia (73 y.o. female)  Date: 3/2/2023  Diagnosis: Pleural effusion [J90] Pleural effusion      Precautions: Fall Risk  PLOF:  see above     ASSESSMENT:    Pt received in bed in NAD and agreeable, continues to have R chest tube in place. Pt c/o of B posterior calf tightness and educated it could be from decreased mobility and OOB time in weight baring since hospital admission, no redness/warmth/edema noted. Pt limited by fatigue and assisted with RW and line management to turn and sit into recliner to eat lunch this date. Continues to benefit from acute PT and will continue to follow, nursing educated on progress this date. Progression toward goals:   [x]      Improving appropriately and progressing toward goals  []      Improving slowly and progressing toward goals  []      Not making progress toward goals and plan of care will be adjusted     PLAN:  Patient continues to benefit from skilled intervention to address the above impairments. Continue treatment per established plan of care.     Further Equipment Recommendations for Discharge: rolling walker         AMPAC: 17/24  Current research shows that an AM-PAC score of 17 (13 without stairs) or less is not associated with a discharge to the patient's home setting. Based on an AM-PAC score of 17/24 (**/20 if omitting stairs) and their current functional mobility deficits, it is recommended that the patient have 3-5 sessions per week of Physical Therapy at d/c to increase the patient's independence. This AMPAC score should be considered in conjunction with interdisciplinary team recommendations to determine the most appropriate discharge setting. Patient's social support, diagnosis, medical stability, and prior level of function should also be taken into consideration. SUBJECTIVE:   Patient stated, \"I want to go home. \"    OBJECTIVE DATA SUMMARY:   Critical Behavior:  Orientation  Orientation Level: Oriented X4       Functional Mobility Training:  Bed Mobility:  Bed Mobility Training  Bed Mobility Training: Yes  Rolling: Stand-by assistance  Scooting: Stand-by assistance  Transfers:  Transfer Training  Transfer Training: Yes  Sit to Stand: Minimum assistance  Stand to Sit: Contact-guard assistance  Bed to Chair: Minimum assistance;Assist X2 (w/RW)  Balance:  Balance  Sitting: Impaired  Sitting - Static: Fair (occasional)  Sitting - Dynamic: Fair (occasional)  Standing: Impaired  Standing - Static: Fair  Standing - Dynamic: Fair    Pain:  Pain level pre-treatment: not rated/10  Pain level post-treatment: \"    \" /10   Pain Intervention(s): Rest, Ice, Repositioning   Response to intervention: Nurse notified    Activity Tolerance:   Activity Tolerance: Patient limited by pain; Patient limited by endurance; Patient limited by fatigue  Please refer to the flowsheet for vital signs taken during this treatment.   After treatment:   [x] Patient left in no apparent distress sitting up in chair  [] Patient left in no apparent distress in bed  [x] Call bell left within reach  [x] Nursing notified  [] Caregiver present  [] Bed alarm activated  [] SCDs applied      COMMUNICATION/EDUCATION:   Patient Education  Education Given To: Patient  Education Provided: Role of Therapy;Plan of Care;Transfer Training  Education Method: Verbal  Barriers to Learning: Cognition  Education Outcome: Continued education needed      [employer], PT  Minutes: Shannan 61 AM-PAC® Basic Mobility Inpatient Short Form (6-Clicks) Version 2    How much HELP from another person does the patient currently need    (If the patient hasn't done an activity recently, how much help from another person do you think he/she would need if he/she tried?)   Total (Total A or Dep)   A Lot  (Mod to Max A)   A Little (Sup or Min A)   None (Mod I to I)   Turning from your back to your side while in a flat bed without using bedrails? [] 1 [] 2 [x] 3 [] 4   2. Moving from lying on your back to sitting on the side of a flat bed without using bedrails? [] 1 [] 2 [x] 3 [] 4   3. Moving to and from a bed to a chair (including a wheelchair)? [] 1 [] 2 [x] 3 [] 4   4. Standing up from a chair using your arms (e.g., wheelchair, or bedside chair)? [] 1 [] 2 [x] 3 [] 4   5. Walking in hospital room? [] 1 [] 2 [x] 3 [] 4   6. Climbing 3-5 steps with a railing?+   [] 1 [x] 2 [] 3 [] 4   +If stair climbing cannot be assessed, skip item #6. Sum responses from items 1-5. Current research shows that an AM-PAC score of 17 (13 without stairs) or less is not associated with a discharge to the patient's home setting. Based on an AM-PAC score of 17/24 (**/20 if omitting stairs) and their current functional mobility deficits, it is recommended that the patient have 3-5 sessions per week of Physical Therapy at d/c to increase the patient's independence.

## 2023-03-02 NOTE — CONSULTS
[facility]. Pulmonary, Critical Care, and Sleep Medicine    Initial Patient Consult    Name: Angie Barillas MRN: [id number]   : 1950 Hospital: [address] [place] [address] Dr   Date: 3/2/2023        Problem:  Right-sided exudative pleural effusion, likely malignant effusion from patient's metastatic adenocarcinoma. Less likely empyema. Reviewing prior imaging, patient had worsening effusion since January. September chest x-ray showed no effusion but does have right-sided mass. Stage DAVID lung adenocarcinoma, suspect likely stage IVB given abdominal LAD  Right lower lobe lesion seen as far back as early . Patient offered biopsy in  but refused it. States that she was scared of the answer. Follows at [facility] with Dr. Chel Lee  History of tobacco use  History of polysubstance use including cocaine    Plan:  I flushed the chest tube. Suspect it was likely clotted off. Patient will need tPA dornase every 12 hours for 3 days. I have ordered this. Changed out her atrium. Previous atrium to be sent down for pathology to review. Highly likely that this is due to adenocarcinoma metastasizing to the pleura given her right rib biopsy was positive for adenocarcinoma as well. After 3 days, depending on output, plan to remove chest tube. Will need follow up CT chest dry after tpa/dnase. Will need follow-up imaging with a chest x-ray at 2 to 3 weeks. If patient has repeated accumulation, she is a candidate for a Pleurx catheter if she wants this. Would not place another chest tube prior to evaluation for Pleurx unless she is an extremis and thoracentesis is insufficient. I informed patient that regardless of which treatment course we follow, she likely has a few months to maybe a year or 2 left to live. She understood, and was sad. It seemed that she did not realize this prior to now. Palliative care is warranted to establish goals of care if primary team agrees.     HPI:     This patient has been seen and evaluated at the request of Dr. Fam Rendon for R sided pleural effusion. Patient is a 71 y.o. female with prior history of smoking, polysubstance abuse, right lower lobe lung mass who presented to the ED with worsening shortness of breath and right-sided chest pain. Patient had a chest tube placed in the ED and no other work-up done on the pleural effusion. Apparently the chest tube was also placed in a nonsterile fashion. CT surgery has been following the patient for this chest tube. Pulmonology was consulted to evaluate for the etiology of this chest tube. Currently, the patient feels okay. Does not have any shortness of breath. Denies any fevers, chills, nausea, vomiting, diarrhea, dizziness, lightheadedness. Of note, patient has known right lower lobe lung mass since more than a year ago. She was scheduled to have further evaluation including biopsy in April 2022 but refused at that time. When asked her about it, she said that she was scared of the answer. Eventually she was lost to follow-up and was recently seen as an outpatient in February by Dr. Kamila Morillo after she had biopsy done of right rib lytic lesion which showed adenocarcinoma. She is currently pending additional work-up and is planned for outpatient palliative radiation. Past Medical History:   Diagnosis Date    Angina at rest [facility])     Anxiety     CVA, old, facial weakness 2012    left ptosis    Dental caries     Osteoarthritis     Osteopenia     Right lower lobe lung mass 04/15/2022    Offered Biopsy and couldn't decide between PAlliative Care and Biopsy      Past Surgical History:   Procedure Laterality Date    CT BIOPSY PERCUTANEOUS SUPERFICIAL BONE  1/26/2023    CT BIOPSY PERCUTANEOUS SUPERFICIAL BONE 1/26/2023 [facility] RAD CT    WISDOM TOOTH EXTRACTION        Prior to Admission medications    Medication Sig Start Date End Date Taking?  Authorizing Provider   acetaminophen (TYLENOL) 325 MG tablet Take 650 mg by mouth every 6 hours as needed 22   Ar Automatic Reconciliation   ibuprofen (ADVIL;MOTRIN) 400 MG tablet Take 600 mg by mouth every 6 hours as needed    Ar Automatic Reconciliation   levoFLOXacin (LEVAQUIN) 750 MG tablet Take 750 mg by mouth daily 23   Ar Automatic Reconciliation     Allergies   Allergen Reactions    Aspirin Nausea Only      Social History     Tobacco Use    Smoking status: Some Days     Packs/day: 0.50     Types: Cigarettes    Smokeless tobacco: Never   Substance Use Topics    Alcohol use: Yes      Family History   Problem Relation Age of Onset    Cancer Daughter         Unspecified Cancer    Cancer Cousin         Unspecified Cancer x2        Review of Systems:  Pertinent items are noted in HPI. Objective:   Vital Signs:    /80   Pulse (!) 110   Temp 99.7 °F (37.6 °C) (Oral)   Resp 22   Ht 4' 11\" (1.499 m)   Wt 125 lb 14.1 oz (57.1 kg)   SpO2 97%   BMI 25.43 kg/m²             Temp (24hrs), Av °F (37.2 °C), Min:97.9 °F (36.6 °C), Max:99.9 °F (37.7 °C)       Intake/Output:   Last shift:      701 - 1900  In: -   Out: 35   Last 3 shifts: 1901 -  07  In: -   Out: 880 [Urine:650]    Intake/Output Summary (Last 24 hours) at 3/2/2023 1409  Last data filed at 3/2/2023 1047  Gross per 24 hour   Intake --   Output 335 ml   Net -335 ml        Physical Exam:  /80   Pulse (!) 110   Temp 99.7 °F (37.6 °C) (Oral)   Resp 22   Ht 4' 11\" (1.499 m)   Wt 125 lb 14.1 oz (57.1 kg)   SpO2 97%   BMI 25.43 kg/m²         Temp (24hrs), Av °F (37.2 °C), Min:97.9 °F (36.6 °C), Max:99.9 °F (37.7 °C)    701 - 1900  In: -   Out: 35    1901 -  0700  In: -   Out: 880 [Urine:650]    General: AOX3, NAD. Thin. HEENT: NCAT, no scleral icterus, PERRL  Neck: No LAD, no JVD  Lungs: CTAB, no wheezing, rales, or crackles. R sided chest tube site covered in bandage. CV: RRR, S1/S2 normal. No MRG. Abdomen: Soft, NT, ND.  Normoactive bowel sounds. Extremities: No cyanosis or edema. Skin: No rashes or lesions  Neuro: Aox3. Facial droop.          Data review:   Labs:  Recent Results (from the past 24 hour(s))   Comprehensive Metabolic Panel    Collection Time: 03/02/23  3:01 AM   Result Value Ref Range    Sodium 138 136 - 145 mmol/L    Potassium 4.1 3.5 - 5.5 mmol/L    Chloride 109 100 - 111 mmol/L    CO2 26 21 - 32 mmol/L    Anion Gap 3 3.0 - 18 mmol/L    Glucose 95 74 - 99 mg/dL    BUN 16 7.0 - 18 MG/DL    Creatinine 1.36 (H) 0.6 - 1.3 MG/DL    Bun/Cre Ratio 12 12 - 20      Est, Glom Filt Rate 42 (L) >60 ml/min/1.73m2    Calcium 8.8 8.5 - 10.1 MG/DL    Total Bilirubin 1.2 (H) 0.2 - 1.0 MG/DL    ALT 27 13 - 56 U/L    AST 59 (H) 10 - 38 U/L    Alk Phosphatase 130 (H) 45 - 117 U/L    Total Protein 5.5 (L) 6.4 - 8.2 g/dL    Albumin 1.8 (L) 3.4 - 5.0 g/dL    Globulin 3.7 2.0 - 4.0 g/dL    Albumin/Globulin Ratio 0.5 (L) 0.8 - 1.7     Vancomycin Level, Random    Collection Time: 03/02/23  3:01 AM   Result Value Ref Range    Vancomycin Rm 21.6 5.0 - 40.0 UG/ML   CBC with Auto Differential    Collection Time: 03/02/23  3:01 AM   Result Value Ref Range    WBC 14.6 (H) 4.6 - 13.2 K/uL    RBC 2.91 (L) 4.20 - 5.30 M/uL    Hemoglobin 8.7 (L) 12.0 - 16.0 g/dL    Hematocrit 24.4 (L) 35.0 - 45.0 %    MCV 83.8 78.0 - 100.0 FL    MCH 29.9 24.0 - 34.0 PG    MCHC 35.7 31.0 - 37.0 g/dL    RDW 15.9 (H) 11.6 - 14.5 %    Platelets 561 821 - 949 K/uL    MPV 10.9 9.2 - 11.8 FL    Nucleated RBCs 0.2 (H) 0  WBC    nRBC 0.03 (H) 0.00 - 0.01 K/uL    Seg Neutrophils 82 (H) 40 - 73 %    Lymphocytes 7 (L) 21 - 52 %    Monocytes 10 3 - 10 %    Eosinophils % 1 0 - 5 %    Basophils 0 0 - 2 %    Immature Granulocytes 0 0.0 - 0.5 %    Segs Absolute 12.0 (H) 1.8 - 8.0 K/UL    Absolute Lymph # 1.0 0.9 - 3.6 K/UL    Absolute Mono # 1.5 (H) 0.05 - 1.2 K/UL    Absolute Eos # 0.1 0.0 - 0.4 K/UL    Basophils Absolute 0.0 0.0 - 0.1 K/UL    Absolute Immature Granulocyte 0.0 0.00 - 0.04 K/UL Differential Type MANUAL      Platelet Comment ADEQUATE PLATELETS      RBC Comment ANISOCYTOSIS  1+        RBC Comment TARGET CELLS  1+        RBC Comment POIKILOCYTOSIS  1+        RBC Comment POLYCHROMASIA  1+         Imaging:  I have personally reviewed the patients radiographs and have reviewed the reports:  CXR x 3, CT scan x 4           Brian Marcus MD  [facility] Pulmonary/Critical Care Fellow  3/2/2023

## 2023-03-02 NOTE — PROGRESS NOTES
[facility] Hospitalist Group  Progress Note    Patient: Deion Stapleton Age: 71 y.o. : 1950 MR#: [id number] SSN: [id number]  Date/Time: 3/2/2023     Subjective: Patient lying in the bed, feels slightly better. Denies any chest pain or shortness of breath. Still has some discomfort in the chest tube site. Assessment/Plan:   Possible sepsis due to pleural fluid  Right pleural effusion post chest tube placement by ED  Metastatic lung cancer  History of CVA  Active tobacco abuse  Anxiety disorder  Sinus tachycardia  Mild RITA    Plan  Chest tube management per CT surgery  No cultures were sent from the pleural fluid, will consult pulmonary for further management  Discussed with pulmonary team  Continue empiric antibiotics for now  We will start gentle hydration, monitor creatinine  We will obtain thyroid function tests and monitor heart rate  Further plan based on the chest tube removal    Discussed with the patient at the bedside and explained about normal plan care. Disposition: Home once cleared by pulmonary and CT surgery    Case discussed with:  [x]Patient  [x]Family  []Nursing  []Case Management  DVT Prophylaxis:  []Lovenox  []Hep SQ  [x]SCDs  []Coumadin   []Eliquis/Xarelto     Objective:   VS: /74   Pulse (!) 108   Temp 99.4 °F (37.4 °C) (Oral)   Resp 18   Ht 4' 11\" (1.499 m)   Wt 125 lb 14.1 oz (57.1 kg)   SpO2 95%   BMI 25.43 kg/m²    Tmax/24hrs: Temp (24hrs), Av.9 °F (37.2 °C), Min:97.9 °F (36.6 °C), Max:99.9 °F (37.7 °C)  IOBRIEF  Intake/Output Summary (Last 24 hours) at 3/2/2023 1800  Last data filed at 3/2/2023 1605  Gross per 24 hour   Intake 480 ml   Output 285 ml   Net 195 ml       General:  Alert, cooperative, no acute distress    HEENT: PERRLA, anicteric sclerae. Pulmonary: Decreased breath sounds right base, chest tube site dressing clean  Cardiovascular: Regular rate and Rhythm.   GI:  Soft, Non distended, Non tender. + Bowel sounds. Extremities:  No edema. No calf tenderness. Psych: Good insight. Not anxious or agitated. Neurologic: Alert and oriented X 3. Moves all ext.   Additional:    Medications:   Current Facility-Administered Medications   Medication Dose Route Frequency    HYDROcodone-chlorpheniramine (TUSSIONEX) 10-8 MG/5ML oral suspension 5 mL  5 mL Oral Q12H PRN    vancomycin (VANCOCIN) 1,000 mg in sodium chloride 0.9 % 250 mL (vial-mate) IVPB  1,000 mg IntraVENous Q12H    lidocaine 4 % external patch 1 patch  1 patch TransDERmal Daily    oxyCODONE (ROXICODONE) immediate release tablet 5 mg  5 mg Oral Q4H PRN    Or    oxyCODONE (ROXICODONE) immediate release tablet 10 mg  10 mg Oral Q6H PRN    oxyCODONE-acetaminophen (PERCOCET) 5-325 MG per tablet 1 tablet  1 tablet Oral Q4H PRN    gabapentin (NEURONTIN) capsule 100 mg  100 mg Oral Nightly    nicotine (NICODERM CQ) 14 MG/24HR 1 patch  1 patch TransDERmal Daily    sodium chloride flush 0.9 % injection 5-40 mL  5-40 mL IntraVENous 2 times per day    sodium chloride flush 0.9 % injection 5-40 mL  5-40 mL IntraVENous PRN    ondansetron (ZOFRAN-ODT) disintegrating tablet 4 mg  4 mg Oral Q8H PRN    Or    ondansetron (ZOFRAN) injection 4 mg  4 mg IntraVENous Q6H PRN    polyethylene glycol (GLYCOLAX) packet 17 g  17 g Oral Daily PRN    famotidine (PEPCID) tablet 20 mg  20 mg Oral Daily    acetaminophen (TYLENOL) tablet 650 mg  650 mg Oral Q6H PRN    Or    acetaminophen (TYLENOL) suppository 650 mg  650 mg Rectal Q6H PRN    naloxone (NARCAN) injection 0.4 mg  0.4 mg IntraVENous PRN    piperacillin-tazobactam (ZOSYN) 3,375 mg in sodium chloride 0.9 % 100 mL extended IVPB (mini-bag)  3,375 mg IntraVENous Q8H       Labs:    Recent Results (from the past 24 hour(s))   Comprehensive Metabolic Panel    Collection Time: 03/02/23  3:01 AM   Result Value Ref Range    Sodium 138 136 - 145 mmol/L    Potassium 4.1 3.5 - 5.5 mmol/L    Chloride 109 100 - 111 mmol/L    CO2 26 21 - 32 mmol/L Anion Gap 3 3.0 - 18 mmol/L    Glucose 95 74 - 99 mg/dL    BUN 16 7.0 - 18 MG/DL    Creatinine 1.36 (H) 0.6 - 1.3 MG/DL    Bun/Cre Ratio 12 12 - 20      Est, Glom Filt Rate 42 (L) >60 ml/min/1.73m2    Calcium 8.8 8.5 - 10.1 MG/DL    Total Bilirubin 1.2 (H) 0.2 - 1.0 MG/DL    ALT 27 13 - 56 U/L    AST 59 (H) 10 - 38 U/L    Alk Phosphatase 130 (H) 45 - 117 U/L    Total Protein 5.5 (L) 6.4 - 8.2 g/dL    Albumin 1.8 (L) 3.4 - 5.0 g/dL    Globulin 3.7 2.0 - 4.0 g/dL    Albumin/Globulin Ratio 0.5 (L) 0.8 - 1.7     Vancomycin Level, Random    Collection Time: 03/02/23  3:01 AM   Result Value Ref Range    Vancomycin Rm 21.6 5.0 - 40.0 UG/ML   CBC with Auto Differential    Collection Time: 03/02/23  3:01 AM   Result Value Ref Range    WBC 14.6 (H) 4.6 - 13.2 K/uL    RBC 2.91 (L) 4.20 - 5.30 M/uL    Hemoglobin 8.7 (L) 12.0 - 16.0 g/dL    Hematocrit 24.4 (L) 35.0 - 45.0 %    MCV 83.8 78.0 - 100.0 FL    MCH 29.9 24.0 - 34.0 PG    MCHC 35.7 31.0 - 37.0 g/dL    RDW 15.9 (H) 11.6 - 14.5 %    Platelets 694 193 - 317 K/uL    MPV 10.9 9.2 - 11.8 FL    Nucleated RBCs 0.2 (H) 0  WBC    nRBC 0.03 (H) 0.00 - 0.01 K/uL    Seg Neutrophils 82 (H) 40 - 73 %    Lymphocytes 7 (L) 21 - 52 %    Monocytes 10 3 - 10 %    Eosinophils % 1 0 - 5 %    Basophils 0 0 - 2 %    Immature Granulocytes 0 0.0 - 0.5 %    Segs Absolute 12.0 (H) 1.8 - 8.0 K/UL    Absolute Lymph # 1.0 0.9 - 3.6 K/UL    Absolute Mono # 1.5 (H) 0.05 - 1.2 K/UL    Absolute Eos # 0.1 0.0 - 0.4 K/UL    Basophils Absolute 0.0 0.0 - 0.1 K/UL    Absolute Immature Granulocyte 0.0 0.00 - 0.04 K/UL    Differential Type MANUAL      Platelet Comment ADEQUATE PLATELETS      RBC Comment ANISOCYTOSIS  1+        RBC Comment TARGET CELLS  1+        RBC Comment POIKILOCYTOSIS  1+        RBC Comment POLYCHROMASIA  1+           Signed By: Salinas Velasquez MD     March 2, 2023      Disclaimer: Sections of this note are dictated using utilizing voice recognition software.   Minor typographical errors may be present. If questions arise, please do not hesitate to contact me or call our department.

## 2023-03-02 NOTE — PLAN OF CARE
Problem: Occupational Therapy - Adult  Goal: By Discharge: Performs self-care activities at highest level of function for planned discharge setting. See evaluation for individualized goals. Description: Occupational Therapy Goals:  Initiated 2/27/2023 to be met within 7-10 days. 1.  Patient will perform upper body dressing with modified independence. 2.  Patient will perform lower body dressing with modified independence. 3.  Patient will perform grooming task standing at sink with modified independence, F+ balance. 4.  Patient will perform toilet transfers with supervision/set-up. 5.  Patient will perform all aspects of toileting with modified independence. 6.  Patient will participate in upper extremity therapeutic exercise/activities with modified independence for 8-10 minutes to increase ROM/strength for ADLs. 7.  Patient will utilize energy conservation techniques during functional activities with verbal cues. PLOF:Patient was independent with self-care and functional mobility PTA. Outcome: Progressing   OCCUPATIONAL THERAPY TREATMENT    Patient: Juan A Andres (18 y.o. female)  Date: 3/2/2023  Diagnosis: Pleural effusion [J90] Pleural effusion      Precautions: Fall Risk    Chart, occupational therapy assessment, plan of care, and goals were reviewed. ASSESSMENT:  Pt is pleasant and cooperative. Remains w/chest tube. Co-treated w/PT to maximize safety w/functional transfer training and pt participation. Pt requires increase time w/bed mobility and assist 2/2 multiple line mgt. Pt c/o dizziness at EOB requires ~ 5 minutes to resolve. Reviewed importance of hand placement and safety w/RW w/functional transfers for increase safety and independence. Pt c/o BLE pain in standing. Alerted Megan Landa. Educated on energy conservation techniques w/ADLs, demonstrating w/simple ADL grooming tasks. Pt left in chair and encouraged OOB for 1 meal/day.      Progression toward goals:  [] Improving appropriately and progressing toward goals  [x]          Improving slowly and progressing toward goals  []          Not making progress toward goals and plan of care will be adjusted     PLAN:  Patient continues to benefit from skilled intervention to address the above impairments. Continue treatment per established plan of care. Further Equipment Recommendations for Discharge: shower chair and rolling walker    AMPAC: Current research shows that an AM-PAC score of 17 or less is not associated with a discharge to the patient's home setting. Based on an AM-PAC score of 15/24 and their current ADL deficits; it is recommended that the patient have 3-5 sessions per week of Occupational Therapy at d/c to increase the patient's independence. This AMPAC score should be considered in conjunction with interdisciplinary team recommendations to determine the most appropriate discharge setting. Patient's social support, diagnosis, medical stability, and prior level of function should also be taken into consideration. SUBJECTIVE:   Patient stated, \"I'm not going home. \"    OBJECTIVE DATA SUMMARY:   Cognitive/Behavioral Status:  Orientation  Orientation Level: Oriented X4    Functional Mobility and Transfers for ADLs:   Bed Mobility:  Bed Mobility Training  Supine to sit:Min Assist, Additional time     Transfers:  Transfer Training  Transfer Training: Yes  Sit to Stand: Minimum assistance  Bed to Chair: Minimum assistance;Assist X2 (w/RW)    Balance:  Balance  Sitting: Impaired  Sitting - Static: Fair (occasional) (fair plus)  Sitting - Dynamic: Fair (occasional)  Standing: Impaired  Standing - Static: Fair  Standing - Dynamic: Fair    ADL Intervention:  Grooming: Stand by assistance    Pain:  Pain level pre-treatment: 0/10   Pain level post-treatment: 0/10    Activity Tolerance:    Fair, pt fatigues easily    After treatment:   [x]  Patient left in no apparent distress sitting up in chair  []  Patient left in no apparent distress in bed  [x]  Call bell left within reach  [x]  Nursing notified  []  Caregiver present  []  Bed alarm activated    COMMUNICATION/EDUCATION:   Patient Education  Education Given To: Patient  Education Provided: Role of Therapy;Transfer Training;Plan of Care;Energy Conservation  Education Method: Demonstration;Verbal;Teach Back  Barriers to Learning: None  Education Outcome: Continued education needed    Thank you for this referral.  MARIS Wilson  Minutes: [address] AM-PAC® Daily Activity Inpatient Short Form (6-Clicks)*    How much HELP from another person does the patient currently need    (If the patient hasn't done an activity recently, how much help from another person do you think he/she would need if he/she tried?)   Total (Total A or Dep)   A Lot  (Mod to Max A)   A Little (Sup or Min A)   None (Mod I to I)   Putting on and taking off regular lower body clothing? [] 1 [x] 2 [] 3 [] 4   2. Bathing (including washing, rinsing,      drying)? [] 1 [x] 2 [] 3 [] 4   3. Toileting, which includes using toilet, bedpan or urinal?   [] 1 [x] 2 [] 3 [] 4   4. Putting on and taking off regular upper body clothing? [] 1 [] 2 [x] 3 [] 4   5. Taking care of personal grooming such as brushing teeth? [] 1 [] 2 [x] 3 [] 4   6. Eating meals?    [] 1 [] 2 [x] 3 [] 4

## 2023-03-02 NOTE — PROGRESS NOTES
Noted chest tube removed by CTS - will put in CT chest order for tomorrow to eval extent of effusion. If there is significant effusion, will need to have IR guided chest tube placed with plan for tpa/dnase through it.

## 2023-03-03 ENCOUNTER — APPOINTMENT (OUTPATIENT)
Facility: HOSPITAL | Age: 73
DRG: 720 | End: 2023-03-03
Payer: MEDICAID

## 2023-03-03 LAB
ANION GAP SERPL CALC-SCNC: 2 MMOL/L (ref 3–18)
BASOPHILS # BLD: 0.1 K/UL (ref 0–0.1)
BASOPHILS NFR BLD: 1 % (ref 0–2)
BUN SERPL-MCNC: 17 MG/DL (ref 7–18)
BUN/CREAT SERPL: 11 (ref 12–20)
CALCIUM SERPL-MCNC: 8.3 MG/DL (ref 8.5–10.1)
CHLORIDE SERPL-SCNC: 112 MMOL/L (ref 100–111)
CO2 SERPL-SCNC: 28 MMOL/L (ref 21–32)
CREAT SERPL-MCNC: 1.51 MG/DL (ref 0.6–1.3)
DIFFERENTIAL METHOD BLD: ABNORMAL
EOSINOPHIL # BLD: 0.2 K/UL (ref 0–0.4)
EOSINOPHIL NFR BLD: 2 % (ref 0–5)
ERYTHROCYTE [DISTWIDTH] IN BLOOD BY AUTOMATED COUNT: 15.6 % (ref 11.6–14.5)
GLUCOSE SERPL-MCNC: 135 MG/DL (ref 74–99)
HCT VFR BLD AUTO: 23.4 % (ref 35–45)
HGB BLD-MCNC: 8.1 G/DL (ref 12–16)
IMM GRANULOCYTES # BLD AUTO: 0.1 K/UL (ref 0–0.04)
IMM GRANULOCYTES NFR BLD AUTO: 1 % (ref 0–0.5)
LYMPHOCYTES # BLD: 0.9 K/UL (ref 0.9–3.6)
LYMPHOCYTES NFR BLD: 7 % (ref 21–52)
MAGNESIUM SERPL-MCNC: 2.1 MG/DL (ref 1.6–2.6)
MCH RBC QN AUTO: 29.1 PG (ref 24–34)
MCHC RBC AUTO-ENTMCNC: 34.6 G/DL (ref 31–37)
MCV RBC AUTO: 84.2 FL (ref 78–100)
MONOCYTES # BLD: 1.2 K/UL (ref 0.05–1.2)
MONOCYTES NFR BLD: 9 % (ref 3–10)
NEUTS SEG # BLD: 10.9 K/UL (ref 1.8–8)
NEUTS SEG NFR BLD: 81 % (ref 40–73)
NRBC # BLD: 0.03 K/UL (ref 0–0.01)
NRBC BLD-RTO: 0.2 PER 100 WBC
PLATELET # BLD AUTO: 276 K/UL (ref 135–420)
PMV BLD AUTO: 10.2 FL (ref 9.2–11.8)
POTASSIUM SERPL-SCNC: 3.8 MMOL/L (ref 3.5–5.5)
RBC # BLD AUTO: 2.78 M/UL (ref 4.2–5.3)
SODIUM SERPL-SCNC: 142 MMOL/L (ref 136–145)
TSH SERPL DL<=0.05 MIU/L-ACNC: 1.1 UIU/ML (ref 0.36–3.74)
WBC # BLD AUTO: 13.4 K/UL (ref 4.6–13.2)

## 2023-03-03 PROCEDURE — 2140000001 HC CVICU INTERMEDIATE R&B

## 2023-03-03 PROCEDURE — 36415 COLL VENOUS BLD VENIPUNCTURE: CPT

## 2023-03-03 PROCEDURE — 51798 US URINE CAPACITY MEASURE: CPT

## 2023-03-03 PROCEDURE — 83735 ASSAY OF MAGNESIUM: CPT

## 2023-03-03 PROCEDURE — 80048 BASIC METABOLIC PNL TOTAL CA: CPT

## 2023-03-03 PROCEDURE — 2580000003 HC RX 258

## 2023-03-03 PROCEDURE — 85025 COMPLETE CBC W/AUTO DIFF WBC: CPT

## 2023-03-03 PROCEDURE — 6370000000 HC RX 637 (ALT 250 FOR IP): Performed by: STUDENT IN AN ORGANIZED HEALTH CARE EDUCATION/TRAINING PROGRAM

## 2023-03-03 PROCEDURE — 99232 SBSQ HOSP IP/OBS MODERATE 35: CPT | Performed by: HOSPITALIST

## 2023-03-03 PROCEDURE — 6360000002 HC RX W HCPCS: Performed by: HOSPITALIST

## 2023-03-03 PROCEDURE — 6360000002 HC RX W HCPCS

## 2023-03-03 PROCEDURE — 71250 CT THORAX DX C-: CPT

## 2023-03-03 PROCEDURE — 84443 ASSAY THYROID STIM HORMONE: CPT

## 2023-03-03 RX ORDER — ENOXAPARIN SODIUM 100 MG/ML
30 INJECTION SUBCUTANEOUS DAILY
Status: DISCONTINUED | OUTPATIENT
Start: 2023-03-04 | End: 2023-03-05

## 2023-03-03 RX ORDER — METHYLPREDNISOLONE SODIUM SUCCINATE 40 MG/ML
40 INJECTION, POWDER, LYOPHILIZED, FOR SOLUTION INTRAMUSCULAR; INTRAVENOUS EVERY 8 HOURS
Status: DISCONTINUED | OUTPATIENT
Start: 2023-03-03 | End: 2023-03-04

## 2023-03-03 RX ORDER — IPRATROPIUM BROMIDE AND ALBUTEROL SULFATE 2.5; .5 MG/3ML; MG/3ML
1 SOLUTION RESPIRATORY (INHALATION) EVERY 4 HOURS PRN
Status: DISCONTINUED | OUTPATIENT
Start: 2023-03-03 | End: 2023-03-05 | Stop reason: HOSPADM

## 2023-03-03 RX ADMIN — OXYCODONE HYDROCHLORIDE 10 MG: 5 TABLET ORAL at 08:36

## 2023-03-03 RX ADMIN — PIPERACILLIN SODIUM AND TAZOBACTAM SODIUM 3375 MG: 3; .375 INJECTION, POWDER, LYOPHILIZED, FOR SOLUTION INTRAVENOUS at 11:27

## 2023-03-03 RX ADMIN — OXYCODONE HYDROCHLORIDE 5 MG: 5 TABLET ORAL at 12:41

## 2023-03-03 RX ADMIN — OXYCODONE HYDROCHLORIDE 5 MG: 5 TABLET ORAL at 04:44

## 2023-03-03 RX ADMIN — PIPERACILLIN SODIUM AND TAZOBACTAM SODIUM 3375 MG: 3; .375 INJECTION, POWDER, LYOPHILIZED, FOR SOLUTION INTRAVENOUS at 02:05

## 2023-03-03 RX ADMIN — METHYLPREDNISOLONE SODIUM SUCCINATE 40 MG: 40 INJECTION, POWDER, FOR SOLUTION INTRAMUSCULAR; INTRAVENOUS at 17:25

## 2023-03-03 RX ADMIN — SODIUM CHLORIDE, PRESERVATIVE FREE 10 ML: 5 INJECTION INTRAVENOUS at 20:39

## 2023-03-03 RX ADMIN — GABAPENTIN 100 MG: 100 CAPSULE ORAL at 20:35

## 2023-03-03 RX ADMIN — SODIUM CHLORIDE, PRESERVATIVE FREE 10 ML: 5 INJECTION INTRAVENOUS at 08:36

## 2023-03-03 RX ADMIN — OXYCODONE HYDROCHLORIDE 10 MG: 5 TABLET ORAL at 16:23

## 2023-03-03 ASSESSMENT — PAIN SCALES - WONG BAKER
WONGBAKER_NUMERICALRESPONSE: 0
WONGBAKER_NUMERICALRESPONSE: 0

## 2023-03-03 ASSESSMENT — PAIN DESCRIPTION - DESCRIPTORS
DESCRIPTORS: ACHING
DESCRIPTORS: ACHING;THROBBING
DESCRIPTORS: ACHING

## 2023-03-03 ASSESSMENT — PAIN SCALES - GENERAL
PAINLEVEL_OUTOF10: 6
PAINLEVEL_OUTOF10: 0
PAINLEVEL_OUTOF10: 2
PAINLEVEL_OUTOF10: 0
PAINLEVEL_OUTOF10: 7
PAINLEVEL_OUTOF10: 5

## 2023-03-03 ASSESSMENT — PAIN DESCRIPTION - ORIENTATION: ORIENTATION: RIGHT

## 2023-03-03 ASSESSMENT — PAIN DESCRIPTION - LOCATION
LOCATION: FLANK;CHEST
LOCATION: BACK
LOCATION: BACK

## 2023-03-03 NOTE — PROGRESS NOTES
0730-  Bedside and Verbal shift change report given to Viona Hodgkin, RN (oncoming nurse) by Ceci Poe RN (offgoing nurse). Report included the following information Nurse Handoff Report, ED SBAR, Intake/Output, MAR, and Recent Results. 1915-  Bedside and Verbal shift change report given to Ceci Poe RN (oncoming nurse) by Viona Hodgkin, RN (offgoing nurse). Report included the following information Nurse Handoff Report, ED SBAR, Intake/Output, MAR, and Recent Results.

## 2023-03-03 NOTE — PROGRESS NOTES
[facility]  [facility]    Patient: Zahra Holden Age: 71 y.o. : 1950 MR#: [id number] SSN: [id number]  Date/Time: 3/3/2023     Subjective: Patient lying in the bed, still shortness of breath on exertion. Difficulty ambulating due to weakness. Friend at the bedside     Assessment/Plan:   Possible sepsis due to pleural fluid  Right pleural effusion post chest tube placement by ED, chest tube removed by CT surgery on 3/2/2023  Metastatic lung cancer  History of CVA  Active tobacco abuse  Anxiety disorder  Sinus tachycardia  Mild RITA    Plan  Repeat CT chest noted, discussed with pulmonary. Slight worsening atelectasis but no change in the fluid since admission. Pulmonary recommend weaning off oxygen and reassess for home oxygen. If tolerates can be discharged tomorrow. No cultures were sent from the pleural fluid, pulmonary thinks fluid looks malignant, recommend holding off antibiotics and monitor. Continue gentle hydration, monitor creatinine  Thyroid function normal  We will consult palliative care    Addendum  Patient could not be weaned off 4 L oxygen, oxygen sats desaturated to 86% on 2 L. Will initiate IV steroids, bronchodilators and monitor  Will discuss with pulmonary for oxygen saturation does not improve    Discussed with the patient at the bedside and explained about normal plan care. Disposition: PT recommending SNF but patient insisting on no home health  Tried calling daughter but no response, voicemail left.     Case discussed with:  [x]Patient  [x]Family  []Nursing  []Case Management  DVT Prophylaxis:  []Lovenox  []Hep SQ  [x]SCDs  []Coumadin   []Eliquis/Xarelto     Objective:   VS: /74   Pulse (!) 107   Temp 97.9 °F (36.6 °C) (Oral)   Resp 18   Ht 4' 11\" (1.499 m)   Wt 125 lb 14.1 oz (57.1 kg)   SpO2 95%   BMI 25.43 kg/m²    Tmax/24hrs: Temp (24hrs), Av.2 °F (36.8 °C), Min:97.2 °F (36.2 °C), Max:99.1 °F (37.3 °C)  IOBRIEF  Intake/Output Summary (Last 24 hours) at 3/3/2023 1647  Last data filed at 3/3/2023 0045  Gross per 24 hour   Intake --   Output 200 ml   Net -200 ml       General:  Alert, cooperative, no acute distress, but patient cachectic  Pulmonary: Decreased breath sounds right base, chest tube site dressing clean  Cardiovascular: Regular rate and Rhythm. GI:  Soft, Non distended, Non tender. + Bowel sounds. Extremities:  No edema. No calf tenderness. Psych: Good insight. Not anxious or agitated. Neurologic: Alert and oriented X 3. Moves all ext.   Additional: Diffuse muscle wasting    Medications:   Current Facility-Administered Medications   Medication Dose Route Frequency    [START ON 3/4/2023] enoxaparin Sodium (LOVENOX) injection 30 mg  30 mg SubCUTAneous Daily    methylPREDNISolone sodium (SOLU-MEDROL) injection 40 mg  40 mg IntraVENous Q8H    ipratropium-albuterol (DUONEB) nebulizer solution 1 ampule  1 ampule Inhalation Q4H PRN    0.9 % sodium chloride infusion   IntraVENous Continuous    lidocaine 4 % external patch 1 patch  1 patch TransDERmal Daily    oxyCODONE (ROXICODONE) immediate release tablet 5 mg  5 mg Oral Q4H PRN    Or    oxyCODONE (ROXICODONE) immediate release tablet 10 mg  10 mg Oral Q6H PRN    oxyCODONE-acetaminophen (PERCOCET) 5-325 MG per tablet 1 tablet  1 tablet Oral Q4H PRN    gabapentin (NEURONTIN) capsule 100 mg  100 mg Oral Nightly    nicotine (NICODERM CQ) 14 MG/24HR 1 patch  1 patch TransDERmal Daily    sodium chloride flush 0.9 % injection 5-40 mL  5-40 mL IntraVENous 2 times per day    sodium chloride flush 0.9 % injection 5-40 mL  5-40 mL IntraVENous PRN    ondansetron (ZOFRAN-ODT) disintegrating tablet 4 mg  4 mg Oral Q8H PRN    Or    ondansetron (ZOFRAN) injection 4 mg  4 mg IntraVENous Q6H PRN    polyethylene glycol (GLYCOLAX) packet 17 g  17 g Oral Daily PRN    famotidine (PEPCID) tablet 20 mg  20 mg Oral Daily    acetaminophen (TYLENOL) tablet 650 mg  650 mg Oral Q6H PRN    Or    acetaminophen (TYLENOL) suppository 650 mg  650 mg Rectal Q6H PRN    naloxone (NARCAN) injection 0.4 mg  0.4 mg IntraVENous PRN       Labs:    Recent Results (from the past 24 hour(s))   Basic Metabolic Panel    Collection Time: 03/03/23  4:06 AM   Result Value Ref Range    Sodium 142 136 - 145 mmol/L    Potassium 3.8 3.5 - 5.5 mmol/L    Chloride 112 (H) 100 - 111 mmol/L    CO2 28 21 - 32 mmol/L    Anion Gap 2 (L) 3.0 - 18 mmol/L    Glucose 135 (H) 74 - 99 mg/dL    BUN 17 7.0 - 18 MG/DL    Creatinine 1.51 (H) 0.6 - 1.3 MG/DL    Bun/Cre Ratio 11 (L) 12 - 20      Est, Glom Filt Rate 37 (L) >60 ml/min/1.73m2    Calcium 8.3 (L) 8.5 - 10.1 MG/DL   CBC with Auto Differential    Collection Time: 03/03/23  4:06 AM   Result Value Ref Range    WBC 13.4 (H) 4.6 - 13.2 K/uL    RBC 2.78 (L) 4.20 - 5.30 M/uL    Hemoglobin 8.1 (L) 12.0 - 16.0 g/dL    Hematocrit 23.4 (L) 35.0 - 45.0 %    MCV 84.2 78.0 - 100.0 FL    MCH 29.1 24.0 - 34.0 PG    MCHC 34.6 31.0 - 37.0 g/dL    RDW 15.6 (H) 11.6 - 14.5 %    Platelets 656 455 - 711 K/uL    MPV 10.2 9.2 - 11.8 FL    Nucleated RBCs 0.2 (H) 0  WBC    nRBC 0.03 (H) 0.00 - 0.01 K/uL    Seg Neutrophils 81 (H) 40 - 73 %    Lymphocytes 7 (L) 21 - 52 %    Monocytes 9 3 - 10 %    Eosinophils % 2 0 - 5 %    Basophils 1 0 - 2 %    Immature Granulocytes 1 (H) 0.0 - 0.5 %    Segs Absolute 10.9 (H) 1.8 - 8.0 K/UL    Absolute Lymph # 0.9 0.9 - 3.6 K/UL    Absolute Mono # 1.2 0.05 - 1.2 K/UL    Absolute Eos # 0.2 0.0 - 0.4 K/UL    Basophils Absolute 0.1 0.0 - 0.1 K/UL    Absolute Immature Granulocyte 0.1 (H) 0.00 - 0.04 K/UL    Differential Type AUTOMATED     Magnesium    Collection Time: 03/03/23  4:06 AM   Result Value Ref Range    Magnesium 2.1 1.6 - 2.6 mg/dL   TSH    Collection Time: 03/03/23  4:06 AM   Result Value Ref Range    TSH, 3RD GENERATION 1.10 0.36 - 3.74 uIU/mL       Signed By: Merle Monae MD     March 3, 2023      Disclaimer: Sections of this note are dictated using utilizing voice recognition software. Minor typographical errors may be present. If questions arise, please do not hesitate to contact me or call our department.

## 2023-03-03 NOTE — PROGRESS NOTES
Comprehensive Nutrition Assessment    Type and Reason for Visit:  Reassess    Nutrition Recommendations/Plan:   Continue current diet and oral supplement as tolerated. Encourage PO intake. Continue to monitor tolerance of PO, compliance of oral supplements, weight, labs, and plan of care during admission. Malnutrition Assessment:  Malnutrition Status: Moderate malnutrition (02/28/23 1041)    Context:  Acute Illness     Findings of the 6 clinical characteristics of malnutrition:  Energy Intake:  Mild decrease in energy intake (Comment)  Weight Loss:  Greater than 5% over 1 month ((-20% significant change x 30 days). )     Body Fat Loss:  Mild body fat loss Buccal region, Orbital   Muscle Mass Loss:  Mild muscle mass loss Clavicles (pectoralis & deltoids)  Fluid Accumulation:  No significant fluid accumulation     Strength:  Not Performed    Nutrition Assessment:    Pt admitted with shortness of breath, pain to biopsy site, chest pain-recently diagnosed with lung cancer per MD note. MD with pt at time of visit. Second visit, pt with family member at bedside, on phone, told to come back. Observed breakfast just delivered at bedside. No PO documented in flowsheet since 2/27. Per RN Sulema Cao, pt does not have a big appetite. Full code per palliative care. Nutrition Related Findings:    Last BM PTA. Pertinent Meds: pepcid, lovenox, gabapentin, zosyn Pertinent Labs: BUN/Cr 17/1.51, GFR 37 Wound Type: Skin Tears       Current Nutrition Intake & Therapies:    Average Meal Intake: 1-25%  Average Supplements Intake: None Ordered  ADULT DIET; Regular  ADULT ORAL NUTRITION SUPPLEMENT; Lunch, Dinner; Standard High Calorie/High Protein Oral Supplement    Anthropometric Measures:  Height: 4' 11\" (149.9 cm)  Ideal Body Weight (IBW): 95 lbs (43 kg)    Admission Body Weight: 125 lb 14.1 oz (57.1 kg) (57.1 kg)  Current Body Weight: 125 lb 14.1 oz (57.1 kg), 132.5 % IBW.  Weight Source: Bed Scale  Current BMI (kg/m2): 25.4  BMI Categories: Normal Weight (BMI 22.0 to 24.9) age over 72    Estimated Daily Nutrient Needs:  Energy Requirements Based On: Kcal/kg (25-30)  Weight Used for Energy Requirements: Current  Energy (kcal/day): 2553-7307  Weight Used for Protein Requirements: Current (1.0-1.3)  Protein (g/day): 57-74  Method Used for Fluid Requirements: 1 ml/kcal  Fluid (ml/day): 3306-6011    Nutrition Diagnosis:   Inadequate oral intake related to early satiety, inadequate protein-energy intake, impaired respiratory function as evidenced by intake 0-25%, intake 26-50%, poor intake prior to admission, weight loss  Moderate malnutrition, In context of acute illness or injury related to catabolic illness, increase demand for energy/nutrients as evidenced by Criteria as identified in malnutrition assessment    Nutrition Interventions:   Food and/or Nutrient Delivery: Continue Current Diet, Continue Oral Nutrition Supplement  Nutrition Education/Counseling: No recommendation at this time  Coordination of Nutrition Care: Continue to monitor while inpatient  Plan of Care discussed with: pt    Goals:  Previous Goal Met: Progressing toward Goal(s)  Goals: Meet at least 75% of estimated needs, by next RD assessment       Nutrition Monitoring and Evaluation:   Behavioral-Environmental Outcomes: None Identified  Food/Nutrient Intake Outcomes: Food and Nutrient Intake, Supplement Intake  Physical Signs/Symptoms Outcomes: Biochemical Data, GI Status, Meal Time Behavior, Weight, Nutrition Focused Physical Findings    Discharge Planning:    Continue current diet     Devon Porras 87, 67 [address]   Contact: [phone]

## 2023-03-03 NOTE — PROGRESS NOTES
Patient SATS on 2L nasal cannula was 86%  Unable to do walking RA SATS per MD order due to above. O2 turned up to 4L and SATS came up to 92%      1648-  Bladder scan done per MD order. 80 cc urine left in bladder post void.

## 2023-03-03 NOTE — PROGRESS NOTES
Physician Progress Note      PATIENTNorton Clause  CSN #:                  [id number]  :                       1950  ADMIT DATE:       2023 1:41 PM  DISCH DATE:  RESPONDING  PROVIDER #:        Marcellus Litten DO          QUERY TEXT:    Pt admitted with sepsis and has moderate malnutrition documented in the   23 Dietician note. Please further specify type of malnutrition with   documentation in the medical record. The medical record reflects the following:  Risk Factors: Acute illness  Clinical Indicators:  23  RD consult note:  Malnutrition Status: Moderate malnutrition (23 1041)  Context:  Acute Illness  Findings of the 6 clinical characteristics of malnutrition:  Energy Intake:  Mild decrease in energy intake (Comment)  Weight Loss:  Greater than 5% over 1 month ((-20% significant change x 30   days). )  Body Fat Loss:  Mild body fat loss Buccal region, Orbital  Muscle Mass Loss:  Mild muscle mass loss Clavicles (pectoralis & deltoids)  Fluid Accumulation:  No significant fluid accumulation   Strength:  Not Performed  Treatment: Ensure Enlive BID; continue current diet; monitor PO intake and   weight; RD following    ASPEN Criteria:    https://aspenjournals. onlinelibrary. chen. com/doi/full/10.1177/606711216757247  5    Thank you,  Rashawn Yost [email or web address]  Options provided:  -- Moderate Malnutrition  -- Other - I will add my own diagnosis  -- Disagree - Not applicable / Not valid  -- Disagree - Clinically unable to determine / Unknown  -- Refer to Clinical Documentation Reviewer    PROVIDER RESPONSE TEXT:    This patient has moderate malnutrition. Query created by: Brenda Oconnor on 2023 3:15 PM      QUERY TEXT:    Pt admitted with \"Sepsis 2/2 infected pleural effusion- per ER \"sterile field   was compromised\" during the procedure. \" CT reports \"Superimposed pneumonia is   possible\".  If possible, please document in progress notes and discharge   summary the relationship, if any, between sepsis, pleural fluid and possible   pneumonia. The medical record reflects the following:  Risk Factors: Metastatic Lung Cancer - adenocarcinoma, diagnosed 1 month ago  Clinical Indicators: H&P:  Sepsis 2/2 infected pleural effusion- per ER   \"sterile field was compromised\" during the procedure  H&P:  CTA chest with no PE, large right pleural effusion, superimposed PNA is   possible, trace left pleural effusion, new nodular density in the superior   left lower lobe- possibly metastasis vs infectious/inflammatory, new   indeterminate liver lesion in the left hepatic lobe, concerning for   metastasis, enlarged gastrohepatic lymph nodes also likely metastatic. Treatment: Zosyn; Vanco; CT; CXR; labs; VS    Thank you,  Raghav Duncan [email or web address]  Options provided:  -- Sepsis due to infected pleural fluid following chest tube insertion  -- Sepsis due to due to infected pleural fluid unrelated to chest tube   insertion  -- Sepsis due to infected pleural fluid and possible PNA, unrelated to chest   tube insertion. -- Other - I will add my own diagnosis  -- Disagree - Not applicable / Not valid  -- Disagree - Clinically unable to determine / Unknown  -- Refer to Clinical Documentation Reviewer    PROVIDER RESPONSE TEXT:    This patient has sepsis due to due to infected pleural fluid unrelated to   chest tube insertion.     Query created by: Toña Costello on 3/1/2023 10:13 AM      Electronically signed by:  Johnie Guthrie DO 3/3/2023 5:57 AM

## 2023-03-03 NOTE — PROGRESS NOTES
[facility] Pharmacokinetic Monitoring Service - Vancomycin    Indication: PNA  Goal AUC/MARY: 400-600 mg*hr/L  Day of Therapy: 6  Additional Antimicrobials: pip-tazo    Pertinent Laboratory Values: Wt Readings from Last 1 Encounters:   02/28/23 125 lb 14.1 oz (57.1 kg)     Temp Readings from Last 1 Encounters:   03/03/23 97.2 °F (36.2 °C) (Oral)     Procalcitonin   Date Value Ref Range Status   11/08/2021 0.06 ng/mL Final     Comment:          Suspected Sepsis:  <0.50 ng/mL     Low likelihood of sepsis. 0.50-2.00 ng/mL    Increased likelihood of sepsis. Antibiotics encouraged. >2.00 ng/mL  High risk of sepsis/shock. Antibiotics strongly encouraged. Suspected Lower Resp Tract Infections:  <0.24 ng/mL    Low likelihood of bacterial infection. >0.24 ng/mL    Increased likelihood of bacterial infection. Antibiotics encouraged. With successful antibiotic therapy, PCT levels should decrease rapidly. (Half-life of 24 to 36 hours)       Procalcitonin values from samples collected within the first 6 hours of systemic infection may still be low. Retesting may be indicated. Values from day 1 and day 4 can be entered into the Change in Procalcitonin Calculator (www.[facility] [place]-calculator. [employer]) to determine the patient's Mortality Risk Prognosis. In healthy neonates, plasma Procalcitonin (PCT) concentrations increase gradually after birth, reaching peak values at about 24 hours of age then decrease to normal values below 0.5 ng/mL by 48-72 hours of age. Estimated Creatinine Clearance: 26 mL/min (A) (based on SCr of 1.51 mg/dL (H)).   Recent Labs     03/02/23  0301 03/03/23  0406   CREATININE 1.36* 1.51*   WBC 14.6* 13.4*     Pertinent Cultures:  Culture Date Source Results   2/27 blood NGTD   3/1 urine 22,000 Enterococcus   MRSA Nasal Swab: awaiting result    Assessment:  Date Current Dose Concentration (mg/L) Timing of Concentration (h) AUC   2/26 1,750 mg x1 - - -   2/27 750 mg q18h - - -   2/28 750 mg q18h  1,000 mg q12h 9.9 13 250  488   3/1 1,000 mg q12h - - -   3/2 1,000 mg q12h 21.6 5 494   Note: Serum concentrations collected for AUC dosing may appear elevated if collected in close proximity to the dose administered, this is not necessarily an indication of toxicity    Plan:  Dose by levels - patient in RITA  Recommendation whether to continue per pulmonary consult  Random tomorrow AM  Pharmacy will continue to monitor patient and adjust therapy as indicated    Thank you for the consult,  CARTER Barron [facility]  3/3/2023

## 2023-03-03 NOTE — PROGRESS NOTES
[facility]. Pulmonary, Critical Care, and Sleep Medicine    Initial Patient Consult    Name: Nita Rosas MRN: [id number]   : 1950 Hospital: [address] [place] [address] Dr   Date: 3/3/2023        Problem:  Right-sided exudative pleural effusion, likely malignant effusion from patient's metastatic adenocarcinoma. Less likely empyema. Reviewing prior imaging, patient had worsening effusion since January. September chest x-ray showed no effusion but does have right-sided mass. Stage DAVID lung adenocarcinoma, suspect likely stage IVB given abdominal LAD  Right lower lobe lesion seen as far back as early . Patient offered biopsy in  but refused it. States that she was scared of the answer. Follows at [facility] with Dr. Becca Beach  History of tobacco use  History of polysubstance use including cocaine    Plan:  CT chest ordered for today. Recommend follow-up imaging with a chest x-ray at 2 to 3 weeks. If patient has repeated accumulation, consider a Pleurx catheter based on patient preference. Palliative care is following    HPI:     This patient has been seen and evaluated at the request of Dr. Ariana Schwartz for R sided pleural effusion. Patient is a 71 y.o. female with prior history of smoking, polysubstance abuse, right lower lobe lung mass who presented to the ED with worsening shortness of breath and right-sided chest pain. Patient had a chest tube placed in the ED and no other work-up done on the pleural effusion. Apparently the chest tube was also placed in a nonsterile fashion. CT surgery has been following the patient for this chest tube. Pulmonology was consulted to evaluate for the etiology of this chest tube. Currently, the patient feels okay. Does not have any shortness of breath. Denies any fevers, chills, nausea, vomiting, diarrhea, dizziness, lightheadedness. Of note, patient has known right lower lobe lung mass since more than a year ago.   She was scheduled to have further evaluation including biopsy in April 2022 but refused at that time. When asked her about it, she said that she was scared of the answer. Eventually she was lost to follow-up and was recently seen as an outpatient in February by Dr. Fady Henriquez after she had biopsy done of right rib lytic lesion which showed adenocarcinoma. She is currently pending additional work-up and is planned for outpatient palliative radiation. 3/3/2023    - Patient sitting up comfortably in bed eating breakfast  - Adequate O2 sats on NC at 4 L/min  - No distress or increased work of breathing  - States her breathing is significantly improved since admission  - Chest tube removed yesterday by CT surgery team    Past Medical History:   Diagnosis Date    Angina at rest [facility])     Anxiety     CVA, old, facial weakness 2012    left ptosis    Dental caries     Osteoarthritis     Osteopenia     Right lower lobe lung mass 04/15/2022    Offered Biopsy and couldn't decide between PAlliative Care and Biopsy      Past Surgical History:   Procedure Laterality Date    CT BIOPSY PERCUTANEOUS SUPERFICIAL BONE  1/26/2023    CT BIOPSY PERCUTANEOUS SUPERFICIAL BONE 1/26/2023 [facility] RAD CT    WISDOM TOOTH EXTRACTION        Prior to Admission medications    Medication Sig Start Date End Date Taking?  Authorizing Provider   acetaminophen (TYLENOL) 325 MG tablet Take 650 mg by mouth every 6 hours as needed 9/7/22   Ar Automatic Reconciliation   ibuprofen (ADVIL;MOTRIN) 400 MG tablet Take 600 mg by mouth every 6 hours as needed    Ar Automatic Reconciliation   levoFLOXacin (LEVAQUIN) 750 MG tablet Take 750 mg by mouth daily 2/5/23   Ar Automatic Reconciliation     Allergies   Allergen Reactions    Aspirin Nausea Only      Social History     Tobacco Use    Smoking status: Some Days     Packs/day: 0.50     Types: Cigarettes    Smokeless tobacco: Never   Substance Use Topics    Alcohol use: Yes      Family History   Problem Relation Age of Onset    Cancer Daughter         Unspecified Cancer    Cancer Cousin         Unspecified Cancer x2        Review of Systems:  Pertinent items are noted in HPI. Objective:   Vital Signs:    /75   Pulse (!) 108   Temp 97.2 °F (36.2 °C) (Oral)   Resp 17   Ht 4' 11\" (1.499 m)   Wt 125 lb 14.1 oz (57.1 kg)   SpO2 94%   BMI 25.43 kg/m²             Temp (24hrs), Av.8 °F (37.1 °C), Min:97.2 °F (36.2 °C), Max:99.7 °F (37.6 °C)       Intake/Output:   Last shift:      No intake/output data recorded. Last 3 shifts: 1901 -  0700  In: 480 [P.O.:480]  Out: 485 [Urine:450]    Intake/Output Summary (Last 24 hours) at 3/3/2023 0942  Last data filed at 3/3/2023 0045  Gross per 24 hour   Intake 360 ml   Output 235 ml   Net 125 ml          Physical Exam:  /75   Pulse (!) 108   Temp 97.2 °F (36.2 °C) (Oral)   Resp 17   Ht 4' 11\" (1.499 m)   Wt 125 lb 14.1 oz (57.1 kg)   SpO2 94%   BMI 25.43 kg/m²         Temp (24hrs), Av.8 °F (37.1 °C), Min:97.2 °F (36.2 °C), Max:99.7 °F (37.6 °C)    No intake/output data recorded. 1901 -  0700  In: 480 [P.O.:480]  Out: 485 [Urine:450]    General: AOX3, NAD. Thin. HEENT: NCAT, no scleral icterus, PERRL  Neck: No LAD, no JVD  Lungs: CTAB, no wheezing, rales, or crackles. R sided chest tube site covered in bandage. CV: RRR, S1/S2 normal. No MRG. Abdomen: Soft, NT, ND. Normoactive bowel sounds. Extremities: No cyanosis or edema. Skin: No rashes or lesions  Neuro: Aox3. Facial droop.          Data review:   Labs:  Recent Results (from the past 24 hour(s))   Basic Metabolic Panel    Collection Time: 23  4:06 AM   Result Value Ref Range    Sodium 142 136 - 145 mmol/L    Potassium 3.8 3.5 - 5.5 mmol/L    Chloride 112 (H) 100 - 111 mmol/L    CO2 28 21 - 32 mmol/L    Anion Gap 2 (L) 3.0 - 18 mmol/L    Glucose 135 (H) 74 - 99 mg/dL    BUN 17 7.0 - 18 MG/DL    Creatinine 1.51 (H) 0.6 - 1.3 MG/DL    Bun/Cre Ratio 11 (L) 12 - 20      Est, Glom Filt Rate 37 (L) >60 ml/min/1.73m2    Calcium 8.3 (L) 8.5 - 10.1 MG/DL   CBC with Auto Differential    Collection Time: 03/03/23  4:06 AM   Result Value Ref Range    WBC 13.4 (H) 4.6 - 13.2 K/uL    RBC 2.78 (L) 4.20 - 5.30 M/uL    Hemoglobin 8.1 (L) 12.0 - 16.0 g/dL    Hematocrit 23.4 (L) 35.0 - 45.0 %    MCV 84.2 78.0 - 100.0 FL    MCH 29.1 24.0 - 34.0 PG    MCHC 34.6 31.0 - 37.0 g/dL    RDW 15.6 (H) 11.6 - 14.5 %    Platelets 426 188 - 244 K/uL    MPV 10.2 9.2 - 11.8 FL    Nucleated RBCs 0.2 (H) 0  WBC    nRBC 0.03 (H) 0.00 - 0.01 K/uL    Seg Neutrophils 81 (H) 40 - 73 %    Lymphocytes 7 (L) 21 - 52 %    Monocytes 9 3 - 10 %    Eosinophils % 2 0 - 5 %    Basophils 1 0 - 2 %    Immature Granulocytes 1 (H) 0.0 - 0.5 %    Segs Absolute 10.9 (H) 1.8 - 8.0 K/UL    Absolute Lymph # 0.9 0.9 - 3.6 K/UL    Absolute Mono # 1.2 0.05 - 1.2 K/UL    Absolute Eos # 0.2 0.0 - 0.4 K/UL    Basophils Absolute 0.1 0.0 - 0.1 K/UL    Absolute Immature Granulocyte 0.1 (H) 0.00 - 0.04 K/UL    Differential Type AUTOMATED     Magnesium    Collection Time: 03/03/23  4:06 AM   Result Value Ref Range    Magnesium 2.1 1.6 - 2.6 mg/dL   TSH    Collection Time: 03/03/23  4:06 AM   Result Value Ref Range    TSH, 3RD GENERATION 1.10 0.36 - 3.74 uIU/mL     Imaging:  I have personally reviewed the patients radiographs and have reviewed the reports:  CXR x 3, CT scan x 4           Luis Jolly MD  Pulmonary/Critical Care/Sleep Medicine  3/3/2023

## 2023-03-03 NOTE — PLAN OF CARE
Problem: Pain  Goal: Verbalizes/displays adequate comfort level or baseline comfort level  Outcome: Progressing     Problem: Skin/Tissue Integrity  Goal: Absence of new skin breakdown  Description: 1. Monitor for areas of redness and/or skin breakdown  2. Assess vascular access sites hourly  3. Every 4-6 hours minimum:  Change oxygen saturation probe site  4. Every 4-6 hours:  If on nasal continuous positive airway pressure, respiratory therapy assess nares and determine need for appliance change or resting period.   Outcome: Progressing     Problem: Nutrition Deficit:  Goal: Optimize nutritional status  Outcome: Progressing  Flowsheets (Taken 3/3/2023 0905 by Rosy Ardon RD)  Nutrient intake appropriate for improving, restoring, or maintaining nutritional needs:   Assess nutritional status and recommend course of action   Monitor oral intake, labs, and treatment plans   Recommend appropriate diets, oral nutritional supplements, and vitamin/mineral supplements

## 2023-03-04 ENCOUNTER — APPOINTMENT (OUTPATIENT)
Facility: HOSPITAL | Age: 73
DRG: 720 | End: 2023-03-04
Payer: MEDICAID

## 2023-03-04 LAB
ANION GAP SERPL CALC-SCNC: 4 MMOL/L (ref 3–18)
BACTERIA SPEC CULT: ABNORMAL
BASOPHILS # BLD: 0 K/UL (ref 0–0.1)
BASOPHILS NFR BLD: 0 % (ref 0–2)
BUN SERPL-MCNC: 17 MG/DL (ref 7–18)
BUN/CREAT SERPL: 14 (ref 12–20)
CALCIUM SERPL-MCNC: 8.8 MG/DL (ref 8.5–10.1)
CC UR VC: ABNORMAL
CHLORIDE SERPL-SCNC: 111 MMOL/L (ref 100–111)
CO2 SERPL-SCNC: 26 MMOL/L (ref 21–32)
CREAT SERPL-MCNC: 1.2 MG/DL (ref 0.6–1.3)
DIFFERENTIAL METHOD BLD: ABNORMAL
EOSINOPHIL # BLD: 0 K/UL (ref 0–0.4)
EOSINOPHIL NFR BLD: 0 % (ref 0–5)
ERYTHROCYTE [DISTWIDTH] IN BLOOD BY AUTOMATED COUNT: 16.1 % (ref 11.6–14.5)
GLUCOSE SERPL-MCNC: 124 MG/DL (ref 74–99)
HCT VFR BLD AUTO: 24.5 % (ref 35–45)
HGB BLD-MCNC: 8.6 G/DL (ref 12–16)
IMM GRANULOCYTES # BLD AUTO: 0.1 K/UL (ref 0–0.04)
IMM GRANULOCYTES NFR BLD AUTO: 1 % (ref 0–0.5)
LYMPHOCYTES # BLD: 0.7 K/UL (ref 0.9–3.6)
LYMPHOCYTES NFR BLD: 5 % (ref 21–52)
MAGNESIUM SERPL-MCNC: 2.1 MG/DL (ref 1.6–2.6)
MCH RBC QN AUTO: 28.6 PG (ref 24–34)
MCHC RBC AUTO-ENTMCNC: 35.1 G/DL (ref 31–37)
MCV RBC AUTO: 81.4 FL (ref 78–100)
MONOCYTES # BLD: 0.3 K/UL (ref 0.05–1.2)
MONOCYTES NFR BLD: 2 % (ref 3–10)
NEUTS SEG # BLD: 13.5 K/UL (ref 1.8–8)
NEUTS SEG NFR BLD: 92 % (ref 40–73)
NRBC # BLD: 0.03 K/UL (ref 0–0.01)
NRBC BLD-RTO: 0.2 PER 100 WBC
PLATELET # BLD AUTO: 362 K/UL (ref 135–420)
PMV BLD AUTO: 10.6 FL (ref 9.2–11.8)
POTASSIUM SERPL-SCNC: 4.8 MMOL/L (ref 3.5–5.5)
RBC # BLD AUTO: 3.01 M/UL (ref 4.2–5.3)
SERVICE CMNT-IMP: ABNORMAL
SODIUM SERPL-SCNC: 141 MMOL/L (ref 136–145)
VANCOMYCIN SERPL-MCNC: 15.5 UG/ML (ref 5–40)
WBC # BLD AUTO: 14.6 K/UL (ref 4.6–13.2)

## 2023-03-04 PROCEDURE — 6370000000 HC RX 637 (ALT 250 FOR IP): Performed by: STUDENT IN AN ORGANIZED HEALTH CARE EDUCATION/TRAINING PROGRAM

## 2023-03-04 PROCEDURE — 6370000000 HC RX 637 (ALT 250 FOR IP): Performed by: INTERNAL MEDICINE

## 2023-03-04 PROCEDURE — 80048 BASIC METABOLIC PNL TOTAL CA: CPT

## 2023-03-04 PROCEDURE — 2700000000 HC OXYGEN THERAPY PER DAY

## 2023-03-04 PROCEDURE — 94761 N-INVAS EAR/PLS OXIMETRY MLT: CPT

## 2023-03-04 PROCEDURE — 80202 ASSAY OF VANCOMYCIN: CPT

## 2023-03-04 PROCEDURE — 6360000002 HC RX W HCPCS: Performed by: HOSPITALIST

## 2023-03-04 PROCEDURE — 6370000000 HC RX 637 (ALT 250 FOR IP)

## 2023-03-04 PROCEDURE — 85025 COMPLETE CBC W/AUTO DIFF WBC: CPT

## 2023-03-04 PROCEDURE — 6370000000 HC RX 637 (ALT 250 FOR IP): Performed by: HOSPITALIST

## 2023-03-04 PROCEDURE — 83735 ASSAY OF MAGNESIUM: CPT

## 2023-03-04 PROCEDURE — 2580000003 HC RX 258

## 2023-03-04 PROCEDURE — 2140000001 HC CVICU INTERMEDIATE R&B

## 2023-03-04 PROCEDURE — 71045 X-RAY EXAM CHEST 1 VIEW: CPT

## 2023-03-04 PROCEDURE — 36415 COLL VENOUS BLD VENIPUNCTURE: CPT

## 2023-03-04 PROCEDURE — 99232 SBSQ HOSP IP/OBS MODERATE 35: CPT | Performed by: HOSPITALIST

## 2023-03-04 RX ORDER — LEVOFLOXACIN 750 MG/1
750 TABLET ORAL
Status: DISCONTINUED | OUTPATIENT
Start: 2023-03-04 | End: 2023-03-05 | Stop reason: HOSPADM

## 2023-03-04 RX ORDER — METHYLPREDNISOLONE SODIUM SUCCINATE 40 MG/ML
20 INJECTION, POWDER, LYOPHILIZED, FOR SOLUTION INTRAMUSCULAR; INTRAVENOUS EVERY 8 HOURS
Status: DISCONTINUED | OUTPATIENT
Start: 2023-03-04 | End: 2023-03-05

## 2023-03-04 RX ADMIN — METHYLPREDNISOLONE SODIUM SUCCINATE 40 MG: 40 INJECTION, POWDER, FOR SOLUTION INTRAMUSCULAR; INTRAVENOUS at 08:10

## 2023-03-04 RX ADMIN — METHYLPREDNISOLONE SODIUM SUCCINATE 20 MG: 40 INJECTION, POWDER, FOR SOLUTION INTRAMUSCULAR; INTRAVENOUS at 16:47

## 2023-03-04 RX ADMIN — LEVOFLOXACIN 750 MG: 750 TABLET, FILM COATED ORAL at 12:50

## 2023-03-04 RX ADMIN — SODIUM CHLORIDE, PRESERVATIVE FREE 5 ML: 5 INJECTION INTRAVENOUS at 22:00

## 2023-03-04 RX ADMIN — OXYCODONE HYDROCHLORIDE 5 MG: 5 TABLET ORAL at 03:15

## 2023-03-04 RX ADMIN — GABAPENTIN 100 MG: 100 CAPSULE ORAL at 20:55

## 2023-03-04 RX ADMIN — OXYCODONE HYDROCHLORIDE 5 MG: 5 TABLET ORAL at 20:54

## 2023-03-04 RX ADMIN — METHYLPREDNISOLONE SODIUM SUCCINATE 40 MG: 40 INJECTION, POWDER, FOR SOLUTION INTRAMUSCULAR; INTRAVENOUS at 00:16

## 2023-03-04 RX ADMIN — METOPROLOL TARTRATE 25 MG: 25 TABLET, FILM COATED ORAL at 12:51

## 2023-03-04 RX ADMIN — METOPROLOL TARTRATE 25 MG: 25 TABLET, FILM COATED ORAL at 20:55

## 2023-03-04 RX ADMIN — SODIUM CHLORIDE, PRESERVATIVE FREE 10 ML: 5 INJECTION INTRAVENOUS at 08:10

## 2023-03-04 RX ADMIN — FAMOTIDINE 20 MG: 20 TABLET ORAL at 08:08

## 2023-03-04 RX ADMIN — OXYCODONE HYDROCHLORIDE 5 MG: 5 TABLET ORAL at 15:24

## 2023-03-04 ASSESSMENT — PAIN SCALES - GENERAL
PAINLEVEL_OUTOF10: 0
PAINLEVEL_OUTOF10: 6
PAINLEVEL_OUTOF10: 7
PAINLEVEL_OUTOF10: 6
PAINLEVEL_OUTOF10: 6
PAINLEVEL_OUTOF10: 0

## 2023-03-04 ASSESSMENT — PAIN SCALES - WONG BAKER
WONGBAKER_NUMERICALRESPONSE: 0

## 2023-03-04 ASSESSMENT — PAIN DESCRIPTION - ORIENTATION
ORIENTATION: RIGHT;INNER
ORIENTATION: RIGHT
ORIENTATION: RIGHT;INNER
ORIENTATION: RIGHT

## 2023-03-04 ASSESSMENT — PAIN DESCRIPTION - DESCRIPTORS
DESCRIPTORS: ACHING;THROBBING
DESCRIPTORS: ACHING
DESCRIPTORS: ACHING;THROBBING
DESCRIPTORS: ACHING

## 2023-03-04 ASSESSMENT — PAIN DESCRIPTION - LOCATION
LOCATION: SHOULDER
LOCATION: BACK
LOCATION: CHEST
LOCATION: CHEST
LOCATION: SHOULDER

## 2023-03-04 ASSESSMENT — PAIN - FUNCTIONAL ASSESSMENT
PAIN_FUNCTIONAL_ASSESSMENT: ACTIVITIES ARE NOT PREVENTED
PAIN_FUNCTIONAL_ASSESSMENT: ACTIVITIES ARE NOT PREVENTED

## 2023-03-04 NOTE — PROGRESS NOTES
New order placed for DME for rolling walker and nebulizer. Both ordered through [employer] via [employer] site. Rolling walker brought to pts room and nebulizer will be delivered to pts home. Pt and pts nurse updated.     Coleridge Pallas RN CDCES  Case Management

## 2023-03-04 NOTE — PROGRESS NOTES
[facility]. Pulmonary, Critical Care, and Sleep Medicine    Initial Patient Consult    Name: Macho Arreola MRN: [id number]   : 1950 Hospital: [address] [place] [address] Dr   Date: 3/4/2023        Problem:  Right-sided exudative pleural effusion, likely malignant effusion from patient's metastatic adenocarcinoma. Less likely empyema. Reviewing prior imaging, patient had worsening effusion since January. September chest x-ray showed no effusion but does have right-sided mass. Stage DAVID lung adenocarcinoma, suspect likely stage IVB given abdominal LAD  Right lower lobe lesion seen as far back as early . Patient offered biopsy in  but refused it. States that she was scared of the answer. Follows at [facility] with Dr. Aaliyah Jimenez  History of tobacco use  History of polysubstance use including cocaine    Plan:  Wean O2 as tolerated. Recommend follow-up imaging with a chest x-ray at 2 to 3 weeks. If patient has repeated accumulation, consider a Pleurx catheter based on patient preference. Palliative care is following    HPI:     This patient has been seen and evaluated at the request of Dr. Andrew Juarez for R sided pleural effusion. Patient is a 71 y.o. female with prior history of smoking, polysubstance abuse, right lower lobe lung mass who presented to the ED with worsening shortness of breath and right-sided chest pain. Patient had a chest tube placed in the ED and no other work-up done on the pleural effusion. Apparently the chest tube was also placed in a nonsterile fashion. CT surgery has been following the patient for this chest tube. Pulmonology was consulted to evaluate for the etiology of this chest tube. Currently, the patient feels okay. Does not have any shortness of breath. Denies any fevers, chills, nausea, vomiting, diarrhea, dizziness, lightheadedness. Of note, patient has known right lower lobe lung mass since more than a year ago.   She was scheduled to have further evaluation including biopsy in April 2022 but refused at that time. When asked her about it, she said that she was scared of the answer. Eventually she was lost to follow-up and was recently seen as an outpatient in February by Dr. Angeline Wright after she had biopsy done of right rib lytic lesion which showed adenocarcinoma. She is currently pending additional work-up and is planned for outpatient palliative radiation. 3/4/2023    - Patient sitting up comfortably in bed eating breakfast  - Patient states she feels better this am; dyspnea improved. - Adequate O2 sats on NC at 4 L/min; was not able to wean yesterday  - No distress or increased work of breathing  - States her breathing is significantly improved since admission  - CT chest shows extensive atelectasis and consolidative process on the right with no significant increase in pleural fluid  - Antibiotics stopped yesterday by primary team     Past Medical History:   Diagnosis Date    Angina at rest [facility])     Anxiety     CVA, old, facial weakness 2012    left ptosis    Dental caries     Osteoarthritis     Osteopenia     Right lower lobe lung mass 04/15/2022    Offered Biopsy and couldn't decide between PAlliative Care and Biopsy      Past Surgical History:   Procedure Laterality Date    CT BIOPSY PERCUTANEOUS SUPERFICIAL BONE  1/26/2023    CT BIOPSY PERCUTANEOUS SUPERFICIAL BONE 1/26/2023 [facility] RAD CT    WISDOM TOOTH EXTRACTION        Prior to Admission medications    Medication Sig Start Date End Date Taking?  Authorizing Provider   acetaminophen (TYLENOL) 325 MG tablet Take 650 mg by mouth every 6 hours as needed 9/7/22   Ar Automatic Reconciliation   ibuprofen (ADVIL;MOTRIN) 400 MG tablet Take 600 mg by mouth every 6 hours as needed    Ar Automatic Reconciliation   levoFLOXacin (LEVAQUIN) 750 MG tablet Take 750 mg by mouth daily 2/5/23   Ar Automatic Reconciliation     Allergies   Allergen Reactions    Aspirin Nausea Only      Social History     Tobacco Use    Smoking status: Some Days     Packs/day: 0.50     Types: Cigarettes    Smokeless tobacco: Never   Substance Use Topics    Alcohol use: Yes      Family History   Problem Relation Age of Onset    Cancer Daughter         Unspecified Cancer    Cancer Cousin         Unspecified Cancer x2        Review of Systems:  Pertinent items are noted in HPI. Objective:   Vital Signs:    BP (!) 148/93   Pulse (!) 120   Temp 97.2 °F (36.2 °C) (Oral)   Resp 22   Ht 4' 11\" (1.499 m)   Wt 125 lb 14.1 oz (57.1 kg)   SpO2 97%   BMI 25.43 kg/m²             Temp (24hrs), Av.5 °F (36.4 °C), Min:97 °F (36.1 °C), Max:98.1 °F (36.7 °C)       Intake/Output:   Last shift:      No intake/output data recorded. Last 3 shifts: 1901 -  0700  In: -   Out: 900 [Urine:900]    Intake/Output Summary (Last 24 hours) at 3/4/2023 0910  Last data filed at 3/4/2023 0514  Gross per 24 hour   Intake --   Output 700 ml   Net -700 ml          Physical Exam:  BP (!) 148/93   Pulse (!) 120   Temp 97.2 °F (36.2 °C) (Oral)   Resp 22   Ht 4' 11\" (1.499 m)   Wt 125 lb 14.1 oz (57.1 kg)   SpO2 97%   BMI 25.43 kg/m²         Temp (24hrs), Av.5 °F (36.4 °C), Min:97 °F (36.1 °C), Max:98.1 °F (36.7 °C)    No intake/output data recorded. 1901 -  0700  In: -   Out: 900 [Urine:900]    General: AOX3, NAD. Thin. HEENT: NCAT, no scleral icterus, PERRL  Neck: No LAD, no JVD  Lungs: CTAB, no wheezing, rales, or crackles. R sided chest tube site covered in bandage. CV: RRR, S1/S2 normal. No MRG. Abdomen: Soft, NT, ND. Normoactive bowel sounds. Extremities: No cyanosis or edema. Skin: No rashes or lesions  Neuro: Aox3. Facial droop.          Data review:   Labs:  Recent Results (from the past 24 hour(s))   Basic Metabolic Panel    Collection Time: 23  3:05 AM   Result Value Ref Range    Sodium 141 136 - 145 mmol/L    Potassium 4.8 3.5 - 5.5 mmol/L    Chloride 111 100 - 111 mmol/L    CO2 26 21 - 32 mmol/L    Anion Gap 4 3.0 - 18 mmol/L    Glucose 124 (H) 74 - 99 mg/dL    BUN 17 7.0 - 18 MG/DL    Creatinine 1.20 0.6 - 1.3 MG/DL    Bun/Cre Ratio 14 12 - 20      Est, Glom Filt Rate 48 (L) >60 ml/min/1.73m2    Calcium 8.8 8.5 - 10.1 MG/DL   CBC with Auto Differential    Collection Time: 03/04/23  3:05 AM   Result Value Ref Range    WBC 14.6 (H) 4.6 - 13.2 K/uL    RBC 3.01 (L) 4.20 - 5.30 M/uL    Hemoglobin 8.6 (L) 12.0 - 16.0 g/dL    Hematocrit 24.5 (L) 35.0 - 45.0 %    MCV 81.4 78.0 - 100.0 FL    MCH 28.6 24.0 - 34.0 PG    MCHC 35.1 31.0 - 37.0 g/dL    RDW 16.1 (H) 11.6 - 14.5 %    Platelets 226 058 - 307 K/uL    MPV 10.6 9.2 - 11.8 FL    Nucleated RBCs 0.2 (H) 0  WBC    nRBC 0.03 (H) 0.00 - 0.01 K/uL    Seg Neutrophils 92 (H) 40 - 73 %    Lymphocytes 5 (L) 21 - 52 %    Monocytes 2 (L) 3 - 10 %    Eosinophils % 0 0 - 5 %    Basophils 0 0 - 2 %    Immature Granulocytes 1 (H) 0.0 - 0.5 %    Segs Absolute 13.5 (H) 1.8 - 8.0 K/UL    Absolute Lymph # 0.7 (L) 0.9 - 3.6 K/UL    Absolute Mono # 0.3 0.05 - 1.2 K/UL    Absolute Eos # 0.0 0.0 - 0.4 K/UL    Basophils Absolute 0.0 0.0 - 0.1 K/UL    Absolute Immature Granulocyte 0.1 (H) 0.00 - 0.04 K/UL    Differential Type AUTOMATED     Magnesium    Collection Time: 03/04/23  3:05 AM   Result Value Ref Range    Magnesium 2.1 1.6 - 2.6 mg/dL   Vancomycin Level, Random    Collection Time: 03/04/23  3:05 AM   Result Value Ref Range    Vancomycin Rm 15.5 5.0 - 40.0 UG/ML     Imaging:  I have personally reviewed the patients radiographs and have reviewed the reports:  CXR x 3, CT scan x 4           Luis Girard, [facility]  Pulmonary/Critical Care/Sleep Medicine  3/4/2023

## 2023-03-04 NOTE — PROGRESS NOTES
Several attempts were made to ambulate patient. Patient refused. Patient remains on 3L O2 via NC. SpO2 93% . Patient denies any pain at this time.

## 2023-03-04 NOTE — CARE COORDINATION
CM sent message in [facility] with [employer], that Home Portable Oxygen needs to be delivered to the hospital for discharge. CM let them know that patient lives alone, and family to transport patient home today when discharged. CM emailed afterhours referrals with [employer] via [facility], and updated them on Home Oxygen order placed in [facility], and Portable Home Oxygen will need to be delivered to the hospital for patient discharge. CM let them know that patient lives alone, and family to transport patient home when Home Portable Oxygen is delivered. CM called [employer] [phone], spoke to answering service, CM was referred to Afterhours referral email, for new hospital Oxygen orders. CM let answering service know that CM has already sent email for Home Oxygen.              Milena Mayer RN  Case Management [phone]

## 2023-03-04 NOTE — PROGRESS NOTES
Renal Dosing Monitoring    Estimated Creatinine Clearance: 33 mL/min (based on SCr of 1.2 mg/dL). Patient is on Levofloxacin for CAP. Medication was adjusted from 750 mg every 24 hour(s) to 750 mg every q48 hour(s) per approved renal dosing policy.   Duration= 5 days

## 2023-03-04 NOTE — CARE COORDINATION
DME Home Oxygen Order noted, nurse has been updated that CM will need new Walk Test with results documented in the chart to order Home Oxygen for patient.              Claus Montes, RN  Case Management [phone]

## 2023-03-04 NOTE — PROGRESS NOTES
0710: Bedside shift change report given to Paul Mei RN (oncoming nurse) by George Scott RN (offgoing nurse). Report included the following information Nurse Handoff Report, Adult Overview, Intake/Output, MAR, and Cardiac Rhythm NSR .     4821: Pt educated on why she needed Lovenox. However, she refused the medication because she said she don't want to be taking too many meds. I offered pt SCDs, which is at the bedside and she refused. 1130: Pt refused vital signs being taken. MD is aware. 1520: Pt c/o right shoulder pain. No s/s of distress noted. Will give pain meds to alleviate pain. 1550: Rounding on pt. Pt stated pain has been rectified and she's feeling much better. 1905: Bedside shift change report given to [place] [facility] AT [facility], [facility] (oncoming nurse) by Paul Mei RN (offgoing nurse). Report included the following information Nurse Handoff Report, Adult Overview, Intake/Output, MAR, and Cardiac Rhythm NSR .

## 2023-03-04 NOTE — CARE COORDINATION
BRENT spoke with Kathy with [facility], said they can take patient. BRENT put patient in the queue for [facility]. BRENT let Kathy know that patient is a possible discharge for today or tomorrow depending on when Home Oxygen is delivered.                Catrachita Cheema, RN  Case Management [phone]

## 2023-03-04 NOTE — CARE COORDINATION
Patient only has Medicaid, no SNF benefits, discharge plan needs to be ARU or home with home health, or LTC.              Rick Tucker RN  Case Management [phone]

## 2023-03-04 NOTE — PROGRESS NOTES
DME O2 order noted and ordered throught [employer] in [place] to be delivered to patients room. Patient and patients nurse updated.       [facility] RN CDCES  Case Management

## 2023-03-04 NOTE — PROGRESS NOTES
[facility]  [facility]    Patient: Peña Callahan Age: 71 y.o. : 1950 MR#: [id number] SSN: [id number]  Date/Time: 3/4/2023     Subjective: Patient sitting in the recliner, feeling better but still short of breath on exertion. Patient also get tachycardic on exertion. I had a long discussion with the patient about her current diagnosis, prognosis and long-term plan. In between her family member knocked on the door and also a CNA patient requested me to stop discussing about her medical care and she does not want anybody else to hear. She also got very upset and angry at her family member was trying to come inside the room during my discussion. Patient also got very emotional and started crying during my discussion. She did not want to discuss any further about her prognosis or advance directives. Assessment/Plan:   Possible sepsis due to pleural fluid  Right pleural effusion post chest tube placement by ED, chest tube removed by CT surgery on 3/2/2023  Metastatic lung cancer  History of CVA  Active tobacco abuse  Anxiety disorder  Sinus tachycardia  Mild RITA, resolved    Plan  Patient still gets exertional tachycardia and shortness of breath, discussed with pulmonary. Per pulmonary there is no excessive fluid to remove but patient does have significant consolidation, atelectasis and lung cancer causing her symptoms. Per pulmonary no further treatment needed in the hospital other than patient following up outpatient for chemo and radiation. Pulmonary okay to transition to p.o. antibiotics to complete 7 days since no cultures were sent on admission. We will discontinue steroids, pulmonary does not feel it will be helpful.   Home oxygen tested and patient qualified  Home oxygen ordered  Thyroid function normal  Pending palliative care  Poor and guarded prognosis    Discussed with the patient at the bedside and explained about plan of care, discussed about advance directives, poor and guarded prognosis. Patient currently does not want to discuss any further about prognosis or advanced directives. She would prefer to go home and follow-up with her oncology and make further plans. Offered to talk to her family but patient states she does not want me to talk to her family for now but she will discuss with them and her oncologist and make further plans. Disposition: PT recommending SNF but patient insisting on no home health  Plan for discharge home tomorrow if clinically stable and home oxygen delivered    Discussed with RN at the bedside      Case discussed with:  [x]Patient  [x]Family  []Nursing  []Case Management  DVT Prophylaxis:  []Lovenox  []Hep SQ  [x]SCDs  []Coumadin   []Eliquis/Xarelto     Objective:   VS: BP (!) 148/93   Pulse 100   Temp 97.2 °F (36.2 °C) (Oral)   Resp 22   Ht 4' 11\" (1.499 m)   Wt 125 lb 14.1 oz (57.1 kg)   SpO2 97%   BMI 25.43 kg/m²    Tmax/24hrs: Temp (24hrs), Av.4 °F (36.3 °C), Min:97 °F (36.1 °C), Max:98.1 °F (36.7 °C)  IOBRIEF  Intake/Output Summary (Last 24 hours) at 3/4/2023 1407  Last data filed at 3/4/2023 0514  Gross per 24 hour   Intake --   Output 700 ml   Net -700 ml       General:  Alert, cooperative, no acute distress, but patient cachectic  Pulmonary: Decreased breath sounds right base, chest tube site dressing clean  Cardiovascular: Regular rate and Rhythm. GI:  Soft, Non distended, Non tender. + Bowel sounds. Extremities:  No edema. No calf tenderness. Psych: Good insight. Not anxious or agitated. Neurologic: Alert and oriented X 3. Moves all ext.   Additional: Diffuse muscle wasting    Medications:   Current Facility-Administered Medications   Medication Dose Route Frequency    levoFLOXacin (LEVAQUIN) tablet 750 mg  750 mg Oral Q48H    metoprolol tartrate (LOPRESSOR) tablet 25 mg  25 mg Oral BID    methylPREDNISolone sodium (SOLU-MEDROL) injection 20 mg  20 mg IntraVENous Q8H    enoxaparin Sodium (LOVENOX) injection 30 mg  30 mg SubCUTAneous Daily    ipratropium-albuterol (DUONEB) nebulizer solution 1 ampule  1 ampule Inhalation Q4H PRN    lidocaine 4 % external patch 1 patch  1 patch TransDERmal Daily    oxyCODONE (ROXICODONE) immediate release tablet 5 mg  5 mg Oral Q4H PRN    Or    oxyCODONE (ROXICODONE) immediate release tablet 10 mg  10 mg Oral Q6H PRN    oxyCODONE-acetaminophen (PERCOCET) 5-325 MG per tablet 1 tablet  1 tablet Oral Q4H PRN    gabapentin (NEURONTIN) capsule 100 mg  100 mg Oral Nightly    nicotine (NICODERM CQ) 14 MG/24HR 1 patch  1 patch TransDERmal Daily    sodium chloride flush 0.9 % injection 5-40 mL  5-40 mL IntraVENous 2 times per day    sodium chloride flush 0.9 % injection 5-40 mL  5-40 mL IntraVENous PRN    ondansetron (ZOFRAN-ODT) disintegrating tablet 4 mg  4 mg Oral Q8H PRN    Or    ondansetron (ZOFRAN) injection 4 mg  4 mg IntraVENous Q6H PRN    polyethylene glycol (GLYCOLAX) packet 17 g  17 g Oral Daily PRN    famotidine (PEPCID) tablet 20 mg  20 mg Oral Daily    acetaminophen (TYLENOL) tablet 650 mg  650 mg Oral Q6H PRN    Or    acetaminophen (TYLENOL) suppository 650 mg  650 mg Rectal Q6H PRN    naloxone (NARCAN) injection 0.4 mg  0.4 mg IntraVENous PRN       Labs:    Recent Results (from the past 24 hour(s))   Basic Metabolic Panel    Collection Time: 03/04/23  3:05 AM   Result Value Ref Range    Sodium 141 136 - 145 mmol/L    Potassium 4.8 3.5 - 5.5 mmol/L    Chloride 111 100 - 111 mmol/L    CO2 26 21 - 32 mmol/L    Anion Gap 4 3.0 - 18 mmol/L    Glucose 124 (H) 74 - 99 mg/dL    BUN 17 7.0 - 18 MG/DL    Creatinine 1.20 0.6 - 1.3 MG/DL    Bun/Cre Ratio 14 12 - 20      Est, Glom Filt Rate 48 (L) >60 ml/min/1.73m2    Calcium 8.8 8.5 - 10.1 MG/DL   CBC with Auto Differential    Collection Time: 03/04/23  3:05 AM   Result Value Ref Range    WBC 14.6 (H) 4.6 - 13.2 K/uL    RBC 3.01 (L) 4.20 - 5.30 M/uL    Hemoglobin 8.6 (L) 12.0 - 16.0 g/dL    Hematocrit 24.5 (L) 35.0 - 45.0 %    MCV 81.4 78.0 - 100.0 FL    MCH 28.6 24.0 - 34.0 PG    MCHC 35.1 31.0 - 37.0 g/dL    RDW 16.1 (H) 11.6 - 14.5 %    Platelets 271 087 - 397 K/uL    MPV 10.6 9.2 - 11.8 FL    Nucleated RBCs 0.2 (H) 0  WBC    nRBC 0.03 (H) 0.00 - 0.01 K/uL    Seg Neutrophils 92 (H) 40 - 73 %    Lymphocytes 5 (L) 21 - 52 %    Monocytes 2 (L) 3 - 10 %    Eosinophils % 0 0 - 5 %    Basophils 0 0 - 2 %    Immature Granulocytes 1 (H) 0.0 - 0.5 %    Segs Absolute 13.5 (H) 1.8 - 8.0 K/UL    Absolute Lymph # 0.7 (L) 0.9 - 3.6 K/UL    Absolute Mono # 0.3 0.05 - 1.2 K/UL    Absolute Eos # 0.0 0.0 - 0.4 K/UL    Basophils Absolute 0.0 0.0 - 0.1 K/UL    Absolute Immature Granulocyte 0.1 (H) 0.00 - 0.04 K/UL    Differential Type AUTOMATED     Magnesium    Collection Time: 03/04/23  3:05 AM   Result Value Ref Range    Magnesium 2.1 1.6 - 2.6 mg/dL   Vancomycin Level, Random    Collection Time: 03/04/23  3:05 AM   Result Value Ref Range    Vancomycin Rm 15.5 5.0 - 40.0 UG/ML       Signed By: Breana Peguero MD     March 4, 2023      Disclaimer: Sections of this note are dictated using utilizing voice recognition software. Minor typographical errors may be present. If questions arise, please do not hesitate to contact me or call our department.

## 2023-03-05 ENCOUNTER — APPOINTMENT (OUTPATIENT)
Facility: HOSPITAL | Age: 73
DRG: 720 | End: 2023-03-05
Payer: MEDICAID

## 2023-03-05 ENCOUNTER — HOME HEALTH ADMISSION (OUTPATIENT)
Age: 73
End: 2023-03-05
Payer: MEDICAID

## 2023-03-05 VITALS
DIASTOLIC BLOOD PRESSURE: 96 MMHG | WEIGHT: 125.88 LBS | SYSTOLIC BLOOD PRESSURE: 142 MMHG | OXYGEN SATURATION: 94 % | TEMPERATURE: 98.4 F | HEIGHT: 59 IN | HEART RATE: 110 BPM | RESPIRATION RATE: 20 BRPM | BODY MASS INDEX: 25.38 KG/M2

## 2023-03-05 PROCEDURE — 6370000000 HC RX 637 (ALT 250 FOR IP): Performed by: HOSPITALIST

## 2023-03-05 PROCEDURE — 94761 N-INVAS EAR/PLS OXIMETRY MLT: CPT

## 2023-03-05 PROCEDURE — 2700000000 HC OXYGEN THERAPY PER DAY

## 2023-03-05 PROCEDURE — 2580000003 HC RX 258

## 2023-03-05 PROCEDURE — 6360000002 HC RX W HCPCS: Performed by: HOSPITALIST

## 2023-03-05 PROCEDURE — 71045 X-RAY EXAM CHEST 1 VIEW: CPT

## 2023-03-05 PROCEDURE — 6370000000 HC RX 637 (ALT 250 FOR IP): Performed by: STUDENT IN AN ORGANIZED HEALTH CARE EDUCATION/TRAINING PROGRAM

## 2023-03-05 PROCEDURE — 6370000000 HC RX 637 (ALT 250 FOR IP)

## 2023-03-05 PROCEDURE — 99239 HOSP IP/OBS DSCHRG MGMT >30: CPT | Performed by: HOSPITALIST

## 2023-03-05 PROCEDURE — 6370000000 HC RX 637 (ALT 250 FOR IP): Performed by: INTERNAL MEDICINE

## 2023-03-05 RX ORDER — METOPROLOL TARTRATE 50 MG/1
50 TABLET, FILM COATED ORAL 2 TIMES DAILY
Qty: 60 TABLET | Refills: 0 | Status: SHIPPED | OUTPATIENT
Start: 2023-03-05 | End: 2023-03-06 | Stop reason: SDUPTHER

## 2023-03-05 RX ORDER — POLYETHYLENE GLYCOL 3350 17 G/17G
17 POWDER, FOR SOLUTION ORAL DAILY PRN
Qty: 527 G | Refills: 0 | Status: SHIPPED | OUTPATIENT
Start: 2023-03-05 | End: 2023-03-06 | Stop reason: SDUPTHER

## 2023-03-05 RX ORDER — METOPROLOL TARTRATE 50 MG/1
50 TABLET, FILM COATED ORAL 2 TIMES DAILY
Status: DISCONTINUED | OUTPATIENT
Start: 2023-03-05 | End: 2023-03-05 | Stop reason: HOSPADM

## 2023-03-05 RX ORDER — ENOXAPARIN SODIUM 100 MG/ML
40 INJECTION SUBCUTANEOUS DAILY
Status: DISCONTINUED | OUTPATIENT
Start: 2023-03-06 | End: 2023-03-05 | Stop reason: HOSPADM

## 2023-03-05 RX ORDER — LIDOCAINE 4 G/G
1 PATCH TOPICAL DAILY
Qty: 30 EACH | Refills: 0 | Status: SHIPPED | OUTPATIENT
Start: 2023-03-06 | End: 2023-03-06 | Stop reason: SDUPTHER

## 2023-03-05 RX ORDER — GABAPENTIN 100 MG/1
100 CAPSULE ORAL NIGHTLY
Qty: 30 CAPSULE | Refills: 0 | Status: SHIPPED | OUTPATIENT
Start: 2023-03-05 | End: 2023-04-04

## 2023-03-05 RX ORDER — IPRATROPIUM BROMIDE AND ALBUTEROL SULFATE 2.5; .5 MG/3ML; MG/3ML
3 SOLUTION RESPIRATORY (INHALATION) EVERY 4 HOURS PRN
Qty: 60 EACH | Refills: 0 | Status: SHIPPED | OUTPATIENT
Start: 2023-03-05 | End: 2023-03-06 | Stop reason: SDUPTHER

## 2023-03-05 RX ORDER — OXYCODONE HYDROCHLORIDE 5 MG/1
5 TABLET ORAL EVERY 6 HOURS PRN
Qty: 12 TABLET | Refills: 0 | Status: SHIPPED | OUTPATIENT
Start: 2023-03-05 | End: 2023-03-08

## 2023-03-05 RX ORDER — LEVOFLOXACIN 750 MG/1
750 TABLET ORAL
Qty: 2 TABLET | Refills: 0 | Status: SHIPPED | OUTPATIENT
Start: 2023-03-06 | End: 2023-03-06 | Stop reason: SDUPTHER

## 2023-03-05 RX ADMIN — METHYLPREDNISOLONE SODIUM SUCCINATE 20 MG: 40 INJECTION, POWDER, FOR SOLUTION INTRAMUSCULAR; INTRAVENOUS at 00:37

## 2023-03-05 RX ADMIN — METOPROLOL TARTRATE 25 MG: 25 TABLET, FILM COATED ORAL at 12:54

## 2023-03-05 RX ADMIN — FAMOTIDINE 20 MG: 20 TABLET ORAL at 08:45

## 2023-03-05 RX ADMIN — LEVOFLOXACIN 750 MG: 750 TABLET, FILM COATED ORAL at 12:56

## 2023-03-05 RX ADMIN — OXYCODONE HYDROCHLORIDE 5 MG: 5 TABLET ORAL at 09:18

## 2023-03-05 RX ADMIN — SODIUM CHLORIDE, PRESERVATIVE FREE 10 ML: 5 INJECTION INTRAVENOUS at 08:46

## 2023-03-05 RX ADMIN — METOPROLOL TARTRATE 25 MG: 25 TABLET, FILM COATED ORAL at 08:46

## 2023-03-05 RX ADMIN — METHYLPREDNISOLONE SODIUM SUCCINATE 20 MG: 40 INJECTION, POWDER, FOR SOLUTION INTRAMUSCULAR; INTRAVENOUS at 08:46

## 2023-03-05 ASSESSMENT — PAIN DESCRIPTION - DESCRIPTORS
DESCRIPTORS: ACHING

## 2023-03-05 ASSESSMENT — PAIN DESCRIPTION - LOCATION
LOCATION: BACK

## 2023-03-05 ASSESSMENT — PAIN SCALES - GENERAL
PAINLEVEL_OUTOF10: 7
PAINLEVEL_OUTOF10: 8
PAINLEVEL_OUTOF10: 3
PAINLEVEL_OUTOF10: 2

## 2023-03-05 ASSESSMENT — PAIN - FUNCTIONAL ASSESSMENT
PAIN_FUNCTIONAL_ASSESSMENT: ACTIVITIES ARE NOT PREVENTED

## 2023-03-05 ASSESSMENT — PAIN DESCRIPTION - ORIENTATION
ORIENTATION: LEFT;RIGHT
ORIENTATION: LEFT;RIGHT;LOWER
ORIENTATION: LEFT;RIGHT;LOWER

## 2023-03-05 NOTE — CARE COORDINATION
Elaine Ernandez with [employer] messaged CM in [facility]. Belkis Sandifer said they did attempt to deliver the Oxygen yesterday to the hospital, but said they were told by the family, that patient had discharged. Leatha Sandifer said they are attempting to deliver the Home Setup, and have not been able to contact patient/family for that. Belkisraiza Hindsifer said family can call Mayi Kate [phone]. Leatha Sandifer said Home Oxygen will not be able to be delivered until Monday. CM gave phone numbers to Belkis Sandifer, patient's room phone number given, cell phone number given, and phone number for patient's daughter given. Leatha Sandifer said they will follow up with phone numbers. Melodie Zafar and updated.                Lorin Villanueva RN  Case Management [phone]

## 2023-03-05 NOTE — PROGRESS NOTES
[facility]. Pulmonary, Critical Care, and Sleep Medicine    Initial Patient Consult    Name: Armen Fitzgerald MRN: [id number]   : 1950 Hospital: [address] [place] [address] Dr   Date: 3/5/2023        Problem:  Right-sided exudative pleural effusion, likely malignant effusion from patient's metastatic adenocarcinoma. Less likely empyema. Reviewing prior imaging, patient had worsening effusion since January. September chest x-ray showed no effusion but does have right-sided mass. Stage DAVID lung adenocarcinoma, suspect likely stage IVB given abdominal LAD  Right lower lobe lesion seen as far back as early . Patient offered biopsy in  but refused it. States that she was scared of the answer. [facility] Yahaira Mares  History of tobacco use  History of polysubstance use including cocaine    Plan:  Wean O2 as tolerated. Complete antibiotic course  Recommend follow-up imaging with a chest x-ray at 2 to 3 weeks. If patient has repeated accumulation, consider a Pleurx catheter based on patient preference. Palliative care is following    HPI:     This patient has been seen and evaluated at the request of Dr. Leeroy Pickard for R sided pleural effusion. Patient is a 71 y.o. female with prior history of smoking, polysubstance abuse, right lower lobe lung mass who presented to the ED with worsening shortness of breath and right-sided chest pain. Patient had a chest tube placed in the ED and no other work-up done on the pleural effusion. Apparently the chest tube was also placed in a nonsterile fashion. CT surgery has been following the patient for this chest tube. Pulmonology was consulted to evaluate for the etiology of this chest tube. Currently, the patient feels okay. Does not have any shortness of breath. Denies any fevers, chills, nausea, vomiting, diarrhea, dizziness, lightheadedness. Of note, patient has known right lower lobe lung mass since more than a year ago. She was scheduled to have further evaluation including biopsy in April 2022 but refused at that time. When asked her about it, she said that she was scared of the answer. Eventually she was lost to follow-up and was recently seen as an outpatient in February by Dr. Sapphire Hill after she had biopsy done of right rib lytic lesion which showed adenocarcinoma. She is currently pending additional work-up and is planned for outpatient palliative radiation. 3/5/2023    - Patient sleeping comfortably in bed  - Dyspnea improved  - Adequate O2 sats on NC at 3 L/min  - No distress or increased work of breathing  - CXR this am shows improved aeration in right upper lung fields    Past Medical History:   Diagnosis Date    Angina at rest [facility])     Anxiety     CVA, old, facial weakness 2012    left ptosis    Dental caries     Osteoarthritis     Osteopenia     Right lower lobe lung mass 04/15/2022    Offered Biopsy and couldn't decide between PAlliative Care and Biopsy      Past Surgical History:   Procedure Laterality Date    CT BIOPSY PERCUTANEOUS SUPERFICIAL BONE  1/26/2023    CT BIOPSY PERCUTANEOUS SUPERFICIAL BONE 1/26/2023 [address] [facility] RAD CT    WISDOM TOOTH EXTRACTION        Prior to Admission medications    Medication Sig Start Date End Date Taking?  Authorizing Provider   acetaminophen (TYLENOL) 325 MG tablet Take 650 mg by mouth every 6 hours as needed 9/7/22   Ar Automatic Reconciliation   ibuprofen (ADVIL;MOTRIN) 400 MG tablet Take 600 mg by mouth every 6 hours as needed    Ar Automatic Reconciliation   levoFLOXacin (LEVAQUIN) 750 MG tablet Take 750 mg by mouth daily 2/5/23   Ar Automatic Reconciliation     Allergies   Allergen Reactions    Aspirin Nausea Only      Social History     Tobacco Use    Smoking status: Some Days     Packs/day: 0.50     Types: Cigarettes    Smokeless tobacco: Never   Substance Use Topics    Alcohol use: Yes      Family History   Problem Relation Age of Onset    Cancer Daughter         Unspecified Cancer    Cancer Cousin         Unspecified Cancer x2        Review of Systems:  Pertinent items are noted in HPI. Objective:   Vital Signs:    BP (!) 167/99   Pulse 96   Temp 98 °F (36.7 °C) (Oral)   Resp 20   Ht 4' 11\" (1.499 m)   Wt 125 lb 14.1 oz (57.1 kg)   SpO2 97%   BMI 25.43 kg/m²             Temp (24hrs), Av.6 °F (36.4 °C), Min:97.1 °F (36.2 °C), Max:98.4 °F (36.9 °C)       Intake/Output:   Last shift:      No intake/output data recorded. Last 3 shifts: 1901 -  0700  In: 200 [P.O.:200]  Out: 1100 [Urine:1100]    Intake/Output Summary (Last 24 hours) at 3/5/2023 0752  Last data filed at 3/5/2023 0207  Gross per 24 hour   Intake 200 ml   Output 400 ml   Net -200 ml          Physical Exam:  BP (!) 167/99   Pulse 96   Temp 98 °F (36.7 °C) (Oral)   Resp 20   Ht 4' 11\" (1.499 m)   Wt 125 lb 14.1 oz (57.1 kg)   SpO2 97%   BMI 25.43 kg/m²         Temp (24hrs), Av.6 °F (36.4 °C), Min:97.1 °F (36.2 °C), Max:98.4 °F (36.9 °C)    No intake/output data recorded. 1901 -  0700  In: 200 [P.O.:200]  Out: 1100 [Urine:1100]    General: AOX3, NAD. Thin. HEENT: NCAT, no scleral icterus, PERRL  Neck: No LAD, no JVD  Lungs: CTAB, no wheezing, rales, or crackles. R sided chest tube site covered in bandage. CV: RRR, S1/S2 normal. No MRG. Abdomen: Soft, NT, ND. Normoactive bowel sounds. Extremities: No cyanosis or edema. Skin: No rashes or lesions  Neuro: Aox3. Facial droop. Data review:   Labs:  No results found for this or any previous visit (from the past 24 hour(s)). Imaging:  I have personally reviewed the patients radiographs and have reviewed the reports:  CXR x 3, CT scan x 4           Luis Galvan MD  Pulmonary/Critical Care/Sleep Medicine  3/5/2023

## 2023-03-05 NOTE — DISCHARGE SUMMARY
Discharge Summary    Patient: Zahra Holden MRN: [id number]  CSN: [id number]    YOB: 1950  Age: 71 y.o. Sex: female    DOA: 2/26/2023 LOS:  LOS: 7 days        Disposition: Home with Radha Manley    Discharge Date:   3/5/2023    Admission Diagnosis: Pleural effusion [J90]    Discharge Diagnosis:    Possible sepsis due to pleural fluid  Right pleural effusion post chest tube placement by ED, chest tube removed by CT surgery on 3/2/2023  Metastatic lung cancer  History of CVA  Active tobacco abuse  Anxiety disorder  Sinus tachycardia  Mild RITA, resolved  Cancer cachexia  Moderate protein calorie malnutrition    Discharge Condition: Stable      PHYSICAL EXAM  Visit Vitals  BP (!) 142/96   Pulse (!) 110   Temp 98.4 °F (36.9 °C) (Oral)   Resp 20   Ht 4' 11\" (1.499 m)   Wt 125 lb 14.1 oz (57.1 kg)   SpO2 94%   BMI 25.43 kg/m²       General:  Alert, cooperative, no acute distress, but patient cachectic  Pulmonary: Decreased breath sounds right base, chest tube site dressing clean  Cardiovascular: Regular rate and Rhythm. GI:  Soft, Non distended, Non tender. + Bowel sounds. Extremities:  No edema. No calf tenderness. Psych: Good insight. Not anxious or agitated. Neurologic: Alert and oriented X 3. Moves all ext. Additional: Diffuse muscle wasting                                Hospital Course: Zahra Holden is a 71 y.o. female with a PMHx of RLL lung mass, CVA, osteoarthritis, osteopenia who presented to the ED with complaints of shortness of breath, pain to biopsy site, chest pain. Patient has a newly diagnosed lung cancer and had right sided lung biopsy performed on 2/9. Patient states since then she has been feeling pain, sob and chest pain. Patient was poor historian. In the emergency room patient noted to have large right pleural effusion, possibly due to metastatic cancer. ED discussed with CT surgery and chest tube was placed.   Patient was admitted to hospital post chest tube placement and CT surgery was consulted. Patient was started on empiric antibiotics during admission. For unclear reason patient's pleural fluid was not sent for cytology or cultures. CT surgery was managing chest tube. Given no cultures and cytology, pulmonary was consulted for further management. Pulmonary recommended to continue antibiotics given her leukocytosis but suspected fluid most likely malignant. Cytology was later sent but still pending. CT surgery followed up on the patient, decided to remove chest tube. Initially pulmonary was recommending CT chest before chest tube removal but since CT surgery removed chest tube pulmonary obtained CT chest post chest tube removal.  Patient still had some pleural fluid but mostly atelectasis and consolidation and lung mass. Pulmonary and CT surgery recommended no need for further fluid removal but follow-up outpatient with repeat x-ray in 1 week if recurrent fluid then patient would benefit from Pleurx catheter. Patient continues to be slightly tachycardic on exertion and also short of breath on exertion. Per pulmonary no further fluid removal but patient would benefit from home oxygen. Pulmonary recommended no further intervention and okay for discharge    Patient has some history of drug use, she does not want us to talk to her family about patient's current condition and outpatient follow-ups. Multiple times I have discussed with the patient about need for close follow-up with oncology given her worsening cancer and pleural fluid. I also discussed with her about advance directives and hospice care given her poor prognosis. Patient got really upset with me and still became very emotional and started crying. Patient did not wanted to hear anymore about her prognosis and does not want to make any decision about advance directives for now. After multiple attempts patient finally agreed for me to talk to her daughter Leticia Acevedo.   I did talk to Leticia Curtis and explained in detail about patient's current condition, guarded and poor prognosis given her metastatic cancer. Discussed with the daughter about need for follow-up with oncology, CT surgery for close monitoring of pleural fluid and also for lung cancer. I also explained to the daughter that patient's condition is progressively worsening and has a poor prognosis, discussed about hospice options 2. Daughter states she will discuss with the patient and follow-up with oncology and make further decisions based on oncology recommendations. Patient was checked for home oxygen, home oxygen has been arranged. Patient has been arrangement for DME for walker, nebulizer. Patient will be discharged home with home health care. Discussed with the patient at the bedside and also with her daughter Elisa Roman over the phone about her discharge plan, follow-up appointments, new medications and side effects. Explained about importance of follow-up with oncology, explained about worsening cancer and poor prognosis. All of them understood and agreed with plan. Patient's friend Angelic Turners at the bedside but patient does not want to give information to the friend. Procedures: None     Consults:   Dr. Tomás Maxwell, pulmonary  [address], CT surgery    Imaging studies:   CT Result (most recent):  CT CHEST WO CONTRAST 03/03/2023    Narrative  CT CHEST, NONCONTRAST    CPT CODE: 42286    INDICATION: Follow-up right-sided effusion. Recent rib/chest wall biopsy  positive for metastatic adenocarcinoma, favor lung primary. COMPARISON: 2/27/2023    TECHNIQUE: Without administration of IV contrast, helically acquired serial  axial CT images through the thorax were obtained. Additional coronal and  sagittal reformation images were also performed.     All CT scans at this facility are performed using dose optimization technique as  appropriate to the performed exam, to include automated exposure control,  adjustment of the mA and/or kV according to patient's size (Including  appropriate matching for site-specific examinations), or use of iterative  reconstruction technique. FINDINGS:    Given the short 5 day interval compared to the prior CT, the changes compared to  the prior CT will be emphasized in this report. Interval removal of right chest tube since the preceding CT. Small residual  subcutaneous emphysema in the right lateral chest wall. Redemonstrated small bilateral pleural effusions, slightly increased bilaterally  compared to the preceding chest CT, including a subpulmonic component of pleural  fluid on the right. Substantial interval decrease in previously noted right pneumothorax. There are  a few small scattered air bubbles in the small right pleural effusion, which is  suspect for tiny residual pneumothorax associated with the pleural effusion. In  the appropriate clinical context, a question of empyema could not be excluded by  CT. Assessment is limited by lack of IV contrast. Clinical correlation might be  helpful such as regarding the output of recent drainage from chest tube. Redemonstrated large confluent mass-like opacification of the right lower lung  involving the entire right lower lobe, without air bronchograms in the right  lower lobe. There is also confluent opacification of the entire right middle  lobe distal to the right middle lobe bronchus. Assessment of the underlying lung  parenchyma is limited by lack of IV contrast. Difficult to distinguish to what  extent this reflects malignancy versus potentially postobstructive atelectasis  and underlying pneumonia. Clinical correlation might be helpful such as  regarding findings at bronchoscopy as clinically warranted. The lung with some  residual aeration in the right upper lobe demonstrates extensive interstitial  and airspace disease. Findings of pulmonary emphysematous disease are noted with  subpleural cystic spaces best seen at the right apex.     Trace pericardial effusion. Mediastinal lymphadenopathy again noted with largest confluent opacity in the  subcarinal mediastinum contiguous with the right lower lung process. Multiple small pulmonary nodules again seen in the left lung. Previously  described pulmonary nodules in the right lung are poorly evaluated on obscured  by the more confluent extensive disease throughout the right lung. Redemonstrated lytic destructive lesion in the left sixth rib posterior  laterally with associated soft tissue component mass along the pleura. Redemonstrated is irregular lytic destructive lesion in the lateral right sixth  rib. Impression  Interval removal of right chest tube since the preceding CT of 2/27/2023. Substantial interval decrease in previously noted right pneumothorax compared to  the prior CT. Few small air bubbles at this time associated with right pleural  effusion in keeping with tiny residual pneumothorax. A question of infection  cannot be excluded by CT as discussed above. Subtotal opacification of the right lung, with complete confluent mass-like  consolidation of the right lower and middle lobes again seen as described and  extensive interstitial and airspace opacity in the residually partially aerated  right upper lobe. Previously described known lytic destructive lesions involving the sixth rib  bilaterally, with associated soft tissue mass along the pleura on the left, are  again seen. Small right pleural effusions, right slightly larger than left, slightly  increased bilaterally compared to the prior CT, including a subpulmonic  component of pleural fluid on the right. Multiple left pulmonary nodules and mediastinal adenopathy again noted.       Discharge Medications:     Current Discharge Medication List        START taking these medications    Details   ipratropium-albuterol (DUONEB) 0.5-2.5 (3) MG/3ML SOLN nebulizer solution Inhale 3 mLs into the lungs every 4 hours as needed for Shortness of Breath  Qty: 60 each, Refills: 0      gabapentin (NEURONTIN) 100 MG capsule Take 1 capsule by mouth nightly for 30 days. Max Daily Amount: 100 mg  Qty: 30 capsule, Refills: 0    Associated Diagnoses: Primary malignant neoplasm of right lung metastatic to other site (HCC)      oxyCODONE (ROXICODONE) 5 MG immediate release tablet Take 1 tablet by mouth every 6 hours as needed for Pain for up to 3 days. Max Daily Amount: 20 mg  Qty: 12 tablet, Refills: 0    Comments: Reduce doses taken as pain becomes manageable  Associated Diagnoses: Primary malignant neoplasm of right lung metastatic to other site (HCC)      metoprolol tartrate (LOPRESSOR) 50 MG tablet Take 1 tablet by mouth 2 times daily  Qty: 60 tablet, Refills: 0      lidocaine 4 % external patch Place 1 patch onto the skin daily Apply patch to right chest.  Patch may remain in place for up to 12 hours in any 24 hour period. Qty: 30 each, Refills: 0      polyethylene glycol (GLYCOLAX) 17 g packet Take 17 g by mouth daily as needed for Constipation  Qty: 527 g, Refills: 0           CONTINUE these medications which have CHANGED    Details   levoFLOXacin (LEVAQUIN) 750 MG tablet Take 1 tablet by mouth every 48 hours for 2 doses  Qty: 2 tablet, Refills: 0           CONTINUE these medications which have NOT CHANGED    Details   acetaminophen (TYLENOL) 325 MG tablet Take 650 mg by mouth every 6 hours as needed      ibuprofen (ADVIL;MOTRIN) 400 MG tablet Take 600 mg by mouth every 6 hours as needed           It is important that you take the medication exactly as they are prescribed. Keep your medication in the bottles provided by the pharmacist and keep a list of the medication names, dosages, and times to be taken in your wallet. Do not take other medications without consulting your doctor.      DIET:  regular diet    ACTIVITY: activity as tolerated  Home health care for Skilled care for COPD, Hypertension and medication management       PT/OT consult      ADDITIONAL INFORMATION: If you experience any of the following symptoms but not limited to Fever, chills, nausea, vomiting, diarrhea, change in mentation, falling, bleeding, shortness of breath, chest pain, please call your primary care physician or return to the emergency room if you cannot get hold of your doctor:     FOLLOW UP CARE:  PCP in 5-7 days. Please call and set up an appointment. Dr. Brodie Whyte, oncology in 1 week  Dr. Mile Hudson, pulmonary in 2 week  Dr. Nikhil Disla, [facility] in 1 week    Minutes spent on discharge: 40 minutes spent coordinating this discharge (review instructions/follow-up, prescriptions, preparing report for sign off)    Breana Peguero MD  3/5/2023 12:48 PM    Disclaimer: Sections of this note are dictated using utilizing voice recognition software. Minor typographical errors may be present. If questions arise, please do not hesitate to contact me or call our department.

## 2023-03-05 NOTE — DISCHARGE INSTRUCTIONS
Discharge Instructions    Patient: Armen Fitzgerald MRN: [id number]  CSN: [id number]    YOB: 1950  Age: 71 y.o. Sex: female    DOA: 2/26/2023       DIET:  regular diet    ACTIVITY: activity as tolerated  Home health care for Skilled care for COPD, Hypertension and medication management       PT/OT consult      ADDITIONAL INFORMATION: If you experience any of the following symptoms but not limited to Fever, chills, nausea, vomiting, diarrhea, change in mentation, falling, bleeding, shortness of breath, chest pain, please call your primary care physician or return to the emergency room if you cannot get hold of your doctor:     FOLLOW UP CARE:  PCP in 5-7 days. Please call and set up an appointment.   Dr. Sven Bhat, oncology in 1 week  Dr. Effie Rowe, pulmonary in 2 week  Dr. Valentino Sings, [address] Surgery in 1 week    Steffanie Weems MD  3/5/2023 12:46 PM

## 2023-03-05 NOTE — PROGRESS NOTES
0715: Bedside shift change report given to Jordan Malloy RN (oncoming nurse) by Mariaa Wood (offgoing nurse). Report included the following information Nurse Handoff Report, Adult Overview, Intake/Output, MAR, and Cardiac Rhythm NSR .     1200: MD at bedside. 1225: Spoke with  in regard to update on patient's home O2 being delivered. Currently, there isn't a ETA on delivery. 1615: Pt discharged to home. Home o2 was delivered to unit and at home. Pt was educated on prescribed medications as well as follow up appointments.

## 2023-03-05 NOTE — CARE COORDINATION
Discharge order noted for today. Pt has been accepted to [facility] agency. Spoke with patient and she is  agreeable to the transition plan today. Transport has been arranged through patient's friend. Patient's discharge summary and home health  orders have been forwarded to [address] [place] [address] health  agency via [facility] Patient is waiting for Home Portable Oxygen delivery for discharge home today.  Discharge information has been documented on the AVS.             Catrachita Cheema RN  Case Management [phone]

## 2023-03-05 NOTE — CARE COORDINATION
BRENT spoke with Kathy with [facility], and updated that patient is to discharge today, once Home Portable Oxygen is delivered.            Catrachita Cheema RN  Case Management [phone]

## 2023-03-05 NOTE — CARE COORDINATION
BRENT spoke with Dr. Miller Swanson, asked to see why Home Oxygen needs to wait until Monday. BRENT messaged Elaine Ernandez with [facility] in [facility], and let her know that Doctor asking why Portable Oxygen cannot be delivered today, and has to wait until Monday.            Edd Foreman, RN  Case Management [phone]

## 2023-03-05 NOTE — CARE COORDINATION
BRENT messaged [facility] in [facility], checking on status of Home Portable Oxygen delivery to the hospital for discharge for today.              Rick Tucker RN  Case Management [phone]

## 2023-03-05 NOTE — PLAN OF CARE
Problem: Discharge Planning  Goal: Discharge to home or other facility with appropriate resources  3/5/2023 1012 by Anabelle George RN  Outcome: Progressing  3/4/2023 2345 by Brian Hanson RN  Outcome: Progressing     Problem: Pain  Goal: Verbalizes/displays adequate comfort level or baseline comfort level  3/5/2023 1012 by Anabelle George RN  Outcome: Toshia Guevaraish  3/4/2023 2345 by Brian Hanson RN  Outcome: Progressing     Problem: Skin/Tissue Integrity  Goal: Absence of new skin breakdown  Description: 1. Monitor for areas of redness and/or skin breakdown  2. Assess vascular access sites hourly  3. Every 4-6 hours minimum:  Change oxygen saturation probe site  4. Every 4-6 hours:  If on nasal continuous positive airway pressure, respiratory therapy assess nares and determine need for appliance change or resting period.   3/5/2023 1012 by Anabelle George RN  Outcome: Progressing  3/4/2023 2345 by Brian Hanson RN  Outcome: Progressing     Problem: Safety - Adult  Goal: Free from fall injury  3/5/2023 1012 by Anabelle George RN  Outcome: Progressing  3/4/2023 2345 by Brian Hanson RN  Outcome: Progressing     Problem: Nutrition Deficit:  Goal: Optimize nutritional status  3/5/2023 1012 by Anabelle George RN  Outcome: Progressing  3/4/2023 2345 by Brian Hanson RN  Outcome: Progressing     Problem: ABCDS Injury Assessment  Goal: Absence of physical injury  3/5/2023 1012 by Anabelle George RN  Outcome: Progressing  3/4/2023 2345 by Brian Hanson RN  Outcome: Progressing

## 2023-03-05 NOTE — CARE COORDINATION
Elaine Samples with [facility] messaged CM in [facility], that Home Portable Oxygen to be delivered today to hospital for discharge, and Concentrator to be delivered tomorrow. ETA is unknown. Ruby Montoya and updated.                Danette Bardales, RN  Case Management [phone]

## 2023-03-06 RX ORDER — LIDOCAINE 4 G/G
1 PATCH TOPICAL DAILY
Qty: 30 EACH | Refills: 0 | Status: SHIPPED | OUTPATIENT
Start: 2023-03-06 | End: 2023-04-05

## 2023-03-06 RX ORDER — IPRATROPIUM BROMIDE AND ALBUTEROL SULFATE 2.5; .5 MG/3ML; MG/3ML
3 SOLUTION RESPIRATORY (INHALATION) EVERY 4 HOURS PRN
Qty: 60 EACH | Refills: 0 | Status: SHIPPED | OUTPATIENT
Start: 2023-03-06

## 2023-03-06 RX ORDER — METOPROLOL TARTRATE 50 MG/1
50 TABLET, FILM COATED ORAL 2 TIMES DAILY
Qty: 60 TABLET | Refills: 0 | Status: SHIPPED | OUTPATIENT
Start: 2023-03-06 | End: 2023-04-05

## 2023-03-06 RX ORDER — POLYETHYLENE GLYCOL 3350 17 G/17G
17 POWDER, FOR SOLUTION ORAL DAILY PRN
Qty: 527 G | Refills: 0 | Status: SHIPPED | OUTPATIENT
Start: 2023-03-06 | End: 2023-04-05

## 2023-03-06 RX ORDER — LEVOFLOXACIN 750 MG/1
750 TABLET ORAL
Qty: 2 TABLET | Refills: 0 | Status: SHIPPED | OUTPATIENT
Start: 2023-03-06 | End: 2023-03-09

## 2023-03-06 NOTE — PROGRESS NOTES
Received call from patient's daughter stating that pt is having trouble getting her prescriptions filled and needs them sent to the [facility] on [address]. Dr. Jean-Paul Garcia updated via [employer]. Daughter also stating that they are not sure how to work the oxygen. Sent message through [email or web address] to [email or web address] to please reach out and assist patient.     [facility] RN RENAY  Case Management

## 2023-03-07 ENCOUNTER — HOME CARE VISIT (OUTPATIENT)
Age: 73
End: 2023-03-07
Payer: MEDICAID

## 2023-03-07 ENCOUNTER — HOME CARE VISIT (OUTPATIENT)
Age: 73
End: 2023-03-07

## 2023-03-07 VITALS
SYSTOLIC BLOOD PRESSURE: 126 MMHG | TEMPERATURE: 98.2 F | RESPIRATION RATE: 16 BRPM | OXYGEN SATURATION: 96 % | DIASTOLIC BLOOD PRESSURE: 64 MMHG | HEART RATE: 87 BPM

## 2023-03-07 PROCEDURE — G0151 HHCP-SERV OF PT,EA 15 MIN: HCPCS

## 2023-03-07 PROCEDURE — G0299 HHS/HOSPICE OF RN EA 15 MIN: HCPCS

## 2023-03-07 ASSESSMENT — ENCOUNTER SYMPTOMS: PAIN LOCATION - PAIN QUALITY: ACHING

## 2023-03-07 NOTE — HOME HEALTH
Pt is referred to home care after acute admission with Dx COPD and lung CA  PMHx: Possible sepsis due to pleural fluid    Right pleural effusion post chest tube placement by ED, chest tube removed by CT surgery on 3/2/2023    Metastatic lung cancer    History of CVA    Active tobacco abuse    Anxiety disorder    Sinus tachycardia     SUBJECTIVE: \"I just got a little comfortable, I've been in a lot of pain\"  LIVING SITUATION/PLOF: Pt lives with a friend in one level apt. Currently she is sleeping on a fold out bed in the living room. PTA pt was I with mobility and ADL's w/o AD  CAREGIVER INVOLVEMENT/ ASSISTANCE NEEDED FOR: Transportation, meal prep, med management, mobility, ADL's  MEDICATIONS REVIEWED AND UPDATED: Med rec done by SN just prior to PT arrival.    ROM: EHSAN MARTINO's WFL  STRENGTH: EHSAN MARTINO's grossly 3+,4/5  BED MOBILITY: I supine<>sit and rolling  TRANSFERS: Pt declined to transfer OOB due to concern it would increase her pain. Her CG reports pt needs CG for sit<>stand from low sofa bed and toilet  GAIT: Pt declined to amb this visit. Her CG reports she needs CG to amb in the apt to/from bathroom w/o AD. Pt has a new FWW but there is not room to use it in the apt. STAIRS: NA  BALANCE: Unable to assess balance this visit    PATIENT EDUCATION PROVIDED THIS VISIT: safety, HEP, walking, deep breathing    PATIENT RESPONSE TO EDUCATION PROVIDED: Pt verbalizes understanding of all information and demo's back as appropriate  PATIENT RESPONSE TO EVALUATION/TREATMENT: Pt demo'd limited participation with PT and no report of increased pain and with stable vitals    HEP consisting of:  1. Walking every hour during the day with CG  2. Supine: ankle pumps, heel slides. Seated: hip flexion, LAQ x 10 2x/day  3. Deep breathing   Patient/caregiver verbalized understanding but will need reinforcement for consistency.     ASSESSMENT AND CONTINUED NEED FOR THE FOLLOWING SKILLS: Pt demo's limited participation with PT this visit due concerns for increasing pain. She states she does want PT and that she will participate fully but just is fatigued and in pain today. Pt presents with: decreased strength, impaired gait, decreased transfer status, decreased endurance, decreased balance and decreased safety, increased pain. HHPT is medically necessary in order to improve functional mobility/quality of life, decrease burden of care, reduce risk for re-hospitalization, work towards patient's personal goals of return to PLOF w decrease risk for falls. Goals established for increased independence in the home, safe mobility in the home, improvement in strength and ROM - all designed to reduce fall risk and progress toward independence. Patient will benefit from continued PT intervention to progress toward meeting all established goals     PLAN: PT 2W2 1W1 to establish and upgrade HEP, gait training with the least restrictive A DEV on flat and uneven surfaces, transfer training, stair training, dynamic standing balance re -education. Reinforece general safety precautions.       DISCHARGE PLANNING DISCUSSED: Discharge to self and family under MD supervision once all goals have been met or patient has reached max potential.

## 2023-03-07 NOTE — Clinical Note
ASSESSMENT AND CONTINUED NEED FOR THE FOLLOWING SKILLS: Pt shahrzad's limited participation with PT this visit due concerns for increasing pain. She states she does want PT and that she will participate fully but just is fatigued and in pain today. Pt presents with: decreased strength, impaired gait, decreased transfer status, decreased endurance, decreased balance and decreased safety, increased pain. HHPT is medically necessary in order to improve functional mobility/quality of life, decrease burden of care, reduce risk for re-hospitalization, work towards patient's personal goals of return to PLOF w decrease risk for falls. Goals established for increased independence in the home, safe mobility in the home, improvement in strength and ROM - all designed to reduce fall risk and progress toward independence. Patient will benefit from continued PT intervention to progress toward meeting all established goals     PLAN: PT 2W2 1W1 to establish and upgrade HEP, gait training with the least restrictive A DEV on flat and uneven surfaces, transfer training, stair training, dynamic standing balance re -education. Reinforece general safety precautions.       DISCHARGE PLANNING DISCUSSED: Discharge to self and family under MD supervision once all goals have been met or patient has reached max potential.

## 2023-03-09 ENCOUNTER — HOME CARE VISIT (OUTPATIENT)
Age: 73
End: 2023-03-09
Payer: MEDICAID

## 2023-03-09 VITALS
RESPIRATION RATE: 16 BRPM | DIASTOLIC BLOOD PRESSURE: 70 MMHG | HEART RATE: 78 BPM | OXYGEN SATURATION: 94 % | SYSTOLIC BLOOD PRESSURE: 110 MMHG | TEMPERATURE: 97.2 F

## 2023-03-09 VITALS
HEART RATE: 96 BPM | RESPIRATION RATE: 16 BRPM | OXYGEN SATURATION: 94 % | SYSTOLIC BLOOD PRESSURE: 110 MMHG | DIASTOLIC BLOOD PRESSURE: 64 MMHG | TEMPERATURE: 97.9 F

## 2023-03-09 PROCEDURE — G0157 HHC PT ASSISTANT EA 15: HCPCS

## 2023-03-09 ASSESSMENT — ENCOUNTER SYMPTOMS
PAIN LOCATION - PAIN QUALITY: ACHE, DULL
PAIN LOCATION - PAIN QUALITY: ACHE

## 2023-03-09 NOTE — HOME HEALTH
Subjective: I'm losing weight, I have no appetite. I'm waiting for them to fill my prescription to improve my appetite. Caregiver involvement: Pt's friend assists with household tasks, medication and care as needed    Medications reviewed and all medications available in the home this visit. Medications are somewhat effective at this time. fluctuations in pain     Patient education provided this visit: pain management, energy conservation, breathing exercises signs and symptoms of infection. Patient level of understanding of education provided: Pt requires further education. Skilled Care Performed this visit: see interventions. Patient response to procedure performed:  Pt easily fatigued with all activity. Patient's Progress towards personal goals: Pt reports no falls. She tolerated sitting EOB for 10 minutes and practiced breathing exercises with incentive spirometer. VC for correct technique with incentive spirometer. Pt independent with supine <-> sit, but fatigues quickly sitting unsupported EOB. Continued need for the following skills: HHPT medically necessary to address decreased LE strength, decreased mobility and increased risk of falls    Plan for next visit: amb    The following discharge planning was discussed with the pt/caregiver: d/c HHPT in 3 more visits.

## 2023-03-10 NOTE — HOME HEALTH
Skilled services/Home bound verification:     Skilled Reason for admission/summary of clinical condition:  COPD. This patient is homebound for the following reasons Requires considerable and taxing effort to leave the home . Caregiver: friend. Caregiver assists with Agnieszka Gan MD APPTS. Rishi Sanchez Medications reconciled and all medications are available in the home this visit. The following education was provided regarding medications: TAUGHT S/S OF EXACERBATION OF COPD. Medications  are EFFECTIVEat this time. High risk medication teaching regarding anticoagulants, hyperglycemic agents or opiod narcotics performed (specify) OPIOIDS, USEAGE AND SIDE EFFECTS.    notified of any discrepancies/look a like medications/medication interactions  NA    Home health supplies by type and quantity ordered/delivered this visit include: NA    Patient education provided this visit to include: SEE NSG INTERVENTIONS, EDUCATED PATIENT ON COPD S/S. PATIENT SUFFERS ALOT OF PAIN FROM A LIVER BIOPSY DONE PREVIOUSLY. Patient level of understanding of education provided: GOOD, VERBALIZED UNDERSTANDING. Sharps Education Provided:YES  Patient response to procedure performed: PROGRESSING. Home exercise program/Homework provided: DEEP BREATHING TO DECREASE CHANCE OF PNEUMONIA. Pt/Caregiver instructed on plan of care and are agreeable to plan of care at this time. Physician  Radhika Mckinney., APRN - NP    notified of patient admission to home health and plan of care including anticipated frequency of 1W4, 2PRN and treatments/interventions/modalities of TEACHING ON COPD. Discharge planning discussed with patient and caregiver. Discharge planning as follows: PATIENT WILL BE DISCHARGED WHEN GOALS ARE MET AND PATIENT IS STABLE. Rishi Sanchez Pt/Caregiver did verbalize understanding of discharge planning. Next MD appointment 3-13-23 (date) with  Radhika Roque APRN - NP    .   Patient/caregiver encouraged/instructed to keep appointment as lack of follow through with physician appointment could result in discontinuation of home care services for non-compliance.

## 2023-03-12 ENCOUNTER — HOME CARE VISIT (OUTPATIENT)
Age: 73
End: 2023-03-12
Payer: MEDICAID

## 2023-03-13 ENCOUNTER — OFFICE VISIT (OUTPATIENT)
Dept: CARDIOTHORACIC SURGERY | Age: 73
End: 2023-03-13
Payer: MEDICAID

## 2023-03-13 VITALS
BODY MASS INDEX: 25.2 KG/M2 | HEIGHT: 59 IN | TEMPERATURE: 99.5 F | WEIGHT: 125 LBS | HEART RATE: 125 BPM | SYSTOLIC BLOOD PRESSURE: 128 MMHG | OXYGEN SATURATION: 87 % | DIASTOLIC BLOOD PRESSURE: 70 MMHG

## 2023-03-13 DIAGNOSIS — Z93.8 S/P CHEST TUBE PLACEMENT (HCC): Primary | ICD-10-CM

## 2023-03-13 PROCEDURE — 99213 OFFICE O/P EST LOW 20 MIN: CPT | Performed by: PHYSICIAN ASSISTANT

## 2023-03-13 PROCEDURE — 1123F ACP DISCUSS/DSCN MKR DOCD: CPT | Performed by: PHYSICIAN ASSISTANT

## 2023-03-13 NOTE — PROGRESS NOTES
Pt comes to clinic for suture removal s/p ER chest tube placement and hospitalization. She c/o pain and requesting pain medication. RIGHT chest tube site clean, dry and healing. 3 silk sutures removed, benzoin and steri-strips applied. No follow up needed.      Magi Juan PA-C  [facility]  [phone]]

## 2023-03-14 ENCOUNTER — HOME CARE VISIT (OUTPATIENT)
Age: 73
End: 2023-03-14
Payer: MEDICAID

## 2023-03-14 VITALS
HEART RATE: 63 BPM | DIASTOLIC BLOOD PRESSURE: 76 MMHG | SYSTOLIC BLOOD PRESSURE: 116 MMHG | TEMPERATURE: 98 F | OXYGEN SATURATION: 92 %

## 2023-03-14 PROCEDURE — G0299 HHS/HOSPICE OF RN EA 15 MIN: HCPCS

## 2023-03-14 PROCEDURE — G0157 HHC PT ASSISTANT EA 15: HCPCS

## 2023-03-14 NOTE — HOME HEALTH
Subjective: I saw the doctor yesterday and they took out my stitches. Caregiver involvement: Pt home alone today, but her daughter and a friend that intermittently assists with care    Medications reviewed and all medications are available in the home this visit. Medications are effective at this time. no new medication. Patient education provided this visit: signs and symptoms of infection. fall prevention, energy conservation. Patient level of understanding of education provided: pt requires further education. Skilled Care Performed this visit: see intervention    Patient response to procedure performed:  Pt fatigued with all activity. Patient's Progress towards personal goals: Pt reports no falls. She demonstrated improved activity tolerance today vs last session. She remained sitting t/o session and was able to amb in living room and kitchen w/o AD. She states she used walker at doctor appointment yesterday. Pt requires encouragement to increase overall activity t/o day and gradually decreasing time spent OOB during the day. Continued need for the following skills: HHPT medically necessary to address decreased LE strength, decreased standing balance and increased risk of falls. Plan for next visit: amb    The following discharge planning was discussed with the pt/caregiver: d/c in 2 more visits.

## 2023-03-15 ENCOUNTER — HOSPITAL ENCOUNTER (EMERGENCY)
Facility: HOSPITAL | Age: 73
Discharge: LEFT BEFORE DISCHARGE | End: 2023-03-16
Attending: STUDENT IN AN ORGANIZED HEALTH CARE EDUCATION/TRAINING PROGRAM
Payer: MEDICAID

## 2023-03-15 ENCOUNTER — HOME CARE VISIT (OUTPATIENT)
Age: 73
End: 2023-03-15
Payer: MEDICAID

## 2023-03-15 ENCOUNTER — APPOINTMENT (OUTPATIENT)
Facility: HOSPITAL | Age: 73
End: 2023-03-15
Payer: MEDICAID

## 2023-03-15 VITALS
TEMPERATURE: 98.8 F | WEIGHT: 125 LBS | RESPIRATION RATE: 26 BRPM | DIASTOLIC BLOOD PRESSURE: 72 MMHG | BODY MASS INDEX: 25.2 KG/M2 | HEIGHT: 59 IN | HEART RATE: 124 BPM | OXYGEN SATURATION: 98 % | SYSTOLIC BLOOD PRESSURE: 103 MMHG

## 2023-03-15 DIAGNOSIS — A41.9 SEPTICEMIA (HCC): Primary | ICD-10-CM

## 2023-03-15 DIAGNOSIS — K13.79 PAIN IN ORAL CAVITY: ICD-10-CM

## 2023-03-15 PROCEDURE — 99285 EMERGENCY DEPT VISIT HI MDM: CPT

## 2023-03-15 PROCEDURE — 93005 ELECTROCARDIOGRAM TRACING: CPT | Performed by: STUDENT IN AN ORGANIZED HEALTH CARE EDUCATION/TRAINING PROGRAM

## 2023-03-15 PROCEDURE — 96365 THER/PROPH/DIAG IV INF INIT: CPT

## 2023-03-15 PROCEDURE — 71046 X-RAY EXAM CHEST 2 VIEWS: CPT

## 2023-03-15 ASSESSMENT — PAIN SCALES - GENERAL: PAINLEVEL_OUTOF10: 6

## 2023-03-15 ASSESSMENT — ENCOUNTER SYMPTOMS: PAIN LOCATION - PAIN QUALITY: SHARP

## 2023-03-15 ASSESSMENT — PAIN - FUNCTIONAL ASSESSMENT: PAIN_FUNCTIONAL_ASSESSMENT: 0-10

## 2023-03-16 ENCOUNTER — HOME CARE VISIT (OUTPATIENT)
Age: 73
End: 2023-03-16
Payer: MEDICAID

## 2023-03-16 VITALS
HEART RATE: 132 BPM | TEMPERATURE: 97 F | OXYGEN SATURATION: 97 % | RESPIRATION RATE: 16 BRPM | SYSTOLIC BLOOD PRESSURE: 122 MMHG | DIASTOLIC BLOOD PRESSURE: 80 MMHG

## 2023-03-16 LAB
ALBUMIN SERPL-MCNC: 2.7 G/DL (ref 3.4–5)
ALBUMIN/GLOB SERPL: 0.7 (ref 0.8–1.7)
ALP SERPL-CCNC: 260 U/L (ref 45–117)
ALT SERPL-CCNC: 15 U/L (ref 13–56)
ANION GAP SERPL CALC-SCNC: 9 MMOL/L (ref 3–18)
AST SERPL-CCNC: 36 U/L (ref 10–38)
BASOPHILS # BLD: 0 K/UL (ref 0–0.1)
BASOPHILS NFR BLD: 0 % (ref 0–2)
BILIRUB SERPL-MCNC: 1.6 MG/DL (ref 0.2–1)
BUN SERPL-MCNC: 20 MG/DL (ref 7–18)
BUN/CREAT SERPL: 17 (ref 12–20)
CALCIUM SERPL-MCNC: 9 MG/DL (ref 8.5–10.1)
CHLORIDE SERPL-SCNC: 102 MMOL/L (ref 100–111)
CO2 SERPL-SCNC: 27 MMOL/L (ref 21–32)
CREAT SERPL-MCNC: 1.19 MG/DL (ref 0.6–1.3)
DIFFERENTIAL METHOD BLD: ABNORMAL
EKG ATRIAL RATE: 127 BPM
EKG DIAGNOSIS: NORMAL
EKG P AXIS: 71 DEGREES
EKG P-R INTERVAL: 130 MS
EKG Q-T INTERVAL: 328 MS
EKG QRS DURATION: 68 MS
EKG QTC CALCULATION (BAZETT): 476 MS
EKG R AXIS: 69 DEGREES
EKG T AXIS: 59 DEGREES
EKG VENTRICULAR RATE: 127 BPM
EOSINOPHIL # BLD: 0.3 K/UL (ref 0–0.4)
EOSINOPHIL NFR BLD: 2 % (ref 0–5)
ERYTHROCYTE [DISTWIDTH] IN BLOOD BY AUTOMATED COUNT: 19.4 % (ref 11.6–14.5)
GLOBULIN SER CALC-MCNC: 3.7 G/DL (ref 2–4)
GLUCOSE SERPL-MCNC: 93 MG/DL (ref 74–99)
HCT VFR BLD AUTO: 24.8 % (ref 35–45)
HGB BLD-MCNC: 8.6 G/DL (ref 12–16)
IMM GRANULOCYTES # BLD AUTO: 0 K/UL (ref 0–0.04)
IMM GRANULOCYTES NFR BLD AUTO: 0 % (ref 0–0.5)
LACTATE SERPL-SCNC: 2 MMOL/L (ref 0.4–2)
LYMPHOCYTES # BLD: 1.7 K/UL (ref 0.9–3.6)
LYMPHOCYTES NFR BLD: 10 % (ref 21–52)
MCH RBC QN AUTO: 29.2 PG (ref 24–34)
MCHC RBC AUTO-ENTMCNC: 34.7 G/DL (ref 31–37)
MCV RBC AUTO: 84.1 FL (ref 78–100)
MONOCYTES # BLD: 1.4 K/UL (ref 0.05–1.2)
MONOCYTES NFR BLD: 8 % (ref 3–10)
NEUTS SEG # BLD: 13.5 K/UL (ref 1.8–8)
NEUTS SEG NFR BLD: 80 % (ref 40–73)
NRBC # BLD: ABNORMAL K/UL (ref 0–0.01)
NRBC BLD-RTO: ABNORMAL PER 100 WBC
PLATELET # BLD AUTO: 520 K/UL (ref 135–420)
PLATELET COMMENT: ABNORMAL
PMV BLD AUTO: 10 FL (ref 9.2–11.8)
POTASSIUM SERPL-SCNC: 3.3 MMOL/L (ref 3.5–5.5)
PROCALCITONIN SERPL-MCNC: 0.8 NG/ML
PROT SERPL-MCNC: 6.4 G/DL (ref 6.4–8.2)
RBC # BLD AUTO: 2.95 M/UL (ref 4.2–5.3)
RBC MORPH BLD: ABNORMAL
SODIUM SERPL-SCNC: 138 MMOL/L (ref 136–145)
TROPONIN I SERPL HS-MCNC: 127 NG/L (ref 0–54)
WBC # BLD AUTO: 16.9 K/UL (ref 4.6–13.2)

## 2023-03-16 PROCEDURE — 96360 HYDRATION IV INFUSION INIT: CPT

## 2023-03-16 PROCEDURE — 80053 COMPREHEN METABOLIC PANEL: CPT

## 2023-03-16 PROCEDURE — 96361 HYDRATE IV INFUSION ADD-ON: CPT

## 2023-03-16 PROCEDURE — 2580000003 HC RX 258: Performed by: HEALTH CARE PROVIDER

## 2023-03-16 PROCEDURE — 6370000000 HC RX 637 (ALT 250 FOR IP): Performed by: HEALTH CARE PROVIDER

## 2023-03-16 PROCEDURE — 96366 THER/PROPH/DIAG IV INF ADDON: CPT

## 2023-03-16 PROCEDURE — 84484 ASSAY OF TROPONIN QUANT: CPT

## 2023-03-16 PROCEDURE — 87040 BLOOD CULTURE FOR BACTERIA: CPT

## 2023-03-16 PROCEDURE — 87150 DNA/RNA AMPLIFIED PROBE: CPT

## 2023-03-16 PROCEDURE — 83605 ASSAY OF LACTIC ACID: CPT

## 2023-03-16 PROCEDURE — G0157 HHC PT ASSISTANT EA 15: HCPCS

## 2023-03-16 PROCEDURE — 96365 THER/PROPH/DIAG IV INF INIT: CPT

## 2023-03-16 PROCEDURE — 6360000002 HC RX W HCPCS: Performed by: HEALTH CARE PROVIDER

## 2023-03-16 PROCEDURE — 96368 THER/DIAG CONCURRENT INF: CPT

## 2023-03-16 PROCEDURE — 85025 COMPLETE CBC W/AUTO DIFF WBC: CPT

## 2023-03-16 PROCEDURE — 84145 PROCALCITONIN (PCT): CPT

## 2023-03-16 RX ORDER — IBUPROFEN 400 MG/1
400 TABLET ORAL
Status: COMPLETED | OUTPATIENT
Start: 2023-03-16 | End: 2023-03-16

## 2023-03-16 RX ORDER — SODIUM CHLORIDE, SODIUM LACTATE, POTASSIUM CHLORIDE, AND CALCIUM CHLORIDE .6; .31; .03; .02 G/100ML; G/100ML; G/100ML; G/100ML
30 INJECTION, SOLUTION INTRAVENOUS ONCE
Status: COMPLETED | OUTPATIENT
Start: 2023-03-16 | End: 2023-03-16

## 2023-03-16 RX ORDER — ACETAMINOPHEN 500 MG
1000 TABLET ORAL ONCE
Status: COMPLETED | OUTPATIENT
Start: 2023-03-16 | End: 2023-03-16

## 2023-03-16 RX ADMIN — PIPERACILLIN AND TAZOBACTAM 4500 MG: 4; .5 INJECTION, POWDER, FOR SOLUTION INTRAVENOUS at 04:36

## 2023-03-16 RX ADMIN — IBUPROFEN 400 MG: 400 TABLET ORAL at 02:04

## 2023-03-16 RX ADMIN — ACETAMINOPHEN 1000 MG: 500 TABLET ORAL at 02:03

## 2023-03-16 RX ADMIN — SODIUM CHLORIDE, SODIUM LACTATE, POTASSIUM CHLORIDE, AND CALCIUM CHLORIDE 1464 ML: 600; 310; 30; 20 INJECTION, SOLUTION INTRAVENOUS at 01:26

## 2023-03-16 ASSESSMENT — ENCOUNTER SYMPTOMS
DYSPNEA ACTIVITY LEVEL: AFTER AMBULATING 10 - 20 FT
DIARRHEA: 0
CHEST TIGHTNESS: 0
COUGH: 0
NAUSEA: 0
ABDOMINAL PAIN: 0
SHORTNESS OF BREATH: 0
VOMITING: 0

## 2023-03-16 NOTE — ED PROVIDER NOTES
EMERGENCY DEPARTMENT HISTORY AND PHYSICAL EXAM        Date: 3/15/2023  Patient Name: Bismark Beaver    History of Presenting Illness     Chief Complaint   Patient presents with    Dental Pain    Shortness of Breath       History Provided By: History obtained from patient    HPI: Bismark Beaver, 71 y.o. female PMHx significant for lung cancer recent diagnosis RLL, CVA with residual left eye droop, presents driven by friend to the ED with cc of dental pain. Patient states that she bit into a pork chop 3 hrs prior to arrival and experienced intense pain in upper left mouth. Patient does not have any teeth. She reports putting hydrogen peroxide in her mouth and that some blood came out. Previous ED note 2/5/23: medical history of right lower lobe lung mass biopsied January 2023 positive for adenocarcinoma  Patient apparently has been on Levaquin and Augmentin for her  postobstructive pneumonia. While she was hospitalized a rib biopsy was performed that is positive for adenocarcinoma. Hospitalized February 22 to March 5, 2023 with pleural effusion, possible sepsis, chest tube placed and removed. Patient is full code status per discussion in ED. Tachycardia at 134 in triage. Patient does not endorse chest pain or palpitations. Not short of breath when on supplemental oxygen. Patient denies nausea, vomiting, fever, chills, diarrhea, leg pain. There are no other complaints, changes, or physical findings at this time. PCP: ALEJANDRO Tenorio NP    No current facility-administered medications on file prior to encounter. Current Outpatient Medications on File Prior to Encounter   Medication Sig Dispense Refill    OXYGEN 3 L by Other route continuous.  nasal cannula      ipratropium-albuterol (DUONEB) 0.5-2.5 (3) MG/3ML SOLN nebulizer solution Inhale 3 mLs into the lungs every 4 hours as needed for Shortness of Breath 60 each 0    metoprolol tartrate (LOPRESSOR) 50 MG tablet Take 1 tablet by mouth 2 times daily 60 tablet 0    lidocaine 4 % external patch Place 1 patch onto the skin daily Apply patch to right chest.  Patch may remain in place for up to 12 hours in any 24 hour period. 30 each 0    polyethylene glycol (GLYCOLAX) 17 g packet Take 17 g by mouth daily as needed for Constipation (Patient not taking: Reported on 3/13/2023) 527 g 0    gabapentin (NEURONTIN) 100 MG capsule Take 1 capsule by mouth nightly for 30 days. Max Daily Amount: 100 mg 30 capsule 0    acetaminophen (TYLENOL) 325 MG tablet Take 650 mg by mouth every 6 hours as needed      ibuprofen (ADVIL;MOTRIN) 400 MG tablet Take 600 mg by mouth every 6 hours as needed           Past History     Past Medical History:  Past Medical History:   Diagnosis Date    Angina at rest [facility])     Anxiety     CVA, old, facial weakness 2012    left ptosis    Dental caries     Osteoarthritis     Osteopenia     Right lower lobe lung mass 04/15/2022    Offered Biopsy and couldn't decide between PAlliative Care and Biopsy       Past Surgical History:  Past Surgical History:   Procedure Laterality Date    CT BIOPSY PERCUTANEOUS SUPERFICIAL BONE  1/26/2023    CT BIOPSY PERCUTANEOUS SUPERFICIAL BONE 1/26/2023 [facility] RAD CT    WISDOM TOOTH EXTRACTION         Family History:  Family History   Problem Relation Age of Onset    Cancer Daughter         Unspecified Cancer    Cancer Cousin         Unspecified Cancer x2       Social History:  Social History     Tobacco Use    Smoking status: Some Days     Packs/day: 0.50     Types: Cigarettes    Smokeless tobacco: Never   Substance Use Topics    Alcohol use: Yes    Drug use: No       Allergies: Allergies   Allergen Reactions    Aspirin Nausea Only         Review of Systems   Review of Systems   Constitutional:  Negative for appetite change, fatigue and fever. HENT:  Positive for dental problem. Respiratory:  Negative for cough, chest tightness and shortness of breath.     Cardiovascular:  Negative for chest pain, palpitations and leg swelling. Gastrointestinal:  Negative for abdominal pain, diarrhea, nausea and vomiting. Genitourinary:  Negative for difficulty urinating, dysuria, flank pain and frequency. Skin:  Negative for rash. Neurological:  Negative for dizziness, facial asymmetry, weakness, light-headedness, numbness and headaches. Hematological:  Negative for adenopathy. Does not bruise/bleed easily. Psychiatric/Behavioral:  Negative for agitation, behavioral problems and confusion. Physical Exam   Physical Exam  Vitals and nursing note reviewed. Constitutional:       General: She is not in acute distress. Appearance: Normal appearance. She is not ill-appearing, toxic-appearing or diaphoretic. HENT:      Head: Normocephalic and atraumatic. Mouth/Throat:      Comments: Several white patches on gum lime exposed bone vs soft tissue (?). Area in left maxillary does not have surrounding abscess or erythema. There is no soft-tissue swelling in the mouth or throat. Cardiovascular:      Rate and Rhythm: Regular rhythm. Tachycardia present. Pulmonary:      Effort: Pulmonary effort is normal.      Breath sounds: Normal breath sounds. Musculoskeletal:         General: Normal range of motion. Cervical back: Normal range of motion and neck supple. Skin:     General: Skin is warm. Findings: No rash. Neurological:      General: No focal deficit present. Mental Status: She is alert and oriented to person, place, and time. Cranial Nerves: No cranial nerve deficit. Sensory: No sensory deficit. Motor: No weakness.        Diagnostic Study Results     Labs -     Recent Results (from the past 12 hour(s))   EKG 12 Lead    Collection Time: 03/15/23  9:48 PM   Result Value Ref Range    Ventricular Rate 127 BPM    Atrial Rate 127 BPM    P-R Interval 130 ms    QRS Duration 68 ms    Q-T Interval 328 ms    QTc Calculation (Bazett) 476 ms    P Axis 71 degrees    R Axis 69 degrees    T Axis 59 degrees    Diagnosis Sinus tachycardia    Lactate, Sepsis    Collection Time: 03/16/23  1:25 AM   Result Value Ref Range    Lactic Acid, Sepsis 2.0 0.4 - 2.0 MMOL/L   CBC with Auto Differential    Collection Time: 03/16/23  3:05 AM   Result Value Ref Range    WBC 16.9 (H) 4.6 - 13.2 K/uL    RBC 2.95 (L) 4.20 - 5.30 M/uL    Hemoglobin 8.6 (L) 12.0 - 16.0 g/dL    Hematocrit 24.8 (L) 35.0 - 45.0 %    MCV 84.1 78.0 - 100.0 FL    MCH 29.2 24.0 - 34.0 PG    MCHC 34.7 31.0 - 37.0 g/dL    RDW 19.4 (H) 11.6 - 14.5 %    Platelets 160 (H) 994 - 420 K/uL    MPV 10.0 9.2 - 11.8 FL    Nucleated RBCs ABNORMAL 0  WBC    nRBC ABNORMAL 0.00 - 0.01 K/uL    Seg Neutrophils 80 (H) 40 - 73 %    Lymphocytes 10 (L) 21 - 52 %    Monocytes 8 3 - 10 %    Eosinophils % 2 0 - 5 %    Basophils 0 0 - 2 %    Immature Granulocytes 0 0.0 - 0.5 %    Segs Absolute 13.5 (H) 1.8 - 8.0 K/UL    Absolute Lymph # 1.7 0.9 - 3.6 K/UL    Absolute Mono # 1.4 (H) 0.05 - 1.2 K/UL    Absolute Eos # 0.3 0.0 - 0.4 K/UL    Basophils Absolute 0.0 0.0 - 0.1 K/UL    Absolute Immature Granulocyte 0.0 0.00 - 0.04 K/UL    Differential Type MANUAL      Platelet Comment Increased Platelets      RBC Comment ANISOCYTOSIS  2+        RBC Comment POLYCHROMASIA  1+        RBC Comment TARGET CELLS  1+        RBC Comment POIKILOCYTOSIS  1+       Comprehensive Metabolic Panel    Collection Time: 03/16/23  3:05 AM   Result Value Ref Range    Sodium 138 136 - 145 mmol/L    Potassium 3.3 (L) 3.5 - 5.5 mmol/L    Chloride 102 100 - 111 mmol/L    CO2 27 21 - 32 mmol/L    Anion Gap 9 3.0 - 18 mmol/L    Glucose 93 74 - 99 mg/dL    BUN 20 (H) 7.0 - 18 MG/DL    Creatinine 1.19 0.6 - 1.3 MG/DL    Bun/Cre Ratio 17 12 - 20      Est, Glom Filt Rate 49 (L) >60 ml/min/1.73m2    Calcium 9.0 8.5 - 10.1 MG/DL    Total Bilirubin 1.6 (H) 0.2 - 1.0 MG/DL    ALT 15 13 - 56 U/L    AST 36 10 - 38 U/L    Alk Phosphatase 260 (H) 45 - 117 U/L    Total Protein 6.4 6.4 - 8.2 g/dL Albumin 2.7 (L) 3.4 - 5.0 g/dL    Globulin 3.7 2.0 - 4.0 g/dL    Albumin/Globulin Ratio 0.7 (L) 0.8 - 1.7     Troponin    Collection Time: 03/16/23  3:05 AM   Result Value Ref Range    Troponin, High Sensitivity 127 (HH) 0 - 54 ng/L   Procalcitonin    Collection Time: 03/16/23  3:05 AM   Result Value Ref Range    Procalcitonin 0.80 ng/mL       Radiologic Studies -   XR CHEST (2 VW)    (Results Pending)   CTA CHEST W WO CONTRAST    (Results Pending)         Medical Decision Making   I am the first provider for this patient. I reviewed the vital signs, available nursing notes, past medical history, past surgical history, family history and social history. Vital Signs-Reviewed the patient's vital signs. Vitals:    03/15/23 2352   BP: 103/72   Pulse: (!) 124   Resp: 26   Temp:    SpO2: 98%           EKG interpretation: (Preliminary)  Sinus tachycardia at 127 bpm without ST segment changes or T wave inversions. Records Reviewed: Home health, previous hospital notes    Provider Notes (Medical Decision Making):   71year old female with recent diagnosis of lung cancer presents to ED with dental pain. Dental Pain:Osteomyelitis a consideration, but onset was very acute and precipitated by trauma. Soft tissue infection of gum vs inflammation. Provided oral tylenol and ibuprofen in ED. Sepsis: Upon lab work-up she was found to have elevated WBC count and antibiotics initiated (Vanc, Zosyn). See note below. Possible sources include dental, pulmonary, unknown. CXR shows diffuse opacity in right lobes consistent with previous x-ray. Procal 0.8. Urinalysis pending at time of notewriting. Blood cultures collected. Tachycardia: Some improvement with IV fluids ongoing. Possible etiologies include septic shock, pulmonary embolism, others. CTA ordered to rule out pulmonary embolism, pending at time of note writing. AMI possible, EKG not concerning, troponin elevated at 126. Will repeat 2 hours.     Historical heart rates:  Home health visit yesterday heart rate was 63.  3/13/23 visit with CTS sp chest tube placement, she had pulse of 125 when not on oxygen with spo2 87%.      4:26 AM   Patient care will be transferred to Dr. Danielle Byrnes. Discussed available and pending diagnostic results, differential diagnosis being considered and care plan at length. Safer sign out form completed and patient was informed of changing providers. Is this patient to be included in the SEP-1 core measure due to severe sepsis or septic shock? Yes SEP-1 CORE MEASURE DATA      Sepsis Criteria   Severe Sepsis Criteria   Septic Shock Criteria       Must meet 2:    []Temp >100.9 F (38.3 C) or < 96.8 F (36 C)  [x]HR > 90  []RR > 20  [x]WBC > 12 or < 4 or 10% bands    AND:    [x] Infection Confirmed or Suspected. Must meet 1:    []Lactate > 2       or   []Signs of Organ Dysfunction:    - SBP < 90 or MAP < 65  -Creatinine > 2 or increased from baseline  -Urine Output < 0.5 ml/kg/hr  -Bilirubin > 2  -INR > 1.5 (not anticoagulated)  -Platelets < 197,475  -Acute Respiratory Failure as evidenced by new need for NIPPV or mechanical ventilation   Must meet 1:    []Lactate > 4        or   []SBP < 90 or MAP < 65 for at least two readings in the first hour after fluid bolus administration    []Vasopressors initiated (if hypotension persists after fluid resuscitation)   Patient Vitals for the past 6 hrs:   BP Pulse Resp SpO2   03/15/23 2139 -- -- -- 98 %   03/15/23 2345 -- -- -- 91 %   03/15/23 2352 103/72 (!) 124 26 98 %      Recent Labs     03/16/23  0305   WBC 16.9*   CREATININE 1.19   BILITOT 1.6*   *        Sepsis dx based on likely source of pulmonary infection vs dental vs unknown. Patient tachycardic, antibiotics initiated once WBC count returned as elevated.     Fluid Resuscitation Rationale: at least 30mL/kg based on entered actual weight at time of triage    Repeat lactate level: ordered and pending at this time    Reassessment Exam: patient mentating well, pulse decreased to 112 while fluids ongoing, BP stable. ED Course:        1. Septicemia ([address]    2. Pain in oral cavity        Orders Placed This Encounter   Medications    lactated ringers bolus    acetaminophen (TYLENOL) tablet 1,000 mg    ibuprofen (ADVIL;MOTRIN) tablet 400 mg    piperacillin-tazobactam (ZOSYN) 4,500 mg in sodium chloride 0.9 % 100 mL IVPB (mini-bag)     Order Specific Question:   Antimicrobial Indications     Answer:   Sepsis of Unknown Etiology    vancomycin (VANCOCIN) 1500 mg in sodium chloride 0.9% 500 mL IVPB     Order Specific Question:   Antimicrobial Indications     Answer:   Sepsis of Unknown Etiology          Medication List        ASK your doctor about these medications      acetaminophen 325 MG tablet  Commonly known as: TYLENOL     gabapentin 100 MG capsule  Commonly known as: NEURONTIN  Take 1 capsule by mouth nightly for 30 days. Max Daily Amount: 100 mg     ibuprofen 400 MG tablet  Commonly known as: ADVIL;MOTRIN     ipratropium-albuterol 0.5-2.5 (3) MG/3ML Soln nebulizer solution  Commonly known as: DUONEB  Inhale 3 mLs into the lungs every 4 hours as needed for Shortness of Breath     lidocaine 4 % external patch  Place 1 patch onto the skin daily Apply patch to right chest.  Patch may remain in place for up to 12 hours in any 24 hour period. metoprolol tartrate 50 MG tablet  Commonly known as: LOPRESSOR  Take 1 tablet by mouth 2 times daily     OXYGEN     polyethylene glycol 17 g packet  Commonly known as: GLYCOLAX  Take 17 g by mouth daily as needed for Constipation              No follow-up provider specified.           Chata Lubin PA-C  03/16/23 0082

## 2023-03-16 NOTE — HOME HEALTH
Skilled Reason for admission/summary of clinical condition:  [place]urell Effusion  This patient is homebound for the following reasons Requires considerable and taxing effort to leave the home .    Caregiver: friend.  Caregiver assists with COOKING , CLEANING AND TAKES PATIENT TO MD APPTS..    Medications reconciled and all medications are available in the home this visit.      The following education was provided regarding medications: Medications  are EFFECTIVEat this time.      High risk medication teaching regarding anticoagulants, hyperglycemic agents or opiod narcotics performed (specify) OPIOIDS, USEAGE AND SIDE EFFECTS.    notified of any discrepancies/look a like medications/medication interactions  NA    Home health supplies by type and quantity ordered/delivered this visit include: NA    Patient education provided this visit to include: SEE NSG INTERVENTIONS, EDUCATED PATIENT ON COPD AND PLEUERL  S/S.  O2 AT 3 LITERS VIA NASAL CANNULA.  PATIENT SUFFERS ALOT OF PAIN FROM SUTURES UNDER RT ARM. PLURELL EFFUSION. . steri strips intact at surgical site.  Patient level of understanding of education provided: GOOD, VERBALIZED UNDERSTANDING.    Sharps Education Provided:YES  Patient response to procedure performed: PROGRESSING.    Home exercise program/Homework provided: DEEP BREATHING TO DECREASE CHANCE OF PNEUMONIA.    Pt/Caregiver instructed on plan of care and are agreeable to plan of care at this time.  PATIENT WILL BE DISCHARGED WHEN GOALS ARE MET AND PATIENT IS STABLE.

## 2023-03-16 NOTE — ED NOTES
Pt refusing finger stick for POC glucose and second set of blood cultures. Pt states, \"you don't even know what you're doing. Is there another nurse that can do this? I don't need to be poked again. \"     Bernard Paredes RN  03/16/23 0131

## 2023-03-16 NOTE — CASE COMMUNICATION
OT has attempted to contact pt > x3 to schedule OT initial eval with no success. Please DC OT eval and treat orders due to not able to schedule with pt given multiple attempts.

## 2023-03-16 NOTE — ED NOTES
Pt refused vancomycin, stated she wanted to leave. Md is aware. Pt signed [place] [place] paperwork. Currently in waiting room waiting on ride. Provider seen pt and spoke with her about risk of leaving AMA.      [address] [place]  03/16/23 8996

## 2023-03-16 NOTE — ED NOTES
Pt brought in by family member who states that the patient has been SOB and he believes she has pneumonia. Pt c/o dental pain and a cough. She was recently admitted to the hospital and sent home with oxygen.  Pt did not bring it with her today and is unable to recall how much oxygen she is supposed to be on   RA sats 91%  placed on [address] [place] [address], RU[place]  03/15/23 0043

## 2023-03-17 VITALS
TEMPERATURE: 97.8 F | OXYGEN SATURATION: 98 % | SYSTOLIC BLOOD PRESSURE: 106 MMHG | DIASTOLIC BLOOD PRESSURE: 64 MMHG | HEART RATE: 98 BPM

## 2023-03-17 NOTE — CASE COMMUNICATION
Assessment and Summary of Care:  Patient's current functional status before discharge is as follows  Strength: FTSTS 22 sec with B UE support. upon completion 02 sats on 3L O2 96% and  bpm returned to 96 bpm after 5 minutes rest  Bed Mobility: supine <-> sit mod I  Transfers: sit <-> stand improved from requiring assistance from CG to now mod I. She uses B UE support for transition. Gait:Pt amb improved from requiring assistanc e from CG to now amb 120 feet in home w/o AD with supervision. Pt managing O2 tubing, but requires occ vc for safety with tubing to avoid tripping over O2 tubing with turns.   Recommendations: d/c HHPT next visit

## 2023-03-17 NOTE — HOME HEALTH
Subjective:  I missed my doctor appointment on Wednesday, I didn't have anyone to take me. Caregiver involvement: Pt home alone this visit. Medications reviewed and all medications are available in the home this visit. Medications are effective at this time. no new mediation    Patient education provided this visit: signs and symptoms of infection, pain management, fall prevention, energy conservation, O2 safety    Patient level of understanding of education provided: Pt requires further education    Skilled Care Performed this visit: see interventions    Patient response to procedure performed:  Pt fatigued at end of session. Patient's Progress towards personal goals: Pt reports no falls. She missed appointment with oncology on Wednesday, stating she didn't have a ride. Called oncology office and rescheduled pt for April 6 at 0 and she has and appointment with  at oncology office on March 28th at 0900. Asked pt if she can set up a ride for these appointments. Pt states she has a few friends that give her rides to appointments. Instructed pt to call them today to ensure she makes it to her appointment. Asked pt if I could notify her daughter about appointments, pt stated no, she would keep track of her own appointments. She has appointments listed on a paper in her home, but list is not accurate. Updated with today's appointment. Pt stating she has pain, asked if she took her medication. She states she doesn't have any tylenol. Showed pt bottle of tylenol sitting on the cabinet. Pt stated she was concerned about being evicted and has gone to court over her inability to pay her rent. Inquired if her daughter is aware af the situation. Pt stated she did not tell her daughter and does not want her daughter notified because her daughter has enough going on in her own life. Encouraged pt to inform her daughter of current situation.   Notified Damon PT, of pt's inconsistency with self care, forgetful nature and strong objections to notifying daughter of situation. Will contact PCP for possible orders for home health social work to see pt for recourses. Continued need for the following skills: HHPT medically necessary to address decreased LE strength, decreased standing balance and increased risk of falls    Plan for next visit: d/c HHPT next visit    The following discharge planning was discussed with the pt/caregiver: d/c HHPT next visit.

## 2023-03-18 LAB
ACCESSION NUMBER, LLC1M: ABNORMAL
ACINETOBACTER CALCOAC BAUMANNII COMPLEX BY PCR: NOT DETECTED
B FRAGILIS DNA BLD POS QL NAA+NON-PROBE: NOT DETECTED
BIOFIRE TEST COMMENT: ABNORMAL
C ALBICANS DNA BLD POS QL NAA+NON-PROBE: NOT DETECTED
C AURIS DNA BLD POS QL NAA+NON-PROBE: NOT DETECTED
C GATTII+NEOFOR DNA BLD POS QL NAA+N-PRB: NOT DETECTED
C GLABRATA DNA BLD POS QL NAA+NON-PROBE: NOT DETECTED
C KRUSEI DNA BLD POS QL NAA+NON-PROBE: NOT DETECTED
C PARAP DNA BLD POS QL NAA+NON-PROBE: NOT DETECTED
C TROPICLS DNA BLD POS QL NAA+NON-PROBE: NOT DETECTED
E CLOAC COMP DNA BLD POS NAA+NON-PROBE: NOT DETECTED
E COLI DNA BLD POS QL NAA+NON-PROBE: NOT DETECTED
E FAECALIS DNA BLD POS QL NAA+NON-PROBE: NOT DETECTED
E FAECIUM DNA BLD POS QL NAA+NON-PROBE: NOT DETECTED
ENTEROBACTERALES DNA BLD POS NAA+N-PRB: NOT DETECTED
GP B STREP DNA BLD POS QL NAA+NON-PROBE: NOT DETECTED
HAEM INFLU DNA BLD POS QL NAA+NON-PROBE: NOT DETECTED
K OXYTOCA DNA BLD POS QL NAA+NON-PROBE: NOT DETECTED
KLEBSIELLA SP DNA BLD POS QL NAA+NON-PRB: NOT DETECTED
KLEBSIELLA SP DNA BLD POS QL NAA+NON-PRB: NOT DETECTED
L MONOCYTOG DNA BLD POS QL NAA+NON-PROBE: NOT DETECTED
N MEN DNA BLD POS QL NAA+NON-PROBE: NOT DETECTED
P AERUGINOSA DNA BLD POS NAA+NON-PROBE: NOT DETECTED
PROTEUS SP DNA BLD POS QL NAA+NON-PROBE: NOT DETECTED
RESISTANT GENE TARGETS: ABNORMAL
S AUREUS DNA BLD POS QL NAA+NON-PROBE: NOT DETECTED
S AUREUS+CONS DNA BLD POS NAA+NON-PROBE: DETECTED
S EPIDERMIDIS DNA BLD POS QL NAA+NON-PRB: NOT DETECTED
S LUGDUNENSIS DNA BLD POS QL NAA+NON-PRB: NOT DETECTED
S MALTOPHILIA DNA BLD POS QL NAA+NON-PRB: NOT DETECTED
S MARCESCENS DNA BLD POS NAA+NON-PROBE: NOT DETECTED
S PNEUM DNA BLD POS QL NAA+NON-PROBE: NOT DETECTED
S PYO DNA BLD POS QL NAA+NON-PROBE: NOT DETECTED
SALMONELLA DNA BLD POS QL NAA+NON-PROBE: NOT DETECTED
STREPTOCOCCUS DNA BLD POS NAA+NON-PROBE: NOT DETECTED

## 2023-03-19 LAB
BACTERIA SPEC CULT: ABNORMAL
GRAM STN SPEC: ABNORMAL
GRAM STN SPEC: ABNORMAL
SERVICE CMNT-IMP: ABNORMAL

## 2023-03-20 ENCOUNTER — HOSPITAL ENCOUNTER (EMERGENCY)
Facility: HOSPITAL | Age: 73
Discharge: HOME | End: 2023-03-20
Attending: EMERGENCY MEDICINE
Payer: MEDICAID

## 2023-03-20 VITALS
HEIGHT: 60 IN | SYSTOLIC BLOOD PRESSURE: 94 MMHG | WEIGHT: 135 LBS | HEART RATE: 88 BPM | RESPIRATION RATE: 18 BRPM | OXYGEN SATURATION: 95 % | TEMPERATURE: 97.9 F | DIASTOLIC BLOOD PRESSURE: 72 MMHG | BODY MASS INDEX: 26.5 KG/M2

## 2023-03-20 DIAGNOSIS — B95.8 STAPHYLOCOCCUS INFECTION: ICD-10-CM

## 2023-03-20 DIAGNOSIS — R07.81 RIB PAIN ON RIGHT SIDE: Primary | ICD-10-CM

## 2023-03-20 LAB
ALBUMIN SERPL-MCNC: 2.7 G/DL (ref 3.4–5)
ALBUMIN/GLOB SERPL: 0.7 (ref 0.8–1.7)
ALP SERPL-CCNC: 296 U/L (ref 45–117)
ALT SERPL-CCNC: 17 U/L (ref 13–56)
ANION GAP SERPL CALC-SCNC: 7 MMOL/L (ref 3–18)
AST SERPL-CCNC: 47 U/L (ref 10–38)
BASOPHILS # BLD: 0.2 K/UL (ref 0–0.1)
BASOPHILS NFR BLD: 1 % (ref 0–2)
BILIRUB SERPL-MCNC: 2.1 MG/DL (ref 0.2–1)
BUN SERPL-MCNC: 15 MG/DL (ref 7–18)
BUN/CREAT SERPL: 16 (ref 12–20)
CALCIUM SERPL-MCNC: 9.8 MG/DL (ref 8.5–10.1)
CHLORIDE SERPL-SCNC: 102 MMOL/L (ref 100–111)
CO2 SERPL-SCNC: 30 MMOL/L (ref 21–32)
CREAT SERPL-MCNC: 0.91 MG/DL (ref 0.6–1.3)
DIFFERENTIAL METHOD BLD: ABNORMAL
EOSINOPHIL # BLD: 0.3 K/UL (ref 0–0.4)
EOSINOPHIL NFR BLD: 2 % (ref 0–5)
ERYTHROCYTE [DISTWIDTH] IN BLOOD BY AUTOMATED COUNT: 20.1 % (ref 11.6–14.5)
GLOBULIN SER CALC-MCNC: 3.9 G/DL (ref 2–4)
GLUCOSE SERPL-MCNC: 119 MG/DL (ref 74–99)
HCT VFR BLD AUTO: 27.1 % (ref 35–45)
HGB BLD-MCNC: 9.4 G/DL (ref 12–16)
IMM GRANULOCYTES # BLD AUTO: 0 K/UL (ref 0–0.04)
IMM GRANULOCYTES NFR BLD AUTO: 0 % (ref 0–0.5)
LIPASE SERPL-CCNC: 110 U/L (ref 73–393)
LYMPHOCYTES # BLD: 3.4 K/UL (ref 0.9–3.6)
LYMPHOCYTES NFR BLD: 21 % (ref 21–52)
MCH RBC QN AUTO: 29.7 PG (ref 24–34)
MCHC RBC AUTO-ENTMCNC: 34.7 G/DL (ref 31–37)
MCV RBC AUTO: 85.5 FL (ref 78–100)
MONOCYTES # BLD: 1 K/UL (ref 0.05–1.2)
MONOCYTES NFR BLD: 6 % (ref 3–10)
NEUTS SEG # BLD: 11.4 K/UL (ref 1.8–8)
NEUTS SEG NFR BLD: 70 % (ref 40–73)
NRBC # BLD: ABNORMAL K/UL (ref 0–0.01)
NRBC BLD-RTO: ABNORMAL PER 100 WBC
PLATELET # BLD AUTO: 394 K/UL (ref 135–420)
PLATELET COMMENT: ABNORMAL
PMV BLD AUTO: 10.5 FL (ref 9.2–11.8)
POTASSIUM SERPL-SCNC: 3.9 MMOL/L (ref 3.5–5.5)
PROT SERPL-MCNC: 6.6 G/DL (ref 6.4–8.2)
RBC # BLD AUTO: 3.17 M/UL (ref 4.2–5.3)
RBC MORPH BLD: ABNORMAL
SODIUM SERPL-SCNC: 139 MMOL/L (ref 136–145)
WBC # BLD AUTO: 16.3 K/UL (ref 4.6–13.2)
WBC MORPH BLD: ABNORMAL

## 2023-03-20 PROCEDURE — 83690 ASSAY OF LIPASE: CPT

## 2023-03-20 PROCEDURE — 85025 COMPLETE CBC W/AUTO DIFF WBC: CPT

## 2023-03-20 PROCEDURE — 80053 COMPREHEN METABOLIC PANEL: CPT

## 2023-03-20 PROCEDURE — 99283 EMERGENCY DEPT VISIT LOW MDM: CPT

## 2023-03-20 RX ORDER — ACETAMINOPHEN 325 MG/1
650 TABLET ORAL
Status: DISCONTINUED | OUTPATIENT
Start: 2023-03-20 | End: 2023-03-21 | Stop reason: HOSPADM

## 2023-03-20 ASSESSMENT — ENCOUNTER SYMPTOMS
SINUS PRESSURE: 0
ABDOMINAL PAIN: 0
NAUSEA: 0
SHORTNESS OF BREATH: 0
SINUS PAIN: 0
ABDOMINAL DISTENTION: 0
APNEA: 0
RHINORRHEA: 0
BACK PAIN: 0
CHEST TIGHTNESS: 0
CONSTIPATION: 0
DIARRHEA: 0
SORE THROAT: 0
FACIAL SWELLING: 0
VOMITING: 0
BLOOD IN STOOL: 0
EYES NEGATIVE: 1
TROUBLE SWALLOWING: 0
ANAL BLEEDING: 0

## 2023-03-20 NOTE — ED TRIAGE NOTES
Pt via wheelchair to triage c/o bilateral flank pain x2d. Denies fever or chills. Denies any urinary symptoms. Denies abdominal pain n/v/d. States she has not taken any medication for the pain.

## 2023-03-20 NOTE — ED PROVIDER NOTES
[id number]  [phone]    If symptoms worsen    ALEJANDRO Corrigan - ZACH  [address] [place] [address]  [phone]    In 1 week  As needed        Medication List        ASK your doctor about these medications      acetaminophen 325 MG tablet  Commonly known as: TYLENOL     gabapentin 100 MG capsule  Commonly known as: NEURONTIN  Take 1 capsule by mouth nightly for 30 days. Max Daily Amount: 100 mg     ibuprofen 400 MG tablet  Commonly known as: ADVIL;MOTRIN     ipratropium-albuterol 0.5-2.5 (3) MG/3ML Soln nebulizer solution  Commonly known as: DUONEB  Inhale 3 mLs into the lungs every 4 hours as needed for Shortness of Breath     lidocaine 4 % external patch  Place 1 patch onto the skin daily Apply patch to right chest.  Patch may remain in place for up to 12 hours in any 24 hour period. metoprolol tartrate 50 MG tablet  Commonly known as: LOPRESSOR  Take 1 tablet by mouth 2 times daily     OXYGEN     polyethylene glycol 17 g packet  Commonly known as: GLYCOLAX  Take 17 g by mouth daily as needed for Constipation               Dictation disclaimer:  Please note that this dictation was completed with [employer], the [employer] voice recognition software. Quite often unanticipated grammatical, syntax, homophones, and other interpretive errors are inadvertently transcribed by the computer software. Please disregard these errors. Please excuse any errors that have escaped final proofreading.           ALEJANDRO Vo NP  03/20/23 [id number]

## 2023-03-21 ENCOUNTER — HOME CARE VISIT (OUTPATIENT)
Age: 73
End: 2023-03-21
Payer: MEDICAID

## 2023-03-22 ENCOUNTER — HOME CARE VISIT (OUTPATIENT)
Age: 73
End: 2023-03-22
Payer: MEDICAID

## 2023-03-23 ENCOUNTER — HOME CARE VISIT (OUTPATIENT)
Age: 73
End: 2023-03-23
Payer: MEDICAID

## 2023-03-23 PROCEDURE — G0299 HHS/HOSPICE OF RN EA 15 MIN: HCPCS

## 2023-03-23 NOTE — Clinical Note
This nurse spoken to PCP Dr. Ramon Valadez and was advised that patient would need to be seen before any medications can be filled.  Patient zeferino was advised and appointment was made for March 28th at 11:00am.

## 2023-03-24 ENCOUNTER — HOME CARE VISIT (OUTPATIENT)
Age: 73
End: 2023-03-24
Payer: MEDICAID

## 2023-03-24 VITALS
RESPIRATION RATE: 20 BRPM | HEART RATE: 111 BPM | TEMPERATURE: 98 F | DIASTOLIC BLOOD PRESSURE: 73 MMHG | SYSTOLIC BLOOD PRESSURE: 110 MMHG | OXYGEN SATURATION: 94 %

## 2023-03-24 PROCEDURE — G0151 HHCP-SERV OF PT,EA 15 MIN: HCPCS

## 2023-03-24 ASSESSMENT — ENCOUNTER SYMPTOMS
PAIN LOCATION - PAIN QUALITY: ACHING, SHARP
DYSPNEA ACTIVITY LEVEL: AFTER AMBULATING LESS THAN 10 FT
STOOL DESCRIPTION: FORMED
COUGH CHARACTERISTICS: CONGESTED
COUGH: 1

## 2023-03-24 NOTE — Clinical Note
ASSESSMENT:  Pt is now living with her granddau in a second floor apartment. She needs CG for transfers and amb in the home and at least MOD A for stair negotiation. Pt will benefit from cont PT to increase I and safety in the home including home entry/exit. Her granddau is requesting a hospital bed. PT advised that DME requests will need to be signed off by MD/NP and will have to wait until pt is seen next week. PT will request additonal visits when pt is seen by Jose Coronel NP in order to cont strengthening, mobility training, balance training, and safety ed. PLAN:  Request additional PT visits from Jose Coronel NP.  7B7.

## 2023-03-24 NOTE — HOME HEALTH
of the most common breathing techniques are pursed-lip breathing and diaphragmatic breathing. Maintain healthy weight and well balanced diet. Drink at least 6-8 glasses of water throughout the day. Eat 4-6 small meals a day allows your diaphragm to move more easily and for you to breathe better. Eat complex carbohydrates, good sources of protein, and mono- and poly-unsaturated fats. Eat a variety of fruits and vegetables. Limit simple carbohydrates and sodium. Some patients may produce more mucous than others. Cough techniques may help remove mucous from airways. Practice the 4 Ps (Prioritize, Plan, Pace, and Position) to help you use your energy for what you want to do. Patient educated on oxygen risks to include don't smoke with oxygen, don't use petroleum based products, don't use toaster or electric razor. Don't burn candles or use fireplace. Keep oxygen stored upright, not near heat and not near moisture. Sharps education provided: n/a    Patient level of understanding of education provided: patient verbalized understanding but need additional education. Skilled Care Performed this visit:     Patient response to procedure performed:  tolerated assessment well. Agency Progress toward goals: Pt. is not progressing toward agency goals due to non-medication compliance. Patient's Progress towards personal goals: patient is progressing towards personal goals. Home exercise program: Patient instructed to follow exercises prescribed by hospital, get up every 2 hrs to ambulate and use bathroom, read COPD booklet. Weigh self daily, call dr if gain >2 lbs overnight or >5 lbs in a week. Continued need for the following skills: Nursing and Physical Therapy. Plan for next visit: Medication rec. and education     Patient and/or caregiver notified and agrees to changes in the Plan of Care: Yes. Unable to get patient to sign chart at this time.

## 2023-03-25 VITALS
TEMPERATURE: 97 F | HEART RATE: 91 BPM | RESPIRATION RATE: 18 BRPM | DIASTOLIC BLOOD PRESSURE: 76 MMHG | SYSTOLIC BLOOD PRESSURE: 118 MMHG | OXYGEN SATURATION: 95 %

## 2023-03-25 ASSESSMENT — ENCOUNTER SYMPTOMS: PAIN LOCATION - PAIN QUALITY: ACHING

## 2023-03-25 NOTE — HOME HEALTH
was \"exhausted\". Stairs at eval: NA  BALANCE: Pt scored 13/28 on Tinetti Balance Assessment placing her at high risk for falls. Balance at eval: Unable to assess balance this visit      PATIENT RESPONE TO TX: Pt demo'd positive response to PT shown by full participation w/o increased pain and with stable vitals   PATIENT EDUCATION PROVIDED THIS VISIT: safety, HEP, walking, deep breathing          PATIENT LEVEL OF UNDERSTANDING OF EDUCATION PROVIDED: Pt verbalizes understanding of all information and demo's back as appropriate    HEP consisting of:  amb every hour during the day. supine: AP, quad set, heel slides 15 reps  deep breathing exercises 10 reps/hour (2-3 every commercial)  Written HEP issued, patient/caregiver verbalized understanding. ASSESSMENT:  Pt is now living with her granddau in a second floor apartment. She needs CG for transfers and amb in the home and at least MOD A for stair negotiation. Pt will benefit from cont PT to increase I and safety in the home including home entry/exit. Her granddau is requesting a hospital bed. PT advised that DME requests will need to be signed off by MD/NP and will have to wait until pt is seen next week. PT will request additonal visits when pt is seen by [facility] NP in order to cont strengthening, mobility training, balance training, and safety ed. PLAN:  Request additional PT visits from Jayce Oden NP.  5Z1.

## 2023-03-27 ENCOUNTER — OFFICE VISIT (OUTPATIENT)
Age: 73
End: 2023-03-27

## 2023-03-27 VITALS — OXYGEN SATURATION: 92 % | HEART RATE: 124 BPM | TEMPERATURE: 97.1 F

## 2023-03-27 DIAGNOSIS — M25.562 CHRONIC PAIN OF LEFT KNEE: ICD-10-CM

## 2023-03-27 DIAGNOSIS — G89.29 CHRONIC PAIN OF LEFT KNEE: ICD-10-CM

## 2023-03-27 DIAGNOSIS — M17.12 UNILATERAL PRIMARY OSTEOARTHRITIS, LEFT KNEE: Primary | ICD-10-CM

## 2023-03-27 NOTE — PROGRESS NOTES
Patient present to office today for follow up on left knee. Patient was transferred by family member to office wheelchair for acute shortness of breath. Patient's granddaughter stated to me she was supposed to be nasal cannula oxygen but that she had ran out. Dyspneic at rest, 02 saturations maintained at 92% room air able to talk in short sentences. Nurse made provider aware. Maintained 02 monitor on patient throughout exam. Patient exited the exam room in wheelchair by granddaughter .
Patient present to office today for follow up on left knee. Patient was transferred by family member to office wheelchair for acute shortness of breath. Patient's granddaughter stated to me she was supposed to be nasal cannula oxygen but that she had ran out. Dyspneic at rest, 02 saturations maintained at 92% room air able to talk in short sentences. Nurse made provider aware. Maintained 02 monitor on patient throughout exam. Patient exited the exam room in wheelchair by granddaughter .
fracture or unable to bear weight, MRI is recommended. IMPRESSION:    Encounter Diagnoses   Name Primary? Unilateral primary osteoarthritis, left knee Yes    Chronic pain of left knee          PLAN:  After discussing treatment options, patient's left knee was injected with Gelsyn-3. I will see her back in one week for her Gelsyn-3 #2.

## 2023-03-28 ENCOUNTER — HOME CARE VISIT (OUTPATIENT)
Age: 73
End: 2023-03-28
Payer: MEDICAID

## 2023-03-30 ENCOUNTER — HOME CARE VISIT (OUTPATIENT)
Age: 73
End: 2023-03-30
Payer: MEDICAID
